# Patient Record
Sex: FEMALE | Race: WHITE | NOT HISPANIC OR LATINO | Employment: FULL TIME | URBAN - METROPOLITAN AREA
[De-identification: names, ages, dates, MRNs, and addresses within clinical notes are randomized per-mention and may not be internally consistent; named-entity substitution may affect disease eponyms.]

---

## 2017-02-21 ENCOUNTER — THERAPY VISIT (OUTPATIENT)
Dept: PHYSICAL THERAPY | Facility: CLINIC | Age: 52
End: 2017-02-21
Payer: COMMERCIAL

## 2017-02-21 ENCOUNTER — RADIANT APPOINTMENT (OUTPATIENT)
Dept: GENERAL RADIOLOGY | Facility: CLINIC | Age: 52
End: 2017-02-21
Attending: INTERNAL MEDICINE
Payer: COMMERCIAL

## 2017-02-21 ENCOUNTER — OFFICE VISIT (OUTPATIENT)
Dept: INTERNAL MEDICINE | Facility: CLINIC | Age: 52
End: 2017-02-21
Payer: COMMERCIAL

## 2017-02-21 VITALS
HEIGHT: 65 IN | TEMPERATURE: 98 F | WEIGHT: 212.6 LBS | SYSTOLIC BLOOD PRESSURE: 118 MMHG | DIASTOLIC BLOOD PRESSURE: 78 MMHG | OXYGEN SATURATION: 99 % | HEART RATE: 96 BPM | BODY MASS INDEX: 35.42 KG/M2

## 2017-02-21 DIAGNOSIS — M25.519 SHOULDER PAIN, UNSPECIFIED CHRONICITY, UNSPECIFIED LATERALITY: Primary | ICD-10-CM

## 2017-02-21 DIAGNOSIS — M75.42 IMPINGEMENT SYNDROME OF LEFT SHOULDER: Primary | ICD-10-CM

## 2017-02-21 DIAGNOSIS — Z13.6 CARDIOVASCULAR SCREENING; LDL GOAL LESS THAN 160: ICD-10-CM

## 2017-02-21 DIAGNOSIS — M79.673 PAIN OF FOOT, UNSPECIFIED LATERALITY: ICD-10-CM

## 2017-02-21 DIAGNOSIS — M25.519 SHOULDER PAIN, UNSPECIFIED CHRONICITY, UNSPECIFIED LATERALITY: ICD-10-CM

## 2017-02-21 DIAGNOSIS — Z12.11 COLON CANCER SCREENING: ICD-10-CM

## 2017-02-21 DIAGNOSIS — Z12.31 VISIT FOR SCREENING MAMMOGRAM: ICD-10-CM

## 2017-02-21 PROBLEM — R10.13 DYSPEPSIA: Status: ACTIVE | Noted: 2017-02-21

## 2017-02-21 PROCEDURE — 73030 X-RAY EXAM OF SHOULDER: CPT | Mod: LT

## 2017-02-21 PROCEDURE — 97161 PT EVAL LOW COMPLEX 20 MIN: CPT | Mod: GP | Performed by: PHYSICAL THERAPIST

## 2017-02-21 PROCEDURE — 97110 THERAPEUTIC EXERCISES: CPT | Mod: GP | Performed by: PHYSICAL THERAPIST

## 2017-02-21 PROCEDURE — 99204 OFFICE O/P NEW MOD 45 MIN: CPT | Performed by: INTERNAL MEDICINE

## 2017-02-21 RX ORDER — ACETAMINOPHEN 500 MG
500 TABLET ORAL EVERY 6 HOURS PRN
COMMUNITY
Start: 2012-12-26 | End: 2019-05-10

## 2017-02-21 NOTE — MR AVS SNAPSHOT
After Visit Summary   2/21/2017    Jayne Tyler    MRN: [id number]           Patient Information     Date Of Birth          1965        Visit Information        Provider Department      2/21/2017 6:20 AM Salo Simmons MD [facility]        Today's Diagnoses     Shoulder pain, unspecified chronicity, unspecified laterality    -  1    CARDIOVASCULAR SCREENING; LDL GOAL LESS THAN 160        Pain of foot, unspecified laterality        Visit for screening mammogram        Colon cancer screening           Follow-ups after your visit        Additional Services     GASTROENTEROLOGY ADULT REF PROCEDURE ONLY       Last Lab Result: No results found for: CR  Body mass index is 35.38 kg/(m^2).     Needed:  No  Language:  English    Patient will be contacted to schedule procedure.     Please be aware that coverage of these services is subject to the terms and limitations of your health insurance plan.  Call member services at your health plan with any benefit or coverage questions.  Any procedures must be performed at a [place] facility OR coordinated by your clinic's referral office.    Please bring the following with you to your appointment:    (1) Any X-Rays, CTs or MRIs which have been performed.  Contact the facility where they were done to arrange for  prior to your scheduled appointment.    (2) List of current medications   (3) This referral request   (4) Any documents/labs given to you for this referral            [facility] PT, HAND, AND CHIROPRACTIC REFERRAL       **This order will print in the [facility] Scheduling Office**    Physical Therapy, Hand Therapy and Chiropractic Care are available through:    *[facility]  *[facility]  *[facility]    Call one number to schedule at any of the above locations: [phone].    Your provider has referred you to: Physical Therapy at [facility] or [facility]    Indication/Reason  for Referral: Shoulder Pain  Onset of Illness: weeks++  Therapy Orders: Evaluate and Treat  Special Programs: None  Special Request: None    Solange Narvaez      Additional Comments for the Therapist or Chiropractor:     Please be aware that coverage of these services is subject to the terms and limitations of your health insurance plan.  Call member services at your health plan with any benefit or coverage questions.      Please bring the following to your appointment:    *Your personal calendar for scheduling future appointments  *Comfortable clothing            ORTHO  REFERRAL       [facility] is referring you to the Orthopedic  Services at [facility].       The  Representative will assist you in the coordination of your Orthopedic and Musculoskeletal Care as prescribed by your physician.    The  Representative will call you within 1 business day to help schedule your appointment, or you may contact the  Representative at:    All areas ~ [phone]     Type of Referral : Non Surgical       Timeframe requested: Within 2 weeks    Coverage of these services is subject to the terms and limitations of your health insurance plan.  Please call member services at your health plan with any benefit or coverage questions.      If X-rays, CT or MRI's have been performed, please contact the facility where they were done to arrange for , prior to your scheduled appointment.  Please bring this referral request to your appointment and present it to your specialist.                  Future tests that were ordered for you today     Open Future Orders        Priority Expected Expires Ordered    *MA Screening Digital Bilateral Routine 2/21/2017 3/31/2017 2/21/2017    XR Shoulder Left G/E 3 Views Routine 2/21/2017 3/31/2017 2/21/2017            Who to contact     If you have questions or need follow up information about today's clinic visit  "or your schedule please contact [facility] directly at [phone].  Normal or non-critical lab and imaging results will be communicated to you by MyChart, letter or phone within 4 business days after the clinic has received the results. If you do not hear from us within 7 days, please contact the clinic through [employer] or phone. If you have a critical or abnormal lab result, we will notify you by phone as soon as possible.  Submit refill requests through [employer] or call your pharmacy and they will forward the refill request to us. Please allow 3 business days for your refill to be completed.          Additional Information About Your Visit        [employer] Information     iCopyright lets you send messages to your doctor, view your test results, renew your prescriptions, schedule appointments and more. To sign up, go to [email or web address] . Click on \"Log in\" on the left side of the screen, which will take you to the Welcome page. Then click on \"Sign up Now\" on the right side of the page.     You will be asked to enter the access code listed below, as well as some personal information. Please follow the directions to create your username and password.     Your access code is: [email or web address]  Expires: 2017  6:42 AM     Your access code will  in 90 days. If you need help or a new code, please call your [place] clinic or [phone].        Care EveryWhere ID     This is your Care EveryWhere ID. This could be used by other organizations to access your [place] medical records  [id number]        Your Vitals Were     Pulse Temperature Height Last Period Pulse Oximetry Breastfeeding?    96 98  F (36.7  C) (Oral) 5' 5\" (1.651 m) (LMP Unknown) 99% No    BMI (Body Mass Index)                   35.38 kg/m2            Blood Pressure from Last 3 Encounters:   17 118/78    Weight from Last 3 Encounters:   17 212 lb 9.6 oz (96.4 kg)              We Performed the Following     " GASTROENTEROLOGY ADULT REF PROCEDURE ONLY     [facility], HAND, AND CHIR[facility]RACTIC REFERRAL     ORTHO  REFERRAL        Primary Care Provider    None Doctor, MD       No address on file        Thank you!     Thank you for choosing [facility]  for your care. Our goal is always to provide you with excellent care. Hearing back from our patients is one way we can continue to improve our services. Please take a few minutes to complete the written survey that you may receive in the mail after your visit with us. Thank you!             Your Updated Medication List - Protect others around you: Learn how to safely use, store and throw away your medicines at www.disposemymeds.org.          This list is accurate as of: 2/21/17  6:42 AM.  Always use your most recent med list.                   Brand Name Dispense Instructions for use    acetaminophen 500 MG tablet    TYLENOL     Take 500 mg by mouth every 6 hours as needed       ranitidine 150 MG tablet    ZANTAC     Take 300 mg by mouth 2 times daily

## 2017-02-21 NOTE — MR AVS SNAPSHOT
"              After Visit Summary   2/21/2017    Jayne Tyler    MRN: [id number]           Patient Information     Date Of Birth          1965        Visit Information        Provider Department      2/21/2017 8:10 AM Deandra Crook PT [facility] Physical Therapy        Today's Diagnoses     Impingement syndrome of left shoulder    -  1       Follow-ups after your visit        Your next 10 appointments already scheduled     Feb 27, 2017  8:30 AM CST   New Visit with Olvin Betancur DPM   [facility] ([facility]    [address] [id number] [phone]              Future tests that were ordered for you today     Open Future Orders        Priority Expected Expires Ordered    *MA Screening Digital Bilateral Routine 2/21/2017 3/31/2017 2/21/2017            Who to contact     If you have questions or need follow up information about today's clinic visit or your schedule please contact [facility] directly at [phone].  Normal or non-critical lab and imaging results will be communicated to you by [employer], letter or phone within 4 business days after the clinic has received the results. If you do not hear from us within 7 days, please contact the clinic through [employer] or phone. If you have a critical or abnormal lab result, we will notify you by phone as soon as possible.  Submit refill requests through [employer] or call your pharmacy and they will forward the refill request to us. Please allow 3 business days for your refill to be completed.          Additional Information About Your Visit        MyChart Information     [employer] lets you send messages to your doctor, view your test results, renew your prescriptions, schedule appointments and more. To sign up, go to [email or web address] . Click on \"Log in\" on the left side of the screen, " "which will take you to the Welcome page. Then click on \"Sign up Now\" on the right side of the page.     You will be asked to enter the access code listed below, as well as some personal information. Please follow the directions to create your username and password.     Your access code is: [email or web address]  Expires: 2017  6:42 AM     Your access code will  in 90 days. If you need help or a new code, please call your [place] clinic or [phone].        Care EveryWhere ID     This is your Care EveryWhere ID. This could be used by other organizations to access your [place] medical records  [id number]        Your Vitals Were     Last Period                   (LMP Unknown)            Blood Pressure from Last 3 Encounters:   17 118/78    Weight from Last 3 Encounters:   17 96.4 kg (212 lb 9.6 oz)              We Performed the Following     HC PT EVAL, LOW COMPLEXITY     [facility] INITIAL EVAL REPORT     THERAPEUTIC EXERCISES        Primary Care Provider    None Doctor, MD       No address on file        Thank you!     Thank you for choosing [place] [facility]  for your care. Our goal is always to provide you with excellent care. Hearing back from our patients is one way we can continue to improve our services. Please take a few minutes to complete the written survey that you may receive in the mail after your visit with us. Thank you!             Your Updated Medication List - Protect others around you: Learn how to safely use, store and throw away your medicines at www.disposemymeds.org.          This list is accurate as of: 17 10:54 AM.  Always use your most recent med list.                   Brand Name Dispense Instructions for use    acetaminophen 500 MG tablet    TYLENOL     Take 500 mg by mouth every 6 hours as needed       ranitidine 150 MG tablet    ZANTAC     Take 300 mg by mouth 2 times daily         "

## 2017-02-21 NOTE — NURSING NOTE
"Chief Complaint   Patient presents with     Shoulder Pain     Foot Pain       Initial /78 (BP Location: Left arm, Patient Position: Chair, Cuff Size: Adult Large)  Pulse 96  Temp 98  F (36.7  C) (Oral)  Ht 5' 5\" (1.651 m)  Wt 212 lb 9.6 oz (96.4 kg)  LMP  (LMP Unknown)  SpO2 99%  Breastfeeding? No  BMI 35.38 kg/m2 Estimated body mass index is 35.38 kg/(m^2) as calculated from the following:    Height as of this encounter: 5' 5\" (1.651 m).    Weight as of this encounter: 212 lb 9.6 oz (96.4 kg).  Medication Reconciliation: complete   Arianna Dolan CMA      "

## 2017-02-21 NOTE — PROGRESS NOTES
SUBJECTIVE:                                                    Jayne Tyler is a 51 year old female who presents to clinic today for the following health issues:    New Patient/Transfer of Care- Previously seen at [facility]       Musculoskeletal problem/pain      Duration: 2 yrs     Description  Location: left shoulder     Intensity:  moderate    Accompanying signs and symptoms: radiation of pain to arm, numbness and tingling    History  Previous similar problem: YES. Hx of R shoulder surgery as well   Previous evaluation:  Steroid injections in past- temporary relief     Precipitating or alleviating factors:  Trauma or overuse: no   Aggravating factors include: Lifting arm     Therapies tried and outcome: Steroid injections- temporary relief in past        Other concerns:  1. Right foot neuroma- seen by podiatry in [facility] for Neurolysis injection     Problem list and histories reviewed & adjusted, as indicated.  Additional history: as documented    Patient Active Problem List   Diagnosis     Shoulder pain, unspecified chronicity, unspecified laterality     CARDIOVASCULAR SCREENING; LDL GOAL LESS THAN 160     History reviewed. No pertinent past surgical history.    Social History   Substance Use Topics     Smoking status: Current Every Day Smoker     Types: Cigarettes     Smokeless tobacco: Not on file     Alcohol use Yes      Comment: Rare     History reviewed. No pertinent family history.      Current Outpatient Prescriptions   Medication Sig Dispense Refill     ranitidine (ZANTAC) 150 MG tablet Take 300 mg by mouth 2 times daily       acetaminophen (TYLENOL) 500 MG tablet Take 500 mg by mouth every 6 hours as needed       Allergies   Allergen Reactions     Penicillins Hives     BP Readings from Last 3 Encounters:   No data found for BP    Wt Readings from Last 3 Encounters:   No data found for Wt                    ROS:  C: NEGATIVE for fever, chills, change in weight  E/M: NEGATIVE for  "ear, mouth and throat problems  R: NEGATIVE for significant cough or SOB  CV: NEGATIVE for chest pain, palpitations or peripheral edema  GI: NEGATIVE for nausea, abdominal pain, heartburn, or change in bowel habits  : NEGATIVE for frequency, dysuria, or hematuria  H: NEGATIVE for bleeding problems  P: NEGATIVE for changes in mood or affect    OBJECTIVE:                                                    /78 (BP Location: Left arm, Patient Position: Chair, Cuff Size: Adult Large)  Pulse 96  Temp 98  F (36.7  C) (Oral)  Ht 5' 5\" (1.651 m)  Wt 212 lb 9.6 oz (96.4 kg)  LMP  (LMP Unknown)  SpO2 99%  Breastfeeding? No  BMI 35.38 kg/m2  Body mass index is 35.38 kg/(m^2).  GENERAL: healthy, alert and no distress  EYES: Eyes grossly normal to inspection, extraocular movements - intact, and PERRL  HENT: ear canals- normal; TMs- normal; Nose- normal; Mouth- no ulcers, no lesions  NECK: no tenderness, no adenopathy, no asymmetry, no masses, no stiffness; thyroid- normal to palpation  RESP: lungs clear to auscultation - no rales, no rhonchi, no wheezes  CV: regular rates and rhythm, normal S1 S2, no S3 or S4 and no murmur, no click or rub -  MS: extremities- no gross deformities noted, no edema  NEURO: strength and tone- normal, sensory exam- grossly normal, mentation- intact, speech- normal, reflexes- symmetric  Altered internal ROM left shoulder.  PSYCH: Alert and oriented times 3; speech- coherent , normal rate and volume; able to articulate logical thoughts, able to abstract reason, no tangential thoughts, no hallucinations or delusions, affect- normal     ASSESSMENT/PLAN:                                                      (M25.519) Shoulder pain, unspecified chronicity, unspecified laterality  (primary encounter diagnosis)  Comment: suggest starting PT then potential for [facility] referral  Plan: [facility], HAND, AND CHIROPRACTIC REFERRAL, ORTHO          REFERRAL, XR Shoulder Left G/E 3         Views        "     (Z13.6) CARDIOVASCULAR SCREENING; LDL GOAL LESS THAN 160  Comment: labs advised   Plan:     (M79.113) Pain of foot, unspecified laterality  Comment: appears has had chronic neuroma issues with multiple injections thus better served by seeing Podiatry  Plan:     (Z12.31) Visit for screening mammogram  Comment: ordered as screening  Plan: *MA Screening Digital Bilateral            (Z12.11) Colon cancer screening  Comment: ADVISED COLONOSCOPY FOR ROUTINE PREVENTATIVE CARE.  Plan: GASTROENTEROLOGY ADULT REF PROCEDURE ONLY        States had one at age 30-40 for ?reason        See Patient Instructions    Salo Simmons MD  [facility]    THE MEDICATION LIST HAS BEEN FULLY RECONCILED BY THE [facility] AND THE NURSING STAFF.

## 2017-02-21 NOTE — PROGRESS NOTES
"[place] Initial Evaluation    Subjective:    Jayne Tyler is a 51 year old female with a left shoulder condition.  Condition occurred with:  Unknown cause.  Condition occurred: for unknown reasons.  This is a new condition  Has had a 2-year history of left shoulder pain for unknown reasons. Has had 2 injections with good relief for a few months. July 2016 pain began to flare-up again and has steadily been worsening. Has not had an injection in more than a year. States it is hard to recall the details of her shoulder pain over past 2 years due to her divorce that was stressful. Has not sought care for shoulder because has been putting it off. She is right hand dominant. Pulling, pushing, lifting, reaching are all painful. Can also have pain just sitting at rest, feels like \"a really bad toothache.\". Lying on her right side feels like it allows the left shoulder to fall forward which is painful. Better to lie on left side but then is painful in the morning. History of right shoulder surgery \"to clean it up\" in 2015..    Patient reports pain:  Anterior and upper arm.    Pain is described as aching and is intermittent and reported as 2/10 and 9/10.   Worse during: varies.  Symptoms are exacerbated by using arm overhead, using arm behind back, lifting and carrying and relieved by activity/movement.  Since onset symptoms are gradually worsening.  Special tests:  X-ray.      General health as reported by patient is good.                  Barriers include:  None as reported by the patient.    Red flags:  None as reported by the patient.                      Objective:    Standing Alignment:    Cervical/Thoracic:  Forward head  Shoulder/UE:  Rounded shoulders                  Flexibility/Screens:   Negative screens: Cervical                              Shoulder Evaluation:  ROM:  AROM:    Flexion:  Left:  123    Right:  134    Abduction:  Left: 136   Right:  " 141                  Extension/Internal Rotation:  Left:  T9    Right:  T9    PROM:    Flexion:  Left:  125    Right: 162      Abduction:  Left:  WNL    Right:  WNL    Internal Rotation:  Left:  WNL    Right:  WNL  External Rotation:  Left:  WNL    Right:  WNL                Pain: with all motions and at end range  Endfeel: empty on passive shoulder flexion L, pain limited.  Strength:    Flexion: Left:4+/5    Pain: +    Right: 5/5     Pain:     Abduction:  Left: 4+/5   Pain:+    Right: 5/5     Pain:    Internal Rotation:  Left:5/5      Pain:+    Right: 5/5     Pain:  External Rotation:   Left:4+/5      Pain:+   Right:5/5     Pain:                Palpation:  Palpation assessed shoulder: Pt flinching and wincing with touch.  Left shoulder tenderness present at:  Supraspinatus; Infraspinatus; Teres Minor and Bicipital Groove    Mobility Tests:    Glenohumeral anterior left:  Normal  Glenohumeral anterior right:  Normal  Glenohumeral posterior left:  Normal  Glenohumeral posterior right:  Normal                                             General     ROS    Assessment/Plan:      Patient is a 51 year old female with left side shoulder complaints.  She is right hand dominant. She was teary at today's visit. Fairly good motion/strength but pain with all. Positive impingement tests. Tender with palpation to rotator cuff. Some pain behaviors: jumping and wincing with gentle palpation, at points when pt jumped/winced PT asked pt if that area was painful, she states, no, not really. Had pain with anti-gravity cuff exercises so started with pendulum and AAROM within pain tolerance. Will progress to cuff strengthening and scapular stabilization as able.    Patient has the following significant findings with corresponding treatment plan.                Diagnosis 1:  L shoulder impingement  Pain -  hot/cold therapy, directional preference exercise and home program  Decreased ROM/flexibility - manual therapy, therapeutic exercise  and home program  Decreased strength - therapeutic exercise, therapeutic activities and home program  Decreased function - therapeutic activities and home program  Impaired posture - neuro re-education and home program    Therapy Evaluation Codes:   1) History comprised of:   Personal factors that impact the plan of care:      None.    Comorbidity factors that impact the plan of care are:      None.     Medications impacting care: None.  2) Examination of Body Systems comprised of:   Body structures and functions that impact the plan of care:      Shoulder.   Activity limitations that impact the plan of care are:      Bathing, Dressing, Lifting and Laying down.  3) Clinical presentation characteristics are:   Stable/Uncomplicated.  4) Decision-Making    Low complexity using standardized patient assessment instrument and/or measureable assessment of functional outcome.  Cumulative Therapy Evaluation is: Low complexity.    Previous and current functional limitations:  (See Goal Flow Sheet for this information)    Short term and Long term goals: (See Goal Flow Sheet for this information)     Communication ability:  Patient appears to be able to clearly communicate and understand verbal and written communication and follow directions correctly.  Treatment Explanation - The following has been discussed with the patient:   RX ordered/plan of care  Anticipated outcomes  Possible risks and side effects  This patient would benefit from PT intervention to resume normal activities.   Rehab potential is good.    Frequency:  1 X week, once daily  Duration:  for 4 weeks tapering to 2 X a month over 8 weeks  Discharge Plan:  Achieve all LTG.  Independent in home treatment program.  Reach maximal therapeutic benefit.    Please refer to the daily flowsheet for treatment today, total treatment time and time spent performing 1:1 timed codes.

## 2017-02-22 NOTE — PROGRESS NOTES
Subjective:                                       Pertinent medical history includes:  Overweight, depression and smoking.  Medical allergies: yes (Penicillin).  Other surgeries include:  Orthopedic surgery and other (Bunion right ft, rt shoulder, 2 distectomy, 1 fusion).    Current occupation is .    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                              Objective:    System    Physical Exam    General     ROS    Assessment/Plan:

## 2017-02-27 ENCOUNTER — OFFICE VISIT (OUTPATIENT)
Dept: PODIATRY | Facility: CLINIC | Age: 52
End: 2017-02-27
Payer: COMMERCIAL

## 2017-02-27 ENCOUNTER — RADIANT APPOINTMENT (OUTPATIENT)
Dept: GENERAL RADIOLOGY | Facility: CLINIC | Age: 52
End: 2017-02-27
Attending: PODIATRIST
Payer: COMMERCIAL

## 2017-02-27 VITALS — BODY MASS INDEX: 35.32 KG/M2 | WEIGHT: 212 LBS | HEIGHT: 65 IN

## 2017-02-27 DIAGNOSIS — M77.8 CAPSULITIS OF FOOT, RIGHT: ICD-10-CM

## 2017-02-27 DIAGNOSIS — M20.12 HALLUX ABDUCTO VALGUS, LEFT: ICD-10-CM

## 2017-02-27 DIAGNOSIS — M79.671 RIGHT FOOT PAIN: Primary | ICD-10-CM

## 2017-02-27 PROCEDURE — 99203 OFFICE O/P NEW LOW 30 MIN: CPT | Performed by: PODIATRIST

## 2017-02-27 PROCEDURE — 73630 X-RAY EXAM OF FOOT: CPT | Mod: RT

## 2017-02-27 PROCEDURE — 73630 X-RAY EXAM OF FOOT: CPT | Mod: LT

## 2017-02-27 NOTE — PATIENT INSTRUCTIONS
[facility] Locations:        Monday & Friday AM - [facility] Wednesday - [facility]   [address] [place] [address] [place] [address]   ph: [phone] ph: [phone]   fax: [phone] fax:[phone]       Tuesday - Surgery Thursday - [facility]   [phone] [address] [place] [address]   Appointment Scheduling Line ph: [phone] fax: [phone]     FYI: Our new schedule at [facility] on  is from 7 AM - 2 PM.    Capsulitis/Metatarsalgia    All joints in the body are surrounded by a capsule, or a covering of soft tissue and ligaments. The capsule holds bones together and secretes joint fluid to help lubricate the joint.  If a joint capsule is exposed to excessive force, it can develop microscopic tears and become inflamed. This commonly occurs in the foot due to mild variation in anatomy. Hammertoes, bunions, irregular bone length, joint immobility, etc. can all lead to excessive force on the joint. Capsule injury can also occur due to repetitive stress from exercise, insufficient support from shoes, excessive bare foot walking and excessive weight.      Conservative treatments include ice, rest from the aggravating activity, weight loss, orthotic inserts, improving shoes and shoe modifications. Appropriate shoes will protect the inflamed tissue improving the chances of healing. Avoidance of standing or walking barefoot, including around the house, is necessary to allow healing. Casts are sometimes used for more aggressive protection.  NSAIDs such as Advil are also used to help with pain and decreasing inflammation. If pain continues over a period of weeks with continuous rest and icing, Corticosteroid injections can be a treatment option to try and help decrease inflammation.    Surgery is often necessary to correct the underlying structural problem. Surgery might include shortening an excessively long  "bone, repairing bunion or hammertoe, lengthening a tight Achilles  tendon, etc. These are same day surgeries that might be pursued if more conservative measures fail to provide relief.      The inflamed joint capsule has the potential to completely tear. This will allow the toe to drift off the ground, curving toward the other toes. The involved toe may under or overlap the adjacent toes as drift continues. The pain may improve after the joint tears or this new position will be permanent. Surgery can address the toe alignment. Your goal of treating capsulitis is to avoid this scenario.          Bunion (hallux abducto valgus)   A bunion is caused by muscle imbalance. The great toe is pulled toward the smaller toes. The metatarsal head is pushed outward creating a lump on the side of your foot. Imbalance is the result of foot structure and instability.   Bunions do not improve with time. They usually enlarge, however this is a fairly slow process. Shoes do not necessarily cause bunions, however, they can hasten development and definitely cause bunions to hurt.   Bunions often run in families. We inherit a certain foot structure, which may be predisposed to bunion development.   Bunion pain is likely a combination of shoes rubbing on the bump, nerve irritation, compression between the toes, joint misalignment, arthritis and altered gait.   Signs & Symptoms of \"Bunions\"   Bunions are usually termed mild, moderate or severe. Just because you have a bunion does not mean you have to have pain. There are some people with very severe bunions and no pain and people with mild bunions and a lot of pain.   1.  Pain on the inside of your foot at the big toe joint (1st MTPJ)   2.  Swelling on the inside of your foot at the big toe joint   3.  Redness on the inside of your foot at the big toe joint   4.  Numbness or burning in the big toe (hallux)   5.  Decreased motion at the big toe joint   6.  Painful bursa (fluid-filled sac) on " "the inside of your foot at the big toe joint   7.  Pain while wearing shoes -especially shoes too narrow or with high heels    8.  Pain during activities   9.  Corn in between the big toe and second toe   10.  Callous formation on the side or bottom of the big toe or big toe joint   11.  Callous under the second toe joint (2nd MTPJ)   12.  Pain in the second toe joint   Treatment for \"Bunions\"   Conservative (non-surgical) treatment will not make the bunion go away, but it will hopefully decrease the signs and symptoms you have and help you get rid of the pain and get you back to your activities.   1.  Wider shoes or extra depth shoes: Most bunion pain can be improved simply by wearing compatible shoes. People with bunions cannot choose footwear simply because they like the style. Your bunion should determine which shoes are to be worn. Wide shoes with nonirritating seams,soft leather and a square toe box are most compatible with a bunion. Shoes should fit appropriately right out of the box but may need to be professionally stretched and modified to accommodate the bump. Heels, dress shoes and shoes with pointed toes will not be comfortable.   2. NSAIDs   3.  Arch supports, custom inserts, padding, splints, toe spacers : Most bunion pain can be improved simply by wearing compatible shoes. People with bunions cannot choose footwear simply because they like the style. Your bunion should determine which shoes are to be worn. Wide shoes with nonirritating seams,soft leather and a square toe box are most compatible with a bunion. Shoes should fit appropriately right out of the box but may need to be professionally stretched and modified to accommodate the bump. Heels, dress shoes and shoes with pointed toes will not be comfortable.   4.  Change activities   5.  Physical therapy  Surgical Options  Surgical treatment for bunions is sometimes needed. If you are limited by pain, cannot fit in shoes comfortably and are not " able to do your daily activities then surgery may be a good option for you. There are many different surgical procedures to repair bunions. Your foot and ankle surgeon will review your foot exam findings, your x-rays, your age, your health, your lifestyle, your physical activity level and discuss with you which procedure he or she would recommend. Surgical procedures for bunions range from soft tissue repair to cutting and realigning the bones. It is not recommended that you have bunion surgery for cosmetic reasons (you do not like how your foot looks) or because you want to fit in a certain pair of shoes; There is the risk that even after surgery, the bunion will reoccur 9-10% of the time.   Bunion surgery involves cutting and repositioning the bones surrounding the bunion. Pins and screws are used to hold the bones in place during the healing process. The goal of bunion surgery is to reduce the size of the bunion bump. Realignment of the toe and joint is attempted.     Some first toes cannot be forced back into normal alignment even with surgery. Surgery is helpful in most cases but does not necessarily create a normal foot.   Healing after surgery requires about six weeks of protection. This allows the bone to heal. Maximum recovery takes about one year. The scar tissue and jOint structures require this amount of time to finish the healing process. Expect stiffness, swelling and numbness during that time frame. Bunion surgery does involve side effects. Some side effects are predictable and others are less common but do occur. A scar will be visible and could be irritated by shoes. The shoe may rub on the screw or internal pin requiring surgical removal of these fixation devices. The screw and pin would likely be left in place for a full year. The first toe may loose motion after bunion surgery. The amount of stiffness is variable. Some people never regain normal motion of the first toe. This is due to scar tissue  inherent to any surgery. The first toe may drift toward the second toe or away from the second toe. Spreading of the first and second toes is a rare occurrence after bunion surgery. This can be quite bothersome and would need to be surgically repaired. Toe drift toward the second toe could result in a recurrent bunion and revision surgery. Joint fusion is one option to correct an unstable, drifting toe. This procedure straightens the toe, however, no motion remains. Fusion may be necessary to correct complications of bunion surgery or as the original procedure in severe cases.   All surgical procedures involve risk of infection, numbness, pain, delayed healing, osteotomy dislocation, blood clots, continued foot pain, etc. Bunion surgery is quite complex and should not be taken lightly.   Any skin incision can lead to infection. Deep infection might involve the bone and thus repeat surgery and six weeks of IV antibiotics. Scar tissue can cause nerve pain or numbness. This is generally temporary but can be permanent. We do not have treatments that cure nerve problems. Second toe pain could be related to altered mechanics and pressure transferred to the second toe. Most feet with bunions have pre-existing second toe problems. Delayed bone healing would lengthen the healing time. Some bones simply do not heal. This requires repeat surgery, electronic bone stimulation and/or extended protection. Smokers have an approximate 20% chance of poor bone healing. This is double that of a non-smoker. The bone cut may displace. This may need to be repaired with a second operation. Displacement can cause jOint malalignment. Immobility after surgery can cause blood clots in the legs and lungs. This could result in death.   Foot pain is complex. Most feet hurt for more than one reason. Fixing the bunion would not necessarily create a pain free foot. Appropriate shoes, healthy body weight, avoidance of bare foot walking and moderation  "of activity will always be necessary to enjoy foot comfort. Your bunion may involve arthritis, which is incurable even with surgery. Long standing bunions often involve chronic irritation to the surrounding nerves. Nerve pain may not resolve even with reducing the bunion bump since permanent nerve damage may be present   Bunion surgery is nevertheless quite successful. Most surgical patients are pleased with their foot following bunion surgery. Many of the issues described above can be controlled by taking proper care of your foot during the healing process.   Your surgeon would be happy to fully describe any of the above issues. You should pursue a full understanding of the operation,recovery process and any potential problems that could develop.   Prevention of \"Bunions\"   1.  Do not wear high heels if there is a family history of bunions.  2.  Wear shoes that have enough width and depth in the toe box  Here are exercises that may benefit people with bunions:   Toe stretches. Stretching out your toes can help keep them limber and offset foot pain. To stretch your toes, point your toes straight ahead for 5 seconds and then curl them under for 5 seconds. Repeat these stretches 10 times. These exercises can be especially beneficial if you also have hammertoes, or chronically bent toes, in addition to a bunion.   Toe flexing and edel. Press your toes against a hard surface such as a wall, to flex and stretch them; hold the position for 10 seconds and repeat three to four times. Then flex your toes in the opposite direction; hold the position for 10 seconds and repeat three to four times.   Stretching your big toe. Using your fingers to gently pull your big toe over into proper alignment can be helpful as well. Hold your toe in position for 10 seconds and repeat three to four times.   Resistance exercises. Wrap either a towel or belt around your big toe and use it to pull your big toe toward you while " simultaneously pushing forward, against the towel, with your big toe.   Ball roll. To massage the bottom of your foot, sit down, place a golf ball on the floor under your foot, and roll it around under your foot for two minutes. This can help relieve foot strain and cramping.   Towel curls. You can strengthen your toes by spreading out a small towel on the floor, curling your toes around it, and pulling it toward you. Repeat five times. Gripping objects with your toes like this can help keep your foot flexible.   Picking up marbles. Another gripping exercise you can perform to keep your foot flexible is picking up marbles with your toes. Do this by placing 20 marbles on the floor in front of you and use your foot to pick the marbles up one by one and place them in a bowl.   Walking along the beach. Whenever possible, spend time walking on sand. This can give you a gentle foot massage and also help strengthen your toes. This is especially beneficial for people who have arthritis associated with their bunions.

## 2017-02-27 NOTE — PROGRESS NOTES
ASSESSMENT/PLAN:  Encounter Diagnoses   Name Primary?     Right foot pain Yes     Hallux abducto valgus, left      Capsulitis of foot, right      Conservative cares discussed that would address both feet:    1) adequate forefoot shoe width  2) stiffer soled shoe  3) metatarsal padding versus new custom orthoses  4) avoidance of barefoot walking    We reviewed the x-ray images together.  I explained that I am more concerned about her right foot pain.       If pain persists, she might benefit from a right 2nd metatarsal Weil osteotomy and plantar plate repair.         Body mass index is 35.28 kg/(m^2).    Weight management plan: Patient was referred to their PCP to discuss a diet and exercise plan.      Olvin Betancur DPM, FACFAS, MS    [place] [facility]/Foot & Ankle Surgery      ____________________________________________________________________    HPI:         Chief Complaint: right foot pain. She associates the pain to a neuroma.  Specifies the right 2nd metatarsophalangeal joint region.  Onset of problem: 6-7 years  Pain/ discomfort is described as:  Throbbing, deep ache, numbness at times  Ratin/10   Frequency:  constant    The pain is made worse with walking, standing  Previous treatment: custom orthoses, injections (both cortisone and alcohol).     Secondary concerns:  Pain related to her left foot pain.  30 years and progressing.  History of bunion surgery on the right in       *No past medical history on file.*  *No past surgical history on file.*  *  Current Outpatient Prescriptions   Medication Sig Dispense Refill     ranitidine (ZANTAC) 150 MG tablet Take 300 mg by mouth 2 times daily       acetaminophen (TYLENOL) 500 MG tablet Take 500 mg by mouth every 6 hours as needed         ROS:     A 10-point review of systems was performed and is positive for that noted in the HPI and as seen below.  All other areas are negative.     Numbness in feet?  yes   Calf pain with walking?  "yes  Recent foot/ankle injury? no  Weight change over past 12 months? 35-40# loss  Self perception as overweight? yes  Recent flu-like symptoms? no  Joint pain other than feet ? Fingers, shoulders, hips    Social History: Employment:  Customer service;  Exercise/Physical activity:  Daily walking;  Tobacco use:  yes  Social History     Social History     Marital status:      Spouse name: N/A     Number of children: N/A     Years of education: N/A     Occupational History     Not on file.     Social History Main Topics     Smoking status: Current Every Day Smoker     Types: Cigarettes     Smokeless tobacco: Not on file     Alcohol use Yes      Comment: Rare     Drug use: No     Sexual activity: Yes     Other Topics Concern     Parent/Sibling W/ Cabg, Mi Or Angioplasty Before 65f 55m? No     Social History Narrative       Family history:  No family history on file.    Rheumatoid arthritis:  Parent, sibling  Foot Problems: bunions  Diabetes: no      EXAM:    Vitals: Ht 5' 5\" (1.651 m)  Wt 212 lb (96.2 kg)  LMP  (LMP Unknown)  BMI 35.28 kg/m2  BMI: Body mass index is 35.28 kg/(m^2).  Height: 5' 5\"    Constitutional/ general:  Pt is in no apparent distress, appears well-nourished.  Cooperative with history and physical exam.     Vascular:  Pedal pulses are palpable bilaterally for both the DP and PT arteries.  CFT < 3 sec.  No edema.  Pedal hair growth noted.     Neuro:  Alert and oriented x 3. Coordinated gait.  Light touch sensation is intact to the L4, L5, S1 distributions. No obvious deficits.  No evidence of neurological-based weakness, spasticity, or contracture in the lower extremities.     Derm: Normal texture and turgor.  No erythema, ecchymosis, or cyanosis.  No open lesions.     Musculoskeletal:    Lower extremity muscle strength is normal.  Patient is ambulatory without an assistive device or brace .  No gross deformities.  Pain on palpation:  Sub right 2nd metatarsophalangeal joint - the second toe " has a contracture deformity.      Hallux abducto valgus deformity, left foot.  Reducible in the transverse plane with preserved sagittal plane ROM.  No crepitus.          Radiographic Exam:  X-Ray Findings:  I personally reviewed the films.      4 views right foot:  Two screws in the midshaft of the 1st metatarsal.  Significant, near 1cm, length difference between the 1st and longer 2nd metatarsal  No acute findings.    4 views left foot:  Findings are consistent with the clinical exam.  There is an increased 1st intermetatarsal angle (VALENTIN = 13 degrees) and increased hallux abductus angle.  The sesamoids are subluxing laterally in relation to the 1st metatarsal head.           Olvin Betancur DPM, FACFAS, MS    [place] [facility] Podiat[facility]/Foot & Ankle Surgery

## 2017-02-27 NOTE — MR AVS SNAPSHOT
After Visit Summary   2017    Jayne Tyler    MRN: [id number]           Patient Information     Date Of Birth          1965        Visit Information        Provider Department      2017 8:30 AM Olvin Betancur DPM [facility]        Today's Diagnoses     Right foot pain    -  1    Hallux abducto valgus, left          Care Instructions    Dr. Betancur's Clinic Locations:        Monday & Friday AM - [facility] [address] Wednesday - [facility]   [address] [place] [address] [place] [address]   ph: [phone] ph: [phone]   fax: [phone] fax:[phone]       Tuesday - Surgery Thursday [facility]   [phone] [address] [place] [address]   Appointment Scheduling Line ph: [phone] fax: [phone]     FYI: Our new schedule at [place] on  is from 7 AM - 2 PM.    Capsulitis/Metatarsalgia    All joints in the body are surrounded by a capsule, or a covering of soft tissue and ligaments. The capsule holds bones together and secretes joint fluid to help lubricate the joint.  If a joint capsule is exposed to excessive force, it can develop microscopic tears and become inflamed. This commonly occurs in the foot due to mild variation in anatomy. Hammertoes, bunions, irregular bone length, joint immobility, etc. can all lead to excessive force on the joint. Capsule injury can also occur due to repetitive stress from exercise, insufficient support from shoes, excessive bare foot walking and excessive weight.      Conservative treatments include ice, rest from the aggravating activity, weight loss, orthotic inserts, improving shoes and shoe modifications. Appropriate shoes will protect the inflamed tissue improving the chances of healing. Avoidance of standing or walking barefoot, including around the house, is necessary to allow healing. Casts are sometimes used for  "more aggressive protection.  NSAIDs such as Advil are also used to help with pain and decreasing inflammation. If pain continues over a period of weeks with continuous rest and icing, Corticosteroid injections can be a treatment option to try and help decrease inflammation.    Surgery is often necessary to correct the underlying structural problem. Surgery might include shortening an excessively long bone, repairing bunion or hammertoe, lengthening a tight Achilles  tendon, etc. These are same day surgeries that might be pursued if more conservative measures fail to provide relief.      The inflamed joint capsule has the potential to completely tear. This will allow the toe to drift off the ground, curving toward the other toes. The involved toe may under or overlap the adjacent toes as drift continues. The pain may improve after the joint tears or this new position will be permanent. Surgery can address the toe alignment. Your goal of treating capsulitis is to avoid this scenario.          Bunion (hallux abducto valgus)   A bunion is caused by muscle imbalance. The great toe is pulled toward the smaller toes. The metatarsal head is pushed outward creating a lump on the side of your foot. Imbalance is the result of foot structure and instability.   Bunions do not improve with time. They usually enlarge, however this is a fairly slow process. Shoes do not necessarily cause bunions, however, they can hasten development and definitely cause bunions to hurt.   Bunions often run in families. We inherit a certain foot structure, which may be predisposed to bunion development.   Bunion pain is likely a combination of shoes rubbing on the bump, nerve irritation, compression between the toes, joint misalignment, arthritis and altered gait.   Signs & Symptoms of \"Bunions\"   Bunions are usually termed mild, moderate or severe. Just because you have a bunion does not mean you have to have pain. There are some people with very " "severe bunions and no pain and people with mild bunions and a lot of pain.   1.  Pain on the inside of your foot at the big toe joint (1st MTPJ)   2.  Swelling on the inside of your foot at the big toe joint   3.  Redness on the inside of your foot at the big toe joint   4.  Numbness or burning in the big toe (hallux)   5.  Decreased motion at the big toe joint   6.  Painful bursa (fluid-filled sac) on the inside of your foot at the big toe joint   7.  Pain while wearing shoes -especially shoes too narrow or with high heels    8.  Pain during activities   9.  Corn in between the big toe and second toe   10.  Callous formation on the side or bottom of the big toe or big toe joint   11.  Callous under the second toe joint (2nd MTPJ)   12.  Pain in the second toe joint   Treatment for \"Bunions\"   Conservative (non-surgical) treatment will not make the bunion go away, but it will hopefully decrease the signs and symptoms you have and help you get rid of the pain and get you back to your activities.   1.  Wider shoes or extra depth shoes: Most bunion pain can be improved simply by wearing compatible shoes. People with bunions cannot choose footwear simply because they like the style. Your bunion should determine which shoes are to be worn. Wide shoes with nonirritating seams,soft leather and a square toe box are most compatible with a bunion. Shoes should fit appropriately right out of the box but may need to be professionally stretched and modified to accommodate the bump. Heels, dress shoes and shoes with pointed toes will not be comfortable.   2. NSAIDs   3.  Arch supports, custom inserts, padding, splints, toe spacers : Most bunion pain can be improved simply by wearing compatible shoes. People with bunions cannot choose footwear simply because they like the style. Your bunion should determine which shoes are to be worn. Wide shoes with nonirritating seams,soft leather and a square toe box are most compatible with a " bunion. Shoes should fit appropriately right out of the box but may need to be professionally stretched and modified to accommodate the bump. Heels, dress shoes and shoes with pointed toes will not be comfortable.   4.  Change activities   5.  Physical therapy  Surgical Options  Surgical treatment for bunions is sometimes needed. If you are limited by pain, cannot fit in shoes comfortably and are not able to do your daily activities then surgery may be a good option for you. There are many different surgical procedures to repair bunions. Your foot and ankle surgeon will review your foot exam findings, your x-rays, your age, your health, your lifestyle, your physical activity level and discuss with you which procedure he or she would recommend. Surgical procedures for bunions range from soft tissue repair to cutting and realigning the bones. It is not recommended that you have bunion surgery for cosmetic reasons (you do not like how your foot looks) or because you want to fit in a certain pair of shoes; There is the risk that even after surgery, the bunion will reoccur 9-10% of the time.   Bunion surgery involves cutting and repositioning the bones surrounding the bunion. Pins and screws are used to hold the bones in place during the healing process. The goal of bunion surgery is to reduce the size of the bunion bump. Realignment of the toe and joint is attempted.     Some first toes cannot be forced back into normal alignment even with surgery. Surgery is helpful in most cases but does not necessarily create a normal foot.   Healing after surgery requires about six weeks of protection. This allows the bone to heal. Maximum recovery takes about one year. The scar tissue and jOint structures require this amount of time to finish the healing process. Expect stiffness, swelling and numbness during that time frame. Bunion surgery does involve side effects. Some side effects are predictable and others are less common but  do occur. A scar will be visible and could be irritated by shoes. The shoe may rub on the screw or internal pin requiring surgical removal of these fixation devices. The screw and pin would likely be left in place for a full year. The first toe may loose motion after bunion surgery. The amount of stiffness is variable. Some people never regain normal motion of the first toe. This is due to scar tissue inherent to any surgery. The first toe may drift toward the second toe or away from the second toe. Spreading of the first and second toes is a rare occurrence after bunion surgery. This can be quite bothersome and would need to be surgically repaired. Toe drift toward the second toe could result in a recurrent bunion and revision surgery. Joint fusion is one option to correct an unstable, drifting toe. This procedure straightens the toe, however, no motion remains. Fusion may be necessary to correct complications of bunion surgery or as the original procedure in severe cases.   All surgical procedures involve risk of infection, numbness, pain, delayed healing, osteotomy dislocation, blood clots, continued foot pain, etc. Bunion surgery is quite complex and should not be taken lightly.   Any skin incision can lead to infection. Deep infection might involve the bone and thus repeat surgery and six weeks of IV antibiotics. Scar tissue can cause nerve pain or numbness. This is generally temporary but can be permanent. We do not have treatments that cure nerve problems. Second toe pain could be related to altered mechanics and pressure transferred to the second toe. Most feet with bunions have pre-existing second toe problems. Delayed bone healing would lengthen the healing time. Some bones simply do not heal. This requires repeat surgery, electronic bone stimulation and/or extended protection. Smokers have an approximate 20% chance of poor bone healing. This is double that of a non-smoker. The bone cut may displace. This  "may need to be repaired with a second operation. Displacement can cause jOint malalignment. Immobility after surgery can cause blood clots in the legs and lungs. This could result in death.   Foot pain is complex. Most feet hurt for more than one reason. Fixing the bunion would not necessarily create a pain free foot. Appropriate shoes, healthy body weight, avoidance of bare foot walking and moderation of activity will always be necessary to enjoy foot comfort. Your bunion may involve arthritis, which is incurable even with surgery. Long standing bunions often involve chronic irritation to the surrounding nerves. Nerve pain may not resolve even with reducing the bunion bump since permanent nerve damage may be present   Bunion surgery is nevertheless quite successful. Most surgical patients are pleased with their foot following bunion surgery. Many of the issues described above can be controlled by taking proper care of your foot during the healing process.   Your surgeon would be happy to fully describe any of the above issues. You should pursue a full understanding of the operation,recovery process and any potential problems that could develop.   Prevention of \"Bunions\"   1.  Do not wear high heels if there is a family history of bunions.  2.  Wear shoes that have enough width and depth in the toe box  Here are exercises that may benefit people with bunions:   Toe stretches. Stretching out your toes can help keep them limber and offset foot pain. To stretch your toes, point your toes straight ahead for 5 seconds and then curl them under for 5 seconds. Repeat these stretches 10 times. These exercises can be especially beneficial if you also have hammertoes, or chronically bent toes, in addition to a bunion.   Toe flexing and edel. Press your toes against a hard surface such as a wall, to flex and stretch them; hold the position for 10 seconds and repeat three to four times. Then flex your toes in the opposite " direction; hold the position for 10 seconds and repeat three to four times.   Stretching your big toe. Using your fingers to gently pull your big toe over into proper alignment can be helpful as well. Hold your toe in position for 10 seconds and repeat three to four times.   Resistance exercises. Wrap either a towel or belt around your big toe and use it to pull your big toe toward you while simultaneously pushing forward, against the towel, with your big toe.   Ball roll. To massage the bottom of your foot, sit down, place a golf ball on the floor under your foot, and roll it around under your foot for two minutes. This can help relieve foot strain and cramping.   Towel curls. You can strengthen your toes by spreading out a small towel on the floor, curling your toes around it, and pulling it toward you. Repeat five times. Gripping objects with your toes like this can help keep your foot flexible.   Picking up marbles. Another gripping exercise you can perform to keep your foot flexible is picking up marbles with your toes. Do this by placing 20 marbles on the floor in front of you and use your foot to pick the marbles up one by one and place them in a bowl.   Walking along the beach. Whenever possible, spend time walking on sand. This can give you a gentle foot massage and also help strengthen your toes. This is especially beneficial for people who have arthritis associated with their bunions.           Follow-ups after your visit        Your next 10 appointments already scheduled     Mar 02, 2017  8:50 AM CST   [facility] with Deandra Crook PT   [facility] ([facility]  )    [address] [id number] [phone]              Who to contact     If you have questions or need follow up information about today's clinic visit or your schedule please contact [facility] directly at [phone].  Normal or  "non-critical lab and imaging results will be communicated to you by MyChart, letter or phone within 4 business days after the clinic has received the results. If you do not hear from us within 7 days, please contact the clinic through [facility] or phone. If you have a critical or abnormal lab result, we will notify you by phone as soon as possible.  Submit refill requests through [facility] or call your pharmacy and they will forward the refill request to us. Please allow 3 business days for your refill to be completed.          Additional Information About Your Visit        [facility] Information     [facility] lets you send messages to your doctor, view your test results, renew your prescriptions, schedule appointments and more. To sign up, go to [email or web address] . Click on \"Log in\" on the left side of the screen, which will take you to the Welcome page. Then click on \"Sign up Now\" on the right side of the page.     You will be asked to enter the access code listed below, as well as some personal information. Please follow the directions to create your username and password.     Your access code is: [email or web address]  Expires: 2017  6:42 AM     Your access code will  in 90 days. If you need help or a new code, please call your [facility] clinic or [phone].        Care EveryWhere ID     This is your Care EveryWhere ID. This could be used by other organizations to access your [facility] medical records  [id number]        Your Vitals Were     Height Last Period BMI (Body Mass Index)             5' 5\" (1.651 m) (LMP Unknown) 35.28 kg/m2          Blood Pressure from Last 3 Encounters:   17 118/78    Weight from Last 3 Encounters:   17 212 lb (96.2 kg)   17 212 lb 9.6 oz (96.4 kg)              We Performed the Following     XR Foot Left G/E 3 Views     XR Foot Right G/E 3 Views        Primary Care Provider    None Doctor, MD       No address on file        Thank you!     Thank you for choosing " [facility]  for your care. Our goal is always to provide you with excellent care. Hearing back from our patients is one way we can continue to improve our services. Please take a few minutes to complete the written survey that you may receive in the mail after your visit with us. Thank you!             Your Updated Medication List - Protect others around you: Learn how to safely use, store and throw away your medicines at www.disposemymeds.org.          This list is accurate as of: 2/27/17  9:13 AM.  Always use your most recent med list.                   Brand Name Dispense Instructions for use    acetaminophen 500 MG tablet    TYLENOL     Take 500 mg by mouth every 6 hours as needed       ranitidine 150 MG tablet    ZANTAC     Take 300 mg by mouth 2 times daily

## 2017-02-27 NOTE — NURSING NOTE
"Chief Complaint   Patient presents with     Foot Problems     Bunion left foot x 30 years, neuroma right foot x 6-7 years       Initial Ht 5' 5\" (1.651 m)  Wt 212 lb (96.2 kg)  LMP  (LMP Unknown)  BMI 35.28 kg/m2 Estimated body mass index is 35.28 kg/(m^2) as calculated from the following:    Height as of this encounter: 5' 5\" (1.651 m).    Weight as of this encounter: 212 lb (96.2 kg).  Medication Reconciliation: complete  "

## 2017-03-02 ENCOUNTER — THERAPY VISIT (OUTPATIENT)
Dept: PHYSICAL THERAPY | Facility: CLINIC | Age: 52
End: 2017-03-02
Payer: COMMERCIAL

## 2017-03-02 DIAGNOSIS — M75.42 IMPINGEMENT SYNDROME OF LEFT SHOULDER: ICD-10-CM

## 2017-03-02 PROCEDURE — 97110 THERAPEUTIC EXERCISES: CPT | Mod: GP | Performed by: PHYSICAL THERAPIST

## 2017-03-02 PROCEDURE — 97112 NEUROMUSCULAR REEDUCATION: CPT | Mod: GP | Performed by: PHYSICAL THERAPIST

## 2017-03-03 ENCOUNTER — OFFICE VISIT (OUTPATIENT)
Dept: ORTHOPEDICS | Facility: CLINIC | Age: 52
End: 2017-03-03
Payer: COMMERCIAL

## 2017-03-03 VITALS
HEIGHT: 65 IN | HEART RATE: 94 BPM | SYSTOLIC BLOOD PRESSURE: 132 MMHG | DIASTOLIC BLOOD PRESSURE: 67 MMHG | BODY MASS INDEX: 35.32 KG/M2 | WEIGHT: 212 LBS

## 2017-03-03 DIAGNOSIS — G89.29 CHRONIC LEFT SHOULDER PAIN: Primary | ICD-10-CM

## 2017-03-03 DIAGNOSIS — M25.512 CHRONIC LEFT SHOULDER PAIN: Primary | ICD-10-CM

## 2017-03-03 PROCEDURE — 99244 OFF/OP CNSLTJ NEW/EST MOD 40: CPT | Performed by: PHYSICAL MEDICINE & REHABILITATION

## 2017-03-03 NOTE — PATIENT INSTRUCTIONS
We addressed the following today:    1. Chronic Left Shoulder Pain    Activity modification as discussed    Home exercise program as instructed    Topical Treatments: Ice or heat    Over the counter medication: Acetaminophen (Tylenol) 1000 mg every 6 hours with food (Maximum of 3000 mg/day)  Ibuprofen (Advil) maximum of 800 mg four times a day with food    MRI Lane call [phone] to schedule    Follow-up ~1-2 business days after completion of further diagnostic imaging for discussion of results/further medical care (sooner if needed; call direct clinic number [[phone]] at any time with questions or concerns)

## 2017-03-03 NOTE — PROGRESS NOTES
" [facility]   Clinic Visit s Mar 3, 2017    Subjective:  Jayne Tyler is a 51 year old right-hand dominant female, who is seen in consultation at the request of Salo Simmons MD for evaluation of chronic left shoulder pain.    Symptoms began 2 years ago.  Reports insidious onset without acute precipitating event.  Reports left shoulder pain that is located deep and laterally with intermittent radiation.  Pain is 8/10 in maximal severity and 5/10 currently.  Symptoms are generally worse with reaching, pushing, pulling, reaching up, IR, and ER activities and better with nothing in particular.  Other treatment has consisted of physical therapy, 2 previous left shoulder corticosteroid injections, and home exercises with minimal relief.  Notes intermittent weakness of the left upper extremity.  Numbness and tingling to the palmar aspect of the left hand.  Denies any left shoulder injuries/surgeries.    Patient's past medical, surgical, social, and family histories are reviewed today.  There are no significant contributory medical issues  No past medical history on file.    Review of Systems:  Constitutional: NEGATIVE for fever, chills, or change in weight  Skin: NEGATIVE for worrisome rashes, moles, or lesions  Neuro: POSITIVE for weakness of the left upper extremity  MSK: see HPI  Additional 10 point ROS is negative other than symptoms noted above and in HPI    Objective:  /67  Pulse 94  Ht 1.651 m (5' 5\")  Wt 96.2 kg (212 lb)  LMP  (LMP Unknown)  BMI 35.28 kg/m2  General: healthy, alert, obese, and in no acute distress  Skin: no suspicious lesions or rashes  Psych: mentation appears normal and affect normal/bright  HEENT: no scleral icterus  CV: no pedal edema  Resp: normal respiratory effort without conversational dyspnea   Neuro: motor strength as noted below  Lymph: no palpable lymphadenopathy    MSK:    LEFT SHOULDER  Inspection:    No swelling, bruising, discoloration, or " obvious deformity/asymmetry  Palpation:    Tender about the AC joint and anterior capsule    No tenderness over the acromion or greater tuberosity     Crepitus is absent  Strength:    ER 4+/5 with pain / IR 5/5    Special Tests:    Positive: Neer's, Zapata', crossed arm adduction, empty can test, Nallen's, Speed's, and Yergason's     Negative: Belly test    CERVICAL SPINE  Inspection:    No redness, swelling, ecchymosis, overlying skin change, or gross deformity/asymmetry with normal cervical lordosis present  Palpation:    Tender about the upper trapezius (left)    No tenderness over the cervical spinous processes, paracervical musculature (bilateral), upper trapezius (right), lower trapezius (bilateral), and rhomboids (bilateral)  Range of Motion:     Flexion within normal limits    Extension within normal limits    Right side bend within normal limits    Left side bend limited by pain    Right rotation limited by pain    Left rotation within normal limits  Strength:    Deltoid (C5) 5/5, biceps (C6) 5/5, wrist extension (C6) 5/5, triceps (C7) 5/5, wrist flexion (C7) 5/5, and finger flexion (C8) 5/5  Special Tests:    Positive: None    Negative: Spurling's (bilateral)    Imaging:  No x-rays indicated during today's visit  Previous films were reviewed today, independent visualization of images was performed, and results were discussed with the patient  Left shoulder x-rays - 2/21/2017  Impression:   1. Negative for fracture, subluxation, joint space narrowing, or other acute osseous abnormality.    ASSESSMENT:  1. Chronic left shoulder pain - differential diagnoses includes rotator cuff tear, rotator cuff impingement, rotator cuff tendinopathy, labral degeneration/tear, subacromial-subdeltoid bursitis, etc.    PLAN:  1. Activity modification as discussed, including limitation of activities that cause pain/discomfort.  2. Acetaminophen/Ibuprofen/ice/heat as needed for improved pain control.  3. Continue with  pain-free home exercise program as previously prescribed from formal physical therapy.  4. MR arthrogram of the left shoulder for further evaluation/medical care.  5. Follow-up ~1-2 business days after completion of further diagnostic imaging for discussion of results/further medical care.  Consider further formal physical therapy, left shoulder corticosteroid injection with ultrasound guidance, orthopedic surgery referral, etc. as deemed appropriate moving forward.    Patient's conditions were thoroughly discussed during today's visit with greater than 50% of the visit spent counseling the patient with total time spent face-to-face with the patient being 15 minutes.    Mario Muir DO, [facility]    Disclaimer: This note consists of symbols derived from keyboarding, dictation and/or voice recognition software. As a result, there may be errors in the script that have gone undetected. Please consider this when interpreting information found in this chart.

## 2017-03-03 NOTE — Clinical Note
Dear Jayne Barron saw me [place] [facility] [place].  Please refer to the visit note at your convenience and feel free to contact me should you have any questions.  Sincerely,  Mario Muir DO, [facility]

## 2017-03-03 NOTE — NURSING NOTE
"Chief Complaint   Patient presents with     Shoulder left       Initial /67  Pulse 94  Ht 5' 5\" (1.651 m)  Wt 212 lb (96.2 kg)  LMP  (LMP Unknown)  BMI 35.28 kg/m2 Estimated body mass index is 35.28 kg/(m^2) as calculated from the following:    Height as of this encounter: 5' 5\" (1.651 m).    Weight as of this encounter: 212 lb (96.2 kg).  Medication Reconciliation: complete    "

## 2017-03-03 NOTE — MR AVS SNAPSHOT
After Visit Summary   3/3/2017    Jayne Tyler    MRN: [id number]           Patient Information     Date Of Birth          1965        Visit Information        Provider Department      3/3/2017 8:20 AM Mario Muir DO [facility]        Today's Diagnoses     Chronic left shoulder pain    -  1      Care Instructions    We addressed the following today:    1. Chronic Left Shoulder Pain    Activity modification as discussed    Home exercise program as instructed    Topical Treatments: Ice or heat    Over the counter medication: Acetaminophen (Tylenol) 1000 mg every 6 hours with food (Maximum of 3000 mg/day)  Ibuprofen (Advil) maximum of 800 mg fourtimes a day with food    MRI [place] [phone]    Follow-up ~1-2 business days after completion of diagnostic imaging for further discussion of results/further medical care (sooner if needed; call direct clinic number [[phone]] at any time with questions or concerns)            Follow-ups after your visit        Future tests that were ordered for you today     Open Future Orders        Priority Expected Expires Ordered    MR Shoulder Left w Contrast Routine  3/3/2018 3/3/2017    XR Shoulder Gadolinium Injection Routine 3/3/2017 3/3/2018 3/3/2017            Who to contact     If you have questions or need follow up information about today's clinic visit or your schedule please contact [facility] directly at [phone].  Normal or non-critical lab and imaging results will be communicated to you by MyChart, letter or phone within 4 business days after the clinic has received the results. If you do not hear from us within 7 days, please contact the clinic through MyChart or phone. If you have a critical or abnormal lab result, we will notify you by phone as soon as possible.  Submit refill requests through MoneyMan or call your pharmacy and they will forward the refill request to us. Please allow 3  "business days for your refill to be completed.          Additional Information About Your Visit        MyChart Information     MotherKnows lets you send messages to your doctor, view your test results, renew your prescriptions, schedule appointments and more. To sign up, go to [email or web address] . Click on \"Log in\" on the left side of the screen, which will take you to the Welcome page. Then click on \"Sign up Now\" on the right side of the page.     You will be asked to enter the access code listed below, as well as some personal information. Please follow the directions to create your username and password.     Your access code is: [email or web address]  Expires: 2017  6:42 AM     Your access code will  in 90 days. If you need help or a new code, please call your [facility] clinic or [phone].        Care EveryWhere ID     This is your Care EveryWhere ID. This could be used by other organizations to access your [facility] medical records  [id number]        Your Vitals Were     Pulse Height Last Period BMI (Body Mass Index)          94 5' 5\" (1.651 m) (LMP Unknown) 35.28 kg/m2         Blood Pressure from Last 3 Encounters:   17 132/67   17 118/78    Weight from Last 3 Encounters:   17 212 lb (96.2 kg)   17 212 lb (96.2 kg)   17 212 lb 9.6 oz (96.4 kg)               Primary Care Provider    None Doctor, MD       No address on file        Thank you!     Thank you for choosing [facility]  for your care. Our goal is always to provide you with excellent care. Hearing back from our patients is one way we can continue to improve our services. Please take a few minutes to complete the written survey that you may receive in the mail after your visit with us. Thank you!             Your Updated Medication List - Protect others around you: Learn how to safely use, store and throw away your medicines at www.disposemymeds.org.          This list is accurate as of: 3/3/17  " 8:47 AM.  Always use your most recent med list.                   Brand Name Dispense Instructions for use    acetaminophen 500 MG tablet    TYLENOL     Take 500 mg by mouth every 6 hours as needed       ranitidine 150 MG tablet    ZANTAC     Take 300 mg by mouth 2 times daily

## 2017-03-06 DIAGNOSIS — M25.512 LEFT SHOULDER PAIN: Primary | ICD-10-CM

## 2017-03-08 ENCOUNTER — HOSPITAL ENCOUNTER (OUTPATIENT)
Dept: MRI IMAGING | Facility: CLINIC | Age: 52
End: 2017-03-08
Attending: PHYSICAL MEDICINE & REHABILITATION
Payer: COMMERCIAL

## 2017-03-08 ENCOUNTER — HOSPITAL ENCOUNTER (OUTPATIENT)
Dept: GENERAL RADIOLOGY | Facility: CLINIC | Age: 52
Discharge: HOME | End: 2017-03-08
Attending: PHYSICAL MEDICINE & REHABILITATION | Admitting: PHYSICAL MEDICINE & REHABILITATION
Payer: COMMERCIAL

## 2017-03-08 VITALS — SYSTOLIC BLOOD PRESSURE: 116 MMHG | DIASTOLIC BLOOD PRESSURE: 62 MMHG | HEART RATE: 93 BPM

## 2017-03-08 DIAGNOSIS — M25.512 CHRONIC LEFT SHOULDER PAIN: ICD-10-CM

## 2017-03-08 DIAGNOSIS — G89.29 CHRONIC LEFT SHOULDER PAIN: ICD-10-CM

## 2017-03-08 PROCEDURE — A9585 GADOBUTROL INJECTION: HCPCS | Performed by: PHYSICIAN ASSISTANT

## 2017-03-08 PROCEDURE — 25000125 ZZHC RX 250: Performed by: PHYSICIAN ASSISTANT

## 2017-03-08 PROCEDURE — 25000128 H RX IP 250 OP 636

## 2017-03-08 PROCEDURE — 25000125 ZZHC RX 250

## 2017-03-08 PROCEDURE — 23350 INJECTION FOR SHOULDER X-RAY: CPT

## 2017-03-08 PROCEDURE — 25500064 ZZH RX 255 OP 636: Performed by: PHYSICIAN ASSISTANT

## 2017-03-08 PROCEDURE — 25000128 H RX IP 250 OP 636: Performed by: PHYSICIAN ASSISTANT

## 2017-03-08 PROCEDURE — 73222 MRI JOINT UPR EXTREM W/DYE: CPT | Mod: LT

## 2017-03-08 RX ORDER — LIDOCAINE HYDROCHLORIDE 10 MG/ML
5 INJECTION, SOLUTION EPIDURAL; INFILTRATION; INTRACAUDAL; PERINEURAL ONCE
Status: COMPLETED | OUTPATIENT
Start: 2017-03-08 | End: 2017-03-08

## 2017-03-08 RX ORDER — IOPAMIDOL 408 MG/ML
10 INJECTION, SOLUTION INTRATHECAL ONCE
Status: COMPLETED | OUTPATIENT
Start: 2017-03-08 | End: 2017-03-08

## 2017-03-08 RX ORDER — LIDOCAINE HYDROCHLORIDE 10 MG/ML
INJECTION, SOLUTION EPIDURAL; INFILTRATION; INTRACAUDAL; PERINEURAL
Status: COMPLETED
Start: 2017-03-08 | End: 2017-03-08

## 2017-03-08 RX ORDER — GADOBUTROL 604.72 MG/ML
0.1 INJECTION INTRAVENOUS ONCE
Status: COMPLETED | OUTPATIENT
Start: 2017-03-08 | End: 2017-03-08

## 2017-03-08 RX ADMIN — GADOBUTROL 0.1 ML: 604.72 INJECTION INTRAVENOUS at 13:15

## 2017-03-08 RX ADMIN — LIDOCAINE HYDROCHLORIDE: 10 INJECTION, SOLUTION EPIDURAL; INFILTRATION; INTRACAUDAL; PERINEURAL at 13:16

## 2017-03-08 RX ADMIN — EPINEPHRINE 1 MG: 1 INJECTION, SOLUTION INTRAMUSCULAR; SUBCUTANEOUS at 15:22

## 2017-03-08 RX ADMIN — LIDOCAINE HYDROCHLORIDE 50 MG: 10 INJECTION, SOLUTION EPIDURAL; INFILTRATION; INTRACAUDAL; PERINEURAL at 13:16

## 2017-03-08 RX ADMIN — EPINEPHRINE 0.1 MG: 1 INJECTION, SOLUTION INTRAMUSCULAR; SUBCUTANEOUS at 15:23

## 2017-03-08 RX ADMIN — IOPAMIDOL 10 ML: 408 INJECTION, SOLUTION INTRATHECAL at 13:15

## 2017-03-08 NOTE — PROGRESS NOTES
Pt tolerated procedure well, post procedure pt was taken to MRI for arthrogram. Pt verbalized understanding of written and verbal instructions and left department in satisfactory condition after her MRI . There is no evidence of bleeding upon discharge.

## 2017-03-08 NOTE — PROGRESS NOTES
RADIOLOGY PROCEDURE NOTE  Patient name: Jayne Tyler  MRN: [id number]  : 1965    Pre-procedure diagnosis: Left shoulder pain  Post-procedure diagnosis: Same    Procedure Date/Time: 2017  1:21 PM  Procedure: Left shoulder kishore injection  Estimated blood loss: None  Specimen(s) collected with description: none  The patient tolerated the procedure well with no immediate complications.  Significant findings:none    See imaging dictation for procedural details.    Provider name: Av Patel  Assistant(s):None

## 2017-03-10 ENCOUNTER — TELEPHONE (OUTPATIENT)
Dept: ORTHOPEDICS | Facility: CLINIC | Age: 52
End: 2017-03-10

## 2017-03-10 ENCOUNTER — OFFICE VISIT (OUTPATIENT)
Dept: INTERNAL MEDICINE | Facility: CLINIC | Age: 52
End: 2017-03-10
Payer: COMMERCIAL

## 2017-03-10 VITALS
SYSTOLIC BLOOD PRESSURE: 112 MMHG | HEIGHT: 65 IN | TEMPERATURE: 98.4 F | WEIGHT: 214.4 LBS | DIASTOLIC BLOOD PRESSURE: 82 MMHG | HEART RATE: 99 BPM | OXYGEN SATURATION: 99 % | BODY MASS INDEX: 35.72 KG/M2

## 2017-03-10 DIAGNOSIS — M25.512 ACUTE PAIN OF LEFT SHOULDER: ICD-10-CM

## 2017-03-10 DIAGNOSIS — M75.100 TEAR OF TENDON OF ROTATOR CUFF: Primary | ICD-10-CM

## 2017-03-10 LAB
ANION GAP SERPL CALCULATED.3IONS-SCNC: 5 MMOL/L (ref 3–14)
BASOPHILS # BLD AUTO: 0 10E9/L (ref 0–0.2)
BASOPHILS NFR BLD AUTO: 0.2 %
BUN SERPL-MCNC: 13 MG/DL (ref 7–30)
CALCIUM SERPL-MCNC: 9 MG/DL (ref 8.5–10.1)
CHLORIDE SERPL-SCNC: 109 MMOL/L (ref 94–109)
CO2 SERPL-SCNC: 28 MMOL/L (ref 20–32)
CREAT SERPL-MCNC: 0.95 MG/DL (ref 0.52–1.04)
DIFFERENTIAL METHOD BLD: NORMAL
EOSINOPHIL # BLD AUTO: 0.2 10E9/L (ref 0–0.7)
EOSINOPHIL NFR BLD AUTO: 1.9 %
ERYTHROCYTE [DISTWIDTH] IN BLOOD BY AUTOMATED COUNT: 14.4 % (ref 10–15)
ERYTHROCYTE [SEDIMENTATION RATE] IN BLOOD BY WESTERGREN METHOD: 9 MM/H (ref 0–30)
GFR SERPL CREATININE-BSD FRML MDRD: 62 ML/MIN/1.7M2
GLUCOSE SERPL-MCNC: 94 MG/DL (ref 70–99)
HCT VFR BLD AUTO: 46.1 % (ref 35–47)
HGB BLD-MCNC: 15.1 G/DL (ref 11.7–15.7)
LYMPHOCYTES # BLD AUTO: 2.8 10E9/L (ref 0.8–5.3)
LYMPHOCYTES NFR BLD AUTO: 25.5 %
MCH RBC QN AUTO: 32.1 PG (ref 26.5–33)
MCHC RBC AUTO-ENTMCNC: 32.8 G/DL (ref 31.5–36.5)
MCV RBC AUTO: 98 FL (ref 78–100)
MONOCYTES # BLD AUTO: 1 10E9/L (ref 0–1.3)
MONOCYTES NFR BLD AUTO: 9.5 %
NEUTROPHILS # BLD AUTO: 6.8 10E9/L (ref 1.6–8.3)
NEUTROPHILS NFR BLD AUTO: 62.9 %
PLATELET # BLD AUTO: 296 10E9/L (ref 150–450)
POTASSIUM SERPL-SCNC: 4.3 MMOL/L (ref 3.4–5.3)
RBC # BLD AUTO: 4.7 10E12/L (ref 3.8–5.2)
SODIUM SERPL-SCNC: 142 MMOL/L (ref 133–144)
WBC # BLD AUTO: 10.8 10E9/L (ref 4–11)

## 2017-03-10 PROCEDURE — 80048 BASIC METABOLIC PNL TOTAL CA: CPT | Performed by: INTERNAL MEDICINE

## 2017-03-10 PROCEDURE — 36415 COLL VENOUS BLD VENIPUNCTURE: CPT | Performed by: INTERNAL MEDICINE

## 2017-03-10 PROCEDURE — 85025 COMPLETE CBC W/AUTO DIFF WBC: CPT | Performed by: INTERNAL MEDICINE

## 2017-03-10 PROCEDURE — 85652 RBC SED RATE AUTOMATED: CPT | Performed by: INTERNAL MEDICINE

## 2017-03-10 PROCEDURE — 99214 OFFICE O/P EST MOD 30 MIN: CPT | Performed by: INTERNAL MEDICINE

## 2017-03-10 RX ORDER — HYDROCODONE BITARTRATE AND ACETAMINOPHEN 5; 325 MG/1; MG/1
1-2 TABLET ORAL EVERY 6 HOURS PRN
Qty: 25 TABLET | Refills: 0 | Status: SHIPPED | OUTPATIENT
Start: 2017-03-10 | End: 2017-05-18

## 2017-03-10 NOTE — TELEPHONE ENCOUNTER
Phone call to patient and informed of Dr. Muir's recommendations below. She states she is unable to take ibuprofen due to stomach ulcer. She was given Norco by PCP this am and states she will use it if needed, otherwise will take tylenol. Will continue icing.   Appointment scheduled with Dr. George for 3/13/17 and she will follow up at that time.     MARIA EUGENIA Edwards RN.

## 2017-03-10 NOTE — TELEPHONE ENCOUNTER
Received voicemail from [facility], Dr. Balderas's office. Patient is being seen there now and Dr. Balderas would like to speak to Dr. Muir.   Dr. Muir saw patient on 3/3/17.     Per chart review patient had MR Arthrogram of left shoulder on 3/8/17 ordered by Dr. Muir. She is scheduled to see Dr Muir on 3/13/17 regarding results.   Results: IMPRESSION:  1. 0.9 cm ill-defined supraspinatus tendon tear with full-thickness  involvement. Fairly prominent tendinosis.  2. Moderate infraspinatus tendinosis.  3. Some anatomic findings which can predispose to impingement.    Phone call to [facility] and spoke with Dr. Balderas. He would like Dr. Muir to call him as patient is quite painful and crying.     Dr. Muir informed and will contact Dr. Balderas.     MARIA EUGENIA Edwards RN

## 2017-03-10 NOTE — TELEPHONE ENCOUNTER
Please call patient to inform that spoke to Dr. Balderas on the telephone this morning regarding shoulder pain.  Please inform patient I would recommend symptomatic care as at this time including Acetaminophen/Ibuprofen/ice as needed for improvement of pain/discomfort and hold on completion of formal physical therapy/pain-free home exercise program at this time. MRI of the left shoulder noted a rotator cuff tear along with rotator cuff tendinitis and findings that could predispose her to rotator cuff impingement. Recommend canceling the patient's appointment scheduled for 3/13/2017 and to schedule with Dr. George or Dr. St from orthopedic surgery for consideration of surgical intervention for further treatment purposes. Increased pain is likely related to injection of contrast and will likely improve some over the course of time.    Mario Muir DO, [facility]  [facility]

## 2017-03-10 NOTE — PROGRESS NOTES
SUBJECTIVE:                                                    Jayne Tyler is a 51 year old female who presents to clinic today for the following health issues:    Joint Pain     Onset: 2 days    Description:   Location: R shoulder  Character: Dull ache    Intensity: 8-9/10    Progression of Symptoms: same    Accompanying Signs & Symptoms:  Other symptoms: radiation of pain to arm   History:   Previous similar pain: no       Precipitating factors:   Trauma or overuse: YES- sx started after getting contrast dye inj into shoulder for MRI    Alleviating factors:  Improved by: nothing       Therapies Tried and outcome:  rest/inactivity and ice, tylenol      Would like note for work excusing her for the past 2 days        Problem list and histories reviewed & adjusted, as indicated.  Additional history: as documented        Reviewed and updated as needed this visit by clinical staff  Tobacco  Allergies       Reviewed and updated as needed this visit by Provider           Past Medical History:  ---------------------------  No past medical history on file.    Past Surgical History:  ---------------------------  No past surgical history on file.    Current Medications:  ---------------------------  Current Outpatient Prescriptions   Medication Sig Dispense Refill     ranitidine (ZANTAC) 150 MG tablet Take 300 mg by mouth 2 times daily       acetaminophen (TYLENOL) 500 MG tablet Take 500 mg by mouth every 6 hours as needed         Allergies:  -------------  Allergies   Allergen Reactions     Penicillins Hives       Social History:  -------------------  Social History     Social History     Marital status:      Spouse name: N/A     Number of children: N/A     Years of education: N/A     Occupational History     Not on file.     Social History Main Topics     Smoking status: Current Every Day Smoker     Types: Cigarettes     Smokeless tobacco: Not on file     Alcohol use Yes      Comment: Rare     Drug use:  "No     Sexual activity: Yes     Other Topics Concern     Parent/Sibling W/ Cabg, Mi Or Angioplasty Before 65f 55m? No     Social History Narrative       Family Medical History:  ------------------------------  No family history on file.      ROS:  REVIEW OF SYSTEMS:    RESP: negative for cough, dyspnea, wheezing, hemoptysis  CV: negative for chest pain, palpitations, PND, PRESSLEY, orthopnea; reports no changes in their ability to perform physical activity (from cardiovascular standpoint)  GI: negative for dysphagia, N/V, pain, melena, diarrhea and constipation  NEURO: negative for numbness/tingling, paralysis, incoordination, or focal weakness     OBJECTIVE:                                                    /82  Pulse 99  Temp 98.4  F (36.9  C) (Oral)  Ht 5' 4.75\" (1.645 m)  Wt 214 lb 6.4 oz (97.3 kg)  LMP  (LMP Unknown)  SpO2 99%  BMI 35.95 kg/m2     GENERAL alert and no distress  EYES:  Normal sclera,conjunctiva, EOMI  HENT: oral and posterior pharynx without lesions or erythema, facies symmetric  NECK: Neck supple. No LAD, without thyroidmegaly or JVD., Carotids without bruits.  RESP: Clear to ausculation bilaterally without wheezes or crackles. Normal BS in all fields.  CV: RRR normal S1S2 without murmurs, rubs or gallops. PMI normal  LYMPH: no cervical lymph adenopathy appreciated  MS: extremities- no gross deformities of the visible extremities noted, no edema  PSYCH: Alert and oriented times 3; speech- coherent  SKIN:  No obvious significant skin lesions on visible portions of face   SHOULDER:  Significant pain in left shoulder with simple rotation, strenhgt intact, just pain with any movement.       Results for orders placed or performed in visit on 03/10/17   CBC with platelets and differential   Result Value Ref Range    WBC 10.8 4.0 - 11.0 10e9/L    RBC Count 4.70 3.8 - 5.2 10e12/L    Hemoglobin 15.1 11.7 - 15.7 g/dL    Hematocrit 46.1 35.0 - 47.0 %    MCV 98 78 - 100 fl    MCH 32.1 26.5 - 33.0 pg    " MCHC 32.8 31.5 - 36.5 g/dL    RDW 14.4 10.0 - 15.0 %    Platelet Count 296 150 - 450 10e9/L    Diff Method Automated Method     % Neutrophils 62.9 %    % Lymphocytes 25.5 %    % Monocytes 9.5 %    % Eosinophils 1.9 %    % Basophils 0.2 %    Absolute Neutrophil 6.8 1.6 - 8.3 10e9/L    Absolute Lymphocytes 2.8 0.8 - 5.3 10e9/L    Absolute Monocytes 1.0 0.0 - 1.3 10e9/L    Absolute Eosinophils 0.2 0.0 - 0.7 10e9/L    Absolute Basophils 0.0 0.0 - 0.2 10e9/L   ESR: Erythrocyte sedimentation rate   Result Value Ref Range    Sed Rate 9 0 - 30 mm/h   Basic metabolic panel   Result Value Ref Range    Sodium 142 133 - 144 mmol/L    Potassium 4.3 3.4 - 5.3 mmol/L    Chloride 109 94 - 109 mmol/L    Carbon Dioxide 28 20 - 32 mmol/L    Anion Gap 5 3 - 14 mmol/L    Glucose 94 70 - 99 mg/dL    Urea Nitrogen 13 7 - 30 mg/dL    Creatinine 0.95 0.52 - 1.04 mg/dL    GFR Estimate 62 >60 mL/min/1.7m2    GFR Estimate If Black 75 >60 mL/min/1.7m2    Calcium 9.0 8.5 - 10.1 mg/dL           ASSESSMENT/PLAN:                                                      (M75.100) Tear of tendon of rotator cuff  (primary encounter diagnosis)  Comment: reviewed MRI report  Spoke with Dr. Muir who will contact her later and arrange appointment with surgeon.   Plan: HYDROcodone-acetaminophen (NORCO) 5-325 MG per         tablet            (M25.512) Acute pain of left shoulder  Comment: most likely irritation of the joint from the injection, should resolve with time, doubt infection as this would bee too soon and the labs are not indicative for this.   Pain meds for now.   Close f/u with orthopedist.   Reviewed this with Dr. Muir   Plan: CBC with platelets and differential, ESR:         Erythrocyte sedimentation rate, Basic metabolic        panel, HYDROcodone-acetaminophen (NORCO) 5-325         MG per tablet               *  Doubt infection at this time.     *  I will speak with Dr. Muir about the pain today and that I gave you the MRI results.  I will ask him  to give further directions.      *  Hydrocodone (narcotic pain medication) 1-2 tablet four times per day as needed for severe pain only.   Beware of drowsiness, nausea, vomiting, when taking this medication.  Do not drive, or operate dangerous equipment after taking this.         See Patient Instructions    ALYSSA NARANJO M.D., MD  [facility]

## 2017-03-10 NOTE — MR AVS SNAPSHOT
After Visit Summary   3/10/2017    Jayne Tyler    MRN: [id number]           Patient Information     Date Of Birth          1965        Visit Information        Provider Department      3/10/2017 8:20 AM Royce Balderas MD [facility]        Today's Diagnoses     Tear of tendon of rotator cuff    -  1    Acute pain of left shoulder          Care Instructions    *  Doubt infection at this time.     *  I will speak with Dr. Muir about the pain today and that I gave you the MRI results.  I will ask him to give further directions.      *  Hydrocodone (narcotic pain medication) 1-2 tablet four times per day as needed for severe pain only.   Beware of drowsiness, nausea, vomiting, when taking this medication.  Do not drive, or operate dangerous equipment after taking this.           Follow-ups after your visit        Your next 10 appointments already scheduled     Mar 13, 2017  5:40 PM CDT   Return Visit with Mario Muir DO   [facility] ([facility])    [address]  [address] [place] [id number]   [phone]              Who to contact     If you have questions or need follow up information about today's clinic visit or your schedule please contact [facility] directly at [phone].  Normal or non-critical lab and imaging results will be communicated to you by MyChart, letter or phone within 4 business days after the clinic has received the results. If you do not hear from us within 7 days, please contact the clinic through MyChart or phone. If you have a critical or abnormal lab result, we will notify you by phone as soon as possible.  Submit refill requests through eCollect or call your pharmacy and they will forward the refill request to us. Please allow 3 business days for your refill to be completed.          Additional Information About Your Visit        MyChart Information   "   Sabakat lets you send messages to your doctor, view your test results, renew your prescriptions, schedule appointments and more. To sign up, go to [email or web address] . Click on \"Log in\" on the left side of the screen, which will take you to the Welcome page. Then click on \"Sign up Now\" on the right side of the page.     You will be asked to enter the access code listed below, as well as some personal information. Please follow the directions to create your username and password.     Your access code is: [email or web address]  Expires: 2017  6:42 AM     Your access code will  in 90 days. If you need help or a new code, please call your [facility] clinic or [phone].        Care EveryWhere ID     This is your Care EveryWhere ID. This could be used by other organizations to access your [facility] medical records  [id number]        Your Vitals Were     Pulse Temperature Height Last Period Pulse Oximetry BMI (Body Mass Index)    99 98.4  F (36.9  C) (Oral) 5' 4.75\" (1.645 m) (LMP Unknown) 99% 35.95 kg/m2       Blood Pressure from Last 3 Encounters:   03/10/17 112/82   17 116/62   17 132/67    Weight from Last 3 Encounters:   03/10/17 214 lb 6.4 oz (97.3 kg)   17 212 lb (96.2 kg)   17 212 lb (96.2 kg)              We Performed the Following     Basic metabolic panel     CBC with platelets and differential     ESR: Erythrocyte sedimentation rate          Today's Medication Changes          These changes are accurate as of: 3/10/17  9:17 AM.  If you have any questions, ask your nurse or doctor.               Start taking these medicines.        Dose/Directions    HYDROcodone-acetaminophen 5-325 MG per tablet   Commonly known as:  NORCO   Used for:  Tear of tendon of rotator cuff, Acute pain of left shoulder   Started by:  Royce Balderas MD        Dose:  1-2 tablet   Take 1-2 tablets by mouth every 6 hours as needed for moderate to severe pain or pain maximum 4 tablet(s) per day "   Quantity:  25 tablet   Refills:  0            Where to get your medicines      Some of these will need a paper prescription and others can be bought over the counter.  Ask your nurse if you have questions.     Bring a paper prescription for each of these medications     HYDROcodone-acetaminophen 5-325 MG per tablet                Primary Care Provider    Jessica Chavarria MD       No address on file        Thank you!     Thank you for choosing [facility]  for your care. Our goal is always to provide you with excellent care. Hearing back from our patients is one way we can continue to improve our services. Please take a few minutes to complete the written survey that you may receive in the mail after your visit with us. Thank you!             Your Updated Medication List - Protect others around you: Learn how to safely use, store and throw away your medicines at www.disposemymeds.org.          This list is accurate as of: 3/10/17  9:17 AM.  Always use your most recent med list.                   Brand Name Dispense Instructions for use    acetaminophen 500 MG tablet    TYLENOL     Take 500 mg by mouth every 6 hours as needed       HYDROcodone-acetaminophen 5-325 MG per tablet    NORCO    25 tablet    Take 1-2 tablets by mouth every 6 hours as needed for moderate to severe pain or pain maximum 4 tablet(s) per day       ranitidine 150 MG tablet    ZANTAC     Take 300 mg by mouth 2 times daily

## 2017-03-10 NOTE — NURSING NOTE
"Chief Complaint   Patient presents with     Shoulder Pain     pain in R shoulder since contrast dye inj on 3/8/17. Pain not going away       Initial /82  Pulse 99  Temp 98.4  F (36.9  C) (Oral)  Ht 5' 4.75\" (1.645 m)  Wt 214 lb 6.4 oz (97.3 kg)  LMP  (LMP Unknown)  SpO2 99%  BMI 35.95 kg/m2 Estimated body mass index is 35.95 kg/(m^2) as calculated from the following:    Height as of this encounter: 5' 4.75\" (1.645 m).    Weight as of this encounter: 214 lb 6.4 oz (97.3 kg).  Medication Reconciliation: complete   Jerri Hedrick CMA      "

## 2017-03-13 ENCOUNTER — OFFICE VISIT (OUTPATIENT)
Dept: ORTHOPEDICS | Facility: CLINIC | Age: 52
End: 2017-03-13
Payer: COMMERCIAL

## 2017-03-13 ENCOUNTER — TELEPHONE (OUTPATIENT)
Dept: ORTHOPEDICS | Facility: CLINIC | Age: 52
End: 2017-03-13

## 2017-03-13 VITALS
HEIGHT: 65 IN | BODY MASS INDEX: 35.65 KG/M2 | SYSTOLIC BLOOD PRESSURE: 116 MMHG | DIASTOLIC BLOOD PRESSURE: 74 MMHG | WEIGHT: 214 LBS

## 2017-03-13 DIAGNOSIS — M25.512 LEFT SHOULDER PAIN: ICD-10-CM

## 2017-03-13 DIAGNOSIS — M75.122 COMPLETE TEAR OF LEFT ROTATOR CUFF: ICD-10-CM

## 2017-03-13 DIAGNOSIS — M75.42 IMPINGEMENT SYNDROME, SHOULDER, LEFT: ICD-10-CM

## 2017-03-13 DIAGNOSIS — M75.100 ROTATOR CUFF SYNDROME: Primary | ICD-10-CM

## 2017-03-13 DIAGNOSIS — M19.019 AC (ACROMIOCLAVICULAR) JOINT ARTHRITIS: ICD-10-CM

## 2017-03-13 DIAGNOSIS — M75.122 COMPLETE TEAR OF LEFT ROTATOR CUFF: Primary | ICD-10-CM

## 2017-03-13 PROCEDURE — 99204 OFFICE O/P NEW MOD 45 MIN: CPT | Performed by: ORTHOPAEDIC SURGERY

## 2017-03-13 NOTE — PROGRESS NOTES
HISTORY OF PRESENT ILLNESS:    Jayne Tyler is a 51 year old female who is seen in consultation at the request of Dr. Muir for left shoulder, rotator cuff tear    Present symptoms: Pt states shoulder pain has been present for about 2 years, with gradually worsening pain over that time.  Pt states since having MRI, she has had significant pain in the shoulder, states pain is so bad she has not been able to go back to work.  Pt states pain is through the anterior shoulder, describes pain as a deep toothache, moving wrong will cause shooting pain going down the arm, reports having throbbing pain going down the arm (to the elbow). The radiculopathy symptoms reported. Pain is mostly in the superior aspect and upper lateral aspect.  Pain in the left shoulder is a lot worse than the right shoulder pain for which she previously underwent surgery  Treatments tried to this point: MRI, Norco, ice, physical therapy  Orthopedic PMH: right shoulder surgery - 2/2015; diskectomies x 2; lumbar fusion - 1999; right bunionectomy - 2008    No past medical history on file.    Past Surgical History   Procedure Laterality Date     Orthopedic surgery  1996     diskectomy - lumbar     Orthopedic surgery  1997     diskectomy - lumbar     Orthopedic surgery  04/1999     L5-S1 lumbar fusion     Bunionectomy Right 01/2008     right foot - bunionectomy     Orthopedic surgery Right 02/2015     Right shoulder scope - dec/dcr, RCR       Family History   Problem Relation Age of Onset     Coronary Artery Disease Mother      Rheumatoid Arthritis Mother      Rheumatoid Arthritis Sister      Coronary Artery Disease Maternal Grandmother      Coronary Artery Disease Maternal Grandfather        Social History     Social History     Marital status:      Spouse name: N/A     Number of children: N/A     Years of education: N/A     Occupational History     Not on file.     Social History Main Topics     Smoking status: Current Every Day Smoker      "Types: Cigarettes     Smokeless tobacco: Not on file     Alcohol use Yes      Comment: Rare     Drug use: No     Sexual activity: Yes     Other Topics Concern     Parent/Sibling W/ Cabg, Mi Or Angioplasty Before 65f 55m? No     Social History Narrative       Current Outpatient Prescriptions   Medication Sig Dispense Refill     HYDROcodone-acetaminophen (NORCO) 5-325 MG per tablet Take 1-2 tablets by mouth every 6 hours as needed for moderate to severe pain or pain maximum 4 tablet(s) per day 25 tablet 0     ranitidine (ZANTAC) 150 MG tablet Take 300 mg by mouth 2 times daily       acetaminophen (TYLENOL) 500 MG tablet Take 500 mg by mouth every 6 hours as needed Reported on 3/13/2017         Allergies   Allergen Reactions     Penicillins Hives       REVIEW OF SYSTEMS:  CONSTITUTIONAL:  NEGATIVE for fever, chills, change in weight  INTEGUMENTARY/SKIN:  NEGATIVE for worrisome rashes, moles or lesions  EYES:  NEGATIVE for vision changes or irritation  ENT/MOUTH:  NEGATIVE for ear, mouth and throat problems  RESP:  NEGATIVE for significant cough or SOB  BREAST:  NEGATIVE for masses, tenderness or discharge  CV:  NEGATIVE for chest pain, palpitations or peripheral edema  GI:  ulcers  :  Negative   MUSCULOSKELETAL:  See HPI above  NEURO:  NEGATIVE for weakness, dizziness or paresthesias  ENDOCRINE:  NEGATIVE for temperature intolerance, skin/hair changes  HEME/ALLERGY/IMMUNE:  NEGATIVE for bleeding problems  PSYCHIATRIC:  Depression, panic attacks      PHYSICAL EXAM:  /74 (BP Location: Right arm, Patient Position: Chair, Cuff Size: Adult Regular)  Ht 5' 4.75\" (1.645 m)  Wt 214 lb (97.1 kg)  LMP  (LMP Unknown)  BMI 35.89 kg/m2  Body mass index is 35.89 kg/(m^2).   GENERAL APPEARANCE: healthy, alert and no distress   HEENT: No apparent thyroid megaly. Clear sclera with normal ocular movement  RESPIRATORY: No labored breathing  SKIN: no suspicious lesions or rashes  NEURO: Normal strength and tone, mentation intact " and speech normal  VASCULAR: Good pulses, and capillary refill   LYMPH: no lymphadenopathy   PSYCH:  mentation appears normal and affect normal/bright    MUSCULOSKELETAL:  Normal gait  Normal neck movement  Normal right shoulder movement  Significantly painful left shoulder motion  Active assistive, however, forward elevation is up to 160   Positive impingement sign, left  Positive arm drop test, left  Slightly tender acromioclavicular joint, left  Painful biceps groove, left     ASSESSMENT:  Chronic rotator cuff tear, impingement syndrome and acromioclavicular joint DJD, Left shoulder  Status post right shoulder arthroscopy decompression and distal clavicle resection, 2015      PLAN:  We visualized the images of MRI scan from March 8, 2017. Findings are thoroughly explained.  She was felt to have possible biceps pathology in addition to the radiologist reading about full-thickness rotator cuff tear and acromioclavicular joint DJD/arthrosis. There was felt to be some impingement from the lateral aspect of coracoacromial ligament.  Given her relatively young age, because of ongoing pain, surgical intervention was felt to be most reasonable option.  Having gone through a similar situation even though rotator cuff tear was not done she is well familiar with the nuances of the situation and options.  We discussed the possibility of decompression, distal clavicle resection, biceps tenolysis and rotator cuff tear repair. Like to surgery on the right, this will take a lot longer for recovery.  With understanding the situation regarding the nature of the surgery, potential benefits and risk of surgery,, she wants to go ahead with surgical intervention. This will be set up sometime in the future. We anticipate one month of arm sling after the operation.        Imaging Interpretation:  MR Shoulder Left w Contrast    Narrative    MR SHOULDER LEFT WITH CONTRAST 3/8/2017 2:41 PM     HISTORY: Chronic left shoulder pain - evaluate  for rotator cuff  tear/tendinopathy, proximal biceps tendinopathy, labral  degeneration/tear, signs of rotator cuff impingement, etc. Pain in  left shoulder. Other chronic pain.     TECHNIQUE:  Multiplanar, multisequence with intraarticular contrast.  Injection procedure dictated separately.    FINDINGS:  Osseous Acromion Outlet:  There is active AC arthrosis, including  mild-moderate inferior hypertrophic change.  This results in  impression on the supraspinatus musculotendinous junction. Moderate  subacromial spurring. Type 2-3 acromion.  No os acromiale.    Rotator Cuff: Fairly prominent supraspinatus tendinosis. There is  ill-defined tearing of the anterior middle supraspinatus tendon. This  has some full-thickness extension with contrast extending into the  subacromial bursa; the tear measures 0.9 cm AP. Moderate infraspinatus  tendinosis. No subscapularis tendon tear or significant tendinosis.   Intact teres minor tendon. No asymmetric muscle atrophy.     Labral Structures: No superior labral tear (SLAP lesion) identified.  No labral cyst identified. No anterior or posterior labral tear  identified.    Biceps Tendon: No tear, dislocation, or significant tendinosis.    Osseous and Cartilaginous Structures: Mild  resorptive change of the  humeral head. No bone contusion or fracture. No glenohumeral  osteoarthritis or apparent chondromalacia identified.    Joint Space: No definite synovitis is appreciated.    Additional Findings: No deltoid muscle edema.      Impression    IMPRESSION:  1. 0.9 cm ill-defined supraspinatus tendon tear with full-thickness  involvement. Fairly prominent tendinosis.  2. Moderate infraspinatus tendinosis.  3. Some anatomic findings which can predispose to impingement.    MD Kevin REYES MD  Department of Orthopedic Surgery        Disclaimer: This note consists of symbols derived from keyboarding, dictation and/or voice recognition software. As a result, there may be  errors in the script that have gone undetected. Please consider this when interpreting information found in this chart.

## 2017-03-13 NOTE — TELEPHONE ENCOUNTER
Scheduled surgery for Left shoulder scope, dec-distal clavicle resection-rotator cuff tear with Dr. George @ [facility] @ 1:45.  Surgery education packet provided to patient.

## 2017-03-13 NOTE — NURSING NOTE
"Chief Complaint   Patient presents with     Shoulder Pain     Left shoulder       Initial /74 (BP Location: Right arm, Patient Position: Chair, Cuff Size: Adult Regular)  Ht 5' 4.75\" (1.645 m)  Wt 214 lb (97.1 kg)  LMP  (LMP Unknown)  BMI 35.89 kg/m2 Estimated body mass index is 35.89 kg/(m^2) as calculated from the following:    Height as of this encounter: 5' 4.75\" (1.645 m).    Weight as of this encounter: 214 lb (97.1 kg).  Medication Reconciliation: complete    "

## 2017-03-13 NOTE — TELEPHONE ENCOUNTER
PT orders pending for Left shoulder arthroscopy, decompression, distal clavicle resection, rotator cuff tear repair.   Please sign.

## 2017-03-13 NOTE — LETTER
[facility]  [address]  [address]  [place] [address]  [phone]        March 13, 2017    RE:Jayne Tyler  [address]  [place] [address]    1965              To whom it may concern:      Jayne Tyler is under my professional care for left shoulder, rotator cuff tear.  Patient was off work 3/8/2017 - 3/13/2017 due to shoulder pain. She may return to work on or about 3/14/2017.  Patient is currently scheduled for surgery, please anticipate patient being off work for 2-3 weeks following surgery.        Sincerely,        Kevin George MD

## 2017-03-13 NOTE — MR AVS SNAPSHOT
After Visit Summary   3/13/2017    Jayne Tyler    MRN: [id number]           Patient Information     Date Of Birth          1965        Visit Information        Provider Department      3/13/2017 3:20 PM Kevin George MD [facility]        Today's Diagnoses     Complete tear of left rotator cuff    -  1    AC (acromioclavicular) joint arthritis        Impingement syndrome, shoulder, left           Follow-ups after your visit        Your next 10 appointments already scheduled     Mar 16, 2017  8:00 AM CDT   [facility] physical with [facility]olo Abrams MD   [facility]    [address]  [place] [facility] [address]   [phone]            Mar 22, 2017  4:40 PM CDT   ANDERSON Extremity with Maycol Simmons PT   [facility] ([facility]  )    [address]  [address]  [place] [address]   [phone]            Apr 10, 2017  1:20 PM CDT   Return Visit with Pieter Rivera PA-C   [facility]TH[facility]IC SURGERY ([facility])    [address]  [address]  [place] [facility] [address]   [phone]              Who to contact     If you have questions or need follow up information about today's clinic visit or your schedule please contact [facility]TH[facility] SURGERY directly at [phone].  Normal or non-critical lab and imaging results will be communicated to you by MyChart, letter or phone within 4 business days after the clinic has received the results. If you do not hear from us within 7 days, please contact the clinic through [employer] or phone. If you have a critical or abnormal lab result, we will notify you by phone as soon as possible.  Submit refill requests through [employer] or call your pharmacy and they will forward the refill request to us. Please allow 3 business days for your refill to be completed.          Additional Information About Your Visit        [facility]  "Information     [employer] lets you send messages to your doctor, view your test results, renew your prescriptions, schedule appointments and more. To sign up, go to [email or web address] . Click on \"Log in\" on the left side of the screen, which will take you to the Welcome page. Then click on \"Sign up Now\" on the right side of the page.     You will be asked to enter the access code listed below, as well as some personal information. Please follow the directions to create your username and password.     Your access code is: [email or web address]  Expires: 2017  7:42 AM     Your access code will  in 90 days. If you need help or a new code, please call your [place] clinic or [phone].        Care EveryWhere ID     This is your Care EveryWhere ID. This could be used by other organizations to access your [place] medical records  [id number]        Your Vitals Were     Height Last Period BMI (Body Mass Index)             5' 4.75\" (1.645 m) (LMP Unknown) 35.89 kg/m2          Blood Pressure from Last 3 Encounters:   17 116/74   03/10/17 112/82   17 116/62    Weight from Last 3 Encounters:   17 214 lb (97.1 kg)   03/10/17 214 lb 6.4 oz (97.3 kg)   17 212 lb (96.2 kg)              Today, you had the following     No orders found for display       Primary Care Provider    Jessica Chavarria MD       No address on file        Thank you!     Thank you for choosing [facility]THOPEDIC SURGERY  for your care. Our goal is always to provide you with excellent care. Hearing back from our patients is one way we can continue to improve our services. Please take a few minutes to complete the written survey that you may receive in the mail after your visit with us. Thank you!             Your Updated Medication List - Protect others around you: Learn how to safely use, store and throw away your medicines at www.disposemymeds.org.          This list is accurate as of: 3/13/17  4:33 PM.  Always use your " most recent med list.                   Brand Name Dispense Instructions for use    acetaminophen 500 MG tablet    TYLENOL     Take 500 mg by mouth every 6 hours as needed Reported on 3/13/2017       HYDROcodone-acetaminophen 5-325 MG per tablet    NORCO    25 tablet    Take 1-2 tablets by mouth every 6 hours as needed for moderate to severe pain or pain maximum 4 tablet(s) per day       ranitidine 150 MG tablet    ZANTAC     Take 300 mg by mouth 2 times daily

## 2017-03-16 ENCOUNTER — OFFICE VISIT (OUTPATIENT)
Dept: INTERNAL MEDICINE | Facility: CLINIC | Age: 52
End: 2017-03-16
Payer: COMMERCIAL

## 2017-03-16 VITALS
SYSTOLIC BLOOD PRESSURE: 98 MMHG | WEIGHT: 213 LBS | TEMPERATURE: 98.2 F | BODY MASS INDEX: 35.49 KG/M2 | HEART RATE: 106 BPM | OXYGEN SATURATION: 96 % | DIASTOLIC BLOOD PRESSURE: 74 MMHG | HEIGHT: 65 IN

## 2017-03-16 DIAGNOSIS — Z01.818 PREOP GENERAL PHYSICAL EXAM: Primary | ICD-10-CM

## 2017-03-16 DIAGNOSIS — M75.122 COMPLETE TEAR OF LEFT ROTATOR CUFF: ICD-10-CM

## 2017-03-16 PROCEDURE — 99214 OFFICE O/P EST MOD 30 MIN: CPT | Performed by: FAMILY MEDICINE

## 2017-03-16 PROCEDURE — 93000 ELECTROCARDIOGRAM COMPLETE: CPT | Performed by: FAMILY MEDICINE

## 2017-03-16 NOTE — MR AVS SNAPSHOT
After Visit Summary   3/16/2017    Jayne Tyler    MRN: [id number]           Patient Information     Date Of Birth          1965        Visit Information        Provider Department      3/16/2017 8:00 AM Bartolo Abrams MD [facility]        Today's Diagnoses     Preop general physical exam    -  1    Complete tear of left rotator cuff          Care Instructions      Before Your Surgery      Call your surgeon if there is any change in your health. This includes signs of a cold or flu (such as a sore throat, runny nose, cough, rash or fever).    Do not smoke, drink alcohol or take over the counter medicine (unless your surgeon or primary care doctor tells you to) for the 24 hours before and after surgery.    If you take prescribed drugs: Follow your doctor s orders about which medicines to take and which to stop until after surgery.    Eating and drinking prior to surgery: follow the instructions from your surgeon    Take a shower or bath the night before surgery. Use the soap your surgeon gave you to gently clean your skin. If you do not have soap from your surgeon, use your regular soap. Do not shave or scrub the surgery site.  Wear clean pajamas and have clean sheets on your bed.         Follow-ups after your visit        Your next 10 appointments already scheduled     Mar 22, 2017  4:40 PM CDT   [facility]    [address] [place] [address]   [phone]            Apr 10, 2017  1:20 PM CDT   Return Visit with Pieter Rivera PA-C   [facility] ([facility])    [address] [place] [address]   [phone]              Who to contact     If you have questions or need follow up information about today's clinic visit or your schedule please contact [facility] directly at [phone].  Normal or  "non-critical lab and imaging results will be communicated to you by MyChart, letter or phone within 4 business days after the clinic has received the results. If you do not hear from us within 7 days, please contact the clinic through [employer] or phone. If you have a critical or abnormal lab result, we will notify you by phone as soon as possible.  Submit refill requests through [employer]ila or call your pharmacy and they will forward the refill request to us. Please allow 3 business days for your refill to be completed.          Additional Information About Your Visit        [employer] Information     [employer] lets you send messages to your doctor, view your test results, renew your prescriptions, schedule appointments and more. To sign up, go to [email or web address] . Click on \"Log in\" on the left side of the screen, which will take you to the Welcome page. Then click on \"Sign up Now\" on the right side of the page.     You will be asked to enter the access code listed below, as well as some personal information. Please follow the directions to create your username and password.     Your access code is: [email or web address]  Expires: 2017  7:42 AM     Your access code will  in 90 days. If you need help or a new code, please call your [place] clinic or [phone].        Care EveryWhere ID     This is your [facility] EveryWhere ID. This could be used by other organizations to access your [place] medical records  [id number]        Your Vitals Were     Pulse Temperature Height Last Period Pulse Oximetry BMI (Body Mass Index)    106 98.2  F (36.8  C) (Oral) 5' 4.75\" (1.645 m) (LMP Unknown) 96% 35.72 kg/m2       Blood Pressure from Last 3 Encounters:   17 98/74   17 116/74   03/10/17 112/82    Weight from Last 3 Encounters:   17 213 lb (96.6 kg)   17 214 lb (97.1 kg)   03/10/17 214 lb 6.4 oz (97.3 kg)              We Performed the Following     EKG 12-lead complete w/read - Clinics        Primary " Care Provider    Jessica Chavarria MD       No address on file        Thank you!     Thank you for choosing [facility]  for your care. Our goal is always to provide you with excellent care. Hearing back from our patients is one way we can continue to improve our services. Please take a few minutes to complete the written survey that you may receive in the mail after your visit with us. Thank you!             Your Updated Medication List - Protect others around you: Learn how to safely use, store and throw away your medicines at www.disposemymeds.org.          This list is accurate as of: 3/16/17 11:59 PM.  Always use your most recent med list.                   Brand Name Dispense Instructions for use    acetaminophen 500 MG tablet    TYLENOL     Take 500 mg by mouth every 6 hours as needed Reported on 3/13/2017       HYDROcodone-acetaminophen 5-325 MG per tablet    NORCO    25 tablet    Take 1-2 tablets by mouth every 6 hours as needed for moderate to severe pain or pain maximum 4 tablet(s) per day       ranitidine 150 MG tablet    ZANTAC     Take 300 mg by mouth 2 times daily

## 2017-03-16 NOTE — NURSING NOTE
"Chief Complaint   Patient presents with     Pre-Op Exam       Initial BP 98/74  Pulse 106  Temp 98.2  F (36.8  C) (Oral)  Ht 5' 4.75\" (1.645 m)  Wt 213 lb (96.6 kg)  LMP  (LMP Unknown)  SpO2 96%  BMI 35.72 kg/m2 Estimated body mass index is 35.72 kg/(m^2) as calculated from the following:    Height as of this encounter: 5' 4.75\" (1.645 m).    Weight as of this encounter: 213 lb (96.6 kg).  Medication Reconciliation: complete    "

## 2017-03-16 NOTE — PROGRESS NOTES
[place] [address]  [facility] [address]  [phone]  Dept: [phone]    PRE-OP EVALUATION:  Today's date: 3/16/2017    Jayne Tyler (: 1965) presents for pre-operative evaluation assessment as requested by NANETTE Alex.  She requires evaluation and anesthesia risk assessment prior to undergoing surgery/procedure for treatment of left shoulder .  Proposed procedure: left rotator cuff    Date of Surgery/ Procedure: 17  Time of Surgery/ Procedure: pm  Hospital/Surgical Facility: [facility]    Primary Physician: Jessica Chavarria  Type of Anesthesia Anticipated: General    Patient has a Health Care Directive or Living Will:  NO    1. NO - Do you have a history of heart attack, stroke, stent, bypass or surgery on an artery in the head, neck, heart or legs?  2. NO - Do you ever have any pain or discomfort in your chest?  3. NO - Do you have a history of  Heart Failure?  4. NO - Are you troubled by shortness of breath when: walking on the level, up a slight hill or at night?  5. NO - Do you currently have a cold, bronchitis or other respiratory infection?  6. NO - Do you have a cough, shortness of breath or wheezing?  7. NO - Do you sometimes get pains in the calves of your legs when you walk?  8. YES - DO YOU OR ANYONE IN YOUR FAMILY HAVE PREVIOUS HISTORY OF BLOOD CLOTS? Superficial phlebitis.  9. NO - Do you or does anyone in your family have a serious bleeding problem such as prolonged bleeding following surgeries or cuts?  10. NO - Have you ever had problems with anemia or been told to take iron pills?  11. NO - Have you had any abnormal blood loss such as black, tarry or bloody stools, or abnormal vaginal bleeding?  12. YES - HAVE YOU EVER HAD A BLOOD TRANSFUSION? Possibly with back surgery - auto donation.  13. NO - Have you or any of your relatives ever had problems with anesthesia?  14. NO - Do you have sleep apnea, excessive snoring or daytime drowsiness?  15. NO  - Do you have any prosthetic heart valves?  16. NO - Do you have prosthetic joints?  17. NO - Is there any chance that you may be pregnant?      HPI:                                                      Brief HPI related to upcoming procedure:     She has had progressively worsening L shoulder pain. MRI shows rotator cuff tear.      No h/o surgical or anesthesia complications.        MEDICAL HISTORY:                                                      Patient Active Problem List    Diagnosis Date Noted     Shoulder pain, unspecified chronicity, unspecified laterality 02/21/2017     Priority: Medium     CARDIOVASCULAR SCREENING; LDL GOAL LESS THAN 160 02/21/2017     Priority: Medium     Pain of foot, unspecified laterality 02/21/2017     Priority: Medium     Dyspepsia 02/21/2017     Priority: Medium     Impingement syndrome of left shoulder 02/21/2017     Priority: Medium      History reviewed. No pertinent past medical history.  Past Surgical History   Procedure Laterality Date     Orthopedic surgery  1996     diskectomy - lumbar     Orthopedic surgery  1997     diskectomy - lumbar     Orthopedic surgery  04/1999     L5-S1 lumbar fusion     Bunionectomy Right 01/2008     right foot - bunionectomy     Orthopedic surgery Right 02/2015     Right shoulder scope - dec/dcr, RCR     Current Outpatient Prescriptions   Medication Sig Dispense Refill     ranitidine (ZANTAC) 150 MG tablet Take 300 mg by mouth 2 times daily       HYDROcodone-acetaminophen (NORCO) 5-325 MG per tablet Take 1-2 tablets by mouth every 6 hours as needed for moderate to severe pain or pain maximum 4 tablet(s) per day 25 tablet 0     acetaminophen (TYLENOL) 500 MG tablet Take 500 mg by mouth every 6 hours as needed Reported on 3/13/2017       OTC products: no recent use of OTC ASA, NSAIDS or Steroids    Allergies   Allergen Reactions     Penicillins Hives      Latex Allergy: NO    Social History   Substance Use Topics     Smoking status: Current  Every Day Smoker     Types: Cigarettes     Smokeless tobacco: Not on file     Alcohol use Yes      Comment: Rare     History   Drug Use No       REVIEW OF SYSTEMS:                                                    Constitutional, neuro, ENT, endocrine, pulmonary, cardiac, gastrointestinal, genitourinary, musculoskeletal, integument and psychiatric systems are negative, except as otherwise noted.    EXAM:                                                    LMP  (LMP Unknown)    GENERAL APPEARANCE: healthy, alert and no distress     EYES: EOMI, PERRL     HENT:  mouth without ulcers or lesions     NECK: no adenopathy, thyroid normal to palpation     RESP: lungs clear      CV: regular rates and rhythm, no murmur, click or rub     ABDOMEN:  soft, nontender     MS: L shoulder weakness and tenderness with abduction, ext rotation     SKIN: no suspicious lesions or rashes     NEURO: Normal strength and tone     PSYCH: mentation appears normal. and affect normal/bright    DIAGNOSTICS:                                                      EKG: RSR, rate 78, axis normal, ST segments normal, T waves normal, no ectopy, tracing WNL.      Recent Labs   Lab Test  03/10/17   0837   HGB  15.1   PLT  296   NA  142   POTASSIUM  4.3   CR  0.95        IMPRESSION:                                                    Diagnosis/reason for consult:     (Z01.818) Preop general physical exam  (primary encounter diagnosis)  Comment: satis  Plan: EKG 12-lead complete w/read - Clinics            (M75.122) Complete tear of left rotator cuff  Comment:   Plan:         The proposed surgical procedure is considered INTERMEDIATE risk.    REVISED CARDIAC RISK INDEX  The patient has the following serious cardiovascular risks for perioperative complications such as (MI, PE, VFib and 3  AV Block):  No serious cardiac risks  INTERPRETATION: 2 risks: Class III (moderate risk - 6.6% complication rate)    The patient has the following additional risks for  perioperative complications:  No identified additional risks          ICD-10-CM    1. Preop general physical exam Z01.818    2. Complete tear of left rotator cuff M75.122        RECOMMENDATIONS:                                                        --Patient is to take all scheduled medications on the day of surgery EXCEPT for modifications listed below.    APPROVAL GIVEN to proceed with proposed procedure, without further diagnostic evaluation       Signed Electronically by: Bartolo Abrams MD    Copy of this evaluation report is provided to requesting physician.    [place] Preop Guidelines

## 2017-03-17 ENCOUNTER — TELEPHONE (OUTPATIENT)
Dept: ORTHOPEDICS | Facility: CLINIC | Age: 52
End: 2017-03-17

## 2017-03-17 NOTE — TELEPHONE ENCOUNTER
Our goal is to complete and return forms within 5-7 business days of receiving the request.    Type of form Requested: Attending Physician Statement - Short term Disability  Form requested by (include company):   [employer]    Phone number for requestor: [phone]  Date form is requested by: not listed    How will form be returned?:  fax to [phone]  Has the patient signed a consent form for release of information (may be included with form)? No  Additional comments: Pt is scheduled for Left shoulder RC repair / dec/ dcr on 3/29/2017     Form was started and place in brown accordion file for provider review/ completion at [facility].

## 2017-03-22 ENCOUNTER — THERAPY VISIT (OUTPATIENT)
Dept: PHYSICAL THERAPY | Facility: CLINIC | Age: 52
End: 2017-03-22
Payer: COMMERCIAL

## 2017-03-22 DIAGNOSIS — M25.519 SHOULDER PAIN, UNSPECIFIED CHRONICITY, UNSPECIFIED LATERALITY: Primary | ICD-10-CM

## 2017-03-22 PROCEDURE — 97162 PT EVAL MOD COMPLEX 30 MIN: CPT | Mod: GP | Performed by: PHYSICAL THERAPIST

## 2017-03-22 PROCEDURE — 97110 THERAPEUTIC EXERCISES: CPT | Mod: GP | Performed by: PHYSICAL THERAPIST

## 2017-03-22 NOTE — MR AVS SNAPSHOT
"              After Visit Summary   3/22/2017    Jayne Tyler    MRN: [id number]           Patient Information     Date Of Birth          1965        Visit Information        Provider Department      3/22/2017 4:40 PM Maycol Simmons PT [facility]        Today's Diagnoses     Shoulder pain, unspecified chronicity, unspecified laterality    -  1       Follow-ups after your visit        Your next 10 appointments already scheduled     Apr 10, 2017  1:20 PM CDT   Return Visit with Pieter Rivera PA-C   [facility]THOPEDIC SURGERY ([facility])    [address]  [address]  [facility] [address]   [phone]              Who to contact     If you have questions or need follow up information about today's clinic visit or your schedule please contact [facility] directly at [phone].  Normal or non-critical lab and imaging results will be communicated to you by MyChart, letter or phone within 4 business days after the clinic has received the results. If you do not hear from us within 7 days, please contact the clinic through MyChart or phone. If you have a critical or abnormal lab result, we will notify you by phone as soon as possible.  Submit refill requests through [employer] or call your pharmacy and they will forward the refill request to us. Please allow 3 business days for your refill to be completed.          Additional Information About Your Visit        MyChart Information     [employer] lets you send messages to your doctor, view your test results, renew your prescriptions, schedule appointments and more. To sign up, go to [email or web address] . Click on \"Log in\" on the left side of the screen, which will take you to the Welcome page. Then click on \"Sign up Now\" on the right side of the page.     You will be asked to enter the access code listed below, as well as some personal information. Please follow the directions to create your username " and password.     Your access code is: [email or web address]  Expires: 2017  7:42 AM     Your access code will  in 90 days. If you need help or a new code, please call your [place] clinic or [phone].        Care EveryWhere ID     This is your Care EveryWhere ID. This could be used by other organizations to access your [place] medical records  [id number]        Your Vitals Were     Last Period                   (LMP Unknown)            Blood Pressure from Last 3 Encounters:   17 98/74   17 116/74   03/10/17 112/82    Weight from Last 3 Encounters:   17 96.6 kg (213 lb)   17 97.1 kg (214 lb)   03/10/17 97.3 kg (214 lb 6.4 oz)              We Performed the Following     [facility] Eval Report     PT Eval, Moderate Complexity (87519)     Therapeutic Exercises        Primary Care Provider    None Doctor, MD       No address on file        Thank you!     Thank you for choosing [facility]  for your care. Our goal is always to provide you with excellent care. Hearing back from our patients is one way we can continue to improve our services. Please take a few minutes to complete the written survey that you may receive in the mail after your visit with us. Thank you!             Your Updated Medication List - Protect others around you: Learn how to safely use, store and throw away your medicines at www.disposemymeds.org.          This list is accurate as of: 3/22/17  5:09 PM.  Always use your most recent med list.                   Brand Name Dispense Instructions for use    acetaminophen 500 MG tablet    TYLENOL     Take 500 mg by mouth every 6 hours as needed Reported on 3/13/2017       HYDROcodone-acetaminophen 5-325 MG per tablet    NORCO    25 tablet    Take 1-2 tablets by mouth every 6 hours as needed for moderate to severe pain or pain maximum 4 tablet(s) per day       ranitidine 150 MG tablet    ZANTAC     Take 300 mg by mouth 2 times daily

## 2017-03-22 NOTE — PROGRESS NOTES
Subjective:    Jayne Tyler is a 51 year old female with a left shoulder condition.  Condition occurred with:  Repetition/overuse.  Condition occurred: in the community.  This is a chronic condition  2 year history of left shoulder pain secondary to compensating for right shoulder after surgery. Pt has had constant pain since that did not respond to conservative treatment. Pt referred by MD for therapy on 3-13-17. Pt is scheduled to have surgery on 3-29-17..    Patient reports pain:  Anterior.  Radiates to:  Shoulder and upper arm.  Pain is described as sharp and aching and is constant and reported as 8/10.  Associated symptoms:  Loss of motion/stiffness and loss of strength. Pain is worse during the night.  Symptoms are exacerbated by using arm behind back, using arm overhead, using arm at shoulder level, lying on extremity, lifting and carrying and relieved by rest, ice and analgesics.  Since onset symptoms are gradually worsening.  Special tests:  X-ray and MRI (see report).  Previous treatment includes physical therapy (injections x 2).  There was mild improvement following previous treatment.  General health as reported by patient is good.  Pertinent medical history includes:  Overweight, smoking and depression.  Medical allergies: yes (pennicillian).  Other surgeries include:  Orthopedic surgery (right foot, multiple spinal surgeries).  Current medications:  Pain medication (ramitidine, hydrocodone).  Current occupation is .    Primary job tasks include:  Prolonged sitting.        Red flags:  Pain at rest/night.                      Objective:    Standing Alignment:      Shoulder/upper extremity deviations alignment: forward shoulder posture.                                       Shoulder Evaluation:  ROM:  AROM:    Flexion:  Left:  40      Extension: Left: 40  Abduction:  Left: 45         External Rotation:  Left:  20                    PROM:    Flexion:  Left:  60        Extension:   Left:  55      Abduction:  Left:  58        Internal Rotation:  Left:  40      External Rotation:  Left:  35                        Strength:    Flexion: Left:4-/5 Weak/painful    Pain:      Extension:  Left: 4/5    Pain:      Abduction:  Left: 4-/5  Weak/painful  Pain:      Adduction:  Left: 5-/5    Pain:      Internal Rotation:  Left:4/5     Pain:      External Rotation:   Left:4-/5   Weak/painful    Pain:               Special Tests:    Left shoulder positive for the following special tests:  Impingement    Palpation:    Left shoulder tenderness present at:  Supraspinatus                                       General     ROS    Assessment/Plan:      Patient is a 51 year old female with left side shoulder complaints.    Patient has the following significant findings with corresponding treatment plan.                Diagnosis 1:  Left rotator cuff tear  Pain -  hot/cold therapy, self management, education and home program  Decreased ROM/flexibility - therapeutic exercise, therapeutic activity and home program  Decreased strength - therapeutic exercise, therapeutic activities and home program    Therapy Evaluation Codes:   1) History comprised of:   Personal factors that impact the plan of care:      Time since onset of symptoms.    Comorbidity factors that impact the plan of care are:      Depression, Overweight and Smoking.     Medications impacting care: Pain.  2) Examination of Body Systems comprised of:   Body structures and functions that impact the plan of care:      Cervical spine and Shoulder.   Activity limitations that impact the plan of care are:      Bathing, Cooking, Driving, Dressing, Lifting, Reading/Computer work, Sleeping and Laying down.  3) Clinical presentation characteristics are:   Evolving/Changing.  4) Decision-Making    Moderate complexity using standardized patient assessment instrument and/or measureable assessment of functional outcome.  Cumulative Therapy Evaluation is: Moderate  complexity.    Previous and current functional limitations:  (See Goal Flow Sheet for this information)    Short term and Long term goals: (See Goal Flow Sheet for this information)     Communication ability:  Patient appears to be able to clearly communicate and understand verbal and written communication and follow directions correctly.  Treatment Explanation - The following has been discussed with the patient:   RX ordered/plan of care  Anticipated outcomes  Possible risks and side effects  This patient would benefit from PT intervention to resume normal activities.   Rehab potential is good.    Frequency:  1 X week, once daily  Duration:  for 1 weeks  Discharge Plan:  Achieve all LTG.  Independent in home treatment program.  Reach maximal therapeutic benefit.    Please refer to the daily flowsheet for treatment today, total treatment time and time spent performing 1:1 timed codes.

## 2017-03-27 NOTE — TELEPHONE ENCOUNTER
LVM for patient who had called to find out status of paperwork.  Paperwork has been received and will be completed on/after date of surgery per Dr. George.  He does not complete this prior to surgery incase surgery is delayed or pushed for any reason to a different date.      Asked patient to call back if she has questions, or if she would like a letter or anything indicating when surgery is scheduled.    GWEN Herrmann,  ATC

## 2017-03-29 ENCOUNTER — TRANSFERRED RECORDS (OUTPATIENT)
Dept: HEALTH INFORMATION MANAGEMENT | Facility: CLINIC | Age: 52
End: 2017-03-29

## 2017-04-03 NOTE — TELEPHONE ENCOUNTER
Received call from Darron Carney, asking to confirm date of surgery and what kind of surgery patient had done. Recommended he request in writing and fax number given.     After call ended, chart reviewed to find STD forms listed below.     MARIA EUGENIA Edwards RN

## 2017-04-04 NOTE — TELEPHONE ENCOUNTER
Pt called to inquire about status of forms.  Informed her I would check on them and make sure they get sent out.  Restrictions for ~ 6 weeks, to follow up on 4/10/17.    Completed and faxed as requested, copy sent for scanning.

## 2017-04-10 ENCOUNTER — OFFICE VISIT (OUTPATIENT)
Dept: ORTHOPEDICS | Facility: CLINIC | Age: 52
End: 2017-04-10
Payer: COMMERCIAL

## 2017-04-10 DIAGNOSIS — Z47.89 ORTHOPEDIC AFTERCARE: Primary | ICD-10-CM

## 2017-04-10 DIAGNOSIS — G89.18 POST-OP PAIN: ICD-10-CM

## 2017-04-10 DIAGNOSIS — M75.102 UNSPECIFIED ROTATOR CUFF TEAR OR RUPTURE OF LEFT SHOULDER, NOT SPECIFIED AS TRAUMATIC: ICD-10-CM

## 2017-04-10 PROCEDURE — 99024 POSTOP FOLLOW-UP VISIT: CPT | Performed by: PHYSICIAN ASSISTANT

## 2017-04-10 RX ORDER — OXYCODONE AND ACETAMINOPHEN 5; 325 MG/1; MG/1
1 TABLET ORAL EVERY 4 HOURS PRN
Qty: 30 TABLET | Refills: 0 | Status: SHIPPED | OUTPATIENT
Start: 2017-04-10 | End: 2017-05-01

## 2017-04-10 NOTE — LETTER
4/10/2017       RE: Jayne Tyler  [address]  [place] [address]           Dear Colleague,    Thank you for referring your patient, Jayne Tyler, to the [facility] ORTHOPEDIC SURGERY. Please see a copy of my visit note below.    HISTORY OF PRESENT ILLNESS:    Jayne Tyler is a 51 year old female who is seen in follow up for Left shoulder arthr DEC/DCR, mini open RCR, DOS 3/29/2017, Dr. George.  Present symptoms: Sling, Pain medication at night mainly.  Sleep disturbed.  Removes sling when seated, some exercises, (pendulum).  No new injuries.    Denies Chest pain, Calve pain, Fever, Chills.    Current Treatment: post op.      PHYSICAL EXAM:  There were no vitals taken for this visit.  There is no height or weight on file to calculate BMI.   GENERAL APPEARANCE: healthy, alert and no distress   PSYCH:  mentation appears normal and affect normal/bright    MSK:  Left: Shoulder  - presents in well fitted sling.  Ambulates: WNG  Incision clean and dry, staples present, healing.  Appropriate incisional erythema.   Yes Ecchymosis resolving..  No calve pain on palpation.  Edema min.  CMS: diane incisional numbness, otherwise grossly intac, to hand.  AROM elbow and hand wnl, shoulder deferred..      IMAGING INTERPRETATION:  NOne today.     ASSESSMENT:  Jayne Tyler is a 51 year old female S/P Left shoulder arthr DEC/DCR, mini open RCR, DOS 3/29/2017, Dr. George.  Healing, as expected.    PLAN:  - Surgery discussed, images reviewed if applicable, and all questions were answered at this time.  - staples removed with sterile technique, steri-strips applied in usual fashion, care instructions given and verbally acknowledged.  - Medications: refill Percocet with instructions to taper.  - Physical Therapy: Orders to start at 4 weeks post op.  - Sling and no active shoulder motions 4 weeks post op.    Return to clinic 4, weeks.    Pieter Rivera PA-C    Dept. Orthopedic Surgery  [facility]  [facility]   4/10/2017        Again, thank you for allowing me to participate in the care of your patient.        Sincerely,              Pieter Rivera PA-C

## 2017-04-10 NOTE — PROGRESS NOTES
HISTORY OF PRESENT ILLNESS:    Jayne [place] is a 51 year old female who is seen in follow up for Left shoulder arthr DEC/DCR, mini open RCR, DOS 3/29/2017, Dr. George.  Present symptoms: Sling, Pain medication at night mainly.  Sleep disturbed.  Removes sling when seated, some exercises, (pendulum).  No new injuries.    Denies Chest pain, Calve pain, Fever, Chills.    Current Treatment: post op.      PHYSICAL EXAM:  There were no vitals taken for this visit.  There is no height or weight on file to calculate BMI.   GENERAL APPEARANCE: healthy, alert and no distress   PSYCH:  mentation appears normal and affect normal/bright    MSK:  Left: Shoulder  - presents in well fitted sling.  Ambulates: WNG  Incision clean and dry, staples present, healing.  Appropriate incisional erythema.   Yes Ecchymosis resolving..  No calve pain on palpation.  Edema min.  CMS: diane incisional numbness, otherwise grossly intac, to hand.  AROM elbow and hand wnl, shoulder deferred..      IMAGING INTERPRETATION:  NOne today.     ASSESSMENT:  Jayne Tyler is a 51 year old female S/P Left shoulder arthr DEC/DCR, mini open RCR, DOS 3/29/2017, Dr. George.  Healing, as expected.    PLAN:  - Surgery discussed, images reviewed if applicable, and all questions were answered at this time.  - staples removed with sterile technique, steri-strips applied in usual fashion, care instructions given and verbally acknowledged.  - Medications: refill Percocet with instructions to taper.  - Physical Therapy: Orders to start at 4 weeks post op.  - Sling and no active shoulder motions 4 weeks post op.    Return to clinic 4, weeks.    Pieter Rivera PA-C    Dept. Orthopedic Surgery  [facility]   4/10/2017

## 2017-04-10 NOTE — MR AVS SNAPSHOT
"              After Visit Summary   4/10/2017    Jayne Tyler    MRN: [id number]           Patient Information     Date Of Birth          1965        Visit Information        Provider Department      4/10/2017 1:20 PM Pieter Rivera PA-C [facility]TH[facility]        Today's Diagnoses     Orthopedic aftercare    -  1      Care Instructions    Incision care instructions:  Keep dry 24 hours.  Showering is ok after that time, however no soaking or scrubbing of incision for 2 weeks.  Steri-strips will most likely fall off on their own, however they may be removed after 1 weeks with rubbing alcohol if they have not.    Sling on when up and moving for 4 weeks after surgery.      No active motion at the shoulder, use gravity or the other arm.    Follow up in 4 weeks.        Follow-ups after your visit        Follow-up notes from your care team     Return in about 4 weeks (around 5/8/2017).      Who to contact     If you have questions or need follow up information about today's clinic visit or your schedule please contact [facility]TH[facility] directly at [phone].  Normal or non-critical lab and imaging results will be communicated to you by [employer], letter or phone within 4 business days after the clinic has received the results. If you do not hear from us within 7 days, please contact the clinic through [employer] or phone. If you have a critical or abnormal lab result, we will notify you by phone as soon as possible.  Submit refill requests through [employer] or call your pharmacy and they will forward the refill request to us. Please allow 3 business days for your refill to be completed.          Additional Information About Your Visit        MyChart Information     [employer] lets you send messages to your doctor, view your test results, renew your prescriptions, schedule appointments and more. To sign up, go to [email or web address] . Click on \"Log in\" on the left side of " "the screen, which will take you to the Welcome page. Then click on \"Sign up Now\" on the right side of the page.     You will be asked to enter the access code listed below, as well as some personal information. Please follow the directions to create your username and password.     Your access code is: [email or web address]  Expires: 2017  7:42 AM     Your access code will  in 90 days. If you need help or a new code, please call your [facility] or [phone].        Care EveryWhere ID     This is your Care EveryWhere ID. This could be used by other organizations to access your [facility] medical records  [id number]         Blood Pressure from Last 3 Encounters:   17 98/74   17 116/74   03/10/17 112/82    Weight from Last 3 Encounters:   17 213 lb (96.6 kg)   17 214 lb (97.1 kg)   03/10/17 214 lb 6.4 oz (97.3 kg)              Today, you had the following     No orders found for display       Primary Care Provider    Jessica Chavarria MD       No address on file        Thank you!     Thank you for choosing [facility]  for your care. Our goal is always to provide you with excellent care. Hearing back from our patients is one way we can continue to improve our services. Please take a few minutes to complete the written survey that you may receive in the mail after your visit with us. Thank you!             Your Updated Medication List - Protect others around you: Learn how to safely use, store and throw away your medicines at www.disposemymeds.org.          This list is accurate as of: 4/10/17  2:00 PM.  Always use your most recent med list.                   Brand Name Dispense Instructions for use    acetaminophen 500 MG tablet    TYLENOL     Take 500 mg by mouth every 6 hours as needed Reported on 3/13/2017       HYDROcodone-acetaminophen 5-325 MG per tablet    NORCO    25 tablet    Take 1-2 tablets by mouth every 6 hours as needed for moderate to severe pain or pain " maximum 4 tablet(s) per day       ranitidine 150 MG tablet    ZANTAC     Take 300 mg by mouth 2 times daily

## 2017-04-10 NOTE — PATIENT INSTRUCTIONS
Incision care instructions:  Keep dry 24 hours.  Showering is ok after that time, however no soaking or scrubbing of incision for 2 weeks.  Steri-strips will most likely fall off on their own, however they may be removed after 1 weeks with rubbing alcohol if they have not.    Sling on when up and moving for 4 weeks after surgery.      No active motion at the shoulder, use gravity or the other arm.    Follow up in 4 weeks.

## 2017-04-12 NOTE — TELEPHONE ENCOUNTER
[facility] request for additional information including office notes.    Placed in brown [facility] file for completion.

## 2017-04-12 NOTE — TELEPHONE ENCOUNTER
Forms completed,  Estimate 8-12 weeks off.    Left with triage.  Pieter Rivera PA-C  [facility] - Surgery\

## 2017-04-24 ENCOUNTER — THERAPY VISIT (OUTPATIENT)
Dept: PHYSICAL THERAPY | Facility: CLINIC | Age: 52
End: 2017-04-24
Payer: COMMERCIAL

## 2017-04-24 DIAGNOSIS — M75.102 TEAR OF LEFT ROTATOR CUFF, UNSPECIFIED TEAR EXTENT: ICD-10-CM

## 2017-04-24 DIAGNOSIS — Z47.89 AFTERCARE FOLLOWING SURGERY OF THE MUSCULOSKELETAL SYSTEM: Primary | ICD-10-CM

## 2017-04-24 PROCEDURE — 97010 HOT OR COLD PACKS THERAPY: CPT | Mod: GP | Performed by: PHYSICAL THERAPIST

## 2017-04-24 PROCEDURE — 97161 PT EVAL LOW COMPLEX 20 MIN: CPT | Mod: GP | Performed by: PHYSICAL THERAPIST

## 2017-04-24 PROCEDURE — 97110 THERAPEUTIC EXERCISES: CPT | Mod: GP | Performed by: PHYSICAL THERAPIST

## 2017-04-24 NOTE — PROGRESS NOTES
"Subjective:  Physical Therapy Initial Evaluation    Therapist Impression:   Jayne Tyler is a 51 year old female patient presenting to Physical Therapy with: L shoulder pain. These impairments limit their ability to use her arm actively. Skilled PT services are necessary in order to reduce impairments and improve independent function.    Precautions/Restrictions/MD instructions: Start with PROM for 4 weeks, (may have pt perform PROM exercises at home if deemed reliable,) then AROM progression starting with assisted ROM. During Assisted ROM, Once PROM is greater than 140 degrees of Flexion, pt may progress to Strengthening as tolerated.  -Follow up with Dr. George's office on the 8th    Subjective:    DOI/onset: 3/29/17 surgery   History of current symptoms: R shoulder scope \"a couple years ago.\" Pain for 2+ years on the left side.  Primary pain location: anterior, superior  Quality/radiation: none  Exacerbated by: moving arm  Relieved by: arm at side  Pain: Pain is rated 4/10 Currently, 1/10 at best, and 8/10 at worst.  Previous Treatment: PT Effect of prior treatment: mild    Imaging: Prior to surgery: MRI showed supra tear     Occupation: Customer service represntative  Job duties: off work now, no date in mind; computer work and prolonged sitting  PMH: Needs to have foot surgery also; pain at night, smoking, overweight.   -Surgeries: Right shoulder scope 2 years ago, bunion right foot. BACK: diskectomy and L5-S1 fusion  Medications: refer to medical chart; oxycodone before bed  Sleeping: on back or right side.    Patient's goals: gain functional use of arm  General health as reported by patient: good.     Previous functional status:  fully functional prior to pain onset/injury.     HPI                    Objective:  SHOULDER:    Cervical Screen: negative     Shoulder: (* indicates patient's pain); ROM in parentheses is PROM   L  (PROM) R  (PROM) MMT L MMT R   Flex 170 90 Did not test    Abd 165 45 Did not test "    IR  To belly Did not test    ER  15 Did not test      Special tests: Deferred     :System    Physical Exam    General     ROS    Assessment/Plan:      Patient is a 51 year old female with left side shoulder complaints.    Patient has the following significant findings with corresponding treatment plan.                Diagnosis 1:  L shoulder pain s/p RCR  Pain -  hot/cold therapy, manual therapy, self management, education and home program  Decreased ROM/flexibility - manual therapy, therapeutic exercise and home program  Decreased joint mobility - manual therapy, therapeutic exercise and home program  Decreased strength - therapeutic exercise, therapeutic activities and home program  Decreased proprioception - neuro re-education, therapeutic activities and home program  Impaired muscle performance - neuro re-education and home program  Decreased function - therapeutic activities and home program    Therapy Evaluation Codes:   1) History comprised of:   Personal factors that impact the plan of care:      Time since onset of symptoms.    Comorbidity factors that impact the plan of care are:      Overweight and Smoking.     Medications impacting care: Pain.  2) Examination of Body Systems comprised of:   Body structures and functions that impact the plan of care:      Shoulder.   Activity limitations that impact the plan of care are:      Lifting, Sitting and Working.  3) Clinical presentation characteristics are:   Stable/Uncomplicated.  4) Decision-Making    Low complexity using standardized patient assessment instrument and/or measureable assessment of functional outcome.  Cumulative Therapy Evaluation is: Low complexity.    Previous and current functional limitations:  (See Goal Flow Sheet for this information)    Short term and Long term goals: (See Goal Flow Sheet for this information)     Communication ability:  Patient appears to be able to clearly communicate and understand verbal and written communication  and follow directions correctly.  Treatment Explanation - The following has been discussed with the patient:   RX ordered/plan of care  Anticipated outcomes  Possible risks and side effects  This patient would benefit from PT intervention to resume normal activities.   Rehab potential is good.    Frequency:  1-2 X week, once daily  Duration:  for 8 weeks  Discharge Plan:  Achieve all LTG.  Independent in home treatment program.  Reach maximal therapeutic benefit.    Please refer to the daily flowsheet for treatment today, total treatment time and time spent performing 1:1 timed codes.

## 2017-04-27 ENCOUNTER — THERAPY VISIT (OUTPATIENT)
Dept: PHYSICAL THERAPY | Facility: CLINIC | Age: 52
End: 2017-04-27
Payer: COMMERCIAL

## 2017-04-27 DIAGNOSIS — Z47.89 AFTERCARE FOLLOWING SURGERY OF THE MUSCULOSKELETAL SYSTEM: ICD-10-CM

## 2017-04-27 DIAGNOSIS — M75.102 TEAR OF LEFT ROTATOR CUFF, UNSPECIFIED TEAR EXTENT: ICD-10-CM

## 2017-04-27 PROCEDURE — 97110 THERAPEUTIC EXERCISES: CPT | Mod: GP | Performed by: PHYSICAL THERAPIST

## 2017-05-01 ENCOUNTER — THERAPY VISIT (OUTPATIENT)
Dept: PHYSICAL THERAPY | Facility: CLINIC | Age: 52
End: 2017-05-01
Payer: COMMERCIAL

## 2017-05-01 DIAGNOSIS — M75.102 TEAR OF LEFT ROTATOR CUFF, UNSPECIFIED TEAR EXTENT: ICD-10-CM

## 2017-05-01 DIAGNOSIS — G89.18 POST-OP PAIN: ICD-10-CM

## 2017-05-01 DIAGNOSIS — Z47.89 AFTERCARE FOLLOWING SURGERY OF THE MUSCULOSKELETAL SYSTEM: ICD-10-CM

## 2017-05-01 PROCEDURE — 97110 THERAPEUTIC EXERCISES: CPT | Mod: GP | Performed by: PHYSICAL THERAPIST

## 2017-05-01 PROCEDURE — 97010 HOT OR COLD PACKS THERAPY: CPT | Mod: GP | Performed by: PHYSICAL THERAPIST

## 2017-05-01 NOTE — TELEPHONE ENCOUNTER
Medication Request for: percocet            Patient had Left shoulder arthr DEC/DCR, mini open RCR, DOS 3/29/2017, Dr. George.    Patient currently taking: one tab at bedtime   Takes ibuprofen 200mg twice daily but states she has an ulcer and is not really supposed to be taking it. Is also icing.   Problems/ Concerns of Patient: no side effects reported  Prescription last written on 4/10/17 by Cruzito Rivera PA-C   Last Fill Quantity: 30 with # refills: 0  Sig: take one tab every 4 hrs prn     Last Office Visit by provider: 4/10/17  Future Office Visit:  5/8/17  Patient desires to  at , they have been notified that they will need to present a photo ID  Pharmacy selected in [place].    Medication requests may take up to 3 business days for a response  Has patient been notified of this Yes  Patient informed we will get back with her tomorrow after checking with Cruzito Rivera PA-C.     Please advise on refill.     MARIA EUGENIA Edwards RN

## 2017-05-02 RX ORDER — OXYCODONE AND ACETAMINOPHEN 5; 325 MG/1; MG/1
1 TABLET ORAL EVERY 4 HOURS PRN
Qty: 30 TABLET | Refills: 0 | Status: ON HOLD | OUTPATIENT
Start: 2017-05-02 | End: 2017-06-06

## 2017-05-03 NOTE — TELEPHONE ENCOUNTER
Patient left voice mail last evening inquiring about refill of Percocet.     Returned call, she states that she never did get the VM, she is requesting that Rx be walked to the pharmacy on the 1st floor. Rx hand carried to pharmacy and left for patient.    Caleb Rodriguez, ATC

## 2017-05-04 ENCOUNTER — THERAPY VISIT (OUTPATIENT)
Dept: PHYSICAL THERAPY | Facility: CLINIC | Age: 52
End: 2017-05-04
Payer: COMMERCIAL

## 2017-05-04 DIAGNOSIS — M75.102 TEAR OF LEFT ROTATOR CUFF, UNSPECIFIED TEAR EXTENT: ICD-10-CM

## 2017-05-04 DIAGNOSIS — Z47.89 AFTERCARE FOLLOWING SURGERY OF THE MUSCULOSKELETAL SYSTEM: ICD-10-CM

## 2017-05-04 PROCEDURE — 97010 HOT OR COLD PACKS THERAPY: CPT | Mod: GP | Performed by: PHYSICAL THERAPIST

## 2017-05-04 PROCEDURE — 97110 THERAPEUTIC EXERCISES: CPT | Mod: GP | Performed by: PHYSICAL THERAPIST

## 2017-05-05 ENCOUNTER — OFFICE VISIT (OUTPATIENT)
Dept: PODIATRY | Facility: CLINIC | Age: 52
End: 2017-05-05
Payer: COMMERCIAL

## 2017-05-05 VITALS
WEIGHT: 212 LBS | SYSTOLIC BLOOD PRESSURE: 121 MMHG | HEIGHT: 65 IN | DIASTOLIC BLOOD PRESSURE: 68 MMHG | BODY MASS INDEX: 35.32 KG/M2

## 2017-05-05 DIAGNOSIS — M77.8 CAPSULITIS OF RIGHT FOOT: ICD-10-CM

## 2017-05-05 DIAGNOSIS — M79.671 RIGHT FOOT PAIN: ICD-10-CM

## 2017-05-05 DIAGNOSIS — Q72.899 LONGITUDINAL DEFICIENCY OF METATARSAL BONE: Primary | ICD-10-CM

## 2017-05-05 PROCEDURE — 99213 OFFICE O/P EST LOW 20 MIN: CPT | Performed by: PODIATRIST

## 2017-05-05 NOTE — PROGRESS NOTES
ASSESSMENT/PLAN:  Encounter Diagnoses   Name Primary?     Longitudinal deficiency of metatarsal bone Yes     Capsulitis of right foot      Right foot pain      A fairly exhaustive conservative approach has not helped.  She is requesting surgery.     We discussed the surgical procedure and expected length of recovery.   The patient was asked to carefully consider surgery.  If it is scheduled, she will need to return for a more detailed discussion including the planned procedure(s), risks, benefits, post operative cares, course and prognosis.    Pre-Op Orders:   Laymans terms = (in parentheses)    Diagnosis:  1)  Capsulitis, joint pain, right 2nd metatarsophalangeal joint    2) elongated right 2nd metatarsal      Consent:  1) Weil osteotomy, right 2nd metatarsal  (shortening and elevating the bone)  2) possible plantar plate/ capsule repair, right 2nd metatarsophalangeal joint  3) hammer toe repair, right 2nd toe    Surgeon Procedure Time:  60 min  Anesthesia:  MAC with local  Location:  [facility]  Special Instruments:  Mini c arm; Arthrex Scorpion plantar plate system; Arthrex twist off screws; Arthrex Trim It pins  Start time:  7:30 or to follow previous cases  ALLERGIES:  Penicillins        Body mass index is 35.55 kg/(m^2).    Weight management plan: Patient was referred to their PCP to discuss a diet and exercise plan.      Olvin Betancur DPM, FACFAS, MS    [place] Department of Podiatry/Foot & Ankle Surgery      ____________________________________________________________________    HPI:         Chief Complaint: right foot capsulitis.  I evaluated her for this in .  She has tried rigid-soled hiking boots, metatarsal pads, custom orthoses, avoidance of barefoot walking, injection.  Pain persists and is significantly affecting her quaility of life and limiting her weight bearing   Onset of problem: 6-7 years  Pain/ discomfort is described as:  Throbbing, deep ache  Ratin/10   Frequency: daily  The  pain is made worse with walking, standing  Previous treatment: see above      *No past medical history on file.*  *  Past Surgical History:   Procedure Laterality Date     BUNIONECTOMY Right 01/2008    right foot - bunionectomy     ORTHOPEDIC SURGERY  1996    diskectomy - lumbar     ORTHOPEDIC SURGERY  1997    diskectomy - lumbar     ORTHOPEDIC SURGERY  04/1999    L5-S1 lumbar fusion     ORTHOPEDIC SURGERY Right 02/2015    Right shoulder scope - dec/dcr, RCR   *  *  Current Outpatient Prescriptions   Medication Sig Dispense Refill     oxyCODONE-acetaminophen (PERCOCET) 5-325 MG per tablet Take 1 tablet by mouth every 4 hours as needed for pain = 30 tablet 0     ranitidine (ZANTAC) 150 MG tablet Take 300 mg by mouth 2 times daily       acetaminophen (TYLENOL) 500 MG tablet Take 500 mg by mouth every 6 hours as needed Reported on 3/13/2017       HYDROcodone-acetaminophen (NORCO) 5-325 MG per tablet Take 1-2 tablets by mouth every 6 hours as needed for moderate to severe pain or pain maximum 4 tablet(s) per day (Patient not taking: Reported on 5/5/2017) 25 tablet 0       Social History: Employment:  Customer service;  Exercise/Physical activity:  Daily walking;  Tobacco use:  yes  Social History     Social History     Marital status:      Spouse name: N/A     Number of children: N/A     Years of education: N/A     Occupational History     Not on file.     Social History Main Topics     Smoking status: Current Every Day Smoker     Types: Cigarettes     Smokeless tobacco: Not on file     Alcohol use Yes      Comment: Rare     Drug use: No     Sexual activity: Yes     Other Topics Concern     Parent/Sibling W/ Cabg, Mi Or Angioplasty Before 65f 55m? No     Social History Narrative       Family history:  Family History   Problem Relation Age of Onset     Coronary Artery Disease Mother      Rheumatoid Arthritis Mother      Rheumatoid Arthritis Sister      Coronary Artery Disease Maternal Grandmother      Coronary  Artery Disease Maternal Grandfather      Family History Negative Father          EXAM:    Body mass index is 35.55 kg/(m^2).    Constitutional/ general:  Pt is in no apparent distress, appears well-nourished.  Cooperative with history and physical exam.     Vascular:  Pedal pulses are palpable bilaterally for both the DP and PT arteries.  CFT < 3 sec.  No edema.  Pedal hair growth noted.     Neuro:  Alert and oriented x 3. Coordinated gait.  Light touch sensation is intact to the L4, L5, S1 distributions. No obvious deficits.  No evidence of neurological-based weakness, spasticity, or contracture in the lower extremities.     Derm: Normal texture and turgor.  No erythema, ecchymosis, or cyanosis.  No open lesions.     Musculoskeletal:    Lower extremity muscle strength is normal.  Patient is ambulatory without an assistive device or brace .  No gross deformities.  Pain on palpation:  Sub right 2nd metatarsophalangeal joint - the second toe has a contracture deformity.       Previous radiographic Exam:  X-Ray Findings:  I personally reviewed the films.      4 views right foot:  Two screws in the midshaft of the 1st metatarsal.  Significant, near 1cm, length difference between the 1st and longer 2nd metatarsal  No acute findings.        Olvin Betancur DPM, FACFAS, MS    [place] Department of Podiatry/Foot & Ankle Surgery

## 2017-05-05 NOTE — NURSING NOTE
"Chief Complaint   Patient presents with     Foot Problems     x8 years capsulitis of right foot, previously seen in February       Initial /68 (BP Location: Right arm, Patient Position: Chair, Cuff Size: Adult Large)  Ht 5' 4.75\" (1.645 m)  Wt 212 lb (96.2 kg)  BMI 35.55 kg/m2 Estimated body mass index is 35.55 kg/(m^2) as calculated from the following:    Height as of this encounter: 5' 4.75\" (1.645 m).    Weight as of this encounter: 212 lb (96.2 kg).  Medication Reconciliation: complete   Jaylin Short MA      "

## 2017-05-05 NOTE — PATIENT INSTRUCTIONS
DR. SINGH'S SCHEDULE:        MONDAY / FRIDAY AM - [facility] WEDNESDAY - [place]   [address] [place] [address] [place] [address]   P: [phone] P: [phone]   F: [phone] F:[phone]       TUESDAY - SURGERY THURSDAY - [place]   [phone] [address] [place] [address]   TO SCHEDULE AN APPT CALL: P: [phone] F: [phone]     FYI: Our schedule at [place] on Wednesday is from 7 AM - 2 PM.        BODY MASS INDEX (BMI)  Many things can cause foot and ankle problems. Foot structure, activity level, foot mechanics and injuries are common causes of pain.    One very important issue that often goes unmentioned, is body weight.  Extra weight can cause increased stress on muscles, ligaments, bones and tendons. Sometimes just a few extra pounds is all it takes to put one over her/his threshold. Without reducing that stress, it can be difficult to alleviate pain.      Some people are uncomfortable addressing this issue, but we feel it is important for you to think about it. As Foot & Ankle specialists, our job is addressing the lower extremity problem and possible causes.     Regarding extra body weight, we encourage patients to discuss diet and weight management plans with their primary care doctors. It is this team approach that gives you the best opportunity for pain relief and getting you back on your feet.

## 2017-05-05 NOTE — LETTER
5/5/2017       RE: Jayne Tyler  [address] [facility] [address]           Dear Colleague,    Thank you for referring your patient, Jayne Tyler, to the [facility]. Please see a copy of my visit note below.      ASSESSMENT/PLAN:  Encounter Diagnoses   Name Primary?     Longitudinal deficiency of metatarsal bone Yes     Capsulitis of right foot      Right foot pain      A fairly exhaustive conservative approach has not helped.  She is requesting surgery.     We discussed the surgical procedure and expected length of recovery.   The patient was asked to carefully consider surgery.  If it is scheduled, she will need to return for a more detailed discussion including the planned procedure(s), risks, benefits, post operative cares, course and prognosis.    Pre-Op Orders:   Laymans terms = (in parentheses)    Diagnosis:  1)  Capsulitis, joint pain, right 2nd metatarsophalangeal joint    2) elongated right 2nd metatarsal      Consent:  1) Weil osteotomy, right 2nd metatarsal  (shortening and elevating the bone)  2) possible plantar plate/ capsule repair, right 2nd metatarsophalangeal joint  3) hammer toe repair, right 2nd toe    Surgeon Procedure Time:  60 min  Anesthesia:  MAC with local  Location:  [facility]  Special Instruments:  Mini c arm; Arthrex Scorpion plantar plate system; Arthrex twist off screws; Arthrex Trim It pins  Start time:  7:30 or to follow previous cases  ALLERGIES:  Penicillins        Body mass index is 35.55 kg/(m^2).    Weight management plan: Patient was referred to their PCP to discuss a diet and exercise plan.      Olvin Betancur, YAYA, FACFAS, MS    [place] Department of Podiatry/Foot & Ankle Surgery      ____________________________________________________________________    HPI:         Chief Complaint: right foot capsulitis.  I evaluated her for this in February, 2017.  She has tried rigid-soled hiking boots, metatarsal pads, custom orthoses, avoidance of  barefoot walking, injection.  Pain persists and is significantly affecting her quaility of life and limiting her weight bearing   Onset of problem: 6-7 years  Pain/ discomfort is described as:  Throbbing, deep ache  Ratin/10   Frequency: daily  The pain is made worse with walking, standing  Previous treatment: see above      *No past medical history on file.*  *  Past Surgical History:   Procedure Laterality Date     BUNIONECTOMY Right 2008    right foot - bunionectomy     ORTHOPEDIC SURGERY  1996    diskectomy - lumbar     ORTHOPEDIC SURGERY      diskectomy - lumbar     ORTHOPEDIC SURGERY  1999    L5-S1 lumbar fusion     ORTHOPEDIC SURGERY Right 2015    Right shoulder scope - dec/dcr, RCR   *  *  Current Outpatient Prescriptions   Medication Sig Dispense Refill     oxyCODONE-acetaminophen (PERCOCET) 5-325 MG per tablet Take 1 tablet by mouth every 4 hours as needed for pain = 30 tablet 0     ranitidine (ZANTAC) 150 MG tablet Take 300 mg by mouth 2 times daily       acetaminophen (TYLENOL) 500 MG tablet Take 500 mg by mouth every 6 hours as needed Reported on 3/13/2017       HYDROcodone-acetaminophen (NORCO) 5-325 MG per tablet Take 1-2 tablets by mouth every 6 hours as needed for moderate to severe pain or pain maximum 4 tablet(s) per day (Patient not taking: Reported on 2017) 25 tablet 0       Social History: Employment:  Customer service;  Exercise/Physical activity:  Daily walking;  Tobacco use:  yes  Social History     Social History     Marital status:      Spouse name: N/A     Number of children: N/A     Years of education: N/A     Occupational History     Not on file.     Social History Main Topics     Smoking status: Current Every Day Smoker     Types: Cigarettes     Smokeless tobacco: Not on file     Alcohol use Yes      Comment: Rare     Drug use: No     Sexual activity: Yes     Other Topics Concern     Parent/Sibling W/ Cabg, Mi Or Angioplasty Before 65f 55m? No     Social  History Narrative       Family history:  Family History   Problem Relation Age of Onset     Coronary Artery Disease Mother      Rheumatoid Arthritis Mother      Rheumatoid Arthritis Sister      Coronary Artery Disease Maternal Grandmother      Coronary Artery Disease Maternal Grandfather      Family History Negative Father          EXAM:    Body mass index is 35.55 kg/(m^2).    Constitutional/ general:  Pt is in no apparent distress, appears well-nourished.  Cooperative with history and physical exam.     Vascular:  Pedal pulses are palpable bilaterally for both the DP and PT arteries.  CFT < 3 sec.  No edema.  Pedal hair growth noted.     Neuro:  Alert and oriented x 3. Coordinated gait.  Light touch sensation is intact to the L4, L5, S1 distributions. No obvious deficits.  No evidence of neurological-based weakness, spasticity, or contracture in the lower extremities.     Derm: Normal texture and turgor.  No erythema, ecchymosis, or cyanosis.  No open lesions.     Musculoskeletal:    Lower extremity muscle strength is normal.  Patient is ambulatory without an assistive device or brace .  No gross deformities.  Pain on palpation:  Sub right 2nd metatarsophalangeal joint - the second toe has a contracture deformity.       Previous radiographic Exam:  X-Ray Findings:  I personally reviewed the films.      4 views right foot:  Two screws in the midshaft of the 1st metatarsal.  Significant, near 1cm, length difference between the 1st and longer 2nd metatarsal  No acute findings.        Olvin Betancur DPM, MELODIE, MS    [place] Department of Podiatry/Foot & Ankle Surgery              Again, thank you for allowing me to participate in the care of your patient.        Sincerely,              Olvin Betancur DPM

## 2017-05-05 NOTE — MR AVS SNAPSHOT
After Visit Summary   5/5/2017    Jayne Tyler    MRN: [id number]           Patient Information     Date Of Birth          1965        Visit Information        Provider Department      5/5/2017 9:00 AM Olvin Betancur DPM [facility]         Follow-ups after your visit        Your next 10 appointments already scheduled     May 08, 2017 10:40 AM CDT   ANDERSON Extremity with Paul Breyen, PT   [facility] ([facility] [place]  )    [address] [place] [address]   [phone]            May 08, 2017  1:00 PM CDT   Return Visit with Pieter Rivera PA-C   [facility] ([facility] [place])    [address] [place] [address]   [phone]            May 11, 2017 10:40 AM CDT   ANDERSON Extremity with Paul Breyen, PT   [facility] ([facility] [place]  )    [address] [place] [address]   [phone]            May 15, 2017 11:20 AM CDT   ANDERSON Extremity with Paul Breyen, PT   [facility] ([facility] [place]  )    [address] [place] [address]   [phone]              Who to contact     If you have questions or need follow up information about today's clinic visit or your schedule please contact [facility] directly at [phone].  Normal or non-critical lab and imaging results will be communicated to you by MyChart, letter or phone within 4 business days after the clinic has received the results. If you do not hear from us within 7 days, please contact the clinic through [employer] or phone. If you have a critical or abnormal lab result, we will notify you by phone as soon as possible.  Submit refill requests through [employer] or call your pharmacy and they will forward the refill request to us. Please allow 3 business days for your refill to be completed.          Additional Information About Your Visit       "  MyChart Information     [employer] lets you send messages to your doctor, view your test results, renew your prescriptions, schedule appointments and more. To sign up, go to [email or web address] . Click on \"Log in\" on the left side of the screen, which will take you to the Welcome page. Then click on \"Sign up Now\" on the right side of the page.     You will be asked to enter the access code listed below, as well as some personal information. Please follow the directions to create your username and password.     Your access code is: [email or web address]  Expires: 2017  7:42 AM     Your access code will  in 90 days. If you need help or a new code, please call your [place] clinic or [phone].        Care EveryWhere ID     This is your Care EveryWhere ID. This could be used by other organizations to access your [place] medical records  [id number]        Your Vitals Were     Height BMI (Body Mass Index)                5' 4.75\" (1.645 m) 35.55 kg/m2           Blood Pressure from Last 3 Encounters:   17 121/68   17 98/74   17 116/74    Weight from Last 3 Encounters:   17 212 lb (96.2 kg)   17 213 lb (96.6 kg)   17 214 lb (97.1 kg)              Today, you had the following     No orders found for display       Primary Care Provider    Jessica Chavarria MD       No address on file        Thank you!     Thank you for choosing [facility]  for your care. Our goal is always to provide you with excellent care. Hearing back from our patients is one way we can continue to improve our services. Please take a few minutes to complete the written survey that you may receive in the mail after your visit with us. Thank you!             Your Updated Medication List - Protect others around you: Learn how to safely use, store and throw away your medicines at www.disposemymeds.org.          This list is accurate as of: 17  9:16 AM.  Always use your most recent med list. "                   Brand Name Dispense Instructions for use    acetaminophen 500 MG tablet    TYLENOL     Take 500 mg by mouth every 6 hours as needed Reported on 3/13/2017       HYDROcodone-acetaminophen 5-325 MG per tablet    NORCO    25 tablet    Take 1-2 tablets by mouth every 6 hours as needed for moderate to severe pain or pain maximum 4 tablet(s) per day       oxyCODONE-acetaminophen 5-325 MG per tablet    PERCOCET    30 tablet    Take 1 tablet by mouth every 4 hours as needed for pain =       ranitidine 150 MG tablet    ZANTAC     Take 300 mg by mouth 2 times daily

## 2017-05-08 ENCOUNTER — TELEPHONE (OUTPATIENT)
Dept: PODIATRY | Facility: CLINIC | Age: 52
End: 2017-05-08

## 2017-05-08 ENCOUNTER — OFFICE VISIT (OUTPATIENT)
Dept: ORTHOPEDICS | Facility: CLINIC | Age: 52
End: 2017-05-08
Payer: COMMERCIAL

## 2017-05-08 ENCOUNTER — THERAPY VISIT (OUTPATIENT)
Dept: PHYSICAL THERAPY | Facility: CLINIC | Age: 52
End: 2017-05-08
Payer: COMMERCIAL

## 2017-05-08 DIAGNOSIS — M75.102 TEAR OF LEFT ROTATOR CUFF, UNSPECIFIED TEAR EXTENT: ICD-10-CM

## 2017-05-08 DIAGNOSIS — Z47.89 ORTHOPEDIC AFTERCARE: Primary | ICD-10-CM

## 2017-05-08 DIAGNOSIS — Z47.89 AFTERCARE FOLLOWING SURGERY OF THE MUSCULOSKELETAL SYSTEM: ICD-10-CM

## 2017-05-08 PROCEDURE — 99024 POSTOP FOLLOW-UP VISIT: CPT | Performed by: PHYSICIAN ASSISTANT

## 2017-05-08 PROCEDURE — 97112 NEUROMUSCULAR REEDUCATION: CPT | Mod: GP | Performed by: PHYSICAL THERAPIST

## 2017-05-08 PROCEDURE — 97110 THERAPEUTIC EXERCISES: CPT | Mod: GP | Performed by: PHYSICAL THERAPIST

## 2017-05-08 NOTE — LETTER
[facility]  [address]  [facility] [address]  [phone]          May 8, 2017    RE:  Jayne Tyler                                                                                                                                                       [address]  [place] [address]            To whom it may concern:    Jayne Tyler is under my professional care for Left shoulder Rotator cuff surgery.  She  may return to work with the following: The employee is UNABLE to return to work until 5/22/2017.    When the patient returns to work, the following restrictions apply to the left arm and hours until 6/5/2017:    D) Reach Above Shoulders: Not at all (0 hours)  E) Lift, carry, push, and pull no more than:  11-20 lbs.    Reduced hours:  May work up to 4 hours daily.    We will reassess at the end of this period.      Sincerely,        Pieter Rivera PA-C

## 2017-05-08 NOTE — PROGRESS NOTES
HISTORY OF PRESENT ILLNESS:    Jayne Tyler is a 51 year old female who is seen in follow up for Left shoulder arthr DEC/DCR, mini open RCR, DOS 3/29/2017, Dr. George..  Present symptoms: Pt reports slow progress.  Pain improving.  Taking one Percocet prior to PT and one at night.  Sleeping a little better.  Tapered sling to as needed, ulysses If walking a lot.  PT progressing.  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  Denies Chest pain, Calve pain, Fever, Chills.    Current Treatment: Postop.    PHYSICAL EXAM:  There were no vitals taken for this visit.  There is no height or weight on file to calculate BMI.   GENERAL APPEARANCE: healthy, alert and no distress   PSYCH:  mentation appears normal and affect normal/bright    MSK:  Left: Shoulder .  Ambulates: WNG.  Incision clean and dry, suture expression at proximal incsion present, healing.  Appropriate incisional erythema.   CMS: diane incisional numbness, otherwise grossly intact.  PROM supine, Flexion 100, with slow progression  Strength, 4/5 in flexion , ER/IR, ABD..      IMAGING INTERPRETATION:  None today.  Images read and interpreted by me.     ASSESSMENT:  Jayne Tyler is a 51 year old female S/P Left shoulder arthr DEC/DCR, mini open RCR, DOS 3/29/2017, Dr. Georeg..  Doing well with progress.  Questions return to work.     PLAN:  - Surgery discussed, images reviewed if applicable, and all questions were answered at this time.  - care instructions given and verbally acknowledged.  - Medications: as current.  - Physical therapy: continue with current physical therapy, progress as tolerated.  - use good form as a guide for what is appropriate use.  - Return to work letter.    Return to clinic 4 weeks.    Pieter Rivera PA-C    Dept. Orthopedic Surgery  [facility]   5/8/2017

## 2017-05-08 NOTE — TELEPHONE ENCOUNTER
Scheduled surgery for Weil Osteotomy, right 2nd metatarsal (shortening and elevating the bone)  possible plantar plate/capsule repair, right 2nd metatsophalangeal joint, hammertoe repair, right 2nd toe  on 6/06/2017 with Dr. Betancur @ [facility] @ 8:35.  Surgery education packet provided to patient.

## 2017-05-09 NOTE — PATIENT INSTRUCTIONS
- Medications: as current.  - Physical therapy: continue with current physical therapy, progress as tolerated.  - use good form as a guide for what is appropriate use.    Follow up in 4-6 weeks.

## 2017-05-11 ENCOUNTER — THERAPY VISIT (OUTPATIENT)
Dept: PHYSICAL THERAPY | Facility: CLINIC | Age: 52
End: 2017-05-11
Payer: COMMERCIAL

## 2017-05-11 DIAGNOSIS — M75.102 TEAR OF LEFT ROTATOR CUFF, UNSPECIFIED TEAR EXTENT: ICD-10-CM

## 2017-05-11 DIAGNOSIS — Z47.89 AFTERCARE FOLLOWING SURGERY OF THE MUSCULOSKELETAL SYSTEM: ICD-10-CM

## 2017-05-11 PROCEDURE — 97010 HOT OR COLD PACKS THERAPY: CPT | Mod: GP | Performed by: PHYSICAL THERAPIST

## 2017-05-11 PROCEDURE — 97110 THERAPEUTIC EXERCISES: CPT | Mod: GP | Performed by: PHYSICAL THERAPIST

## 2017-05-15 ENCOUNTER — THERAPY VISIT (OUTPATIENT)
Dept: PHYSICAL THERAPY | Facility: CLINIC | Age: 52
End: 2017-05-15
Payer: COMMERCIAL

## 2017-05-15 DIAGNOSIS — M75.102 TEAR OF LEFT ROTATOR CUFF, UNSPECIFIED TEAR EXTENT: ICD-10-CM

## 2017-05-15 DIAGNOSIS — Z47.89 AFTERCARE FOLLOWING SURGERY OF THE MUSCULOSKELETAL SYSTEM: ICD-10-CM

## 2017-05-15 PROCEDURE — 97110 THERAPEUTIC EXERCISES: CPT | Mod: GP | Performed by: PHYSICAL THERAPIST

## 2017-05-15 PROCEDURE — 97010 HOT OR COLD PACKS THERAPY: CPT | Mod: GP | Performed by: PHYSICAL THERAPIST

## 2017-05-18 ENCOUNTER — OFFICE VISIT (OUTPATIENT)
Dept: INTERNAL MEDICINE | Facility: CLINIC | Age: 52
End: 2017-05-18
Payer: COMMERCIAL

## 2017-05-18 VITALS
OXYGEN SATURATION: 98 % | BODY MASS INDEX: 37.18 KG/M2 | HEIGHT: 64 IN | DIASTOLIC BLOOD PRESSURE: 88 MMHG | SYSTOLIC BLOOD PRESSURE: 124 MMHG | WEIGHT: 217.8 LBS | TEMPERATURE: 98.8 F | HEART RATE: 92 BPM

## 2017-05-18 DIAGNOSIS — K21.9 GASTROESOPHAGEAL REFLUX DISEASE, ESOPHAGITIS PRESENCE NOT SPECIFIED: ICD-10-CM

## 2017-05-18 DIAGNOSIS — M77.51 CAPSULITIS OF METATARSOPHALANGEAL (MTP) JOINT OF RIGHT FOOT: ICD-10-CM

## 2017-05-18 DIAGNOSIS — Z01.818 PREOPERATIVE EXAMINATION: Primary | ICD-10-CM

## 2017-05-18 PROBLEM — Z47.89 AFTERCARE FOLLOWING SURGERY OF THE MUSCULOSKELETAL SYSTEM: Status: RESOLVED | Noted: 2017-04-24 | Resolved: 2017-05-18

## 2017-05-18 PROBLEM — M75.42 IMPINGEMENT SYNDROME OF LEFT SHOULDER: Status: RESOLVED | Noted: 2017-02-21 | Resolved: 2017-05-18

## 2017-05-18 PROBLEM — M25.519 SHOULDER PAIN, UNSPECIFIED CHRONICITY, UNSPECIFIED LATERALITY: Status: RESOLVED | Noted: 2017-02-21 | Resolved: 2017-05-18

## 2017-05-18 PROBLEM — R10.13 DYSPEPSIA: Status: RESOLVED | Noted: 2017-02-21 | Resolved: 2017-05-18

## 2017-05-18 PROBLEM — M75.102 TEAR OF LEFT ROTATOR CUFF, UNSPECIFIED TEAR EXTENT: Status: RESOLVED | Noted: 2017-04-24 | Resolved: 2017-05-18

## 2017-05-18 PROBLEM — M79.673 PAIN OF FOOT, UNSPECIFIED LATERALITY: Status: RESOLVED | Noted: 2017-02-21 | Resolved: 2017-05-18

## 2017-05-18 PROBLEM — Z13.6 CARDIOVASCULAR SCREENING; LDL GOAL LESS THAN 160: Status: RESOLVED | Noted: 2017-02-21 | Resolved: 2017-05-18

## 2017-05-18 LAB
MRSA DNA SPEC QL NAA+PROBE: NORMAL
SPECIMEN SOURCE: NORMAL

## 2017-05-18 PROCEDURE — 99214 OFFICE O/P EST MOD 30 MIN: CPT | Performed by: INTERNAL MEDICINE

## 2017-05-18 PROCEDURE — 87641 MR-STAPH DNA AMP PROBE: CPT | Performed by: INTERNAL MEDICINE

## 2017-05-18 PROCEDURE — 87640 STAPH A DNA AMP PROBE: CPT | Mod: 59 | Performed by: INTERNAL MEDICINE

## 2017-05-18 NOTE — PROGRESS NOTES
SUBJECTIVE:                                                      HPI: Jayne Tyler is a pleasant 51 year old female who presents for preoperative evaluation.    Surgeon: Pipe  Date: 6/6/17  Location: [facility]  Surgery: osteotomy right seconds metatarsal, hammertoe repair right second toe (intermediate risk)  Indication: capsulitis of right second MTP joint, right second hammertoe    Past Medical History:   Diagnosis Date     Acid reflux disease      Obesity (BMI 30-39.9)      Personal or family history of excessive or prolonged bleeding? No  Personal or family history of blood clots? No  History of snoring/Sleep Apnea? Snores, but never been tested for ZHANE  History of blood transfusions? No    Clinical Predictors of Increased Risk: none    Past Surgical History:   Procedure Laterality Date     ARTHROSCOPY SHOULDER Right 2015     BUNIONECTOMY Right 2008     DISKECTOMY, LUMBAR, SINGLE SP  1996    L5-S1     DISKECTOMY, LUMBAR, SINGLE SP  1997    L5-S1     FUSION LUMBAR ANTERIOR ONE LEVEL  1999    L5-S1     ROTATOR CUFF REPAIR RT/LT Left 2017     Artificial assistive devices, such as pacemakers, artificial cardiac valves, or artificial joints? Right foot, left shoulder, and lumbar hardware in place    Allergies   Allergen Reactions     Penicillins Hives     Personal or family history of allergy to anesthesia? No  Allergy to local or topical analgesics? No  Allergy to latex? No  Allergy to adhesives/skin preparations (chlorhexadine)? No    Current Outpatient Prescriptions   Medication Sig     oxyCODONE-acetaminophen (PERCOCET) 5-325 MG per tablet Take 1 tablet by mouth every 4 hours as needed for pain =     ranitidine (ZANTAC) 150 MG tablet Take 300 mg by mouth 2 times daily     acetaminophen (TYLENOL) 500 MG tablet Take 500 mg by mouth every 6 hours as needed Reported on 3/13/2017     Over the counter medications? Ibuprofen as needed - stopping 1 week prior to surgery  Vitamin Supplements? No  Herbal Supplements?  No    Family History   Problem Relation Age of Onset     Rheumatoid Arthritis Mother      Coronary Artery Disease Mother      s/p MI and PCIs     Rheumatoid Arthritis Sister      Myocardial Infarction Maternal Grandmother      in her 60s     Myocardial Infarction Maternal Grandfather      later in life     DIABETES No family hx of      CEREBROVASCULAR DISEASE No family hx of      Colon Cancer No family hx of      Breast Cancer No family hx of      Ovarian Cancer No family hx of      Occupational History     Customer Service      Social History Main Topics     Smoking status: Current Every Day Smoker     Packs/day: 1.00     Years: 25.00     Types: Cigarettes     Smokeless tobacco: Never Used      Comment: 5 - 6 cigs/day.     Alcohol use Yes      Comment: couple drinks/month     Drug use: No     Social History Narrative    . In a long-term relationship.    3 adult kids.    No grandchildren.    Exercises 4-5x/week: treadmill, rowing, and lift weights.      Functional capacity: Can do heavy work around the house such as scrubbing floors or lifting or moving heavy furniture (4-10 METs)    ROS:  Constitutional: denies unintentional weight loss or gain; denies fevers, chills, or sweats     Cardiovascular: denies chest pain, palpitations, or edema  Respiratory: denies cough, wheezing, shortness of breath, or dyspnea on exertion  Gastrointestinal: denies nausea, vomiting, constipation, diarrhea, or abdominal pain  Genitourinary: denies urinary frequency, urgency, dysuria, or hematuria  Integumentary: denies rash or pruritus  Musculoskeletal: denies back pain, muscle pain, joint pain, or joint swelling  Neurologic: denies focal weakness, numbness, or tingling  Hematologic/Immunologic: denies history of anemia or blood transfusions  Endocrine: denies heat or cold intolerance; denies polyuria, polydipsia  Psychiatric: denies depression or anxiety    OBJECTIVE:                                                      BP  "124/88 (BP Location: Right arm, Patient Position: Chair, Cuff Size: Adult Large)  Pulse 92  Temp 98.8  F (37.1  C) (Oral)  Ht 5' 4\" (1.626 m)  Wt 217 lb 12.8 oz (98.8 kg)  LMP  (LMP Unknown)  SpO2 98%  BMI 37.39 kg/m2  Constitutional: well-appearing  Eyes: normal conjunctivae and lids; pupils equal, round, and reactive to light  Ears, Nose, Mouth, and Throat: normal ears and nose; tympanic membranes visualized and normal; normal lips, teeth, and gums; no oropharyngeal lesions or ulcers  Neck: supple and symmetric; no lymphadenopathy; no thyromegaly or masses  Respiratory: normal respiratory effort; clear to auscultation bilaterally  Cardiovascular: regular rate and rhythm; pedal pulses palpable; no edema  Gastrointestinal: soft, non-tender, non-distended, and bowel sounds present; no organomegaly or masses  Musculoskeletal: normal gait and station; no clubbing or cyanosis  Psych: normal judgment and insight; normal mood and affect; recent and remote memory intact; oriented to time, place, and person    LABS: CBC and BMP 3/10/17 within normal limits.    ECG (3/16/17): sinus rhythm    ASSESSMENT/PLAN:                                                      Jayne Tyler is a pleasant 51 year old female who presents for a preoperative evaluation. She is at low clinical risk for a intermediate risk procedure.    (Z01.818) Preoperative examination  (primary encounter diagnosis)  (M77.51) Capsulitis of metatarsophalangeal (MTP) joint of right foot  (K21.9) Gastroesophageal reflux disease, esophagitis presence not specified  Plan:    - HOLD ibuprofen 1 week prior to surgery.   - May continue ranitidine on morning of surgery (may take with a sip of water).    RECOMMEND PROCEEDING WITH SURGERY: YES.     Thank you for referring Jayne Tyler to me for consultation and allowing me to participate in her care.    The instructions on the AVS were discussed and explained to the patient. Patient expressed " understanding of instructions.    Loli Aquino MD   [address] [address]  T: [phone], F: [phone]

## 2017-05-18 NOTE — LETTER
[address] [address]  [phone]      05/18/17      Jayne Tyler  [address] [facility] [address]        Dear ,    It was a pleasure meeting you the other day! I hope this finds you well.    I wanted to let you know that your nasal swab came back as negative.    Please feel free to contact me with any questions or concerns.      Take care,    Loli Aquino MD   [address] [address]  T: [phone], F: [phone]

## 2017-05-18 NOTE — MR AVS SNAPSHOT
After Visit Summary   5/18/2017    Jayne Tyler    MRN: [id number]           Patient Information     Date Of Birth          1965        Visit Information        Provider Department      5/18/2017 10:00 AM Loli Aquino MD [facility]        Today's Diagnoses     Preoperative examination    -  1      Care Instructions    Nasal swab today.    ---    May continue Zantac on morning of surgery (with a sip of water).     ---    May proceed with surgery.         Follow-ups after your visit        Your next 10 appointments already scheduled     May 19, 2017 10:00 AM CDT   [facility]    [address] [id number] [phone]            May 22, 2017 10:00 AM CDT   [facility] Extremity with Paul Breyen, PT   [facility]    [address]  [facility] [address]   [phone]            Jun 02, 2017 10:40 AM CDT   Return Visit with Pieter Rivera PA-C   [facility] ([facility] [place])    [address]  [address]  [facility] [address]   [phone]            Jun 05, 2017 10:40 AM CDT   ANDERSON Extremity with Paul Breyen, PT   [facility]    [address]  [address]  [facility] [address]   [phone]            Jun 06, 2017   Procedure with Olvin Betancur DPM   [facility] (--)    [address]  [facility] [address]   [phone]            Jun 12, 2017 10:00 AM CDT   [facility] with [facility]    [address] [id number] [phone]              Who to contact     If you have questions or need follow up information about today's clinic visit or your schedule please contact  "[facility] directly at [phone].  Normal or non-critical lab and imaging results will be communicated to you by MyChart, letter or phone within 4 business days after the clinic has received the results. If you do not hear from us within 7 days, please contact the clinic through MyChart or phone. If you have a critical or abnormal lab result, we will notify you by phone as soon as possible.  Submit refill requests through [employer] or call your pharmacy and they will forward the refill request to us. Please allow 3 business days for your refill to be completed.          Additional Information About Your Visit        [email or web address] Information     [employer] lets you send messages to your doctor, view your test results, renew your prescriptions, schedule appointments and more. To sign up, go to [email or web address] . Click on \"Log in\" on the left side of the screen, which will take you to the Welcome page. Then click on \"Sign up Now\" on the right side of the page.     You will be asked to enter the access code listed below, as well as some personal information. Please follow the directions to create your username and password.     Your access code is: [email or web address]  Expires: 2017  7:42 AM     Your access code will  in 90 days. If you need help or a new code, please call your [place] clinic or [phone].        Care EveryWhere ID     This is your Care EveryWhere ID. This could be used by other organizations to access your [place] medical records  [id number]        Your Vitals Were     Pulse Temperature Height Last Period Pulse Oximetry BMI (Body Mass Index)    92 98.8  F (37.1  C) (Oral) 5' 4\" (1.626 m) (LMP Unknown) 98% 37.39 kg/m2       Blood Pressure from Last 3 Encounters:   17 124/88   17 121/68   17 98/74    Weight from Last 3 Encounters:   17 217 lb 12.8 oz (98.8 kg)   17 212 lb (96.2 kg)   17 213 lb (96.6 kg)              We Performed the " Following     Methicillin Resistant Staph Aureus PCR        Primary Care Provider    None Doctor, MD       No address on file        Thank you!     Thank you for choosing [facility]  for your care. Our goal is always to provide you with excellent care. Hearing back from our patients is one way we can continue to improve our services. Please take a few minutes to complete the written survey that you may receive in the mail after your visit with us. Thank you!             Your Updated Medication List - Protect others around you: Learn how to safely use, store and throw away your medicines at www.disposemymeds.org.          This list is accurate as of: 5/18/17 10:32 AM.  Always use your most recent med list.                   Brand Name Dispense Instructions for use    acetaminophen 500 MG tablet    TYLENOL     Take 500 mg by mouth every 6 hours as needed Reported on 3/13/2017       oxyCODONE-acetaminophen 5-325 MG per tablet    PERCOCET    30 tablet    Take 1 tablet by mouth every 4 hours as needed for pain =       ranitidine 150 MG tablet    ZANTAC     Take 300 mg by mouth 2 times daily

## 2017-05-18 NOTE — NURSING NOTE
"Chief Complaint   Patient presents with     Pre-Op Exam     [facility] 6/6/17 - Podiatric surgery       Initial /88 (BP Location: Right arm, Patient Position: Chair, Cuff Size: Adult Large)  Pulse 92  Temp 98.8  F (37.1  C) (Oral)  Ht 5' 4\" (1.626 m)  Wt 217 lb 12.8 oz (98.8 kg)  LMP  (LMP Unknown)  SpO2 98%  BMI 37.39 kg/m2 Estimated body mass index is 37.39 kg/(m^2) as calculated from the following:    Height as of this encounter: 5' 4\" (1.626 m).    Weight as of this encounter: 217 lb 12.8 oz (98.8 kg).  Medication Reconciliation: complete       Kaminibose MA      "

## 2017-05-18 NOTE — PATIENT INSTRUCTIONS
Nasal swab today.    ---    May continue Zantac on morning of surgery (with a sip of water).     ---    May proceed with surgery.

## 2017-05-19 ENCOUNTER — OFFICE VISIT (OUTPATIENT)
Dept: PODIATRY | Facility: CLINIC | Age: 52
End: 2017-05-19
Payer: COMMERCIAL

## 2017-05-19 VITALS
SYSTOLIC BLOOD PRESSURE: 116 MMHG | HEIGHT: 64 IN | WEIGHT: 215 LBS | DIASTOLIC BLOOD PRESSURE: 70 MMHG | BODY MASS INDEX: 36.7 KG/M2

## 2017-05-19 DIAGNOSIS — M20.41 HAMMER TOE OF RIGHT FOOT: ICD-10-CM

## 2017-05-19 DIAGNOSIS — Q72.899 LONGITUDINAL DEFICIENCY OF METATARSAL BONE: Primary | ICD-10-CM

## 2017-05-19 DIAGNOSIS — M77.8 CAPSULITIS OF FOOT, RIGHT: ICD-10-CM

## 2017-05-19 PROCEDURE — 99214 OFFICE O/P EST MOD 30 MIN: CPT | Performed by: PODIATRIST

## 2017-05-19 NOTE — PROGRESS NOTES
Subjective:    Jayne Tyler is a 51 year old female who presents to clinic today for surgical planning and discussion.   She is scheduled to undergo right foot surgery on 6/617: 1) Weil osteotomy, right 2nd metatarsal  (shortening and elevating the bone) 2) possible plantar plate/ capsule repair, right 2nd metatarsophalangeal joint 3) hammer toe repair, right 2nd toe.     Conservative measures (rigid-soled hiking boots, metatarsal pads, custom orthoses, avoidance of barefoot walking, injection ) do not provide adequate relief.      Please see the surgical planning questionnaire below.     Patient Active Problem List   Diagnosis     Obesity (BMI 30-39.9)     Acid reflux disease       Current Outpatient Prescriptions   Medication     oxyCODONE-acetaminophen (PERCOCET) 5-325 MG per tablet     ranitidine (ZANTAC) 150 MG tablet     acetaminophen (TYLENOL) 500 MG tablet     No current facility-administered medications for this visit.         Objective:   Constitutional/ general: Pt is in no apparent distress, appears well-nourished. Cooperative with history and physical exam.      Vascular:  Pedal pulses are palpable bilaterally for both the DP and PT arteries. CFT < 3 sec. No edema. Pedal hair growth noted.      Neuro: Alert and oriented x 3. Coordinated gait. Light touch sensation is intact to the L4, L5, S1 distributions. No obvious deficits. No evidence of neurological-based weakness, spasticity, or contracture in the lower extremities.      Derm: Normal texture and turgor. No erythema, ecchymosis, or cyanosis. No open lesions.      Musculoskeletal:  Lower extremity muscle strength is normal. Patient is ambulatory without an assistive device or brace . No gross deformities. Pain on palpation: Sub right 2nd metatarsophalangeal joint - the second toe has a contracture deformity.       Previous radiographic Exam: X-Ray Findings: I personally reviewed the films.      4 views right foot: Two screws in the midshaft  of the 1st metatarsal. Significant, near 1cm, length difference between the 1st and longer 2nd metatarsal No acute findings.       Assessment:  Encounter Diagnoses   Name Primary?     Longitudinal deficiency of metatarsal bone Yes     Capsulitis of foot, right      Hammer toe of right foot        Plan:  The surgical procedures was discussed in detail, including risks, benefits, post operative course and cares, and prognosis.  Risks discussed include, but are not limited to,  postoperative pain, swelling,  infection, healing complications, temporary or permanent numbness, transfer lesions,  DVT, hypertropohic scar formation, complex regional pain syndrome and need for additional surgery.       I explained that although infrequent, there are intra-operative and post-operative challenges and complications that can occur.  Some of the latter are listed above.  Intra-operative challenges can involve finding poor bone quality, difficulty with the internal fixation (when used), and even inadvertent injury to neighboring structures that would then need to be repaired.     No guarantees were given.     Although the surgical hardware typically is left in, it was explained that if there are hardware-related complications, future removal might be needed.     The patient was also informed that a  might be present on the day of surgery.       PROCEDURE:  Pre-Op Orders:   Laymans terms = (in parentheses)     Diagnosis: 1) Capsulitis, joint pain, right 2nd metatarsophalangeal joint  2) elongated right 2nd metatarsal       Consent: 1) Weil osteotomy, right 2nd metatarsal  (shortening and elevating the bone) 2) possible plantar plate/ capsule repair, right 2nd metatarsophalangeal joint 3) hammer toe repair, right 2nd toe     Surgeon Procedure Time: 60 min  Anesthesia: MAC with local  Location: [facility]  Special Instruments: Mini c arm; Arthrex Scorpion plantar plate system; Arthrex twist off screws; Arthrex Trim It  pins  Start time: 7:30 or to follow previous cases  ALLERGIES:  Penicillins    Greater than 50% (25min) was spent in face-to-face counseling and coordination of care. This excluded any procedure time during the visit.    Olvin Betancur DPM, FACFAS, MS    [place] Department of Podiatry/Foot & Ankle Surgery            PRE-OP QUESTIONNAIRE:    1)  Do you smoke?  Yes 5-6 per day     2)  Have you ever had a blood clot in your legs or lungs?  2014    3)  Any family history of blood clots or bleeding disorders?  no    4)  Do you have an allergy or intolerance to any pain medication? no    5)  Do you have a history of any dependency on pain medications? no    6)  Does another doctor treat you for chronic pain or prescribe pain medication?  Percocet for rotatir cuff repair 3/29/2017    7)  Do you have help at home (family/friends) to assist after surgery?  yes    8)  Have you ever had complications after surgery? no      9)  Do you have problems with balance?  no    10) Any allergy to metal or jewelry?  bianca      11) Any allergy to suture material? no    12) How much time off from work are you allowed or planning for?  4-6 weeks

## 2017-05-19 NOTE — PATIENT INSTRUCTIONS
Review of Surgical Risks    The following is a list of possible risks associated with foot and ankle surgery.  This is not all-inclusive.  Please realize that risks associated with elective foot surgery are low and there are ways to deal with them when they occur.     1) Infection:  Probably the most concerning and discussed risk of surgery, the chance of infection is about 1% with elective surgery.  Often it involves the skin around the incision and resolves with oral antibiotics.  It is rare that infection will be deep and require surgical intervention.  You will receive an antibiotic prior to surgery.    2)  Pain: Surgery is trauma to the foot. Therefore a certain amount of pain is to be expected. This will be most bothersome during the first 1-2 days.  Taking your pain medication, elevating the extremity above heart level, and using ice are all very important for adequate pain management.     3)  Swelling:  This is due to the truama of surgery.  A certain degree of swelling is normal. However, if there is too much, it can impair healing, increase the risk of infection, add to your pain, and linger for several months after surgery making return to normal shoes difficult.   Elevating the limb is extremely important.    4)  Healing Complications:  There are may factors that can impair healing. There is no way to speed up the biological rate of healing.  People tend to find ways to slow it down. Proper nutrition, not smoking, following the instructions provided by your surgeon are ways to help with normal healing.    Sometimes the skin is slow to heal, and an open area along the incision develops.  If this happens, it cannot be stitched closed.  It then needs to heal from the inside out.  Rarely there will be an issue with bone healing, which might require a special device called a bone stimulator and/or a revision surgery.    5)  Temporary and permanent Numbness;  Numbness beyond the area of surgery is to be  expected. Initially it is from the local anesthetic that was injected into your foot.  This wears off within 24 hours.  Continued numbness or tingling is usually from swelling that compresses the nerves. Numbness that lasts for weeks is from nerve injury/irritation. This might eventually resolve or be permanent.      6)  Blood Clot:  Although rare, this is potentially the most dangerous risk associated with foot surgery.  A blood clot in the leg can lead to varicose veins and chronic swelling.  A blood clot that travels to the lungs is a very serious condition requiring hospitalization.  Increased risk is related to trauma of surgery and degree of immobilization.  There are many other risk factors that are not related directly to surgery (smoking, family history, personal history of varicose veins and/or blood clots, cancer, high fat cholesterol and lipids).  Your surgeon will discuss this with you and devise an appropriate plan to help prevent this complication.    7)  Hypertrophic Scar:  Some people have skin that is prone to forming exaggerated scar tissue.  This can be unsightly and uncomfortable.  There are ways to treat it.              8)  Complex Regional Pain Syndrome:  Rarely some people have pain that is out of proportion to the surgical procedure and harder to get control of.  This pain can linger and cause changes in skin temperature and appearance. If this occurs, physical therapy and/or a pain specialist might be needed.    9)  Unique risks Associated with Your Surgery:  _____________________________________  ______________________________________________________________________    There are also intra-operative challenges and complications that can occur.   Intra-operative challenges can involve finding poor bone quality, difficulty with the internal fixation (when used), and even inadvertent injury to neighboring structures that would then need to be repaired.     Please remember that surgical  "complications are rare.  Your surgeon has a plan to deal with them, should one occur.  The primary goal of surgery is pain reduction. There are no guarantees.    An \"abnormal\" foot prior to surgery, is not necessarily a \"normal\" foot afterwards.  Hopefully it is a less painful one.      If you have any questions, please discuss with Dr. Betancur prior to surgery.        Olvin Betancur, YAYA, FACFAS, MS    [place] Department of Podiatry/Foot & Ankle Surgery    Dr. Betancur's Post Operative Patient Instructions:  Please read carefully.       Activity:    1)  Weight bearing status: non weight bearing 3-6 weeks    2)  Keep your right lower extremity elevated 23/24 hours above heart level.  You will want to keep your foot elevated as much as possible for the first week after surgery.     3)  It is recommended that you get up and move aorund (walk, crutch, roll) for short periods each hour while you are awake.  It is okay to wiggle your toes.  If you are not in a splint or cast, move your ankle joint.  Motion helps lower risk of a blood clot in your leg.      4)  If you bathe or shower, the dressing must be kept dry.  Safety is a concern and sponge bathing might be best.  You can purchase a water proof cast protector.     5)  You are not to drive while you are taking pain medications.  No driving, if surgery was done on the right foot/ ankle or if you drive a stick shift.     Specific Cares:    1)  Keep the dressing clean, dry, and intact.  If your dressing gets wet, comes apart, or falls off, please call your clinic and notify Dr. Betancur.  Some bleeding through the dressing is okay.  But if it causes a spot bigger than 2 inches in diameter, please notify Dr. Betancur.     2)  Place an ice pack behind the knee of the surgical side for up to 20min/hour.   It can also be placed on the front of the ankle.     4)  Call your clinic if you experience calf pain, chest pain, or problems breathing.    5)  Call your clinic if fever, increasing " pain that you can't control or a large amount of bleeding.      6) What to do if your pain seems out of control:    1)  Make sure you are truly elevating the foot/ankle above hear level.  2)  Make sure you are using a cold pack/ ice  3)  Check the pain medication instructions:  Usually you can take two pain pills every four hours, if needed.  4)  If the above are not adequate, then you need to remove the brace/ boot and remove the compression wrap. The wrap might be too tight.  After you do this, elevate the foot for an hour and then re-wrap the foot lightly and replace the splint / boot.      Follow-up Appointment:  In 1 week(s) with Dr. Betancur at the clinic you previously saw him at.                           Medications:     IMPORTANT:  Take your pain medication when you get home, even if you are not having pain.  You want it in your system before the local anesthetic (foot numbness from the shot) wears off.      1)  Pain medication(s) (take with food): oxycodone  If hydroxyzine was prescribed, it is for itching,  leg spasms, and makes the other pain medication work better.    2)  Anti blood clot plan: To help prevent a blood clot in your legs, it is important that you wiggle your toes and ankles frequently. Obviously, this might be limited on the surgical side.  Get up and crutch, crawl, roll around a few minutes every hour while you are awake.  Keep the white sock on the non-surgical side and the compression wraps to the knee on the surgical side.  If Dr. Betancur thinks that you are at high risk for a blood clot, he might put you on a blood thinner called enoxaparin.    Please call the clinic if you have any pain or swelling in either calf.      If you have questions or concerns after surgery, please call the Podiatry/Foot & Ankle Surgery Triage Nurse at the [facility]:  [phone] .   If it is after 4:30PM you can reach a [place] Nurse Advisor at [phone] or [phone] toll-free. They can then page the  podiatrist on call, if needed.   There is a [place] podiatrist on call all of the time.          Olvin Betancur DPM, FACSOHAN, MS    [place] Department of Podiatry/Foot & Ankle Surgery

## 2017-05-19 NOTE — LETTER
5/19/2017       RE: Jayne Tyler  [address] [address] [facility] [address]           Dear Colleague,    Thank you for referring your patient, Jayne Tyler, to the [facility]. Please see a copy of my visit note below.      Subjective:    Jayne Tyler is a 51 year old female who presents to clinic today for surgical planning and discussion.   She is scheduled to undergo right foot surgery on 6/617: 1) Weil osteotomy, right 2nd metatarsal  (shortening and elevating the bone) 2) possible plantar plate/ capsule repair, right 2nd metatarsophalangeal joint 3) hammer toe repair, right 2nd toe.     Conservative measures (rigid-soled hiking boots, metatarsal pads, custom orthoses, avoidance of barefoot walking, injection ) do not provide adequate relief.      Please see the surgical planning questionnaire below.     Patient Active Problem List   Diagnosis     Obesity (BMI 30-39.9)     Acid reflux disease       Current Outpatient Prescriptions   Medication     oxyCODONE-acetaminophen (PERCOCET) 5-325 MG per tablet     ranitidine (ZANTAC) 150 MG tablet     acetaminophen (TYLENOL) 500 MG tablet     No current facility-administered medications for this visit.         Objective:   Constitutional/ general: Pt is in no apparent distress, appears well-nourished. Cooperative with history and physical exam.      Vascular:  Pedal pulses are palpable bilaterally for both the DP and PT arteries. CFT < 3 sec. No edema. Pedal hair growth noted.      Neuro: Alert and oriented x 3. Coordinated gait. Light touch sensation is intact to the L4, L5, S1 distributions. No obvious deficits. No evidence of neurological-based weakness, spasticity, or contracture in the lower extremities.      Derm: Normal texture and turgor. No erythema, ecchymosis, or cyanosis. No open lesions.      Musculoskeletal:  Lower extremity muscle strength is normal. Patient is ambulatory without an assistive device or brace . No  gross deformities. Pain on palpation: Sub right 2nd metatarsophalangeal joint - the second toe has a contracture deformity.       Previous radiographic Exam: X-Ray Findings: I personally reviewed the films.      4 views right foot: Two screws in the midshaft of the 1st metatarsal. Significant, near 1cm, length difference between the 1st and longer 2nd metatarsal No acute findings.       Assessment:  Encounter Diagnoses   Name Primary?     Longitudinal deficiency of metatarsal bone Yes     Capsulitis of foot, right      Hammer toe of right foot        Plan:  The surgical procedures was discussed in detail, including risks, benefits, post operative course and cares, and prognosis.  Risks discussed include, but are not limited to,  postoperative pain, swelling,  infection, healing complications, temporary or permanent numbness, transfer lesions,  DVT, hypertropohic scar formation, complex regional pain syndrome and need for additional surgery.       I explained that although infrequent, there are intra-operative and post-operative challenges and complications that can occur.  Some of the latter are listed above.  Intra-operative challenges can involve finding poor bone quality, difficulty with the internal fixation (when used), and even inadvertent injury to neighboring structures that would then need to be repaired.     No guarantees were given.     Although the surgical hardware typically is left in, it was explained that if there are hardware-related complications, future removal might be needed.     The patient was also informed that a  might be present on the day of surgery.       PROCEDURE:  Pre-Op Orders:   Laymans terms = (in parentheses)     Diagnosis: 1) Capsulitis, joint pain, right 2nd metatarsophalangeal joint  2) elongated right 2nd metatarsal       Consent: 1) Weil osteotomy, right 2nd metatarsal  (shortening and elevating the bone) 2) possible plantar plate/ capsule repair, right 2nd  metatarsophalangeal joint 3) hammer toe repair, right 2nd toe     Surgeon Procedure Time: 60 min  Anesthesia: MAC with local  Location: [facility]  Special Instruments: Mini c arm; Arthrex Scorpion plantar plate system; Arthrex twist off screws; Arthrex Trim It pins  Start time: 7:30 or to follow previous cases  ALLERGIES:  Penicillins    Greater than 50% (25min) was spent in face-to-face counseling and coordination of care. This excluded any procedure time during the visit.    Olvin Betancur DPM, FACFAS, MS    [place] Department of Podiatry/Foot & Ankle Surgery            PRE-OP QUESTIONNAIRE:    1)  Do you smoke?  Yes 5-6 per day     2)  Have you ever had a blood clot in your legs or lungs?  2014    3)  Any family history of blood clots or bleeding disorders?  no    4)  Do you have an allergy or intolerance to any pain medication? no    5)  Do you have a history of any dependency on pain medications? no    6)  Does another doctor treat you for chronic pain or prescribe pain medication?  Percocet for rotatir cuff repair 3/29/2017    7)  Do you have help at home (family/friends) to assist after surgery?  yes    8)  Have you ever had complications after surgery? no      9)  Do you have problems with balance?  no    10) Any allergy to metal or jewelry?  bianca      11) Any allergy to suture material? no    12) How much time off from work are you allowed or planning for?  4-6 weeks      Again, thank you for allowing me to participate in the care of your patient.        Sincerely,              Olvin Betancur DPM

## 2017-05-19 NOTE — MR AVS SNAPSHOT
After Visit Summary   5/19/2017    Jayne Tyler    MRN: [id number]           Patient Information     Date Of Birth          1965        Visit Information        Provider Department      5/19/2017 10:00 AM Olvin Betancur DPM [facility] Instructions    Review of Surgical Risks    The following is a list of possible risks associated with foot and ankle surgery.  This is not all-inclusive.  Please realize that risks associated with elective foot surgery are low and there are ways to deal with them when they occur.     1) Infection:  Probably the most concerning and discussed risk of surgery, the chance of infection is about 1% with elective surgery.  Often it involves the skin around the incision and resolves with oral antibiotics.  It is rare that infection will be deep and require surgical intervention.  You will receive an antibiotic prior to surgery.    2)  Pain: Surgery is trauma to the foot. Therefore a certain amount of pain is to be expected. This will be most bothersome during the first 1-2 days.  Taking your pain medication, elevating the extremity above heart level, and using ice are all very important for adequate pain management.     3)  Swelling:  This is due to the truama of surgery.  A certain degree of swelling is normal. However, if there is too much, it can impair healing, increase the risk of infection, add to your pain, and linger for several months after surgery making return to normal shoes difficult.   Elevating the limb is extremely important.    4)  Healing Complications:  There are may factors that can impair healing. There is no way to speed up the biological rate of healing.  People tend to find ways to slow it down. Proper nutrition, not smoking, following the instructions provided by your surgeon are ways to help with normal healing.    Sometimes the skin is slow to heal, and an open area along the incision develops.  If  this happens, it cannot be stitched closed.  It then needs to heal from the inside out.  Rarely there will be an issue with bone healing, which might require a special device called a bone stimulator and/or a revision surgery.    5)  Temporary and permanent Numbness;  Numbness beyond the area of surgery is to be expected. Initially it is from the local anesthetic that was injected into your foot.  This wears off within 24 hours.  Continued numbness or tingling is usually from swelling that compresses the nerves. Numbness that lasts for weeks is from nerve injury/irritation. This might eventually resolve or be permanent.      6)  Blood Clot:  Although rare, this is potentially the most dangerous risk associated with foot surgery.  A blood clot in the leg can lead to varicose veins and chronic swelling.  A blood clot that travels to the lungs is a very serious condition requiring hospitalization.  Increased risk is related to trauma of surgery and degree of immobilization.  There are many other risk factors that are not related directly to surgery (smoking, family history, personal history of varicose veins and/or blood clots, cancer, high fat cholesterol and lipids).  Your surgeon will discuss this with you and devise an appropriate plan to help prevent this complication.    7)  Hypertrophic Scar:  Some people have skin that is prone to forming exaggerated scar tissue.  This can be unsightly and uncomfortable.  There are ways to treat it.              8)  Complex Regional Pain Syndrome:  Rarely some people have pain that is out of proportion to the surgical procedure and harder to get control of.  This pain can linger and cause changes in skin temperature and appearance. If this occurs, physical therapy and/or a pain specialist might be needed.    9)  Unique risks Associated with Your Surgery:  _____________________________________  ______________________________________________________________________    There are  "also intra-operative challenges and complications that can occur.   Intra-operative challenges can involve finding poor bone quality, difficulty with the internal fixation (when used), and even inadvertent injury to neighboring structures that would then need to be repaired.     Please remember that surgical complications are rare.  Your surgeon has a plan to deal with them, should one occur.  The primary goal of surgery is pain reduction. There are no guarantees.    An \"abnormal\" foot prior to surgery, is not necessarily a \"normal\" foot afterwards.  Hopefully it is a less painful one.      If you have any questions, please discuss with Dr. Betancur prior to surgery.        Olvin Betancur, YAYA, FACFAS, MS    [place] Department of Podiatry/Foot & Ankle Surgery    Dr. Betancur's Post Operative Patient Instructions:  Please read carefully.       Activity:    1)  Weight bearing status: non weight bearing 3-6 weeks    2)  Keep your right lower extremity elevated 23/24 hours above heart level.  You will want to keep your foot elevated as much as possible for the first week after surgery.     3)  It is recommended that you get up and move aorund (walk, crutch, roll) for short periods each hour while you are awake.  It is okay to wiggle your toes.  If you are not in a splint or cast, move your ankle joint.  Motion helps lower risk of a blood clot in your leg.      4)  If you bathe or shower, the dressing must be kept dry.  Safety is a concern and sponge bathing might be best.  You can purchase a water proof cast protector.     5)  You are not to drive while you are taking pain medications.  No driving, if surgery was done on the right foot/ ankle or if you drive a stick shift.     Specific Cares:    1)  Keep the dressing clean, dry, and intact.  If your dressing gets wet, comes apart, or falls off, please call your clinic and notify Dr. Betancur.  Some bleeding through the dressing is okay.  But if it causes a spot bigger than 2 inches " in diameter, please notify Dr. Betancur.     2)  Place an ice pack behind the knee of the surgical side for up to 20min/hour.   It can also be placed on the front of the ankle.     4)  Call your clinic if you experience calf pain, chest pain, or problems breathing.    5)  Call your clinic if fever, increasing pain that you can't control or a large amount of bleeding.      6) What to do if your pain seems out of control:    1)  Make sure you are truly elevating the foot/ankle above hear level.  2)  Make sure you are using a cold pack/ ice  3)  Check the pain medication instructions:  Usually you can take two pain pills every four hours, if needed.  4)  If the above are not adequate, then you need to remove the brace/ boot and remove the compression wrap. The wrap might be too tight.  After you do this, elevate the foot for an hour and then re-wrap the foot lightly and replace the splint / boot.      Follow-up Appointment:  In 1 week(s) with Dr. Betancur at the clinic you previously saw him at.                           Medications:     IMPORTANT:  Take your pain medication when you get home, even if you are not having pain.  You want it in your system before the local anesthetic (foot numbness from the shot) wears off.      1)  Pain medication(s) (take with food): oxycodone  If hydroxyzine was prescribed, it is for itching,  leg spasms, and makes the other pain medication work better.    2)  Anti blood clot plan: To help prevent a blood clot in your legs, it is important that you wiggle your toes and ankles frequently. Obviously, this might be limited on the surgical side.  Get up and crutch, crawl, roll around a few minutes every hour while you are awake.  Keep the white sock on the non-surgical side and the compression wraps to the knee on the surgical side.  If Dr. Betancur thinks that you are at high risk for a blood clot, he might put you on a blood thinner called enoxaparin.    Please call the clinic if you have any pain  or swelling in either calf.      If you have questions or concerns after surgery, please call the Podiatry/Foot & Ankle Surgery Triage Nurse at the [facility]:  [phone] .   If it is after 4:30PM you can reach a [facility] Nurse Advisor at [phone] or [phone] toll-free. They can then page the podiatrist on call, if needed.   There is a [facility] podiatrist on call all of the time.          Olvin Betancur DPM, FACFAS, MS    [facility] of Podiatry/Foot & Ankle Surgery            Follow-ups after your visit        Your next 10 appointments already scheduled     May 22, 2017 10:00 AM CDT   [facility] Extremity with Paul Breyen, PT [facility] ([facility]  )    [id number] [place] [address]  [address] [place] [address]   [phone]            Jun 02, 2017 10:40 AM CDT   Return Visit with Pieter Rivera PA-C   [facility] ([facility])    [id number] [place] [address]  [address] [place] [address]   [phone]            Jun 05, 2017 10:40 AM CDT   [facility] Extremity with Paul Breyen, PT [facility] ([facility]  )    [id number] [place] [address]  [address] [place] [address]   [phone]            Jun 06, 2017   Procedure with Olvin Betancur DPM   [facility] (--)    [address]  [facility] [address]   [phone]            Jun 12, 2017 10:00 AM CDT   Return Visit with Olvin Betancur DPM   [facility] ([facility])    [address] [address] [phone]              Who to contact     If you have questions or need follow up information about today's clinic visit or your schedule please contact [facility] directly at [phone].  Normal or non-critical lab and imaging results will be communicated to you by MyChart, letter or phone within 4 business days after the clinic has received the results. If  "you do not hear from us within 7 days, please contact the clinic through [facility] or phone. If you have a critical or abnormal lab result, we will notify you by phone as soon as possible.  Submit refill requests through [facility] or call your pharmacy and they will forward the refill request to us. Please allow 3 business days for your refill to be completed.          Additional Information About Your Visit        [employer] Information     [facility] [employer] lets you send messages to your doctor, view your test results, renew your prescriptions, schedule appointments and more. To sign up, go to [email or web address] . Click on \"Log in\" on the left side of the screen, which will take you to the Welcome page. Then click on \"Sign up Now\" on the right side of the page.     You will be asked to enter the access code listed below, as well as some personal information. Please follow the directions to create your username and password.     Your access code is: [email or web address]  Expires: 2017  7:42 AM     Your access code will  in 90 days. If you need help or a new code, please call your [facility] clinic or [phone].        Care EveryWhere ID     This is your Care EveryWhere ID. This could be used by other organizations to access your [facility] medical records  [id number]        Your Vitals Were     Height Last Period BMI (Body Mass Index)             5' 4\" (1.626 m) (LMP Unknown) 36.9 kg/m2          Blood Pressure from Last 3 Encounters:   17 116/70   17 124/88   17 121/68    Weight from Last 3 Encounters:   17 215 lb (97.5 kg)   17 217 lb 12.8 oz (98.8 kg)   17 212 lb (96.2 kg)              Today, you had the following     No orders found for display       Primary Care Provider    Jessica Chavarria MD       No address on file        Thank you!     Thank you for choosing [facility]  for your care. Our goal is always to provide you with excellent care. Hearing back from " our patients is one way we can continue to improve our services. Please take a few minutes to complete the written survey that you may receive in the mail after your visit with us. Thank you!             Your Updated Medication List - Protect others around you: Learn how to safely use, store and throw away your medicines at www.disposemymeds.org.          This list is accurate as of: 5/19/17 10:29 AM.  Always use your most recent med list.                   Brand Name Dispense Instructions for use    acetaminophen 500 MG tablet    TYLENOL     Take 500 mg by mouth every 6 hours as needed Reported on 5/19/2017       oxyCODONE-acetaminophen 5-325 MG per tablet    PERCOCET    30 tablet    Take 1 tablet by mouth every 4 hours as needed for pain =       ranitidine 150 MG tablet    ZANTAC     Take 300 mg by mouth 2 times daily

## 2017-05-19 NOTE — NURSING NOTE
"Chief Complaint   Patient presents with     Pre-Op Exam     DOS 6/6/17 WEIL OSTEOTOMY RIGHT SECOND METATARSAL (SHORTENING AND ELEVATING THE BONE), POSSIBLE PLANTAR PLATE/CAPSULE REPAIR RIGHT SECOND METATARSAL PHALANGEAL JOINT, HAMMERTOE REPAIR RIGHT SECOND TOE (MINI C-ARM, ARTHREX SCORPION PLANTAR PLATE SYSTEM, ARTHREX TWIST-OFF SCREWS, ARTHREX TRIM IT PINS)       Initial /70 (BP Location: Right arm, Patient Position: Chair, Cuff Size: Adult Large)  Ht 5' 4\" (1.626 m)  Wt 215 lb (97.5 kg)  LMP  (LMP Unknown)  BMI 36.9 kg/m2 Estimated body mass index is 36.9 kg/(m^2) as calculated from the following:    Height as of this encounter: 5' 4\" (1.626 m).    Weight as of this encounter: 215 lb (97.5 kg).  Medication Reconciliation: complete   Jaylin Short MA      "

## 2017-05-22 ENCOUNTER — THERAPY VISIT (OUTPATIENT)
Dept: PHYSICAL THERAPY | Facility: CLINIC | Age: 52
End: 2017-05-22
Payer: COMMERCIAL

## 2017-05-22 ENCOUNTER — TELEPHONE (OUTPATIENT)
Dept: ORTHOPEDICS | Facility: CLINIC | Age: 52
End: 2017-05-22

## 2017-05-22 DIAGNOSIS — Z47.89 AFTERCARE FOLLOWING SURGERY OF THE MUSCULOSKELETAL SYSTEM: ICD-10-CM

## 2017-05-22 DIAGNOSIS — M25.519 SHOULDER PAIN: ICD-10-CM

## 2017-05-22 PROCEDURE — 97110 THERAPEUTIC EXERCISES: CPT | Mod: GP | Performed by: PHYSICAL THERAPIST

## 2017-05-22 PROCEDURE — 97112 NEUROMUSCULAR REEDUCATION: CPT | Mod: GP | Performed by: PHYSICAL THERAPIST

## 2017-05-22 NOTE — TELEPHONE ENCOUNTER
Patient calls stating her [employer] company, [employer], states they faxed a request to our office on 5/17/17 regarding her recent visit with Cruzito Rivera PA-C on 5/8/17.   Informed we did not receive. Confirmed they have the correct fax number. Asked that she contact medical records to see if they have received. Otherwise, asked that she have [employer] re-fax request. She verbalized understanding.     MARIA EUGENIA Edwards RN

## 2017-06-01 NOTE — H&P (VIEW-ONLY)
SUBJECTIVE:                                                      HPI: Jayne Tyler is a pleasant 51 year old female who presents for preoperative evaluation.    Surgeon: Pipe  Date: 6/6/17  Location: [facility]  Surgery: osteotomy right seconds metatarsal, hammertoe repair right second toe (intermediate risk)  Indication: capsulitis of right second MTP joint, right second hammertoe    Past Medical History:   Diagnosis Date     Acid reflux disease      Obesity (BMI 30-39.9)      Personal or family history of excessive or prolonged bleeding? No  Personal or family history of blood clots? No  History of snoring/Sleep Apnea? Snores, but never been tested for ZHANE  History of blood transfusions? No    Clinical Predictors of Increased Risk: none    Past Surgical History:   Procedure Laterality Date     ARTHROSCOPY SHOULDER Right 2015     BUNIONECTOMY Right 2008     DISKECTOMY, LUMBAR, SINGLE SP  1996    L5-S1     DISKECTOMY, LUMBAR, SINGLE SP  1997    L5-S1     FUSION LUMBAR ANTERIOR ONE LEVEL  1999    L5-S1     ROTATOR CUFF REPAIR RT/LT Left 2017     Artificial assistive devices, such as pacemakers, artificial cardiac valves, or artificial joints? Right foot, left shoulder, and lumbar hardware in place    Allergies   Allergen Reactions     Penicillins Hives     Personal or family history of allergy to anesthesia? No  Allergy to local or topical analgesics? No  Allergy to latex? No  Allergy to adhesives/skin preparations (chlorhexadine)? No    Current Outpatient Prescriptions   Medication Sig     oxyCODONE-acetaminophen (PERCOCET) 5-325 MG per tablet Take 1 tablet by mouth every 4 hours as needed for pain =     ranitidine (ZANTAC) 150 MG tablet Take 300 mg by mouth 2 times daily     acetaminophen (TYLENOL) 500 MG tablet Take 500 mg by mouth every 6 hours as needed Reported on 3/13/2017     Over the counter medications? Ibuprofen as needed - stopping 1 week prior to surgery  Vitamin Supplements? No  Herbal Supplements?  No    Family History   Problem Relation Age of Onset     Rheumatoid Arthritis Mother      Coronary Artery Disease Mother      s/p MI and PCIs     Rheumatoid Arthritis Sister      Myocardial Infarction Maternal Grandmother      in her 60s     Myocardial Infarction Maternal Grandfather      later in life     DIABETES No family hx of      CEREBROVASCULAR DISEASE No family hx of      Colon Cancer No family hx of      Breast Cancer No family hx of      Ovarian Cancer No family hx of      Occupational History     Customer Service      Social History Main Topics     Smoking status: Current Every Day Smoker     Packs/day: 1.00     Years: 25.00     Types: Cigarettes     Smokeless tobacco: Never Used      Comment: 5 - 6 cigs/day.     Alcohol use Yes      Comment: couple drinks/month     Drug use: No     Social History Narrative    . In a long-term relationship.    3 adult kids.    No grandchildren.    Exercises 4-5x/week: treadmill, rowing, and lift weights.      Functional capacity: Can do heavy work around the house such as scrubbing floors or lifting or moving heavy furniture (4-10 METs)    ROS:  Constitutional: denies unintentional weight loss or gain; denies fevers, chills, or sweats     Cardiovascular: denies chest pain, palpitations, or edema  Respiratory: denies cough, wheezing, shortness of breath, or dyspnea on exertion  Gastrointestinal: denies nausea, vomiting, constipation, diarrhea, or abdominal pain  Genitourinary: denies urinary frequency, urgency, dysuria, or hematuria  Integumentary: denies rash or pruritus  Musculoskeletal: denies back pain, muscle pain, joint pain, or joint swelling  Neurologic: denies focal weakness, numbness, or tingling  Hematologic/Immunologic: denies history of anemia or blood transfusions  Endocrine: denies heat or cold intolerance; denies polyuria, polydipsia  Psychiatric: denies depression or anxiety    OBJECTIVE:                                                      BP  "124/88 (BP Location: Right arm, Patient Position: Chair, Cuff Size: Adult Large)  Pulse 92  Temp 98.8  F (37.1  C) (Oral)  Ht 5' 4\" (1.626 m)  Wt 217 lb 12.8 oz (98.8 kg)  LMP  (LMP Unknown)  SpO2 98%  BMI 37.39 kg/m2  Constitutional: well-appearing  Eyes: normal conjunctivae and lids; pupils equal, round, and reactive to light  Ears, Nose, Mouth, and Throat: normal ears and nose; tympanic membranes visualized and normal; normal lips, teeth, and gums; no oropharyngeal lesions or ulcers  Neck: supple and symmetric; no lymphadenopathy; no thyromegaly or masses  Respiratory: normal respiratory effort; clear to auscultation bilaterally  Cardiovascular: regular rate and rhythm; pedal pulses palpable; no edema  Gastrointestinal: soft, non-tender, non-distended, and bowel sounds present; no organomegaly or masses  Musculoskeletal: normal gait and station; no clubbing or cyanosis  Psych: normal judgment and insight; normal mood and affect; recent and remote memory intact; oriented to time, place, and person    LABS: CBC and BMP 3/10/17 within normal limits.    ECG (3/16/17): sinus rhythm    ASSESSMENT/PLAN:                                                      Jayne Tyler is a pleasant 51 year old female who presents for a preoperative evaluation. She is at low clinical risk for a intermediate risk procedure.    (Z01.818) Preoperative examination  (primary encounter diagnosis)  (M77.51) Capsulitis of metatarsophalangeal (MTP) joint of right foot  (K21.9) Gastroesophageal reflux disease, esophagitis presence not specified  Plan:    - HOLD ibuprofen 1 week prior to surgery.   - May continue ranitidine on morning of surgery (may take with a sip of water).    RECOMMEND PROCEEDING WITH SURGERY: YES.     Thank you for referring Jayne Tyler to me for consultation and allowing me to participate in her care.    The instructions on the AVS were discussed and explained to the patient. Patient expressed " understanding of instructions.    Loli Aquino MD   [address] [address]  T: [phone], F: [phone]

## 2017-06-02 ENCOUNTER — OFFICE VISIT (OUTPATIENT)
Dept: ORTHOPEDICS | Facility: CLINIC | Age: 52
End: 2017-06-02
Payer: COMMERCIAL

## 2017-06-02 DIAGNOSIS — Z47.89 ORTHOPEDIC AFTERCARE: Primary | ICD-10-CM

## 2017-06-02 PROCEDURE — 99024 POSTOP FOLLOW-UP VISIT: CPT | Performed by: PHYSICIAN ASSISTANT

## 2017-06-02 NOTE — PROGRESS NOTES
HISTORY OF PRESENT ILLNESS:    Jayne Tyler is a 51 year old female who is seen in follow up for Left shoulder arthr DEC/DCR, mini open RCR, DOS 3/29/2017, Dr. George.  Present symptoms: Back to work, half time, doing ok.  Last couple nights no pain medication.  Did fall pulling weeks last week, but doing ok today.  Overall improving.  Denies Chest pain, Calve pain, Fever, Chills.    Current Treatment: postop, PT.    PHYSICAL EXAM:  LMP  (LMP Unknown)  There is no height or weight on file to calculate BMI.   GENERAL APPEARANCE: healthy, alert and no distress   PSYCH:  mentation appears normal and affect normal/bright    MSK:  Left: Shoulder ..  Incision clean and dry, well healed.   CMS: diane incisional numbness, otherwise grossly intact.  PROM supine 140.  ER45, IR 70      IMAGING INTERPRETATION:  None today..  Images read and interpreted by me.     ASSESSMENT:  Jayne Tyler is a 51 year old female S/P Left shoulder arthr DEC/DCR, mini open RCR, DOS 3/29/2017, Dr. George..  Aggravation due to recent events.  Otherwise progressing.     PLAN:  - Care instructions given and verbally acknowledged.  - Medications: as current.  - Physical therapy: continue with current physical therapy  - AAT using good form as a guide.   - RTW letter.    Return to clinic 6, weeks    Pieter Rivera PA-C    Dept. Orthopedic Surgery  [facility]   6/2/2017

## 2017-06-02 NOTE — PATIENT INSTRUCTIONS
Once aggravation resolves, may return to PT as tolerated.    NSAIDs, Ice and sling as needed for aggravation    Follow up in 6 weeks.

## 2017-06-02 NOTE — MR AVS SNAPSHOT
After Visit Summary   6/2/2017    Jayne Tyler    MRN: [id number]           Patient Information     Date Of Birth          1965        Visit Information        Provider Department      6/2/2017 10:40 AM Pieter Rivera PA-C [facility]        Today's Diagnoses     Orthopedic aftercare    -  1      Care Instructions    Once aggravation resolves, may return to PT as tolerated.    NSAIDs, Ice and sling as needed for aggravation    Follow up in 6 weeks.          Follow-ups after your visit        Follow-up notes from your care team     Return in about 6 weeks (around 7/14/2017).      Your next 10 appointments already scheduled     Jun 05, 2017 10:40 AM CDT   [facility] Extremity with Paul Breyen, PT   [facility] ([facility]  )    [address]  [address]  [place] [address]   [phone]            Jun 06, 2017   Procedure with Olvin Betancur DPM   [facility] (--)    [address]  [facility] [id number] [phone]            Jun 12, 2017 10:00 AM CDT   Return Visit with Olvin Betancur DPM   [facility] ([facility])    [address] [address] [phone]              Who to contact     If you have questions or need follow up information about today's clinic visit or your schedule please contact [facility] directly at [phone].  Normal or non-critical lab and imaging results will be communicated to you by MyChart, letter or phone within 4 business days after the clinic has received the results. If you do not hear from us within 7 days, please contact the clinic through [employer] or phone. If you have a critical or abnormal lab result, we will notify you by phone as soon as possible.  Submit refill requests through [employer] or call your pharmacy and they will forward the refill request to us. Please allow 3  "business days for your refill to be completed.          Additional Information About Your Visit        MyChart Information     [employer] lets you send messages to your doctor, view your test results, renew your prescriptions, schedule appointments and more. To sign up, go to [email or web address] . Click on \"Log in\" on the left side of the screen, which will take you to the Welcome page. Then click on \"Sign up Now\" on the right side of the page.     You will be asked to enter the access code listed below, as well as some personal information. Please follow the directions to create your username and password.     Your access code is: [email or web address]  Expires: 2017  2:05 PM     Your access code will  in 90 days. If you need help or a new code, please call your [place] clinic or [phone].        Care EveryWhere ID     This is your Care EveryWhere ID. This could be used by other organizations to access your [place] medical records  [id number]        Your Vitals Were     Last Period                   (LMP Unknown)            Blood Pressure from Last 3 Encounters:   17 116/70   17 124/88   17 121/68    Weight from Last 3 Encounters:   17 215 lb (97.5 kg)   17 217 lb 12.8 oz (98.8 kg)   17 212 lb (96.2 kg)              Today, you had the following     No orders found for display       Primary Care Provider    Jessica Chavarria MD       No address on file        Thank you!     Thank you for choosing [facility]  for your care. Our goal is always to provide you with excellent care. Hearing back from our patients is one way we can continue to improve our services. Please take a few minutes to complete the written survey that you may receive in the mail after your visit with us. Thank you!             Your Updated Medication List - Protect others around you: Learn how to safely use, store and throw away your medicines at www.disposemymeds.org.          This " list is accurate as of: 6/2/17  2:05 PM.  Always use your most recent med list.                   Brand Name Dispense Instructions for use    acetaminophen 500 MG tablet    TYLENOL     Take 500 mg by mouth every 6 hours as needed Reported on 5/19/2017       oxyCODONE-acetaminophen 5-325 MG per tablet    PERCOCET    30 tablet    Take 1 tablet by mouth every 4 hours as needed for pain =       ranitidine 150 MG tablet    ZANTAC     Take 300 mg by mouth 2 times daily

## 2017-06-02 NOTE — LETTER
[facility]  [address]  [facility] [facility] [address]  [phone]          June 2, 2017    RE:  Jayne Tyler                                                                                                                                                       [address]  [place] [id number]            To whom it may concern:    Jayne Tyler is under my professional care for Left shoulder Rotator cuff surgery.  She  may return to work with the following: The employee was ABLE to return to work 5/22/2017.    When the patient returns to work, the following restrictions apply to the left arm and hours starting  6/6/2017 until 7/6/2017:    D) Reach Above Shoulders: Not at all (0 hours)  E) Lift, carry, push, and pull no more than:  11-20 lbs.    Reduced hours:  May work up to 6 hours daily.    We will reassess at the end of this period.      Sincerely,        Pieter Rivera PA-C

## 2017-06-05 ENCOUNTER — THERAPY VISIT (OUTPATIENT)
Dept: PHYSICAL THERAPY | Facility: CLINIC | Age: 52
End: 2017-06-05
Payer: COMMERCIAL

## 2017-06-05 DIAGNOSIS — Z47.89 AFTERCARE FOLLOWING SURGERY OF THE MUSCULOSKELETAL SYSTEM: ICD-10-CM

## 2017-06-05 DIAGNOSIS — M25.519 SHOULDER PAIN: ICD-10-CM

## 2017-06-05 PROCEDURE — 97110 THERAPEUTIC EXERCISES: CPT | Mod: GP | Performed by: PHYSICAL THERAPIST

## 2017-06-05 PROCEDURE — 97112 NEUROMUSCULAR REEDUCATION: CPT | Mod: GP | Performed by: PHYSICAL THERAPIST

## 2017-06-05 PROCEDURE — 97010 HOT OR COLD PACKS THERAPY: CPT | Mod: GP | Performed by: PHYSICAL THERAPIST

## 2017-06-06 ENCOUNTER — APPOINTMENT (OUTPATIENT)
Dept: GENERAL RADIOLOGY | Facility: CLINIC | Age: 52
End: 2017-06-06
Attending: PODIATRIST
Payer: COMMERCIAL

## 2017-06-06 ENCOUNTER — HOSPITAL ENCOUNTER (OUTPATIENT)
Facility: CLINIC | Age: 52
Discharge: HOME | End: 2017-06-06
Attending: PODIATRIST | Admitting: PODIATRIST
Payer: COMMERCIAL

## 2017-06-06 ENCOUNTER — ANESTHESIA EVENT (OUTPATIENT)
Dept: SURGERY | Facility: CLINIC | Age: 52
End: 2017-06-06
Payer: COMMERCIAL

## 2017-06-06 ENCOUNTER — ANESTHESIA (OUTPATIENT)
Dept: SURGERY | Facility: CLINIC | Age: 52
End: 2017-06-06
Payer: COMMERCIAL

## 2017-06-06 VITALS
WEIGHT: 215.6 LBS | OXYGEN SATURATION: 96 % | BODY MASS INDEX: 35.92 KG/M2 | RESPIRATION RATE: 16 BRPM | TEMPERATURE: 97.3 F | HEART RATE: 75 BPM | SYSTOLIC BLOOD PRESSURE: 125 MMHG | DIASTOLIC BLOOD PRESSURE: 69 MMHG | HEIGHT: 65 IN

## 2017-06-06 DIAGNOSIS — G89.18 ACUTE POST-OPERATIVE PAIN: Primary | ICD-10-CM

## 2017-06-06 DIAGNOSIS — T50.905A ITCHING DUE TO DRUG: ICD-10-CM

## 2017-06-06 DIAGNOSIS — L29.89 ITCHING DUE TO DRUG: ICD-10-CM

## 2017-06-06 DIAGNOSIS — Z78.9 DEEP VEIN THROMBOSIS (DVT) PROPHYLAXIS PRESCRIBED AT DISCHARGE: ICD-10-CM

## 2017-06-06 PROCEDURE — 27110028 ZZH OR GENERAL SUPPLY NON-STERILE: Performed by: PODIATRIST

## 2017-06-06 PROCEDURE — 28285 REPAIR OF HAMMERTOE: CPT | Mod: T6 | Performed by: PODIATRIST

## 2017-06-06 PROCEDURE — 25000125 ZZHC RX 250: Performed by: PODIATRIST

## 2017-06-06 PROCEDURE — S0077 INJECTION, CLINDAMYCIN PHOSP: HCPCS | Performed by: PODIATRIST

## 2017-06-06 PROCEDURE — 25000132 ZZH RX MED GY IP 250 OP 250 PS 637: Performed by: ANESTHESIOLOGY

## 2017-06-06 PROCEDURE — 25000132 ZZH RX MED GY IP 250 OP 250 PS 637: Performed by: PODIATRIST

## 2017-06-06 PROCEDURE — 40000170 ZZH STATISTIC PRE-PROCEDURE ASSESSMENT II: Performed by: PODIATRIST

## 2017-06-06 PROCEDURE — 40000277 XR SURGERY CARM FLUORO LESS THAN 5 MIN W STILLS

## 2017-06-06 PROCEDURE — 37000008 ZZH ANESTHESIA TECHNICAL FEE, 1ST 30 MIN: Performed by: PODIATRIST

## 2017-06-06 PROCEDURE — 36000065 ZZH SURGERY LEVEL 4 W FLUORO 1ST 30 MIN: Performed by: PODIATRIST

## 2017-06-06 PROCEDURE — 27210995 ZZH RX 272: Performed by: PODIATRIST

## 2017-06-06 PROCEDURE — 25000128 H RX IP 250 OP 636: Performed by: NURSE ANESTHETIST, CERTIFIED REGISTERED

## 2017-06-06 PROCEDURE — 71000027 ZZH RECOVERY PHASE 2 EACH 15 MINS: Performed by: PODIATRIST

## 2017-06-06 PROCEDURE — 28308 INCISION OF METATARSAL: CPT | Mod: RT | Performed by: PODIATRIST

## 2017-06-06 PROCEDURE — 40000986 XR FOOT PORT RT 3 VW: Mod: RT

## 2017-06-06 PROCEDURE — 25000125 ZZHC RX 250: Performed by: NURSE ANESTHETIST, CERTIFIED REGISTERED

## 2017-06-06 PROCEDURE — 27210794 ZZH OR GENERAL SUPPLY STERILE: Performed by: PODIATRIST

## 2017-06-06 PROCEDURE — 36000063 ZZH SURGERY LEVEL 4 EA 15 ADDTL MIN: Performed by: PODIATRIST

## 2017-06-06 PROCEDURE — 27810325 ZZHC OR IMPLANT OTHER OPNP: Performed by: PODIATRIST

## 2017-06-06 PROCEDURE — 71000012 ZZH RECOVERY PHASE 1 LEVEL 1 FIRST HR: Performed by: PODIATRIST

## 2017-06-06 PROCEDURE — C1713 ANCHOR/SCREW BN/BN,TIS/BN: HCPCS | Performed by: PODIATRIST

## 2017-06-06 PROCEDURE — 28232 INCISION OF TOE TENDON: CPT | Mod: T6 | Performed by: PODIATRIST

## 2017-06-06 PROCEDURE — 37000009 ZZH ANESTHESIA TECHNICAL FEE, EACH ADDTL 15 MIN: Performed by: PODIATRIST

## 2017-06-06 PROCEDURE — S0020 INJECTION, BUPIVICAINE HYDRO: HCPCS | Performed by: PODIATRIST

## 2017-06-06 DEVICE — IMP SCR ARTHREX QUICKFIX TI 2X11MM AR-8930-11: Type: IMPLANTABLE DEVICE | Site: FOOT | Status: FUNCTIONAL

## 2017-06-06 DEVICE — IMP PIN ARTHREX BIO TRIM IT 2.0X100MM AR-4152DS: Type: IMPLANTABLE DEVICE | Site: FOOT | Status: FUNCTIONAL

## 2017-06-06 DEVICE — IMP WIRE KIRSCHNER 0.045X4" 78.2020: Type: IMPLANTABLE DEVICE | Site: FOOT | Status: FUNCTIONAL

## 2017-06-06 DEVICE — IMP SCR ARTHREX QUICKFIX TI 2X13MM AR-8930-13: Type: IMPLANTABLE DEVICE | Site: FOOT | Status: FUNCTIONAL

## 2017-06-06 RX ORDER — SODIUM CHLORIDE, SODIUM LACTATE, POTASSIUM CHLORIDE, CALCIUM CHLORIDE 600; 310; 30; 20 MG/100ML; MG/100ML; MG/100ML; MG/100ML
INJECTION, SOLUTION INTRAVENOUS CONTINUOUS PRN
Status: DISCONTINUED | OUTPATIENT
Start: 2017-06-06 | End: 2017-06-06

## 2017-06-06 RX ORDER — CLINDAMYCIN PHOSPHATE 900 MG/50ML
900 INJECTION, SOLUTION INTRAVENOUS
Status: DISCONTINUED | OUTPATIENT
Start: 2017-06-06 | End: 2017-06-06 | Stop reason: HOSPADM

## 2017-06-06 RX ORDER — FENTANYL CITRATE 50 UG/ML
25-50 INJECTION, SOLUTION INTRAMUSCULAR; INTRAVENOUS
Status: DISCONTINUED | OUTPATIENT
Start: 2017-06-06 | End: 2017-06-06 | Stop reason: HOSPADM

## 2017-06-06 RX ORDER — MEPERIDINE HYDROCHLORIDE 25 MG/ML
12.5 INJECTION INTRAMUSCULAR; INTRAVENOUS; SUBCUTANEOUS
Status: DISCONTINUED | OUTPATIENT
Start: 2017-06-06 | End: 2017-06-06 | Stop reason: HOSPADM

## 2017-06-06 RX ORDER — OXYCODONE HYDROCHLORIDE 5 MG/1
TABLET ORAL
Qty: 40 TABLET | Refills: 0 | Status: SHIPPED | OUTPATIENT
Start: 2017-06-06 | End: 2019-02-25

## 2017-06-06 RX ORDER — PROPOFOL 10 MG/ML
INJECTION, EMULSION INTRAVENOUS CONTINUOUS PRN
Status: DISCONTINUED | OUTPATIENT
Start: 2017-06-06 | End: 2017-06-06

## 2017-06-06 RX ORDER — OXYCODONE HYDROCHLORIDE 5 MG/1
5 TABLET ORAL ONCE
Status: COMPLETED | OUTPATIENT
Start: 2017-06-06 | End: 2017-06-06

## 2017-06-06 RX ORDER — DEXAMETHASONE SODIUM PHOSPHATE 4 MG/ML
INJECTION, SOLUTION INTRA-ARTICULAR; INTRALESIONAL; INTRAMUSCULAR; INTRAVENOUS; SOFT TISSUE PRN
Status: DISCONTINUED | OUTPATIENT
Start: 2017-06-06 | End: 2017-06-06

## 2017-06-06 RX ORDER — LIDOCAINE HYDROCHLORIDE 20 MG/ML
INJECTION, SOLUTION INFILTRATION; PERINEURAL PRN
Status: DISCONTINUED | OUTPATIENT
Start: 2017-06-06 | End: 2017-06-06 | Stop reason: HOSPADM

## 2017-06-06 RX ORDER — SODIUM CHLORIDE, SODIUM LACTATE, POTASSIUM CHLORIDE, CALCIUM CHLORIDE 600; 310; 30; 20 MG/100ML; MG/100ML; MG/100ML; MG/100ML
INJECTION, SOLUTION INTRAVENOUS CONTINUOUS
Status: DISCONTINUED | OUTPATIENT
Start: 2017-06-06 | End: 2017-06-06 | Stop reason: HOSPADM

## 2017-06-06 RX ORDER — NALOXONE HYDROCHLORIDE 0.4 MG/ML
.1-.4 INJECTION, SOLUTION INTRAMUSCULAR; INTRAVENOUS; SUBCUTANEOUS
Status: DISCONTINUED | OUTPATIENT
Start: 2017-06-06 | End: 2017-06-06 | Stop reason: HOSPADM

## 2017-06-06 RX ORDER — FENTANYL CITRATE 50 UG/ML
25-50 INJECTION, SOLUTION INTRAMUSCULAR; INTRAVENOUS EVERY 5 MIN PRN
Status: DISCONTINUED | OUTPATIENT
Start: 2017-06-06 | End: 2017-06-06 | Stop reason: HOSPADM

## 2017-06-06 RX ORDER — HYDROXYZINE HYDROCHLORIDE 25 MG/1
25-50 TABLET, FILM COATED ORAL EVERY 6 HOURS PRN
Qty: 60 TABLET | Refills: 1 | Status: SHIPPED | OUTPATIENT
Start: 2017-06-06 | End: 2019-02-25

## 2017-06-06 RX ORDER — FENTANYL CITRATE 50 UG/ML
INJECTION, SOLUTION INTRAMUSCULAR; INTRAVENOUS PRN
Status: DISCONTINUED | OUTPATIENT
Start: 2017-06-06 | End: 2017-06-06

## 2017-06-06 RX ORDER — MAGNESIUM HYDROXIDE 1200 MG/15ML
LIQUID ORAL PRN
Status: DISCONTINUED | OUTPATIENT
Start: 2017-06-06 | End: 2017-06-06 | Stop reason: HOSPADM

## 2017-06-06 RX ORDER — CLINDAMYCIN PHOSPHATE 900 MG/50ML
900 INJECTION, SOLUTION INTRAVENOUS SEE ADMIN INSTRUCTIONS
Status: DISCONTINUED | OUTPATIENT
Start: 2017-06-06 | End: 2017-06-06 | Stop reason: HOSPADM

## 2017-06-06 RX ORDER — LIDOCAINE HYDROCHLORIDE 20 MG/ML
INJECTION, SOLUTION INFILTRATION; PERINEURAL PRN
Status: DISCONTINUED | OUTPATIENT
Start: 2017-06-06 | End: 2017-06-06

## 2017-06-06 RX ORDER — BUPIVACAINE HYDROCHLORIDE 5 MG/ML
INJECTION, SOLUTION PERINEURAL PRN
Status: DISCONTINUED | OUTPATIENT
Start: 2017-06-06 | End: 2017-06-06 | Stop reason: HOSPADM

## 2017-06-06 RX ORDER — ONDANSETRON 2 MG/ML
4 INJECTION INTRAMUSCULAR; INTRAVENOUS EVERY 30 MIN PRN
Status: DISCONTINUED | OUTPATIENT
Start: 2017-06-06 | End: 2017-06-06 | Stop reason: HOSPADM

## 2017-06-06 RX ORDER — LABETALOL HYDROCHLORIDE 5 MG/ML
10 INJECTION, SOLUTION INTRAVENOUS
Status: DISCONTINUED | OUTPATIENT
Start: 2017-06-06 | End: 2017-06-06 | Stop reason: HOSPADM

## 2017-06-06 RX ORDER — HYDROMORPHONE HYDROCHLORIDE 1 MG/ML
.3-.5 INJECTION, SOLUTION INTRAMUSCULAR; INTRAVENOUS; SUBCUTANEOUS EVERY 10 MIN PRN
Status: DISCONTINUED | OUTPATIENT
Start: 2017-06-06 | End: 2017-06-06 | Stop reason: HOSPADM

## 2017-06-06 RX ORDER — ONDANSETRON 4 MG/1
4 TABLET, ORALLY DISINTEGRATING ORAL EVERY 30 MIN PRN
Status: DISCONTINUED | OUTPATIENT
Start: 2017-06-06 | End: 2017-06-06 | Stop reason: HOSPADM

## 2017-06-06 RX ORDER — ONDANSETRON 2 MG/ML
INJECTION INTRAMUSCULAR; INTRAVENOUS PRN
Status: DISCONTINUED | OUTPATIENT
Start: 2017-06-06 | End: 2017-06-06

## 2017-06-06 RX ADMIN — LIDOCAINE HYDROCHLORIDE 20 MG: 20 INJECTION, SOLUTION INFILTRATION; PERINEURAL at 10:00

## 2017-06-06 RX ADMIN — FENTANYL CITRATE 50 MCG: 50 INJECTION, SOLUTION INTRAMUSCULAR; INTRAVENOUS at 08:58

## 2017-06-06 RX ADMIN — SODIUM CHLORIDE, POTASSIUM CHLORIDE, SODIUM LACTATE AND CALCIUM CHLORIDE: 600; 310; 30; 20 INJECTION, SOLUTION INTRAVENOUS at 08:53

## 2017-06-06 RX ADMIN — PHENYLEPHRINE HYDROCHLORIDE 100 MCG: 10 INJECTION, SOLUTION INTRAMUSCULAR; INTRAVENOUS; SUBCUTANEOUS at 09:41

## 2017-06-06 RX ADMIN — CLINDAMYCIN PHOSPHATE 900 MG: 18 INJECTION, SOLUTION INTRAVENOUS at 08:54

## 2017-06-06 RX ADMIN — PROPOFOL 50 MCG/KG/MIN: 10 INJECTION, EMULSION INTRAVENOUS at 08:57

## 2017-06-06 RX ADMIN — FENTANYL CITRATE 50 MCG: 50 INJECTION, SOLUTION INTRAMUSCULAR; INTRAVENOUS at 10:02

## 2017-06-06 RX ADMIN — MIDAZOLAM HYDROCHLORIDE 2 MG: 1 INJECTION, SOLUTION INTRAMUSCULAR; INTRAVENOUS at 08:52

## 2017-06-06 RX ADMIN — SODIUM CHLORIDE, POTASSIUM CHLORIDE, SODIUM LACTATE AND CALCIUM CHLORIDE: 600; 310; 30; 20 INJECTION, SOLUTION INTRAVENOUS at 10:15

## 2017-06-06 RX ADMIN — OXYCODONE HYDROCHLORIDE 5 MG: 5 TABLET ORAL at 11:23

## 2017-06-06 RX ADMIN — OXYCODONE HYDROCHLORIDE 5 MG: 5 TABLET ORAL at 12:50

## 2017-06-06 RX ADMIN — LIDOCAINE HYDROCHLORIDE 40 MG: 20 INJECTION, SOLUTION INFILTRATION; PERINEURAL at 09:01

## 2017-06-06 RX ADMIN — DEXAMETHASONE SODIUM PHOSPHATE 4 MG: 4 INJECTION, SOLUTION INTRA-ARTICULAR; INTRALESIONAL; INTRAMUSCULAR; INTRAVENOUS; SOFT TISSUE at 09:15

## 2017-06-06 RX ADMIN — FENTANYL CITRATE 50 MCG: 50 INJECTION, SOLUTION INTRAMUSCULAR; INTRAVENOUS at 10:04

## 2017-06-06 RX ADMIN — FENTANYL CITRATE 50 MCG: 50 INJECTION, SOLUTION INTRAMUSCULAR; INTRAVENOUS at 08:54

## 2017-06-06 RX ADMIN — ONDANSETRON 4 MG: 2 INJECTION INTRAMUSCULAR; INTRAVENOUS at 09:32

## 2017-06-06 ASSESSMENT — LIFESTYLE VARIABLES: TOBACCO_USE: 1

## 2017-06-06 NOTE — ANESTHESIA CARE TRANSFER NOTE
Patient: Jayne Tyler    Procedure(s):  1)  WEIL OSTEOTOMY RIGHT SECOND METATARSAL, 2) PLANTAR CAPSULE DEBRIDEMENT/SYNOVECTOMY, RIGHT SECOND METATARSAL PHALANGEAL JOINT, 3) HAMMERTOE REPAIR RIGHT SECOND TOE - Wound Class: I-Clean   - Wound Class: I-Clean   - Wound Class: I-Clean    Diagnosis: CAPSULITIS, JOINT PAIN, RIGHT SECOND MTP JOINT, ELONGATED RIGHT 2ND METATARSAL  Diagnosis Additional Information: No value filed.    Anesthesia Type:   MAC     Note:  Airway :Face Mask  Patient transferred to:PACU        Vitals: (Last set prior to Anesthesia Care Transfer)    CRNA VITALS  6/6/2017 1017 - 6/6/2017 1053      6/6/2017             Pulse: 85    SpO2: 97 %    Resp Rate (set): 10                Electronically Signed By: ELVIS Fernandez CRNA  June 6, 2017  10:53 AM

## 2017-06-06 NOTE — IP AVS SNAPSHOT
MRN:[id number]                      After Visit Summary   6/6/2017    Jayne Tyler    MRN: [id number]           Thank you!     Thank you for choosing [facility] for your care. Our goal is always to provide you with excellent care. Hearing back from our patients is one way we can continue to improve our services. Please take a few minutes to complete the written survey that you may receive in the mail after you visit with us. Thank you!        Patient Information     Date Of Birth          1965        About your hospital stay     You were admitted on:  June 6, 2017 You last received care in the:  [facility] Day [facility]    You were discharged on:  June 6, 2017       Who to Call     For medical emergencies, please call 911.  For non-urgent questions about your medical care, please call your primary care provider or clinic, None  For questions related to your surgery, please call your surgery clinic        Attending Provider     Provider Olvin Ruiz DPM Podiatry       Primary Care Provider    None Doctor, MD      After Care Instructions     Discharge Instructions       Review discharge instructions as directed by Provider.    Dr. Betancur's Post Operative Patient Instructions:  Please read carefully.       Activity:    1)  Weight bearing status: As tolerated in the surgical boot, but keep to the needed minimum    2)  Keep your right lower extremity elevated 23/24 hours above heart level.  You will want to keep your foot elevated as much as possible for the first week after surgery.     3)  It is recommended that you get up and move aorund (walk, crutch, roll) for short periods each hour while you are awake.  It is okay to wiggle your toes.  If you are not in a splint or cast, move your ankle joint.  Motion helps lower risk of a blood clot in your leg.      4)  If you bathe or shower, the dressing must be kept dry.  Safety is a concern and sponge bathing might be  best.  You can purchase a water proof cast protector.     5)  You are not to drive while you are taking pain medications.  No driving, if surgery was done on the right foot/ ankle or if you drive a stick shift.     Specific Cares:    1)  Keep the dressing clean, dry, and intact.  If your dressing gets wet, comes apart, or falls off, please call your clinic and notify Dr. Betancur.  Some bleeding through the dressing is okay.  But if it causes a spot bigger than 2 inches in diameter, please notify Dr. Betancur.     2)  Place an ice pack behind the knee of the surgical side for up to 20min/hour.   It can also be placed on the front of the ankle.     4)  Call your clinic if you experience calf pain, chest pain, or problems breathing.    5)  Call your clinic if fever, increasing pain that you can't control or a large amount of bleeding.      6) What to do if your pain seems out of control:    1)  Make sure you are truly elevating the foot/ankle above hear level.  2)  Make sure you are using a cold pack/ ice  3)  Check the pain medication instructions:  Usually you can take two pain pills every four hours, if needed.  4)  If the above are not adequate, then you need to remove the brace/ boot and remove the compression wrap. The wrap might be too tight.  After you do this, elevate the foot for an hour and then re-wrap the foot lightly and replace the splint / boot.      Follow-up Appointment:  In 1 week(s) with Dr. Betancur at the clinic you previously saw him at.                           Medications:     IMPORTANT:  Take your pain medication when you get home, even if you are not having pain.  You want it in your system before the local anesthetic (foot numbness from the shot) wears off.      1)  Pain medication(s) (take with food): Roxicodone  If hydroxyzine was prescribed, it is for itching,  leg spasms, and makes the other pain medication work better.    2)  Anti blood clot plan: To help prevent a blood clot in your legs, it is  important that you wiggle your toes and ankles frequently. Obviously, this might be limited on the surgical side.  Get up and walk around a few minutes every hour while you are awake.  Keep the white sock on the non-surgical side and the compression wraps to the knee on the surgical side.  If Dr. Betancur thinks that you are at high risk for a blood clot, he might put you on a blood thinner called enoxaparin.    Please call the clinic if you have any pain or swelling in either calf.      If you have questions or concerns after surgery, please call the Podiatry/Foot & Ankle Surgery Triage Nurse at the [facility]:  [phone] .   If it is after 4:30PM you can reach a [facility] Nurse Advisor at [phone] or [phone] toll-free. They can then page the podiatrist on call, if needed.   There is a [facility] podiatrist on call all of the time.          Olvin Betancur DPM, FACFAS, MS    [facility] Pod[facility] & Ankle Surgery                  Your next 10 appointments already scheduled     Jun 06, 2017 12:10 PM CDT   XR FOOT PORT RIGHT 3 VIEWS with SHXRP1   [facility] ([facility])    [address] [place] [id number] [phone]           Please bring a list of your current medicines to your exam. (Include vitamins, minerals and over-thecounter medicines.) Leave your valuables at home.  Tell your doctor if there is a chance you may be pregnant.  You do not need to do anything special for this exam.            Jun 12, 2017 10:00 AM CDT   [facility] Visit with Olvin Betancur DPM   [facility]    [address] [address] [phone]            Jun 29, 2017 11:20 AM CDT   [facility] Ext[facility]ity with Paul Breyen, PT   [facility] ([facility]  )    [address] [facility]  [address]  [facility] [address]   [phone]              Further instructions from your care team        Same Day Surgery Discharge Instructions for  Sedation and General Anesthesia       It's not unusual to feel dizzy, light-headed or faint for up to 24 hours after surgery or while taking pain medication.  If you have these symptoms: sit for a few minutes before standing and have someone assist you when you get up to walk or use the bathroom.      You should rest and relax for the next 24 hours. We recommend you make arrangements to have an adult stay with you for at least 24 hours after your discharge.  Avoid hazardous and strenuous activity.      DO NOT DRIVE any vehicle or operate mechanical equipment for 24 hours following the end of your surgery.  Even though you may feel normal, your reactions may be affected by the medication you have received.      Do not drink alcoholic beverages for 24 hours following surgery.       Slowly progress to your regular diet as you feel able. It's not unusual to feel nauseated and/or vomit after receiving anesthesia.  If you develop these symptoms, drink clear liquids (apple juice, ginger ale, broth, 7-up, etc. ) until you feel better.  If your nausea and vomiting persists for 24 hours, please notify your surgeon.        All narcotic pain medications, along with inactivity and anesthesia, can cause constipation. Drinking plenty of liquids and increasing fiber intake will help.      For any questions of a medical nature, call your surgeon.      Do not make important decisions for 24 hours.      If you had general anesthesia, you may have a sore throat for a couple of days related to the breathing tube used during surgery.  You may use Cepacol lozenges to help with this discomfort.  If it worsens or if you develop a fever, contact your surgeon.       If you feel your pain is not well managed with the pain medications prescribed by your surgeon, please contact your surgeon's office to let them know so they can address your concerns.       Enoxaparin Sodium (Lovenox)  Solution for  "injection  What is this medicine?  Lovenox is an injectable anti-coagulant (sometimes called a \"blood thinner\") used to prevent blood clots, treat existing blood clots or as a bridge if you've stopped taking your Coumadin for surgery.   What should I tell my health care provider before I take this medicine?  They need to know if you have any of these conditions:  bleeding disorders, hemorrhage, or hemophilia  infection of the heart or heart valves  kidney or liver disease  previous stroke  prosthetic heart valve  recent surgery or delivery of a baby  ulcer in the stomach or intestine, diverticulitis, or other bowel disease  an unusual or allergic reaction to enoxaparin, heparin, pork or pork products, other medicines, foods, dyes, or preservatives  pregnant or trying to get pregnant  breast-feeding  How should I use this medicine?     Enoxaparin should be taken at the same time everyday.  It is injected under the skin into fatty tissue, usually the belly. If you think you have taken too much of this medicine contact a poison control center or emergency room at once.  What if I miss a dose?  If you miss a dose, take it as soon as you can. If it is almost time for your next dose, take only that dose. Do not take double or extra doses.  What may interact with this medicine?  Do not take this medicine with any of the following medications:  aspirin and aspirin-like medicines  heparin  mifepristone  warfarin  This medicine may also interact with the following medications:  cilostazol  clopidogrel  dipyridamole  NSAIDs, medicines for pain and inflammation, like ibuprofen or naproxen  sulfinpyrazone  ticlopidine  This list may not describe all possible interactions. Give your health care provider a list of all the medicines, herbs, non-prescription drugs, or dietary supplements you use. Also tell them if you smoke, drink alcohol, or use illegal drugs. Some items may interact with your medicine.  Where should I keep my " medicine?  Keep out of the reach of children.  Store at room temperature between 15 and 30 F C (59 and 86  F). Do not freeze. If your injections have been specially prepared, you may need to store them in the refrigerator. Ask your pharmacist. Throw away any unused medicine after the expiration date.  How to inject enoxaparin (Lovenox):   1.  Wash your hands.   2.  Gather supplies:  syringe and alcohol wipe.    3.  Remove syringe from package.   4.  Select an area on you belly for injection:    Do not inject within 2 inches of belly button     Each time you inject, switch to the other side of your belly.                          Do not inject into scars, moles tattoos, burns, or bruises.     5.  Clean the area with an alcohol pad and allow to dry.   6.  Remove cap from syringe   7.  Hold syringe like a pencil with your dominant hand, with your other                hand gently pinch the area you cleansed with you thumb and                forefinger.  8.  Insert the needle quickly into the fatty roll at a 90 degree angle, making sure  the needle completely into the skin. Do not let go of the fatty roll until you have                                removed the needle.   9.   Press down slowly on the plunger with your finger until syringe is empty.  10. Remove needle and let go of fatty tissue.  Do not rub the injection site. Press gently on site with the alcohol wipe until it stops bleeding.   11. Press the plunger on the syringe to pop the cover over the needle. Discard into  sharps container.     What side effects may I notice from receiving this medicine?  Side effects that you should report to your doctor or health care professional as soon as possible:  allergic reactions like skin rash, itching or hives, swelling of the face, lips, or tongue  black, tarry stools  breathing problems  dark urine  feeling faint or lightheaded, falls  fever  heavy menstrual bleeding  unusual bruising or bleeding    Sharps  "Disposal    A \"sharp\" is a needle, lancets and syringes.  To prevent injury and the spread of disease, you must store and dispose of used sharps properly.  Never leave them out where someone could get hurt.       Storing sharps    Sharps should be stored in either a sharps container--available from your pharmacy or an empty detergent bottle (with a screw top) Do not store sharps in glass bottles, coffee cans or aluminum cans.  Keep sharps out of reach of children   If you use a detergent bottle, label the bottle.  The label should read: \"Do not recycle: Household sharps\"  Place the screw top back on after placing sharps in the bottle.      Place sharps point first into the container.  When it is about half-full, it is ready for disposal.      Disposing of sharps  Never throw loose sharps in the trash.   Never place a container with sharps in a recycling bin.  There are a couple of options for sharps disposal:     Mail-back programs:  There is a fee for this service.       1. [employer] [phone]    www.[employer]       2. Sharps [employer]. [phone]                         www.[employer]c.[employer]      3. [employer] [phone]    www.[employer]              In [place] it is legal to put a laundry bottle full of used     sharps into the garbage as long as the bottle has a     tight lid.  Duct tape the lid to the bottle, then place in a    brown paper bag and then throw in the garbage.                In [place] you may bring your sharps container to    an approved collection station.  To find a station call     [phone] or [phone].  Some stations     charge a fee for this service.                  For more information contact your [facility]'s solid waste program or either of these web sites.       In [place]: www.[employer].[facility].[place]/index.php/living-green/citizens.html         In [place]: dnr.[place].gov/org/aw/wm/household.html                                                         Pending " "Results     Date and Time Order Name Status Description    2017 1045 XR Surgery GREG L/T 5 Min Fluoro w Stills In process             Admission Information     Date & Time Provider Department Dept. Phone    2017 Olvin Betancur, [facility] Same Day Surgery [phone]      Your Vitals Were     Blood Pressure Pulse Temperature Respirations Height Weight    104/60 75 97.3  F (36.3  C) (Tympanic) 12 1.651 m (5' 5\") 97.8 kg (215 lb 9.6 oz)    Last Period Pulse Oximetry BMI (Body Mass Index)             (LMP Unknown) 97% 35.88 kg/m2         [employer] Information     [employer] lets you send messages to your doctor, view your test results, renew your prescriptions, schedule appointments and more. To sign up, go to [email or web address] . Click on \"Log in\" on the left side of the screen, which will take you to the Welcome page. Then click on \"Sign up Now\" on the right side of the page.     You will be asked to enter the access code listed below, as well as some personal information. Please follow the directions to create your username and password.     Your access code is: [email or web address]  Expires: 2017  2:05 PM     Your access code will  in 90 days. If you need help or a new code, please call your [facility] clinic or [phone].        Care EveryWhere ID     This is your Care EveryWhere ID. This could be used by other organizations to access your [facility] medical records  [id number]           Review of your medicines      START taking        Dose / Directions    enoxaparin 40 MG/0.4ML injection   Commonly known as:  LOVENOX   Used for:  Deep vein thrombosis (DVT) prophylaxis prescribed at discharge        Dose:  40 mg   Inject 0.4 mLs (40 mg) Subcutaneous daily   Quantity:  14 Syringe   Refills:  0       hydrOXYzine 25 MG tablet   Commonly known as:  ATARAX   Used for:  Itching due to drug, Acute post-operative pain        Dose:  25-50 mg   Take 1-2 tablets (25-50 mg) by mouth every 6 hours " as needed for itching   Quantity:  60 tablet   Refills:  1       oxyCODONE 5 MG IR tablet   Commonly known as:  ROXICODONE   Used for:  Acute post-operative pain   Notes to Patient:  Given 1123am, mayy take another at 323pm        maximum 12 tablet(s) per day  Take 1-2 tablets by mouth every 4-6 hours as needed for post operative pain.   Quantity:  40 tablet   Refills:  0         CONTINUE these medicines which have NOT CHANGED        Dose / Directions    acetaminophen 500 MG tablet   Commonly known as:  TYLENOL        Dose:  500 mg   Take 500 mg by mouth every 6 hours as needed Reported on 5/19/2017   Refills:  0       ranitidine 150 MG tablet   Commonly known as:  ZANTAC        Dose:  300 mg   Take 300 mg by mouth 2 times daily   Refills:  0         STOP taking     oxyCODONE-acetaminophen 5-325 MG per tablet   Commonly known as:  PERCOCET                Where to get your medicines      These medications were sent to [facility] - [address]  [address], [place] [address]     Phone:  [phone]     enoxaparin 40 MG/0.4ML injection    hydrOXYzine 25 MG tablet         Some of these will need a paper prescription and others can be bought over the counter. Ask your nurse if you have questions.     Bring a paper prescription for each of these medications     oxyCODONE 5 MG IR tablet                Protect others around you: Learn how to safely use, store and throw away your medicines at www.disposemymeds.org.             Medication List: This is a list of all your medications and when to take them. Check marks below indicate your daily home schedule. Keep this list as a reference.      Medications           Morning Afternoon Evening Bedtime As Needed    acetaminophen 500 MG tablet   Commonly known as:  TYLENOL   Take 500 mg by mouth every 6 hours as needed Reported on 5/19/2017                                enoxaparin 40 MG/0.4ML injection   Commonly known as:  LOVENOX    Inject 0.4 mLs (40 mg) Subcutaneous daily                                hydrOXYzine 25 MG tablet   Commonly known as:  ATARAX   Take 1-2 tablets (25-50 mg) by mouth every 6 hours as needed for itching                                oxyCODONE 5 MG IR tablet   Commonly known as:  ROXICODONE   maximum 12 tablet(s) per day  Take 1-2 tablets by mouth every 4-6 hours as needed for post operative pain.   Last time this was given:  5 mg on 6/6/2017 11:23 AM   Notes to Patient:  Given 1123amgolden take another at 323pm                                ranitidine 150 MG tablet   Commonly known as:  ZANTAC   Take 300 mg by mouth 2 times daily

## 2017-06-06 NOTE — OR NURSING
Portable xray done in phase 2. Short cam walker on. SDS staff member escorted pt to discharge door and assisted into vehicle.

## 2017-06-06 NOTE — ANESTHESIA POSTPROCEDURE EVALUATION
Patient: Jayne Tyler    Procedure(s):  1)  WEIL OSTEOTOMY RIGHT SECOND METATARSAL, 2) PLANTAR CAPSULE DEBRIDEMENT/SYNOVECTOMY, RIGHT SECOND METATARSAL PHALANGEAL JOINT, 3) HAMMERTOE REPAIR RIGHT SECOND TOE - Wound Class: I-Clean   - Wound Class: I-Clean   - Wound Class: I-Clean    Diagnosis:CAPSULITIS, JOINT PAIN, RIGHT SECOND MTP JOINT, ELONGATED RIGHT 2ND METATARSAL  Diagnosis Additional Information: No value filed.    Anesthesia Type:  MAC    Note:  Anesthesia Post Evaluation    Patient location during evaluation: PACU  Patient participation: Able to fully participate in evaluation  Level of consciousness: awake and alert  Pain management: adequate  Airway patency: patent  Cardiovascular status: acceptable  Respiratory status: acceptable  Hydration status: acceptable  PONV: none     Anesthetic complications: None          Last vitals:  Vitals:    06/06/17 1115 06/06/17 1131 06/06/17 1250   BP:   125/69   Pulse: 75     Resp: 12  16   Temp: 36.3  C (97.3  F) 36.3  C (97.3  F)    SpO2: 97%  96%         Electronically Signed By: Gautam Uribe MD  June 6, 2017  5:15 PM

## 2017-06-06 NOTE — IP AVS SNAPSHOT
[facility]    [address]    [place] [address]    Phone:  [phone]    Fax:  [phone]                                       After Visit Summary   6/6/2017    Jayne Tyler    MRN: [id number]           After Visit Summary Signature Page     I have received my discharge instructions, and my questions have been answered. I have discussed any challenges I see with this plan with the nurse or doctor.    ..........................................................................................................................................  Patient/Patient Representative Signature      ..........................................................................................................................................  Patient Representative Print Name and Relationship to Patient    ..................................................               ................................................  Date                                            Time    ..........................................................................................................................................  Reviewed by Signature/Title    ...................................................              ..............................................  Date                                                            Time

## 2017-06-06 NOTE — PROGRESS NOTES
S.  Patient was seen today at Same Day Surgery for a cam walker as ordered.  O/G. Help stabilize foot and ankle.  A.  Patient was fit with a medium short pneumatic Cam Walker for the right leg.  Cam walker was assembled by removing the soft liner from the foot plate and uprights, the patients foot and leg was positioned into the soft liner with the heel firmly sealed.  The liner was closed tightly and secured with the Velcro closure. The heel and foot were positioned in the rocker bottom foot plate and the uprights were contoured to fall at mid-line of the lower leg and secured to the Velcro.  The foot plate Velcro straps were secured, proximal straps first, then the distal strap.  The lower leg straps were attached starting distal and working proximal. The ankle joints were set at 90 degrees of dorsi/plantar flexion. Donning and doffing of the Cam Walker was explained.  P.   Patient was advised to contact this office with any problems or questions.

## 2017-06-06 NOTE — DISCHARGE INSTRUCTIONS
Same Day Surgery Discharge Instructions for  Sedation and General Anesthesia       It's not unusual to feel dizzy, light-headed or faint for up to 24 hours after surgery or while taking pain medication.  If you have these symptoms: sit for a few minutes before standing and have someone assist you when you get up to walk or use the bathroom.      You should rest and relax for the next 24 hours. We recommend you make arrangements to have an adult stay with you for at least 24 hours after your discharge.  Avoid hazardous and strenuous activity.      DO NOT DRIVE any vehicle or operate mechanical equipment for 24 hours following the end of your surgery.  Even though you may feel normal, your reactions may be affected by the medication you have received.      Do not drink alcoholic beverages for 24 hours following surgery.       Slowly progress to your regular diet as you feel able. It's not unusual to feel nauseated and/or vomit after receiving anesthesia.  If you develop these symptoms, drink clear liquids (apple juice, ginger ale, broth, 7-up, etc. ) until you feel better.  If your nausea and vomiting persists for 24 hours, please notify your surgeon.        All narcotic pain medications, along with inactivity and anesthesia, can cause constipation. Drinking plenty of liquids and increasing fiber intake will help.      For any questions of a medical nature, call your surgeon.      Do not make important decisions for 24 hours.      If you had general anesthesia, you may have a sore throat for a couple of days related to the breathing tube used during surgery.  You may use Cepacol lozenges to help with this discomfort.  If it worsens or if you develop a fever, contact your surgeon.       If you feel your pain is not well managed with the pain medications prescribed by your surgeon, please contact your surgeon's office to let them know so they can address your concerns.       Enoxaparin Sodium (Lovenox)  Solution for  "injection  What is this medicine?  Lovenox is an injectable anti-coagulant (sometimes called a \"blood thinner\") used to prevent blood clots, treat existing blood clots or as a bridge if you've stopped taking your Coumadin for surgery.   What should I tell my health care provider before I take this medicine?  They need to know if you have any of these conditions:  bleeding disorders, hemorrhage, or hemophilia  infection of the heart or heart valves  kidney or liver disease  previous stroke  prosthetic heart valve  recent surgery or delivery of a baby  ulcer in the stomach or intestine, diverticulitis, or other bowel disease  an unusual or allergic reaction to enoxaparin, heparin, pork or pork products, other medicines, foods, dyes, or preservatives  pregnant or trying to get pregnant  breast-feeding  How should I use this medicine?     Enoxaparin should be taken at the same time everyday.  It is injected under the skin into fatty tissue, usually the belly. If you think you have taken too much of this medicine contact a poison control center or emergency room at once.  What if I miss a dose?  If you miss a dose, take it as soon as you can. If it is almost time for your next dose, take only that dose. Do not take double or extra doses.  What may interact with this medicine?  Do not take this medicine with any of the following medications:  aspirin and aspirin-like medicines  heparin  mifepristone  warfarin  This medicine may also interact with the following medications:  cilostazol  clopidogrel  dipyridamole  NSAIDs, medicines for pain and inflammation, like ibuprofen or naproxen  sulfinpyrazone  ticlopidine  This list may not describe all possible interactions. Give your health care provider a list of all the medicines, herbs, non-prescription drugs, or dietary supplements you use. Also tell them if you smoke, drink alcohol, or use illegal drugs. Some items may interact with your medicine.  Where should I keep my " medicine?  Keep out of the reach of children.  Store at room temperature between 15 and 30 F C (59 and 86  F). Do not freeze. If your injections have been specially prepared, you may need to store them in the refrigerator. Ask your pharmacist. Throw away any unused medicine after the expiration date.  How to inject enoxaparin (Lovenox):   1.  Wash your hands.   2.  Gather supplies:  syringe and alcohol wipe.    3.  Remove syringe from package.   4.  Select an area on you belly for injection:    Do not inject within 2 inches of belly button     Each time you inject, switch to the other side of your belly.                          Do not inject into scars, moles tattoos, burns, or bruises.     5.  Clean the area with an alcohol pad and allow to dry.   6.  Remove cap from syringe   7.  Hold syringe like a pencil with your dominant hand, with your other                hand gently pinch the area you cleansed with you thumb and                forefinger.  8.  Insert the needle quickly into the fatty roll at a 90 degree angle, making sure  the needle completely into the skin. Do not let go of the fatty roll until you have                                removed the needle.   9.   Press down slowly on the plunger with your finger until syringe is empty.  10. Remove needle and let go of fatty tissue.  Do not rub the injection site. Press gently on site with the alcohol wipe until it stops bleeding.   11. Press the plunger on the syringe to pop the cover over the needle. Discard into  sharps container.     What side effects may I notice from receiving this medicine?  Side effects that you should report to your doctor or health care professional as soon as possible:  allergic reactions like skin rash, itching or hives, swelling of the face, lips, or tongue  black, tarry stools  breathing problems  dark urine  feeling faint or lightheaded, falls  fever  heavy menstrual bleeding  unusual bruising or bleeding    Sharps  "Disposal    A \"sharp\" is a needle, lancets and syringes.  To prevent injury and the spread of disease, you must store and dispose of used sharps properly.  Never leave them out where someone could get hurt.       Storing sharps    Sharps should be stored in either a sharps container--available from your pharmacy or an empty detergent bottle (with a screw top) Do not store sharps in glass bottles, coffee cans or aluminum cans.  Keep sharps out of reach of children   If you use a detergent bottle, label the bottle.  The label should read: \"Do not recycle: Household sharps\"  Place the screw top back on after placing sharps in the bottle.      Place sharps point first into the container.  When it is about half-full, it is ready for disposal.      Disposing of sharps  Never throw loose sharps in the trash.   Never place a container with sharps in a recycling bin.  There are a couple of options for sharps disposal:     Mail-back programs:  There is a fee for this service.       1. .953.2478    www.[employer]       2. Sharps [employer]. 223.550.4281                         www.sharpWireless Safetyc.[employer]      3. [employer] 895-556-9648    www.[employer]              In [place] it is legal to put a laundry bottle full of used     sharps into the garbage as long as the bottle has a     tight lid.  Duct tape the lid to the bottle, then place in a    brown paper bag and then throw in the garbage.                In [place] you may bring your sharps container to    an approved collection station.  To find a station call     [phone] or [phone].  Some stations     charge a fee for this service.                  For more information contact your [facility]'s solid waste program or either of these web sites.       In [place]: www.[facility].[place].us/index.php/living-green/citizens.html         In [place]: dnr.[place].gov[place]org/aw/wm/household.html                                                       "

## 2017-06-06 NOTE — BRIEF OP NOTE
[facility] Brief Operative Note    Pre-operative diagnosis: CAPSULITIS, JOINT PAIN, RIGHT SECOND MTP JOINT, ELONGATED RIGHT 2ND METATARSAL   Post-operative diagnosis * No post-op diagnosis entered *   Procedure: Procedure(s):  1)  WEIL OSTEOTOMY RIGHT SECOND METATARSAL, 2) PLANTAR CAPSULE DEBRIDEMENT/SYNOVECTOMY, RIGHT SECOND METATARSAL PHALANGEAL JOINT, 3) HAMMERTOE REPAIR RIGHT SECOND TOE - Wound Class: I-Clean   - Wound Class: I-Clean   - Wound Class: I-Clean   Surgeon: Olvin Betancur DPM   Assistants(s): Salo Foster DPM; Resident   Estimated blood loss: 3 ml    Specimens: None

## 2017-06-06 NOTE — ANESTHESIA PREPROCEDURE EVALUATION
Anesthesia Evaluation     .             ROS/MED HX    ENT/Pulmonary:     (+)tobacco use, Current use , . .   (-) sleep apnea   Neurologic:       Cardiovascular:         METS/Exercise Tolerance:     Hematologic:         Musculoskeletal:         GI/Hepatic:     (+) GERD Asymptomatic on medication,       Renal/Genitourinary:         Endo:     (+) Obesity, .      Psychiatric:         Infectious Disease:         Malignancy:         Other:                     Physical Exam  Normal systems: cardiovascular, pulmonary and dental    Airway   Mallampati: II  TM distance: >3 FB  Neck ROM: full    Dental     Cardiovascular       Pulmonary                     Anesthesia Plan      History & Physical Review  History and physical reviewed and following examination; no interval change.    ASA Status:  2 .    NPO Status:  > 8 hours    Plan for MAC   PONV prophylaxis:  Ondansetron (or other 5HT-3) and Dexamethasone or Solumedrol       Postoperative Care  Postoperative pain management:  IV analgesics.      Consents  Anesthetic plan, risks, benefits and alternatives discussed with:  Patient..                          .

## 2017-06-12 ENCOUNTER — OFFICE VISIT (OUTPATIENT)
Dept: PODIATRY | Facility: CLINIC | Age: 52
End: 2017-06-12
Payer: COMMERCIAL

## 2017-06-12 VITALS
SYSTOLIC BLOOD PRESSURE: 114 MMHG | BODY MASS INDEX: 35.82 KG/M2 | WEIGHT: 215 LBS | HEIGHT: 65 IN | DIASTOLIC BLOOD PRESSURE: 68 MMHG

## 2017-06-12 DIAGNOSIS — Z09 SURGERY FOLLOW-UP EXAMINATION: Primary | ICD-10-CM

## 2017-06-12 PROCEDURE — 99024 POSTOP FOLLOW-UP VISIT: CPT | Performed by: PODIATRIST

## 2017-06-12 NOTE — MR AVS SNAPSHOT
"              After Visit Summary   6/12/2017    Jayne Tyler    MRN: [id number]           Patient Information     Date Of Birth          1965        Visit Information        Provider Department      6/12/2017 10:00 AM Olvin Betancur DPM [facility]        Today's Diagnoses     Surgery follow-up examination    -  1       Follow-ups after your visit        Your next 10 appointments already scheduled     Jun 23, 2017 10:30 AM CDT   Return Visit with Olvin Betancur DPM   [facility] ([facility])    [address] [id number] [phone]            Jun 29, 2017 11:20 AM CDT   [facility] with Paul Breyen, PT   [facility] ([facility]  )    [address]  [address]  [place] [address] [phone]              Who to contact     If you have questions or need follow up information about today's clinic visit or your schedule please contact [facility] directly at [phone].  Normal or non-critical lab and imaging results will be communicated to you by [employer], letter or phone within 4 business days after the clinic has received the results. If you do not hear from us within 7 days, please contact the clinic through [employer] or phone. If you have a critical or abnormal lab result, we will notify you by phone as soon as possible.  Submit refill requests through [employer] or call your pharmacy and they will forward the refill request to us. Please allow 3 business days for your refill to be completed.          Additional Information About Your Visit        [employer] Information     [employer] lets you send messages to your doctor, view your test results, renew your prescriptions, schedule appointments and more. To sign up, go to [email or web address] . Click on \"Log in\" on the left side of the screen, which will take you to the Welcome page. Then click on \"Sign up " "Now\" on the right side of the page.     You will be asked to enter the access code listed below, as well as some personal information. Please follow the directions to create your username and password.     Your access code is: [email or web address]  Expires: 2017  2:05 PM     Your access code will  in 90 days. If you need help or a new code, please call your [facility] clinic or [phone].        Care EveryWhere ID     This is your Care EveryWhere ID. This could be used by other organizations to access your [facility] medical records  [id number]        Your Vitals Were     Height Last Period BMI (Body Mass Index)             5' 5\" (1.651 m) (LMP Unknown) 35.78 kg/m2          Blood Pressure from Last 3 Encounters:   17 114/68   17 125/69   17 116/70    Weight from Last 3 Encounters:   17 215 lb (97.5 kg)   17 215 lb 9.6 oz (97.8 kg)   17 215 lb (97.5 kg)              Today, you had the following     No orders found for display       Primary Care Provider    None Doctor, MD       No address on file        Thank you!     Thank you for choosing [facility]  for your care. Our goal is always to provide you with excellent care. Hearing back from our patients is one way we can continue to improve our services. Please take a few minutes to complete the written survey that you may receive in the mail after your visit with us. Thank you!             Your Updated Medication List - Protect others around you: Learn how to safely use, store and throw away your medicines at www.disposemymeds.org.          This list is accurate as of: 17 11:59 PM.  Always use your most recent med list.                   Brand Name Dispense Instructions for use    acetaminophen 500 MG tablet    TYLENOL     Take 500 mg by mouth every 6 hours as needed Reported on 2017       enoxaparin 40 MG/0.4ML injection    LOVENOX    14 Syringe    Inject 0.4 mLs (40 mg) Subcutaneous daily    "    hydrOXYzine 25 MG tablet    ATARAX    60 tablet    Take 1-2 tablets (25-50 mg) by mouth every 6 hours as needed for itching       oxyCODONE 5 MG IR tablet    ROXICODONE    40 tablet    maximum 12 tablet(s) per day  Take 1-2 tablets by mouth every 4-6 hours as needed for post operative pain.       ranitidine 150 MG tablet    ZANTAC     Take 300 mg by mouth 2 times daily

## 2017-06-12 NOTE — LETTER
6/12/2017       RE: Jayne Tyler  [address] [address] [address]           Dear Colleague,    Thank you for referring your patient, Jayne Tyler, to the [facility]. Please see a copy of my visit note below.    S: Pt presents 6 days  post op.      PROCEDURES:   1.  Weil osteotomy, right second metatarsal.   2.  Synovectomy, right second metatarsophalangeal joint]  3.  Right second toe proximal interphalangeal joint arthroplasty.   4.  Right second toe distal interphalangeal joint flexor tenotomy.       Pain is controlled with oxycodone   No N/V/F/C.     She is elevating as instructed.    WB status: CAM walker  No calf pain, chest pain, or shortness of breath.        O: Vascular:  Pedal pulses palpable.  Moderate right forefoot edema.    Neuro: light touch sensation intact distal to incision.    Derm: Incision iswell coapted.  Sutures intact.  No significant diane-incisional erythema.     Musculoskeletal: Satisfactory post op surgical correction of the right 2nd toe contrature. .     X-Ray Findings: films taken in the post operative setting show stable post operative findings.  2nd metatarsal is more in line with the 3rd (was shortened).  2nd toe is more rectus.    ASSESSMENT:  1) s/p above surgery.  No clinical signs of infection.  Pain is controlled.  Encounter Diagnosis   Name Primary?     Surgery follow-up examination Yes       PLAN:  1) oxycodone refilled  2) sterile dressing change  3) continue elevation, ice, CAM walker, rest  4) ankle ROM exercises encouraged.    Follow up in 1 week for suture removal.     Olvin Betancur DPM, FACFAS, MS    [place] [facility] of Podiatry/Foot & Ankle Surgery            Again, thank you for allowing me to participate in the care of your patient.        Sincerely,              Olvin Betancur DPM

## 2017-06-13 NOTE — OP NOTE
DATE OF PROCEDURE:  06/06/2017      SURGEON:  Olvin Betancur DPM       :  Salo Foster DPM      PREOPERATIVE DIAGNOSES:   1.  Capsulitis joint pain, right second metatarsophalangeal joint.   2.  Elongated right second metatarsal.   3.  Hammertoe deformity, right second toe.      POSTOPERATIVE DIAGNOSES:     1.  Capsulitis joint pain, right second metatarsophalangeal joint.   2.  Elongated right second metatarsal.     3.  Hammertoe deformity, right second toe.      PROCEDURES:   1.  Weil osteotomy, right second metatarsal.   2.  Synovectomy, right second metatarsophalangeal joint]  3.  Right second toe proximal interphalangeal joint arthroplasty.   4.  Right second toe distal interphalangeal joint flexor tenotomy.      ANESTHESIA:  MAC with local.      HEMOSTASIS:  Ankle pneumatic tourniquet at 250 mmHg.      ESTIMATED BLOOD LOSS:  3 mL.      MATERIALS:  Two 2.0 mm Arthrex snap off screws, sizes 11 and 13, one 2.0 mm Arthrex Trim-It pin absorbable and nonabsorbable suture material.      INJECTABLES:  0.5% Marcaine plain injected preoperatively and some additional 2% lidocaine plain injected intraoperatively.      COMPLICATIONS:  None apparent.      INDICATIONS FOR PROCEDURE:  Jayne Tyler is a 51-year-old female who saw me previously in clinic for ongoing and progressive pain associated with her right second metatarsophalangeal joint and right second toe region.  Her pain persisted and progressed, despite an exhaustive conservative trial including  rigid soled hiking boots, metatarsal pads, custom orthoses, avoidance of barefoot walking and injection.  She inquired about surgical intervention.  The procedures were discussed in detail including the risks, the benefits, the postoperative cares, course and prognosis.  She elected to undergo the operation.      DESCRIPTION OF PROCEDURE:  Patient was transported to the operating room and placed supine on the operating table.  IV sedation was initiated.   Local anesthetic was injected into the right foot.  The right foot was then prepped and draped in the normal aseptic fashion, exsanguinated and the ankle tourniquet inflated.      A longitudinal incision was made overlying the right second metatarsophalangeal joint.  Layered dissection was performed.  Bleeding vessels were electrocauterized.  The extensor tendons were identified and retracted laterally.  At this point, a transverse capsulotomy was performed and the medial and lateral collateral ligaments were released.  McGlamry elevator was used to release the plantar soft tissue adhesions.      Using a sagittal saw, a double cut Weil osteotomy was performed.  This involved 2 parallel cuts from dorsal distal running proximally out through the metatarsal neck on the plantar surface.  This allowed for removal of a wafer of bone, so we were able to achieve shortening and elevation of the metatarsal head.  Intraoperative fluoroscopy was used to determine the appropriate corrected length of the metatarsal.  Once satisfied, we then fixated using two 2.0 snap off screws from Arthrex.  The distal overhanging shelf of bone was removed with a bone rongeur.      It should be noted that we did inspect the plantar plate.  It did not appear appropriate for a full repair,  reefing of the plantar plate.  However, there was synovitis and loose synovium.  A synovectomy was performed.  Using a #15 scalpel, I removed the synovitic tissue as well as some loose capsular tissue within the joint.  The area was then irrigated with a copious amount of normal sterile saline.  Layered closure was performed.      Incision was then made overlying the proximal interphalangeal joint of the right second toe.  Layered dissection was performed.  Bleeding vessels were electrocauterized, a transverse tenotomy and capsulotomy was performed.  Capsular tissues were reflected away from both bones of the proximal interphalangeal joint.  Using a sagittal  saw, bone cuts were made just behind the subchondral plate at both the head of the proximal phalanx and the base of the middle phalanx.  Subchondral drilling was also performed to prepare the joint for fusion.  The joint was then fixated using a 2.0 mm Trim-It pin from Arthrex.  The toe sat in a more rectus position in the sagittal plane.     At this point there was an ongoing flexion deformity at the distal interphalangeal joint.  It was therefore thought medically necessary to address this deformity as well.  Using a #6200 [place] blade, an incision was made on the plantar aspect of the right second toe distal interphalangeal joint.  The flexor tendon was released as well as the plantar joint capsule.  The flexion deformity was then easily corrected and the distal toe was dorsiflexed.  The wound was irrigated with a copious amount of normal sterile saline and layered closure was performed.      A well-padded compressive dressing was placed on the right foot.  The tourniquet was deflated.  Patient tolerated the anesthesia and procedure well and was transported to the post-anesthesia care unit in stable condition.         ELYSE SINGH DPM             D: 2017 17:30   T: 2017 19:37   MT: [id number]      Name:     HILLARY FREGOSO   MRN:      [id number]        Account:        [id number]   :      1965           Procedure Date: 2017      Document: [id number]

## 2017-06-15 NOTE — PROGRESS NOTES
S: Pt presents 6 days  post op.      PROCEDURES:   1.  Weil osteotomy, right second metatarsal.   2.  Synovectomy, right second metatarsophalangeal joint]  3.  Right second toe proximal interphalangeal joint arthroplasty.   4.  Right second toe distal interphalangeal joint flexor tenotomy.       Pain is controlled with oxycodone   No N/V/F/C.     She is elevating as instructed.    WB status: CAM walker  No calf pain, chest pain, or shortness of breath.        O: Vascular:  Pedal pulses palpable.  Moderate right forefoot edema.    Neuro: light touch sensation intact distal to incision.    Derm: Incision iswell coapted.  Sutures intact.  No significant diane-incisional erythema.     Musculoskeletal: Satisfactory post op surgical correction of the right 2nd toe contrature. .     X-Ray Findings: films taken in the post operative setting show stable post operative findings.  2nd metatarsal is more in line with the 3rd (was shortened).  2nd toe is more rectus.    ASSESSMENT:  1) s/p above surgery.  No clinical signs of infection.  Pain is controlled.  Encounter Diagnosis   Name Primary?     Surgery follow-up examination Yes       PLAN:  1) oxycodone refilled  2) sterile dressing change  3) continue elevation, ice, CAM walker, rest  4) ankle ROM exercises encouraged.    Follow up in 1 week for suture removal.     Olvin Betancur DPM, FACSOHAN, MS [facility] of Podiatry/Foot & Ankle Surgery

## 2017-06-23 ENCOUNTER — OFFICE VISIT (OUTPATIENT)
Dept: PODIATRY | Facility: CLINIC | Age: 52
End: 2017-06-23
Payer: COMMERCIAL

## 2017-06-23 DIAGNOSIS — Z09 SURGERY FOLLOW-UP EXAMINATION: Primary | ICD-10-CM

## 2017-06-23 PROCEDURE — 99024 POSTOP FOLLOW-UP VISIT: CPT | Performed by: PODIATRIST

## 2017-06-23 NOTE — MR AVS SNAPSHOT
"              After Visit Summary   6/23/2017    Jayne Tyler    MRN: [id number]           Patient Information     Date Of Birth          1965        Visit Information        Provider Department      6/23/2017 10:30 AM Olvin Betancur DPM [facility]        Today's Diagnoses     Surgery follow-up examination    -  1       Follow-ups after your visit        Follow-up notes from your care team     Return in about 1 day (around 6/24/2017).      Your next 10 appointments already scheduled     Jun 29, 2017 11:20 AM CDT   ANDERSON Extremity with Paul Breyen, PT   [facility] [place]  [facility]    [address]  [address]  [facility] [address]   [phone]              Who to contact     If you have questions or need follow up information about today's clinic visit or your schedule please contact [facility] directly at [phone].  Normal or non-critical lab and imaging results will be communicated to you by MyChart, letter or phone within 4 business days after the clinic has received the results. If you do not hear from us within 7 days, please contact the clinic through Webshozhart or phone. If you have a critical or abnormal lab result, we will notify you by phone as soon as possible.  Submit refill requests through BioData or call your pharmacy and they will forward the refill request to us. Please allow 3 business days for your refill to be completed.          Additional Information About Your Visit        Webshozhart Information     BioData lets you send messages to your doctor, view your test results, renew your prescriptions, schedule appointments and more. To sign up, go to [email or web address] . Click on \"Log in\" on the left side of the screen, which will take you to the Welcome page. Then click on \"Sign up Now\" on the right side of the page.     You will be asked to enter the access code listed below, as well as some personal information. " Please follow the directions to create your username and password.     Your access code is: [email or web address]  Expires: 2017  2:05 PM     Your access code will  in 90 days. If you need help or a new code, please call your [place] clinic or [phone].        Care EveryWhere ID     This is your Care EveryWhere ID. This could be used by other organizations to access your [facility] medical records  [id number]        Your Vitals Were     Last Period                   (LMP Unknown)            Blood Pressure from Last 3 Encounters:   17 114/68   17 125/69   17 116/70    Weight from Last 3 Encounters:   17 215 lb (97.5 kg)   17 215 lb 9.6 oz (97.8 kg)   17 215 lb (97.5 kg)              Today, you had the following     No orders found for display       Primary Care Provider    None Doctor, MD       No address on file        Equal Access to Services     [facility]: Hadii aad ku hadasho Soomaali, waaxda luqadaha, qaybta kaalmada adeegyada, shannon mirandain brian hamilton . So [facility] [place] [phone].    ATENCIÓN: Si habla español, tiene a cabrera disposición servicios gratuitos de asistencia lingüística. Llame al [phone].    We comply with applicable federal civil rights laws and [place] laws. We do not discriminate on the basis of race, color, national origin, age, disability sex, sexual orientation or gender identity.            Thank you!     Thank you for choosing [facility]  for your care. Our goal is always to provide you with excellent care. Hearing back from our patients is one way we can continue to improve our services. Please take a few minutes to complete the written survey that you may receive in the mail after your visit with us. Thank you!             Your Updated Medication List - Protect others around you: Learn how to safely use, store and throw away your medicines at www.disposemymeds.org.          This list is accurate as of:  6/23/17 11:07 AM.  Always use your most recent med list.                   Brand Name Dispense Instructions for use Diagnosis    acetaminophen 500 MG tablet    TYLENOL     Take 500 mg by mouth every 6 hours as needed Reported on 5/19/2017        enoxaparin 40 MG/0.4ML injection    LOVENOX    14 Syringe    Inject 0.4 mLs (40 mg) Subcutaneous daily    Deep vein thrombosis (DVT) prophylaxis prescribed at discharge       hydrOXYzine 25 MG tablet    ATARAX    60 tablet    Take 1-2 tablets (25-50 mg) by mouth every 6 hours as needed for itching    Itching due to drug, Acute post-operative pain       oxyCODONE 5 MG IR tablet    ROXICODONE    40 tablet    maximum 12 tablet(s) per day  Take 1-2 tablets by mouth every 4-6 hours as needed for post operative pain.    Acute post-operative pain       ranitidine 150 MG tablet    ZANTAC     Take 300 mg by mouth 2 times daily

## 2017-06-23 NOTE — LETTER
6/23/2017       RE: Jayne Tyler  [address] [address] [address]           Dear Colleague,    Thank you for referring your patient, Jayne Tyler, to the [facility]. Please see a copy of my visit note below.    S: Pt presents 2 weeks  post op.      PROCEDURES:   1.  Weil osteotomy, right second metatarsal.   2.  Synovectomy, right second metatarsophalangeal joint]  3.  Right second toe proximal interphalangeal joint arthroplasty.   4.  Right second toe distal interphalangeal joint flexor tenotomy.       O: Vascular:  Pedal pulses palpable. Interval reduction in right forefoot edema.    Neuro: light touch sensation intact distal to incision.    Derm: Incision is well coapted.  Sutures intact.  No diane-incisional erythema.     Musculoskeletal: Satisfactory post op surgical correction of the right 2nd toe contrature.     X-Ray Findings: films taken in the post operative setting show stable post operative findings.  2nd metatarsal is more in line with the 3rd (was shortened).  2nd toe is more rectus.    ASSESSMENT:  Encounter Diagnosis   Name Primary?     Surgery follow-up examination Yes       PLAN:  Encounter Diagnosis   Name Primary?     Surgery follow-up examination Yes       Suture Removal    The incision was prepped with povodine iodine solution.  Using a sterile suture scissors and forceps, the sutures were removed w/o difficulty.  Incision cleansed with an alcohol wipe and a light dressing was applied.  Pt advised to keep foot dry for an additional 24 hours, and then washing the foot is okay.  Pt stated understanding.    Continue short CAM walker x 4 weeks  Continue elevation and ice, now more on an as-needed basis  Over the counter pain relievers at this point - she is fine with this    Follow up in 1 month    Olvin Betancur DPM, FACFAS, MS    [place] Department of Podiatry/Foot & Ankle Surgery            Again, thank you for allowing me to participate in the care  of your patient.        Sincerely,              Olvin Betancur, KEILAM

## 2017-06-23 NOTE — LETTER
[address] [address]  Phone: [phone]          6/23/2017        Jayne Tyler  [address] [address] [address]          To Whom It May Concern:      Jayne Tyler was seen and treated in clinic today. She is to be off from work until 7/17/17. If there are any questions or concerns, please have them contact my office.        Thank you,        Olvin Betancur

## 2017-06-23 NOTE — PROGRESS NOTES
S: Pt presents 2 weeks  post op.      PROCEDURES:   1.  Weil osteotomy, right second metatarsal.   2.  Synovectomy, right second metatarsophalangeal joint]  3.  Right second toe proximal interphalangeal joint arthroplasty.   4.  Right second toe distal interphalangeal joint flexor tenotomy.       O: Vascular:  Pedal pulses palpable. Interval reduction in right forefoot edema.    Neuro: light touch sensation intact distal to incision.    Derm: Incision is well coapted.  Sutures intact.  No diane-incisional erythema.     Musculoskeletal: Satisfactory post op surgical correction of the right 2nd toe contrature.     X-Ray Findings: films taken in the post operative setting show stable post operative findings.  2nd metatarsal is more in line with the 3rd (was shortened).  2nd toe is more rectus.    ASSESSMENT:  Encounter Diagnosis   Name Primary?     Surgery follow-up examination Yes       PLAN:  Encounter Diagnosis   Name Primary?     Surgery follow-up examination Yes       Suture Removal    The incision was prepped with povodine iodine solution.  Using a sterile suture scissors and forceps, the sutures were removed w/o difficulty.  Incision cleansed with an alcohol wipe and a light dressing was applied.  Pt advised to keep foot dry for an additional 24 hours, and then washing the foot is okay.  Pt stated understanding.    Continue short CAM walker x 4 weeks  Continue elevation and ice, now more on an as-needed basis  Over the counter pain relievers at this point - she is fine with this    Follow up in 1 month    Olvin Betancur DPM, FACFAS, MS [place] Department of Podiatry/Foot & Ankle Surgery

## 2017-06-29 ENCOUNTER — THERAPY VISIT (OUTPATIENT)
Dept: PHYSICAL THERAPY | Facility: CLINIC | Age: 52
End: 2017-06-29
Payer: COMMERCIAL

## 2017-06-29 DIAGNOSIS — M25.519 SHOULDER PAIN: ICD-10-CM

## 2017-06-29 DIAGNOSIS — Z47.89 AFTERCARE FOLLOWING SURGERY OF THE MUSCULOSKELETAL SYSTEM: ICD-10-CM

## 2017-06-29 PROCEDURE — 97110 THERAPEUTIC EXERCISES: CPT | Mod: GP | Performed by: PHYSICAL THERAPIST

## 2017-06-29 PROCEDURE — 97112 NEUROMUSCULAR REEDUCATION: CPT | Mod: GP | Performed by: PHYSICAL THERAPIST

## 2017-06-30 ENCOUNTER — TELEPHONE (OUTPATIENT)
Dept: PODIATRY | Facility: CLINIC | Age: 52
End: 2017-06-30

## 2017-06-30 NOTE — TELEPHONE ENCOUNTER
Patient called by my medical assistant.   New letter completed.    Olvin Betancur DPM, FACFAS, MS    [place] Department of Podiatry/Foot & Ankle Surgery

## 2017-06-30 NOTE — TELEPHONE ENCOUNTER
Pt called clinic  Pt would like to  a note stating her restrictions since surgery and a copy of the 2 f/u office visits  Pt would like to  today at [facility] if possible  Please call pt at  [phone] to advise where and when to

## 2017-06-30 NOTE — LETTER
REPORT OF WORK ABILITY    [address] [id number] [phone]    PATIENT DATA    Employee Name: Jayne Tyler      : 1965     SS#: [id number]      To Whom It May Concern:      Jayne Tyler was seen and treated in clinic 17.  She is status post reconstructive surgery of the right foot.   Due to the surgery, post operative pain, and limited activity, she is not able to work until  17.     If there are any questions or concerns, please have them contact my office.        Thank you,        Olvin Betancur DPM, FACFAS, MS    [place] Department of Podiatry/Foot & Ankle Surgery

## 2017-07-11 ENCOUNTER — OFFICE VISIT (OUTPATIENT)
Dept: ORTHOPEDICS | Facility: CLINIC | Age: 52
End: 2017-07-11
Payer: COMMERCIAL

## 2017-07-11 DIAGNOSIS — Z47.89 ORTHOPEDIC AFTERCARE: Primary | ICD-10-CM

## 2017-07-11 PROCEDURE — 99024 POSTOP FOLLOW-UP VISIT: CPT | Performed by: PHYSICIAN ASSISTANT

## 2017-07-11 NOTE — PROGRESS NOTES
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 George..  Present symptoms: Still sore/ painful with use and feels like popping out. Describes popping out feeling at jolt at arm, non painful, but uncomfortable, needs to lift arm to feel better, just happens with standing.  Back to work Monday.  No new falls.  Sleeping improving, no pain pills.  Using arm for most.  Overall feels like she should be further along.  Denies Chest pain, Calve pain, Fever, Chills.  Current Treatment:     PHYSICAL EXAM:  LMP  (LMP Unknown)  There is no height or weight on file to calculate BMI.   GENERAL APPEARANCE: healthy, alert and no distress   PSYCH:  mentation appears normal and affect normal/bright    MSK:  Left: shoulder .  Incision clean well healed..  CMS: diane incisional numbness, otherwise grossly intact.  AROM Flexion 120, Abd 90, shaking at end.  Cannot reproduce pulling out sensation.  Palpation negative for crepitus or clunk/shift with motion.     ASSESSMENT:  Jayne Tyler is a 52 year old female S/P Left shoulder arthr DEC/DCR, mini open RCR, DOS 3/29/2017, Dr. George...  Slowly progressing.  Unclear what popping out sensation is.    PLAN:  - Care instructions given and verbally acknowledged.  - Medications: none.  - Physical therapy: continue with current physical therapy  - AAT    Return to clinic 6, months, pos top with Dr. Doris Rivera PA-C    Dept. Orthopedic Surgery  [facility]   7/11/2017

## 2017-07-11 NOTE — MR AVS SNAPSHOT
After Visit Summary   7/11/2017    Jayne Tyler    MRN: [id number]           Patient Information     Date Of Birth          1965        Visit Information        Provider Department      7/11/2017 9:20 AM Pieter Rivera PA-C [facility] ORTHOPEDIC SURGERY        Today's Diagnoses     Orthopedic aftercare    -  1      Care Instructions    Continue with therapy, and working on exercises at home.      Use good form as a guide for what is appropriate use.    Follow up with Dr. George at 6 months post op.          Follow-ups after your visit        Follow-up notes from your care team     Return in about 3 months (around 10/11/2017).      Your next 10 appointments already scheduled     Jul 14, 2017  3:20 PM CDT   ANDERSON Extremity with Paul Breyen, PT   [facility] ([facility]  )    [address] [place] [address]   [phone]            Jul 21, 2017  9:00 AM CDT   Return Visit with Olvin Betancur DPM   [facility] ([facility])    [address] [place] [id number] [phone]            Aug 01, 2017  2:30 PM CDT   ANDERSON Extremity with Paul Breyen, PT   [facility] ([facility]  )    [address] [place] [address]   [phone]              Who to contact     If you have questions or need follow up information about today's clinic visit or your schedule please contact [facility] ORTHOPEDIC SURGERY directly at [phone].  Normal or non-critical lab and imaging results will be communicated to you by MyChart, letter or phone within 4 business days after the clinic has received the results. If you do not hear from us within 7 days, please contact the clinic through [email or web address] or phone. If you have a critical or abnormal lab result, we will notify you by phone as soon as possible.  Submit refill requests through Intuit or call your pharmacy and they will  "forward the refill request to us. Please allow 3 business days for your refill to be completed.          Additional Information About Your Visit        MyChart Information     [employer] lets you send messages to your doctor, view your test results, renew your prescriptions, schedule appointments and more. To sign up, go to [email or web address] . Click on \"Log in\" on the left side of the screen, which will take you to the Welcome page. Then click on \"Sign up Now\" on the right side of the page.     You will be asked to enter the access code listed below, as well as some personal information. Please follow the directions to create your username and password.     Your access code is: [email or web address]  Expires: 2017  2:05 PM     Your access code will  in 90 days. If you need help or a new code, please call your [place] clinic or [phone].        Care EveryWhere ID     This is your Care EveryWhere ID. This could be used by other organizations to access your [place] medical records  [id number]        Your Vitals Were     Last Period                   (LMP Unknown)            Blood Pressure from Last 3 Encounters:   17 114/68   17 125/69   17 116/70    Weight from Last 3 Encounters:   17 215 lb (97.5 kg)   17 215 lb 9.6 oz (97.8 kg)   17 215 lb (97.5 kg)              Today, you had the following     No orders found for display       Primary Care Provider    None Doctor, MD       No address on file        Equal Access to Services     [facility]: Hadii aad ku hadasho Soomaali, waaxda luqadaha, qaybta kaalmada adeegyada, waxay elva hamilton . So [facility] [phone].    ATENCIÓN: Si habla español, tiene a cabrera disposición servicios gratuitos de asistencia lingüística. Llame al [phone].    We comply with applicable federal civil rights laws and [place] laws. We do not discriminate on the basis of race, color, national origin, age, disability sex, " sexual orientation or gender identity.            Thank you!     Thank you for choosing [facility] ORTHOPEDIC SURGERY  for your care. Our goal is always to provide you with excellent care. Hearing back from our patients is one way we can continue to improve our services. Please take a few minutes to complete the written survey that you may receive in the mail after your visit with us. Thank you!             Your Updated Medication List - Protect others around you: Learn how to safely use, store and throw away your medicines at www.disposemymeds.org.          This list is accurate as of: 7/11/17 11:41 AM.  Always use your most recent med list.                   Brand Name Dispense Instructions for use Diagnosis    acetaminophen 500 MG tablet    TYLENOL     Take 500 mg by mouth every 6 hours as needed Reported on 5/19/2017        enoxaparin 40 MG/0.4ML injection    LOVENOX    14 Syringe    Inject 0.4 mLs (40 mg) Subcutaneous daily    Deep vein thrombosis (DVT) prophylaxis prescribed at discharge       hydrOXYzine 25 MG tablet    ATARAX    60 tablet    Take 1-2 tablets (25-50 mg) by mouth every 6 hours as needed for itching    Itching due to drug, Acute post-operative pain       oxyCODONE 5 MG IR tablet    ROXICODONE    40 tablet    maximum 12 tablet(s) per day  Take 1-2 tablets by mouth every 4-6 hours as needed for post operative pain.    Acute post-operative pain       ranitidine 150 MG tablet    ZANTAC     Take 300 mg by mouth 2 times daily

## 2017-07-11 NOTE — PATIENT INSTRUCTIONS
Continue with therapy, and working on exercises at home.      Use good form as a guide for what is appropriate use.    Follow up with Dr. George at 6 months post op.

## 2017-07-14 ENCOUNTER — THERAPY VISIT (OUTPATIENT)
Dept: PHYSICAL THERAPY | Facility: CLINIC | Age: 52
End: 2017-07-14
Payer: COMMERCIAL

## 2017-07-14 DIAGNOSIS — M25.519 SHOULDER PAIN: ICD-10-CM

## 2017-07-14 DIAGNOSIS — Z47.89 AFTERCARE FOLLOWING SURGERY OF THE MUSCULOSKELETAL SYSTEM: ICD-10-CM

## 2017-07-14 PROCEDURE — 97112 NEUROMUSCULAR REEDUCATION: CPT | Mod: GP | Performed by: PHYSICAL THERAPIST

## 2017-07-14 PROCEDURE — 97110 THERAPEUTIC EXERCISES: CPT | Mod: GP | Performed by: PHYSICAL THERAPIST

## 2017-07-14 PROCEDURE — 97140 MANUAL THERAPY 1/> REGIONS: CPT | Mod: GP | Performed by: PHYSICAL THERAPIST

## 2017-07-21 ENCOUNTER — OFFICE VISIT (OUTPATIENT)
Dept: PODIATRY | Facility: CLINIC | Age: 52
End: 2017-07-21
Payer: COMMERCIAL

## 2017-07-21 ENCOUNTER — RADIANT APPOINTMENT (OUTPATIENT)
Dept: GENERAL RADIOLOGY | Facility: CLINIC | Age: 52
End: 2017-07-21
Attending: PODIATRIST
Payer: COMMERCIAL

## 2017-07-21 VITALS
SYSTOLIC BLOOD PRESSURE: 114 MMHG | BODY MASS INDEX: 35.65 KG/M2 | DIASTOLIC BLOOD PRESSURE: 70 MMHG | WEIGHT: 214 LBS | HEIGHT: 65 IN

## 2017-07-21 DIAGNOSIS — Z09 SURGERY FOLLOW-UP EXAMINATION: Primary | ICD-10-CM

## 2017-07-21 PROCEDURE — 99024 POSTOP FOLLOW-UP VISIT: CPT | Performed by: PODIATRIST

## 2017-07-21 PROCEDURE — 73630 X-RAY EXAM OF FOOT: CPT | Mod: RT

## 2017-07-21 NOTE — MR AVS SNAPSHOT
"              After Visit Summary   7/21/2017    Jayne Tyler    MRN: [id number]           Patient Information     Date Of Birth          1965        Visit Information        Provider Department      7/21/2017 9:00 AM Olvin Betancur DPM [facility]        Today's Diagnoses     Surgery follow-up examination    -  1       Follow-ups after your visit        Your next 10 appointments already scheduled     Jul 27, 2017  1:50 PM CDT   ANDERSON Extremity with Jon Schoenecker, PT   [facility] ([facility]  )    [address]  [address]  [facility] [address]   [phone]            Aug 01, 2017  2:30 PM CDT   ANDERSON Extremity with Paul Breyen, PT   [facility] ([facility]  )    [address] [address] [address]   [phone]              Who to contact     If you have questions or need follow up information about today's clinic visit or your schedule please contact [facility] directly at [phone].  Normal or non-critical lab and imaging results will be communicated to you by [employer] Pack[employer], letter or phone within 4 business days after the clinic has received the results. If you do not hear from us within 7 days, please contact the clinic through Genius Packhart or phone. If you have a critical or abnormal lab result, we will notify you by phone as soon as possible.  Submit refill requests through Tunesat or call your pharmacy and they will forward the refill request to us. Please allow 3 business days for your refill to be completed.          Additional Information About Your Visit        Genius Packhart Information     Tunesat lets you send messages to your doctor, view your test results, renew your prescriptions, schedule appointments and more. To sign up, go to [email or web address] . Click on \"Log in\" on the left side of the screen, which will take you to the Welcome page. Then click on \"Sign up Now\" on the right side of the " "page.     You will be asked to enter the access code listed below, as well as some personal information. Please follow the directions to create your username and password.     Your access code is: [email or web address]  Expires: 2017  2:05 PM     Your access code will  in 90 days. If you need help or a new code, please call your [place] clinic or [phone].        Care EveryWhere ID     This is your Care EveryWhere ID. This could be used by other organizations to access your [place] medical records  [id number]        Your Vitals Were     Height Last Period BMI (Body Mass Index)             5' 5\" (1.651 m) (LMP Unknown) 35.61 kg/m2          Blood Pressure from Last 3 Encounters:   17 114/70   17 114/68   17 125/69    Weight from Last 3 Encounters:   17 214 lb (97.1 kg)   17 215 lb (97.5 kg)   17 215 lb 9.6 oz (97.8 kg)              We Performed the Following     XR Foot Right G/E 3 Views        Primary Care Provider    None Doctor, MD       No address on file        Equal Access to Services     [facility]: Hadii aad ku hadasho Solucianoali, waaxda luqadaha, qaybta kaalmada adeegyada, shannon mirandain hayilan arash hamilton . So [facility] [phone].    ATENCIÓN: Si habla español, tiene a cabrera disposición servicios gratuitos de asistencia lingüística. Llame al [phone].    We comply with applicable federal civil rights laws and [place] laws. We do not discriminate on the basis of race, color, national origin, age, disability sex, sexual orientation or gender identity.            Thank you!     Thank you for choosing [facility]  for your care. Our goal is always to provide you with excellent care. Hearing back from our patients is one way we can continue to improve our services. Please take a few minutes to complete the written survey that you may receive in the mail after your visit with us. Thank you!             Your Updated Medication List - " Protect others around you: Learn how to safely use, store and throw away your medicines at www.disposemymeds.org.          This list is accurate as of: 7/21/17  9:43 AM.  Always use your most recent med list.                   Brand Name Dispense Instructions for use Diagnosis    acetaminophen 500 MG tablet    TYLENOL     Take 500 mg by mouth every 6 hours as needed Reported on 5/19/2017        enoxaparin 40 MG/0.4ML injection    LOVENOX    14 Syringe    Inject 0.4 mLs (40 mg) Subcutaneous daily    Deep vein thrombosis (DVT) prophylaxis prescribed at discharge       hydrOXYzine 25 MG tablet    ATARAX    60 tablet    Take 1-2 tablets (25-50 mg) by mouth every 6 hours as needed for itching    Itching due to drug, Acute post-operative pain       oxyCODONE 5 MG IR tablet    ROXICODONE    40 tablet    maximum 12 tablet(s) per day  Take 1-2 tablets by mouth every 4-6 hours as needed for post operative pain.    Acute post-operative pain       ranitidine 150 MG tablet    ZANTAC     Take 300 mg by mouth 2 times daily

## 2017-07-21 NOTE — NURSING NOTE
"Chief Complaint   Patient presents with     Surgical Followup     DOS 6/6/17 right foot       Initial /70 (BP Location: Right arm, Cuff Size: Adult Regular)  Ht 5' 5\" (1.651 m)  Wt 214 lb (97.1 kg)  LMP  (LMP Unknown)  BMI 35.61 kg/m2 Estimated body mass index is 35.61 kg/(m^2) as calculated from the following:    Height as of this encounter: 5' 5\" (1.651 m).    Weight as of this encounter: 214 lb (97.1 kg).  Medication Reconciliation: complete   Jaylin Short MA      "

## 2017-07-21 NOTE — Clinical Note
7/21/2017         RE: Jayne Tyler  [address] [facility] [address]        Dear Colleague,    Thank you for referring your patient, Jayne Tyler, to the [facility]. Please see a copy of my visit note below.    S: Pt presents 6 weeks  post op.      PROCEDURES:   1.  Weil osteotomy, right second metatarsal.   2.  Synovectomy, right second metatarsophalangeal joint  3.  Right second toe proximal interphalangeal joint arthroplasty.   4.  Right second toe distal interphalangeal joint flexor tenotomy.     Doing well.  Occasional pain.  Wearing regular shoes.     O:   Vascular:  Pedal pulses palpable. Interval reduction in right forefoot edema.    Neuro: light touch sensation intact distal to incision.    Derm: Incision is well coapted.  Sutures intact.  No diane-incisional erythema.     Musculoskeletal: Satisfactory post op surgical correction of the right 2nd toe contrature.     X-Ray Findings: films taken in the post operative setting show stable post operative findings.  2nd metatarsal is more in line with the 3rd (was shortened).  2nd toe is more rectus.      ASSESSMENT:  Encounter Diagnosis   Name Primary?     Surgery follow-up examination Yes       PLAN:  Supportive, athletic-type shoes  Gradual return to weight bearing activities  No high impact activity for another 6 weeks  Continue 2nd metatarsophalangeal joint ROM exercises    Follow up in 1 month: MyChart message okay if going well.     Olvin eBtancur DPM, FACFAS, MS    [facility]            Again, thank you for allowing me to participate in the care of your patient.        Sincerely,        Olvin Betancur DPM

## 2017-07-21 NOTE — PROGRESS NOTES
S: Pt presents 6 weeks  post op.      PROCEDURES:   1.  Weil osteotomy, right second metatarsal.   2.  Synovectomy, right second metatarsophalangeal joint  3.  Right second toe proximal interphalangeal joint arthroplasty.   4.  Right second toe distal interphalangeal joint flexor tenotomy.     Doing well.  Occasional pain.  Wearing regular shoes.     O:   Vascular:  Pedal pulses palpable. Interval reduction in right forefoot edema.    Neuro: light touch sensation intact distal to incision.    Derm: Incision is well coapted.  Sutures intact.  No diane-incisional erythema.     Musculoskeletal: Satisfactory post op surgical correction of the right 2nd toe contrature.     X-Ray Findings: films taken in the post operative setting show stable post operative findings.  2nd metatarsal is more in line with the 3rd (was shortened).  2nd toe is more rectus.      ASSESSMENT:  Encounter Diagnosis   Name Primary?     Surgery follow-up examination Yes       PLAN:  Supportive, athletic-type shoes  Gradual return to weight bearing activities  No high impact activity for another 6 weeks  Continue 2nd metatarsophalangeal joint ROM exercises    Follow up in 1 month: MyChart message okay if going well.     Olvin Betancur DPM, FACFAS, MS    [facility]

## 2017-09-27 NOTE — PATIENT INSTRUCTIONS
*  Doubt infection at this time.     *  I will speak with Dr. Muir about the pain today and that I gave you the MRI results.  I will ask him to give further directions.      *  Hydrocodone (narcotic pain medication) 1-2 tablet four times per day as needed for severe pain only.   Beware of drowsiness, nausea, vomiting, when taking this medication.  Do not drive, or operate dangerous equipment after taking this.      Pt arrived to [place] [facility] for Complete Myelogram.

## 2018-02-02 PROBLEM — Z47.89 AFTERCARE FOLLOWING SURGERY OF THE MUSCULOSKELETAL SYSTEM: Status: RESOLVED | Noted: 2017-05-22 | Resolved: 2018-02-02

## 2018-02-02 PROBLEM — M25.519 SHOULDER PAIN: Status: RESOLVED | Noted: 2017-05-22 | Resolved: 2018-02-02

## 2018-02-02 NOTE — PROGRESS NOTES
DISCHARGE REPORT    Updated as of February 2, 2018  Progress reporting period is from apr 24, 2017 to Jul 14, 2017.       SUBJECTIVE  Subjective changes noted by patient:  Unknown. Patient has failed to return to clinic.    Current pain level is unknown. Patient has failed to return to clinic.  .     Initial Pain level: 8/10.   Changes in function:  Unknown. Patient has failed to return to clinic.  Adverse reaction to treatment or activity: Unknown. Patient has failed to return to clinic.    OBJECTIVE  Changes noted in objective findings:  Patient has failed to return to therapy so current objective findings are unknown.    ASSESSMENT/PLAN  Updated problem list and treatment plan: Diagnosis 1:  Left Shoulder Pain S/P RCR  STG/LTGs have been met or progress has been made towards goals:  Unknown. Patient has failed to return to clinic.  Assessment of Progress: The patient has not returned to therapy. Current status is unknown.  Self Management Plans:  Patient has been instructed in a home treatment program.  Leslie continues to require the following intervention to meet STG and LTG's:  Unknown. Patient has failed to return to clinic.    Recommendations:  Unable to make an accurate recommendation at this time. Patient has failed to return to clinic.    Please refer to the daily flowsheet for treatment today, total treatment time and time spent performing 1:1 timed codes.

## 2019-02-25 ENCOUNTER — OFFICE VISIT (OUTPATIENT)
Dept: INTERNAL MEDICINE | Facility: CLINIC | Age: 54
End: 2019-02-25
Payer: COMMERCIAL

## 2019-02-25 VITALS
RESPIRATION RATE: 16 BRPM | WEIGHT: 224.5 LBS | TEMPERATURE: 99.1 F | HEART RATE: 89 BPM | HEIGHT: 64 IN | SYSTOLIC BLOOD PRESSURE: 108 MMHG | BODY MASS INDEX: 38.33 KG/M2 | OXYGEN SATURATION: 97 % | DIASTOLIC BLOOD PRESSURE: 68 MMHG

## 2019-02-25 DIAGNOSIS — E66.01 MORBID OBESITY (H): ICD-10-CM

## 2019-02-25 DIAGNOSIS — J01.90 ACUTE SINUSITIS WITH SYMPTOMS > 10 DAYS: Primary | ICD-10-CM

## 2019-02-25 PROCEDURE — 99213 OFFICE O/P EST LOW 20 MIN: CPT | Performed by: PHYSICIAN ASSISTANT

## 2019-02-25 RX ORDER — DOXYCYCLINE 100 MG/1
100 CAPSULE ORAL 2 TIMES DAILY
Qty: 20 CAPSULE | Refills: 0 | Status: SHIPPED | OUTPATIENT
Start: 2019-02-25 | End: 2019-05-10

## 2019-02-25 SDOH — HEALTH STABILITY: MENTAL HEALTH: HOW MANY STANDARD DRINKS CONTAINING ALCOHOL DO YOU HAVE ON A TYPICAL DAY?: 1 OR 2

## 2019-02-25 SDOH — HEALTH STABILITY: MENTAL HEALTH: HOW OFTEN DO YOU HAVE A DRINK CONTAINING ALCOHOL?: MONTHLY OR LESS

## 2019-02-25 ASSESSMENT — MIFFLIN-ST. JEOR: SCORE: 1617.3

## 2019-02-25 NOTE — PROGRESS NOTES
"  SUBJECTIVE:   Jayne Tyler is a 53 year old female who presents to clinic today for the following health issues:      RESPIRATORY SYMPTOMS      Duration: 7-10 days    Description  nasal congestion, rhinorrhea, sore throat, facial pain/pressure, ear pain left - itchy x 1 week then got blood yesterday when cleaning left ear, headache and fatigue/malaise    Severity: moderate    Accompanying signs and symptoms: None    History (predisposing factors):  tobacco abuse    Precipitating or alleviating factors: None    Therapies tried and outcome:  Tylenol, Excedrin, netti pot      -------------------------------------    Problem list and histories reviewed & adjusted, as indicated.  Additional history: as documented    Labs reviewed in EPIC    Reviewed and updated as needed this visit by clinical staff  Tobacco  Allergies  Meds  Soc Hx      Reviewed and updated as needed this visit by Provider  Allergies  Meds         ROS:  Constitutional, HEENT, cardiovascular, pulmonary, gi and gu systems are negative, except as otherwise noted.    OBJECTIVE:     /68   Pulse 89   Temp 99.1  F (37.3  C) (Oral)   Resp 16   Ht 1.632 m (5' 4.25\")   Wt 101.8 kg (224 lb 8 oz)   LMP  (LMP Unknown)   SpO2 97%   Breastfeeding? No   BMI 38.24 kg/m    Body mass index is 38.24 kg/m .  GENERAL: healthy, alert and no distress  HENT: normal cephalic/atraumatic, ear canals and TM's normal, nose and mouth without ulcers or lesions, nasal mucosa edematous , rhinorrhea yellow, oropharynx clear, oral mucous membranes moist and sinuses: maxillary tenderness on bilateral  NECK: no adenopathy, no asymmetry, masses, or scars and thyroid normal to palpation  RESP: lungs clear to auscultation - no rales, rhonchi or wheezes  CV: regular rates and rhythm and normal S1 S2, no S3 or S4  MS: no gross musculoskeletal defects noted, no edema  SKIN: no suspicious lesions or rashes    Diagnostic Test Results:  none     ASSESSMENT/PLAN: "             1. Acute sinusitis with symptoms > 10 days    - doxycycline hyclate (VIBRAMYCIN) 100 MG capsule; Take 1 capsule (100 mg) by mouth 2 times daily for 10 days  Dispense: 20 capsule; Refill: 0    2. Morbid obesity (H)  Updated problem list   Reviewed to discuss with PCP       Recommend to schedule PE with Dr Aquino and catch up on health maintenance. PAP Mammo and colon cancer screening.        Patient Instructions   Continue with sinus rinses.          Nguyen Pearl PA-C  [facility]

## 2019-05-10 ENCOUNTER — ANCILLARY PROCEDURE (OUTPATIENT)
Dept: MAMMOGRAPHY | Facility: CLINIC | Age: 54
End: 2019-05-10
Attending: INTERNAL MEDICINE
Payer: COMMERCIAL

## 2019-05-10 ENCOUNTER — OFFICE VISIT (OUTPATIENT)
Dept: INTERNAL MEDICINE | Facility: CLINIC | Age: 54
End: 2019-05-10
Payer: COMMERCIAL

## 2019-05-10 VITALS
WEIGHT: 227.6 LBS | SYSTOLIC BLOOD PRESSURE: 122 MMHG | OXYGEN SATURATION: 96 % | RESPIRATION RATE: 16 BRPM | HEART RATE: 100 BPM | BODY MASS INDEX: 38.76 KG/M2 | DIASTOLIC BLOOD PRESSURE: 86 MMHG

## 2019-05-10 DIAGNOSIS — G89.29 CHRONIC LEFT SHOULDER PAIN: ICD-10-CM

## 2019-05-10 DIAGNOSIS — Z13.220 SCREENING FOR CHOLESTEROL LEVEL: ICD-10-CM

## 2019-05-10 DIAGNOSIS — Z98.890 HX OF REPAIR OF LEFT ROTATOR CUFF: ICD-10-CM

## 2019-05-10 DIAGNOSIS — Z12.31 ENCOUNTER FOR SCREENING MAMMOGRAM FOR BREAST CANCER: ICD-10-CM

## 2019-05-10 DIAGNOSIS — Z13.21 ENCOUNTER FOR VITAMIN DEFICIENCY SCREENING: ICD-10-CM

## 2019-05-10 DIAGNOSIS — Z00.01 ENCOUNTER FOR ROUTINE ADULT MEDICAL EXAM WITH ABNORMAL FINDINGS: Primary | ICD-10-CM

## 2019-05-10 DIAGNOSIS — M25.512 CHRONIC LEFT SHOULDER PAIN: ICD-10-CM

## 2019-05-10 DIAGNOSIS — Z12.11 SPECIAL SCREENING FOR MALIGNANT NEOPLASMS, COLON: ICD-10-CM

## 2019-05-10 DIAGNOSIS — Z11.4 SCREENING FOR HUMAN IMMUNODEFICIENCY VIRUS WITHOUT PRESENCE OF RISK FACTORS: ICD-10-CM

## 2019-05-10 DIAGNOSIS — Z23 NEED FOR VACCINATION: ICD-10-CM

## 2019-05-10 DIAGNOSIS — Z13.1 SCREENING FOR DIABETES MELLITUS: ICD-10-CM

## 2019-05-10 DIAGNOSIS — Z13.0 SCREENING FOR BLOOD DISEASE: ICD-10-CM

## 2019-05-10 DIAGNOSIS — Z12.4 SCREENING FOR CERVICAL CANCER: ICD-10-CM

## 2019-05-10 DIAGNOSIS — Z12.31 VISIT FOR SCREENING MAMMOGRAM: ICD-10-CM

## 2019-05-10 LAB
ALBUMIN SERPL-MCNC: 3.6 G/DL (ref 3.4–5)
ALP SERPL-CCNC: 75 U/L (ref 40–150)
ALT SERPL W P-5'-P-CCNC: 25 U/L (ref 0–50)
ANION GAP SERPL CALCULATED.3IONS-SCNC: 4 MMOL/L (ref 3–14)
AST SERPL W P-5'-P-CCNC: 19 U/L (ref 0–45)
BILIRUB SERPL-MCNC: 0.6 MG/DL (ref 0.2–1.3)
BUN SERPL-MCNC: 17 MG/DL (ref 7–30)
CALCIUM SERPL-MCNC: 8.9 MG/DL (ref 8.5–10.1)
CHLORIDE SERPL-SCNC: 111 MMOL/L (ref 94–109)
CHOLEST SERPL-MCNC: 231 MG/DL
CO2 SERPL-SCNC: 26 MMOL/L (ref 20–32)
CREAT SERPL-MCNC: 0.98 MG/DL (ref 0.52–1.04)
ERYTHROCYTE [DISTWIDTH] IN BLOOD BY AUTOMATED COUNT: 14.2 % (ref 10–15)
GFR SERPL CREATININE-BSD FRML MDRD: 66 ML/MIN/{1.73_M2}
GLUCOSE SERPL-MCNC: 96 MG/DL (ref 70–99)
HCT VFR BLD AUTO: 50.2 % (ref 35–47)
HDLC SERPL-MCNC: 61 MG/DL
HGB BLD-MCNC: 16.7 G/DL (ref 11.7–15.7)
LDLC SERPL CALC-MCNC: 157 MG/DL
MCH RBC QN AUTO: 31.6 PG (ref 26.5–33)
MCHC RBC AUTO-ENTMCNC: 33.3 G/DL (ref 31.5–36.5)
MCV RBC AUTO: 95 FL (ref 78–100)
NONHDLC SERPL-MCNC: 170 MG/DL
PLATELET # BLD AUTO: 220 10E9/L (ref 150–450)
POTASSIUM SERPL-SCNC: 4.3 MMOL/L (ref 3.4–5.3)
PROT SERPL-MCNC: 7.1 G/DL (ref 6.8–8.8)
RBC # BLD AUTO: 5.28 10E12/L (ref 3.8–5.2)
SODIUM SERPL-SCNC: 141 MMOL/L (ref 133–144)
TRIGL SERPL-MCNC: 67 MG/DL
WBC # BLD AUTO: 7.6 10E9/L (ref 4–11)

## 2019-05-10 PROCEDURE — 82306 VITAMIN D 25 HYDROXY: CPT | Performed by: INTERNAL MEDICINE

## 2019-05-10 PROCEDURE — 99213 OFFICE O/P EST LOW 20 MIN: CPT | Mod: 25 | Performed by: INTERNAL MEDICINE

## 2019-05-10 PROCEDURE — 77067 SCR MAMMO BI INCL CAD: CPT | Mod: TC

## 2019-05-10 PROCEDURE — 85027 COMPLETE CBC AUTOMATED: CPT | Performed by: INTERNAL MEDICINE

## 2019-05-10 PROCEDURE — 90750 HZV VACC RECOMBINANT IM: CPT | Performed by: INTERNAL MEDICINE

## 2019-05-10 PROCEDURE — 87389 HIV-1 AG W/HIV-1&-2 AB AG IA: CPT | Performed by: INTERNAL MEDICINE

## 2019-05-10 PROCEDURE — 99396 PREV VISIT EST AGE 40-64: CPT | Mod: 25 | Performed by: INTERNAL MEDICINE

## 2019-05-10 PROCEDURE — 87624 HPV HI-RISK TYP POOLED RSLT: CPT | Performed by: INTERNAL MEDICINE

## 2019-05-10 PROCEDURE — 80053 COMPREHEN METABOLIC PANEL: CPT | Performed by: INTERNAL MEDICINE

## 2019-05-10 PROCEDURE — G0145 SCR C/V CYTO,THINLAYER,RESCR: HCPCS | Performed by: INTERNAL MEDICINE

## 2019-05-10 PROCEDURE — 90471 IMMUNIZATION ADMIN: CPT | Performed by: INTERNAL MEDICINE

## 2019-05-10 PROCEDURE — 80061 LIPID PANEL: CPT | Performed by: INTERNAL MEDICINE

## 2019-05-10 PROCEDURE — 36415 COLL VENOUS BLD VENIPUNCTURE: CPT | Performed by: INTERNAL MEDICINE

## 2019-05-10 ASSESSMENT — PAIN SCALES - GENERAL: PAINLEVEL: MILD PAIN (2)

## 2019-05-10 NOTE — PROGRESS NOTES
SUBJECTIVE                                                      HPI: Jayne Tyelr is a pleasant 53 year old female who presents for a physical.    Complains of left shoulder pain:  - history of left rotator cuff tear s/p repair in 2017  - reports that pain present before surgery never really improved   - due to insurance issues and work she has lived with the pain without seeking additional intervention  - pain has worsened over the last month and again over the last several days  - no recent injuries, trauma, or unusual exertion  - pain occurs when getting dressed and washing or brushing her hair    ROS:  Constitutional: denies unintentional weight loss or gain; denies fevers, chills, or sweats     Cardiovascular: denies chest pain, palpitations, or edema  Respiratory: denies cough, wheezing, shortness of breath, or dyspnea on exertion  Gastrointestinal: denies nausea, vomiting, constipation, diarrhea, or abdominal pain  Genitourinary: denies urinary frequency, urgency, dysuria, or hematuria  Integumentary: denies rash or pruritus  Musculoskeletal: see above  Neurologic: denies focal weakness, numbness, or tingling  Hematologic/Immunologic: denies history of anemia or blood transfusions  Endocrine: denies heat or cold intolerance; denies polyuria, polydipsia  Psychiatric: denies anxiety; see preventative health below    Past Medical History:   Diagnosis Date     Obesity (BMI 30-39.9)      Past Surgical History:   Procedure Laterality Date     ARTHROSCOPY SHOULDER Right 2015     ARTHROSCOPY SHOULDER, OPEN ROTATOR CUFF REPAIR, COMBINED Left 03/29/2017    Procedure:  Left shoulder arthroscopy and arthroscopic subacromial decompression (acromioplasty, bursectomy and coracoacromial ligament resection). Arthroscopic distal clavicle resection. Mini-open rotator cuff tear repair. Surgeon:  Kevin George MD  Location: [facility]     BUNCorningCTOMY Right 2008     DISKECTOMY, LUMBAR, SINGLE SP  1996    L5-S1      DISKECTOMY, LUMBAR, SINGLE SP  1997    L5-S1     FUSION LUMBAR ANTERIOR ONE LEVEL  1999    L5-S1     OSTEOTOMY FOOT Right 6/6/2017    Procedure: OSTEOTOMY FOOT;  1)  WEIL OSTEOTOMY RIGHT SECOND METATARSAL, 2) PLANTAR CAPSULE DEBRIDEMENT/SYNOVECTOMY, RIGHT SECOND METATARSAL PHALANGEAL JOINT, 3) HAMMERTOE REPAIR RIGHT SECOND TOE;  Surgeon: Olvin Betancur DPM;  Location: [facility]     RECONSTRUCT FOREFOOT WITH METATARSOPHALANGEAL (MTP) FUSION Right 6/6/2017    Procedure: RECONSTRUCT FOREFOOT WITH METATARSOPHALANGEAL (MTP) FUSION;;  Surgeon: Olvin Betancur DPM;  [facility]     REPAIR HAMMER TOE Right 6/6/2017    Procedure: REPAIR HAMMER TOE;;  Surgeon: Olvin Betancur DPM;  Location: [facility]     Family History   Problem Relation Age of Onset     Rheumatoid Arthritis Mother      Coronary Artery Disease Mother         s/p MI and PCIs in 50s     Rheumatoid Arthritis Sister      Myocardial Infarction Maternal Grandmother         in her 60s     Myocardial Infarction Maternal Grandfather         later in life     Diabetes No family hx of      Cerebrovascular Disease No family hx of      Colon Cancer No family hx of      Breast Cancer No family hx of      Ovarian Cancer No family hx of      Social History     Occupational History     Occupation: [employer]   Tobacco Use     Smoking status: Current Every Day Smoker     Packs/day: 1.00     Years: 25.00     Pack years: 25.00     Types: Cigarettes     Smokeless tobacco: Never Used     Tobacco comment: 3-4 cigs/day   Substance and Sexual Activity     Alcohol use: Yes     Frequency: Monthly or less     Drinks per session: 1 or 2     Drug use: No     Sexual activity: Yes     Partners: Male   Lifestyle     Physical activity:     Days per week: Not on file     Minutes per session: Not on file     Stress: Not on file   Social History Narrative    . In a long-term relationship.    3 adult kids.    No grandchildren.    Walks, but no formal exercise.      Allergies   Allergen Reactions     Penicillins Hives     Immunization History   Administered Date(s) Administered     Influenza Vaccine IM 3yrs+ 4 Valent IIV4 10/10/2018     TDAP Vaccine (Adacel) 12/01/2014     OBJECTIVE                                                      /86   Pulse 100   Resp 16   Wt 103.2 kg (227 lb 9.6 oz)   LMP  (LMP Unknown)   SpO2 96%   BMI 38.76 kg/m    Constitutional: well-appearing  Eyes: normal conjunctivae and lids; pupils equal, round, and reactive to light  Ears, Nose, Mouth, and Throat: normal ears and nose; tympanic membranes visualized and normal; normal lips, teeth, and gums; no oropharyngeal lesions or ulcers  Neck: supple and symmetric; no lymphadenopathy; no thyromegaly or masses  Respiratory: normal respiratory effort; clear to auscultation bilaterally  Cardiovascular: regular rate and rhythm; pedal pulses palpable; no edema  Breasts: normal appearance; no masses or skin retraction; no nipple discharge or bleeding; no axillary lymphadenopathy  Gastrointestinal: soft, non-tender, non-distended, and bowel sounds present; no organomegaly or masses  Genitourinary: external genitalia, urethral meatus, and vagina normal; cervix visualized and normal in appearance  Musculoskeletal: normal gait and station; left shoulder with decreased ROM due to pain  Psych: normal judgment and insight; normal mood and affect; recent and remote memory intact; oriented to time, place, and person    PREVENTATIVE HEALTH                                                      Blood pressure: borderline  Breast CA screening: DUE  Cervical CA screening: DUE  Colon CA screening: DUE  Lung CA screening: not medically indicated at this time   Dexa: not medically indicated at this time   Screening HCV: n/a   Screening HIV: DUE  Screening cholesterol: DUE  Screening diabetes: DUE  STD testing: no risk factors present  Depression screening: PHQ-2 assessment completed and reviewed - no intervention  indicated at this time  Alcohol misuse screening: alcohol use reviewed - no intervention indicated at this time  Immunizations: reviewed; Shingrix series DUE    ASSESSMENT/PLAN                                                       (Z00.01) Encounter for routine adult medical exam with abnormal findings  (primary encounter diagnosis)  Comment: PMH, PSH, FH, SH, medications, allergies, immunizations, and preventative health measures reviewed.   Plan: see below for plans.     (Z23) Need for vaccination  Plan: Shingrix #1 today; #2 in 2-6 months.     (Z12.31) Encounter for screening mammogram for breast cancer  Plan: mammogram ordered - patient to schedule     (Z12.11) Special screening for malignant neoplasms, colon  Plan: screening colonoscopy ordered - patient will be contacted to schedule.     (Z12.4) Screening for cervical cancer  Plan: pap smear obtained today.     (Z13.220) Screening for cholesterol level  (Z13.1) Screening for diabetes mellitus  (Z13.0) Screening for blood disease  (Z13.21) Encounter for vitamin deficiency screening  (Z11.4) Screening for human immunodeficiency virus without presence of risk factors  Plan: fasting labs today.     (M25.512,  G89.29) Chronic left shoulder pain  (Z98.890) Hx of repair of left rotator cuff  Plan: patient referred back to Dr. George for further evaluation; may need repeat imaging and/or may benefit from an injection; patient to schedule.     The instructions on the AVS were discussed and explained to the patient. Patient expressed understanding of instructions.    (Chart documentation was completed, in part, with [employer] voice-recognition software. Even though reviewed, some grammatical, spelling, and word errors may remain.)    Loli Aquino MD   [address] [address]  T: [phone], F: [phone]

## 2019-05-10 NOTE — PATIENT INSTRUCTIONS
Shingrix #1 today; #2 in 2-6 months.    ---    Fasting labs and mammogram today.    ---    You will be contacted to schedule your screening colonoscopy.    ---    Pap smear results take ~1 week to come back.    ---    Please schedule follow-up with Dr. George - number below/next page.

## 2019-05-13 ENCOUNTER — TELEPHONE (OUTPATIENT)
Dept: ORTHOPEDICS | Facility: CLINIC | Age: 54
End: 2019-05-13

## 2019-05-14 DIAGNOSIS — E55.9 VITAMIN D DEFICIENCY: Primary | ICD-10-CM

## 2019-05-14 LAB
COPATH REPORT: NORMAL
DEPRECATED CALCIDIOL+CALCIFEROL SERPL-MC: 15 UG/L (ref 20–75)
HIV 1+2 AB+HIV1 P24 AG SERPL QL IA: NONREACTIVE
PAP: NORMAL

## 2019-05-14 RX ORDER — MULTIVIT-MIN/IRON/FOLIC ACID/K 18-600-40
2000 CAPSULE ORAL DAILY
Qty: 90 CAPSULE | Refills: 3 | Status: SHIPPED | OUTPATIENT
Start: 2019-05-14 | End: 2024-01-29

## 2019-05-14 RX ORDER — ERGOCALCIFEROL 1.25 MG/1
50000 CAPSULE, LIQUID FILLED ORAL WEEKLY
Qty: 12 CAPSULE | Refills: 1 | Status: SHIPPED | OUTPATIENT
Start: 2019-05-14 | End: 2022-04-14

## 2019-05-16 LAB
FINAL DIAGNOSIS: NORMAL
HPV HR 12 DNA CVX QL NAA+PROBE: NEGATIVE
HPV16 DNA SPEC QL NAA+PROBE: NEGATIVE
HPV18 DNA SPEC QL NAA+PROBE: NEGATIVE
SPECIMEN DESCRIPTION: NORMAL
SPECIMEN SOURCE CVX/VAG CYTO: NORMAL

## 2019-06-03 ENCOUNTER — OFFICE VISIT (OUTPATIENT)
Dept: ORTHOPEDICS | Facility: CLINIC | Age: 54
End: 2019-06-03
Payer: COMMERCIAL

## 2019-06-03 VITALS — SYSTOLIC BLOOD PRESSURE: 120 MMHG | DIASTOLIC BLOOD PRESSURE: 80 MMHG

## 2019-06-03 DIAGNOSIS — Z98.890 S/P ROTATOR CUFF SURGERY: ICD-10-CM

## 2019-06-03 DIAGNOSIS — M25.512 CHRONIC PAIN IN LEFT SHOULDER: Primary | ICD-10-CM

## 2019-06-03 DIAGNOSIS — G89.29 CHRONIC PAIN IN LEFT SHOULDER: Primary | ICD-10-CM

## 2019-06-03 PROCEDURE — 99214 OFFICE O/P EST MOD 30 MIN: CPT | Performed by: PHYSICIAN ASSISTANT
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
## 2019-06-03 NOTE — PATIENT INSTRUCTIONS
An MRI has been ordered of the shoulder.    Please schedule an appointment 2-3 business days after to follow with results.

## 2019-06-06 ENCOUNTER — MYC MEDICAL ADVICE (OUTPATIENT)
Dept: ORTHOPEDICS | Facility: CLINIC | Age: 54
End: 2019-06-06

## 2019-06-10 ENCOUNTER — HOSPITAL ENCOUNTER (OUTPATIENT)
Dept: MRI IMAGING | Facility: CLINIC | Age: 54
Discharge: HOME | End: 2019-06-10
Attending: PHYSICIAN ASSISTANT | Admitting: PHYSICIAN ASSISTANT
Payer: COMMERCIAL

## 2019-06-10 DIAGNOSIS — Z98.890 S/P ROTATOR CUFF SURGERY: ICD-10-CM

## 2019-06-10 DIAGNOSIS — M25.512 CHRONIC PAIN IN LEFT SHOULDER: ICD-10-CM

## 2019-06-10 DIAGNOSIS — G89.29 CHRONIC PAIN IN LEFT SHOULDER: ICD-10-CM

## 2019-06-10 PROCEDURE — 73221 MRI JOINT UPR EXTREM W/O DYE: CPT | Mod: LT

## 2019-06-12 ENCOUNTER — OFFICE VISIT (OUTPATIENT)
Dept: ORTHOPEDICS | Facility: CLINIC | Age: 54
End: 2019-06-12
Payer: COMMERCIAL

## 2019-06-12 DIAGNOSIS — M75.52 SUBACROMIAL BURSITIS OF LEFT SHOULDER JOINT: Primary | ICD-10-CM

## 2019-06-12 PROCEDURE — 20610 DRAIN/INJ JOINT/BURSA W/O US: CPT | Mod: LT | Performed by: PHYSICIAN ASSISTANT

## 2019-06-12 PROCEDURE — 99213 OFFICE O/P EST LOW 20 MIN: CPT | Mod: 25 | Performed by: PHYSICIAN ASSISTANT

## 2019-06-12 RX ORDER — LIDOCAINE HYDROCHLORIDE 10 MG/ML
4 INJECTION, SOLUTION INFILTRATION; PERINEURAL
Status: DISCONTINUED | OUTPATIENT
Start: 2019-06-12 | End: 2021-05-11

## 2019-06-12 RX ORDER — TESTOSTERONE CYPIONATE 200 MG/ML
2 INJECTION INTRAMUSCULAR
Status: DISCONTINUED | OUTPATIENT
Start: 2019-06-12 | End: 2021-05-11

## 2019-06-12 RX ADMIN — LIDOCAINE HYDROCHLORIDE 4 ML: 10 INJECTION, SOLUTION INFILTRATION; PERINEURAL at 15:39

## 2019-06-12 RX ADMIN — TESTOSTERONE CYPIONATE 2 ML: 200 INJECTION INTRAMUSCULAR at 15:39

## 2019-06-12 NOTE — PROGRESS NOTES
HISTORY OF PRESENT ILLNESS:    Jayne Tyler is a 53 year old female who is seen in follow up for left shoulder MRI results.  Present symptoms: patient reports no new symptoms in the left shoulder region since last office visit. She does note increased pain in the left biceps region, and difficulties sleeping at nighttime.   Current pain level: 2-3/10.    Treatments tried to this point: icing  Past Medical History: Unchanged from the visit of 6/3/19. Please refer to that note.    REVIEW OF SYSTEMS:  CONSTITUTIONAL:  NEGATIVE for fever, chills, change in weight  INTEGUMENTARY/SKIN:  NEGATIVE for worrisome rashes, moles or lesions  EYES:  NEGATIVE for vision changes or irritation  ENT/MOUTH:  NEGATIVE for ear, mouth and throat problems  RESP:  NEGATIVE for significant cough or SOB  BREAST:  NEGATIVE for masses, tenderness or discharge  CV:  NEGATIVE for chest pain, palpitations or peripheral edema  GI:  ulcers  :  Negative   MUSCULOSKELETAL:  See HPI above  NEURO:  NEGATIVE for weakness, dizziness or paresthesias  ENDOCRINE:  NEGATIVE for temperature intolerance, skin/hair changes  HEME/ALLERGY/IMMUNE:  NEGATIVE for bleeding problems  PSYCHIATRIC:  Depression, panic attacks    PHYSICAL EXAM:  LMP  (LMP Unknown)   There is no height or weight on file to calculate BMI.   GENERAL APPEARANCE: healthy, alert and no distress   SKIN: no suspicious lesions or rashes  NEURO: Normal strength and tone, mentation intact and speech normal  VASCULAR: UE with good pulses, and cappillary refill   LYMPH: no lymphadenopathy   PSYCH:  mentation appears normal and affect normal/bright    MSK:  Left shoulder:  Observation: Mild sulcus sign, no lesion, well-healed surgical scar.  Active range of motion decreased with pain in flexion and abduction, points to anterior shoulder.  Palpation: Pain over supra/infraspinatus.  Circulation, sensation, and strength grossly intact.    IMAGING INTERPRETATION:    MR SHOULDER LEFT WITHOUT CONTRAST  6/10/2019 8:41 AM     HISTORY: Shoulder pain, prior rotator cuff repair, re-tear suspected,  negative x-ray. Status post left shoulder DEC/DCR. Rotator cuff repair  2017 with chronic pain and weakness, no trauma history. Chronic pain  in left shoulder.  Status post rotator cuff surgery. Surgery in March 2017.    COMPARISON: 3/8/2017    TECHNIQUE:  Multiplanar, multisequence without contrast.    FINDINGS:  Osseous Acromion Outlet: Postsurgical changes including resection at  the AC joint and acromioplasty. No os acromiale.    Rotator Cuff: Supraspinatus tendon -  Postsurgical changes. No  recurrent tear or significant tendinosis identified.  Infraspinatus  tendon -  mild tendinosis.  Subscapularis tendon -  no tear or  significant tendinosis.  Intact teres minor tendon.  No asymmetric  muscle atrophy.     Labral Structures: No superior labral tear (SLAP lesion) identified.  No labral cyst identified. No anterior or posterior labral tear  identified.    Biceps Tendon: No tear, dislocation, or significant tendinosis.    Osseous and Cartilaginous Structures:  No bone contusion or fracture.  No glenohumeral osteoarthritis or apparent chondromalacia identified.    Joint Space: No significant overall joint effusion.    Additional Findings: No deltoid muscle edema. Medium amount of fluid  within the subacromial-subdeltoid bursa.   Impression:     IMPRESSION:  1. Postsurgical changes. No recurrent supraspinatus tendon tear  identified.  2. Mild infraspinatus tendinosis.  3. Subacromial bursal fluid.    RAMILA VERGARA MD        ASSESSMENT:  - Infraspinatus tendinitis, subacromial bursitis.  -On injection to subacromial bursa there was some tenting of the skin, indicating a possible deltoid defect.    PLAN:  Subacromial cortisone injection.  If not improving will refer for shoulder specialist surgical consult.     Large Joint Injection/Arthocentesis: L subacromial bursa  Date/Time: 6/12/2019 3:39 PM  Performed by: Miguel  Pieter DEL CID PA-C  Authorized by: Pieter Rivera PA-C     Indications:  Pain and osteoarthritis  Needle Size:  22 G  Guidance: landmark guided    Approach:  Posterior  Location:  Shoulder      Site:  L subacromial bursa  Medications:  2 mL dexamethasone 120 MG/30ML; 4 mL lidocaine 1 %  Medications comment:  8mL of 1% Lidocaine was injected.  Outcome:  Tolerated well, no immediate complications  Procedure discussed: discussed risks, benefits, and alternatives    Consent Given by:  Patient  Timeout: timeout called immediately prior to procedure    Prep: patient was prepped and draped in usual sterile fashion          Pieter Rivera PA-C  [facility] - Surgery

## 2019-06-13 NOTE — PATIENT INSTRUCTIONS
As we discussed, following cortisone injection there may be a short time of symptom relief due to the anesthetic medication and the injection, followed by a return to usual symptoms.  Please do not allow cortisone 2 weeks to take effect.  Return to clinic if onset of increased pain, redness or fever .    Follow-up in 6 weeks, or if worsening symptoms.

## 2019-06-17 ENCOUNTER — TRANSFERRED RECORDS (OUTPATIENT)
Dept: HEALTH INFORMATION MANAGEMENT | Facility: CLINIC | Age: 54
End: 2019-06-17

## 2019-09-29 ENCOUNTER — HEALTH MAINTENANCE LETTER (OUTPATIENT)
Age: 54
End: 2019-09-29

## 2020-01-07 ENCOUNTER — OFFICE VISIT (OUTPATIENT)
Dept: INTERNAL MEDICINE | Facility: CLINIC | Age: 55
End: 2020-01-07
Payer: COMMERCIAL

## 2020-01-07 VITALS
BODY MASS INDEX: 40.8 KG/M2 | DIASTOLIC BLOOD PRESSURE: 86 MMHG | WEIGHT: 239 LBS | RESPIRATION RATE: 14 BRPM | SYSTOLIC BLOOD PRESSURE: 124 MMHG | TEMPERATURE: 98 F | HEART RATE: 85 BPM | OXYGEN SATURATION: 98 % | HEIGHT: 64 IN

## 2020-01-07 DIAGNOSIS — L30.4 INTERTRIGO: ICD-10-CM

## 2020-01-07 DIAGNOSIS — L03.316 CELLULITIS, UMBILICAL: Primary | ICD-10-CM

## 2020-01-07 PROCEDURE — 99213 OFFICE O/P EST LOW 20 MIN: CPT | Performed by: PHYSICIAN ASSISTANT

## 2020-01-07 RX ORDER — SULFAMETHOXAZOLE/TRIMETHOPRIM 800-160 MG
1 TABLET ORAL 2 TIMES DAILY
Qty: 14 TABLET | Refills: 0 | Status: SHIPPED | OUTPATIENT
Start: 2020-01-07 | End: 2020-01-14

## 2020-01-07 RX ORDER — KETOCONAZOLE 20 MG/G
CREAM TOPICAL 2 TIMES DAILY
Qty: 15 G | Refills: 0 | Status: SHIPPED | OUTPATIENT
Start: 2020-01-07 | End: 2020-01-21

## 2020-01-07 ASSESSMENT — MIFFLIN-ST. JEOR: SCORE: 1678.07

## 2020-01-07 NOTE — PROGRESS NOTES
Subjective     Jayne Tyler is a 54 year old female who presents to clinic today for the following health issues:    HPI   Concern - Infected Naval  Onset: 5 days    Description:   Pain and redness in naval    Intensity: moderate    Progression of Symptoms:  worsening    Accompanying Signs & Symptoms:  Swelling, notices some weeping from the naval    Previous history of similar problem:   Yes    Precipitating factors:   Worsened by: not properly drying after bathing    Alleviating factors:  Improved by: regular cleaning    Therapies Tried and outcome: salt water solution- no change  -------------------------------------    BP Readings from Last 3 Encounters:   01/07/20 124/86   06/12/19 (P) 124/80   06/03/19 120/80    Wt Readings from Last 3 Encounters:   01/07/20 108.4 kg (239 lb)   05/10/19 103.2 kg (227 lb 9.6 oz)   02/25/19 101.8 kg (224 lb 8 oz)                    -------------------------------------  Reviewed and updated as needed this visit by Provider         Review of Systems   ROS COMP: Constitutional, HEENT, cardiovascular, pulmonary, gi and gu systems are negative, except as otherwise noted.      Objective    LMP  (LMP Unknown)   There is no height or weight on file to calculate BMI.  Physical Exam   GENERAL: healthy, alert and no distress  RESP: lungs clear to auscultation - no rales, rhonchi or wheezes  CV: regular rate and rhythm, normal S1 S2, no S3 or S4, no murmur, click or rub, no peripheral edema and peripheral pulses strong  ABDOMEN: soft, nontender, no hepatosplenomegaly, no masses and bowel sounds normal  SKIN: redness and swelling of the umbilicus noted.  Serous drainage noted.   Gently cleaned with a cotton tip swab      Diagnostic Test Results:  none         Assessment & Plan     1. Cellulitis, umbilical    - sulfamethoxazole-trimethoprim (BACTRIM DS/SEPTRA DS) 800-160 MG tablet; Take 1 tablet by mouth 2 times daily for 7 days  Dispense: 14 tablet; Refill: 0    2. Intertrigo    -  "ketoconazole (NIZORAL) 2 % external cream; Apply topically 2 times daily for 14 days  Dispense: 15 g; Refill: 0     Tobacco Cessation:   reports that she has been smoking cigarettes. She has a 25.00 pack-year smoking history. She has never used smokeless tobacco.  Tobacco Cessation Action Plan: per PCP      BMI:   Estimated body mass index is 40.71 kg/m  as calculated from the following:    Height as of this encounter: 1.632 m (5' 4.25\").    Weight as of this encounter: 108.4 kg (239 lb).   Weight management plan: Patient was referred to their PCP to discuss a diet and exercise plan.        Patient Instructions   Keep area clean and dry          Return in about 2 weeks (around 1/21/2020) for recheck if not improving, regular primary provider.    Nguyen Pearl PA-C  [facility]      "

## 2020-08-25 ENCOUNTER — MYC MEDICAL ADVICE (OUTPATIENT)
Dept: INTERNAL MEDICINE | Facility: CLINIC | Age: 55
End: 2020-08-25

## 2020-08-26 ENCOUNTER — OFFICE VISIT (OUTPATIENT)
Dept: INTERNAL MEDICINE | Facility: CLINIC | Age: 55
End: 2020-08-26
Payer: COMMERCIAL

## 2020-08-26 VITALS
SYSTOLIC BLOOD PRESSURE: 128 MMHG | OXYGEN SATURATION: 97 % | HEART RATE: 89 BPM | DIASTOLIC BLOOD PRESSURE: 82 MMHG | BODY MASS INDEX: 40.86 KG/M2 | TEMPERATURE: 98.4 F | WEIGHT: 239.9 LBS | RESPIRATION RATE: 15 BRPM

## 2020-08-26 DIAGNOSIS — J01.90 ACUTE BACTERIAL SINUSITIS: Primary | ICD-10-CM

## 2020-08-26 DIAGNOSIS — B96.89 ACUTE BACTERIAL SINUSITIS: Primary | ICD-10-CM

## 2020-08-26 PROCEDURE — 99213 OFFICE O/P EST LOW 20 MIN: CPT | Performed by: INTERNAL MEDICINE

## 2020-08-26 RX ORDER — LEVOFLOXACIN 750 MG/1
750 TABLET, FILM COATED ORAL DAILY
Qty: 7 TABLET | Refills: 0 | Status: SHIPPED | OUTPATIENT
Start: 2020-08-26 | End: 2020-10-03

## 2020-08-26 RX ORDER — FLUTICASONE PROPIONATE 50 MCG
2 SPRAY, SUSPENSION (ML) NASAL DAILY
Qty: 16 G | Refills: 0 | Status: SHIPPED | OUTPATIENT
Start: 2020-08-26 | End: 2020-11-09

## 2020-08-26 NOTE — PROGRESS NOTES
"  SUBJECTIVE:                                                      HPI: Jayne Tyler is a pleasant 55 year old female who presents with sinus symptoms:    Ongoing for the last 2 weeks. Symptoms include sinus congestion, pain, and pressure, runny nose, headache. No sore throat or coughing. No fevers or chills. No sneezing or itchy, watery, red eyes.    Has been using Mucinex, Neti pot rinses, and \"a lot of Excedrin\" to no avail.    Allergies significant for penicillin (hives).    The medication, allergy, and problem lists have been reviewed and updated as appropriate.     OBJECTIVE:                                                      /82   Pulse 89   Temp 98.4  F (36.9  C) (Oral)   Resp 15   Wt 108.8 kg (239 lb 14.4 oz)   LMP  (LMP Unknown)   SpO2 97%   BMI 40.86 kg/m    Constitutional: well-appearing  Head, Ears, Nose, and Throat: normocephalic, atraumatic; normal external auditory canal and pinna; tympanic membranes visualized and normal; nasal septum straight; nasal mucosa pink; no oropharyngeal lesions or ulcers; dentition normal; no tonsillar exudates  Neck: supple, symmetric, no thyromegaly or lymphadenopathy  Respiratory: normal respiratory effort    ASSESSMENT/PLAN:                                                      (J01.90,  B96.89) Acute bacterial sinusitis  (primary encounter diagnosis)  Plan: Course of Levaquin and Flonase prescribed; may continue Mucinex and Neti pot rinses; minimize Excedrin use; if symptoms worsen, change, or do not improve, patient to contact MD.    The instructions on the AVS were discussed and explained to the patient. Patient expressed understanding of instructions.    (Chart documentation was completed, in part, with [employer] voice-recognition software. Even though reviewed, some grammatical, spelling, and word errors may remain.)    Loli Aquino MD   [address] [address]  T: [phone], F: " [phone]

## 2020-08-26 NOTE — PATIENT INSTRUCTIONS
START Levofloxacin 750mg daily x 1 week.    ---    START Flonase 2 sprays/nostril daily and consistently.    ---    May continue Mucinex and Neti Pot rinses.    ---    Minimize Excedrin use as it may be causing elevated blood pressure readings.

## 2020-09-22 ENCOUNTER — VIRTUAL VISIT (OUTPATIENT)
Dept: FAMILY MEDICINE | Facility: OTHER | Age: 55
End: 2020-09-22
Payer: COMMERCIAL

## 2020-09-22 DIAGNOSIS — Z20.822 SUSPECTED 2019 NOVEL CORONAVIRUS INFECTION: Primary | ICD-10-CM

## 2020-09-22 PROCEDURE — 99421 OL DIG E/M SVC 5-10 MIN: CPT | Performed by: NURSE PRACTITIONER

## 2020-09-22 NOTE — PROGRESS NOTES
"Date: 2020 06:08:07  Clinician: Bhavya Galvan  Clinician NPI: [id number]  Patient: Jayne Tyler  Patient : 1965  Patient Address: [address] [place] [id number]  Patient Phone: [phone]  Visit Protocol: URI  Patient Summary:  Jayne is a 55 year old ( : 1965 ) female who initiated a [facility] Visit for COVID-19 (Coronavirus) evaluation and screening. When asked the question \"Please sign me up to receive news, health information and promotions from [facility].\", Jayne responded \"No\".    Jayne states her symptoms started gradually 3-4 days ago.   Her symptoms consist of chills, malaise, a sore throat, a cough, nasal congestion, a headache, enlarged lymph nodes, and myalgia. Jayne also feels feverish.   Symptom details     Nasal secretions: The color of her mucus is clear.    Cough: Jayne coughs a few times an hour and her cough is more bothersome at night. Phlegm comes into her throat when she coughs. She does not believe her cough is caused by post-nasal drip. The color of the phlegm is clear.     Sore throat: Jayne reports having moderate throat pain (4-6 on a 10 point pain scale), does not have exudate on her tonsils, and can swallow liquids. The lymph nodes in her neck are enlarged. A rash has not appeared on the skin since the sore throat started.     Temperature: Her current temperature is 99.1 degrees Fahrenheit.     Headache: She states the headache is moderate (4-6 on a 10 point pain scale).      Jayne denies having facial pain or pressure, teeth pain, ageusia, diarrhea, ear pain, anosmia, vomiting, rhinitis, nausea, and wheezing. She also denies double sickening (worsening symptoms after initial improvement) and having recent facial or sinus surgery in the past 60 days. She is not experiencing dyspnea.   Precipitating events  Jayne is not sure if she has been exposed to someone with strep throat. She has not recently been exposed to someone with influenza. Jayne has " been in close contact with the following high risk individuals: people with asthma, heart disease or diabetes.   Pertinent COVID-19 (Coronavirus) information  In the past 14 days, Jayne has not worked in a congregate living setting.   She does not work or volunteer as healthcare worker or a  and does not work or volunteer in a healthcare facility.   Jayne also has not lived in a congregate living setting in the past 14 days. She does not live with a healthcare worker.   Jayne has not had a close contact with a laboratory-confirmed COVID-19 patient within 14 days of symptom onset.   Since December 2019, Jayne and has not had upper respiratory infection or influenza-like illness. Has not been diagnosed with lab-confirmed COVID-19 test   Pertinent medical history  Jayne had 1 sinus infection within the past year.   Jayne has taken an antibiotic medication in the past month. Antibiotic details as reported by the patient (free text): Levofloxacin for sinus infectiom   Jayne does not get yeast infections when she takes antibiotics.   Jayne needs a return to work/school note.   Weight: 238 lbs   Jayne smokes or uses smokeless tobacco.   Weight: 238 lbs    MEDICATIONS: omeprazole-sodium bicarbonate oral, fluticasone propionate nasal, ALLERGIES: Penicillins  Clinician Response:  Dear Jayne,   Your symptoms show that you may have coronavirus (COVID-19). This illness can cause fever, cough and trouble breathing. Many people get a mild case and get better on their own. Some people can get very sick.  What should I do?  We would like to test you for this virus.   1. Please call [phone] to schedule your visit. Explain that you were [facility] have [facility]-19 test. Be ready to share your [facility] visit ID number.  The following will serve as your written order for this COVID Test, ordered by me, for the indication of suspected COVID [Z20.828]: The test will be ordered in Epic, our electronic  "health record, after you are scheduled. It will show as ordered and authorized by Kyler Muir MD.  Order: COVID-19 (Coronavirus) PCR for SYMPTOMATIC testing from [facility].      2. When it's time for your COVID test:  Stay at least 6 feet away from others. (If someone will drive you to your test, stay in the backseat, as far away from the  as you can.)   Cover your mouth and nose with a mask, tissue or washcloth.  Go straight to the testing site. Don't make any stops on the way there or back.      3.Starting now: Stay home and away from others (self-isolate) until:   You've had no fever---and no medicine that reduces fever---for one full day (24 hours). And...   Your other symptoms have gotten better. For example, your cough or breathing has improved. And...   At least 10 days have passed since your symptoms started.       During this time, don't leave the house except for testing or medical care.   Stay in your own room, even for meals. Use your own bathroom if you can.   Stay away from others in your home. No hugging, kissing or shaking hands. No visitors.  Don't go to work, school or anywhere else.    Clean \"high touch\" surfaces often (doorknobs, counters, handles, etc.). Use a household cleaning spray or wipes. You'll find a full list of  on the EPA website: www.epa.gov/pesticide-registration/list-n-disinfectants-use-against-sars-cov-2.   Cover your mouth and nose with a mask, tissue or washcloth to avoid spreading germs.  Wash your hands and face often. Use soap and water.  Caregivers in these groups are at risk for severe illness due to COVID-19:  o People 65 years and older  o People who live in a nursing home or long-term care facility  o People with chronic disease (lung, heart, cancer, diabetes, kidney, liver, immunologic)  o People who have a weakened immune system, including those who:   Are in cancer treatment  Take medicine that weakens the immune system, such as corticosteroids  Had a bone " marrow or organ transplant  Have an immune deficiency  Have poorly controlled HIV or AIDS  Are obese (body mass index of 40 or higher)  Smoke regularly   o Caregivers should wear gloves while washing dishes, handling laundry and cleaning bedrooms and bathrooms.  o Use caution when washing and drying laundry: Don't shake dirty laundry, and use the warmest water setting that you can.  o For more tips, go to www.cdc.gov/coronavirus/2019-ncov/downloads/10Things.pdf.    4.Sign up for Cardio control. We know it's scary to hear that you might have COVID-19. We want to track your symptoms to make sure you're okay over the next 2 weeks. Please look for an email from Cardio control---this is a free, online program that we'll use to keep in touch. To sign up, follow the link in the email. Learn more at [email or web address]  How can I take care of myself?   Get lots of rest. Drink extra fluids (unless a doctor has told you not to).   Take Tylenol (acetaminophen) for fever or pain. If you have liver or kidney problems, ask your family doctor if it's okay to take Tylenol.   Adults can take either:    650 mg (two 325 mg pills) every 4 to 6 hours, or...   1,000 mg (two 500 mg pills) every 8 hours as needed.    Note: Don't take more than 3,000 mg in one day. Acetaminophen is found in many medicines (both prescribed and over-the-counter medicines). Read all labels to be sure you don't take too much.   For children, check the Tylenol bottle for the right dose. The dose is based on the child's age or weight.    If you have other health problems (like cancer, heart failure, an organ transplant or severe kidney disease): Call your specialty clinic if you don't feel better in the next 2 days.       Know when to call 911. Emergency warning signs include:    Trouble breathing or shortness of breath Pain or pressure in the chest that doesn't go away Feeling confused like you haven't felt before, or not being able to wake up  Bluish-colored lips or face.  Where can I get more information?   [facility] -- About COVID-19: [email or web address]   CDC -- What to Do If You're Sick: www.cdc.gov/coronavirus/2019-ncov/about/steps-when-sick.html   [facility] -- Ending Home Isolation: www.cdc.gov/coronavirus/2019-ncov/hcp/disposition-in-home-patients.html   [facility] -- Caring for Someone: www.cdc.gov/coronavirus/2019-ncov/if-you-are-sick/care-for-someone.html   [facility] -- Interim Guidance for Hospital Discharge to Home: www.[facility].[place]/diseases/coronavirus/hcp/hospdischarge.pdf   [facility] clinical trials (COVID-19 research studies): clinicalaffairs.[facility]-clinical-trials    Below are the COVID-19 hotlines at the [place] Department of [facility] ([facility]). Interpreters are available.    For health questions: Call 018-820-0897 or 1-328.939.5725 (7 a.m. to 7 p.m.) For questions about schools and childcare: Call 963-972-1426 or 1-332.258.1686 (7 a.m. to 7 p.m.)    Diagnosis: Cough  Diagnosis ICD: R05

## 2020-09-24 DIAGNOSIS — Z20.822 SUSPECTED 2019 NOVEL CORONAVIRUS INFECTION: ICD-10-CM

## 2020-09-24 PROCEDURE — U0003 INFECTIOUS AGENT DETECTION BY NUCLEIC ACID (DNA OR RNA); SEVERE ACUTE RESPIRATORY SYNDROME CORONAVIRUS 2 (SARS-COV-2) (CORONAVIRUS DISEASE [COVID-19]), AMPLIFIED PROBE TECHNIQUE, MAKING USE OF HIGH THROUGHPUT TECHNOLOGIES AS DESCRIBED BY CMS-2020-01-R: HCPCS | Performed by: FAMILY MEDICINE

## 2020-09-25 LAB
SARS-COV-2 RNA SPEC QL NAA+PROBE: NOT DETECTED
SPECIMEN SOURCE: NORMAL

## 2020-10-03 ENCOUNTER — E-VISIT (OUTPATIENT)
Dept: INTERNAL MEDICINE | Facility: CLINIC | Age: 55
End: 2020-10-03
Payer: COMMERCIAL

## 2020-10-03 DIAGNOSIS — J01.90 ACUTE BACTERIAL SINUSITIS: ICD-10-CM

## 2020-10-03 DIAGNOSIS — B96.89 ACUTE BACTERIAL SINUSITIS: ICD-10-CM

## 2020-10-03 PROCEDURE — 99421 OL DIG E/M SVC 5-10 MIN: CPT | Performed by: INTERNAL MEDICINE

## 2020-10-03 RX ORDER — LEVOFLOXACIN 750 MG/1
750 TABLET, FILM COATED ORAL DAILY
Qty: 10 TABLET | Refills: 0 | Status: SHIPPED | OUTPATIENT
Start: 2020-10-03 | End: 2020-11-09

## 2020-11-09 ENCOUNTER — OFFICE VISIT (OUTPATIENT)
Dept: INTERNAL MEDICINE | Facility: CLINIC | Age: 55
End: 2020-11-09
Payer: COMMERCIAL

## 2020-11-09 VITALS
HEART RATE: 92 BPM | BODY MASS INDEX: 41.73 KG/M2 | OXYGEN SATURATION: 98 % | WEIGHT: 245 LBS | RESPIRATION RATE: 16 BRPM | SYSTOLIC BLOOD PRESSURE: 118 MMHG | TEMPERATURE: 97.9 F | DIASTOLIC BLOOD PRESSURE: 80 MMHG

## 2020-11-09 DIAGNOSIS — E55.9 VITAMIN D DEFICIENCY: ICD-10-CM

## 2020-11-09 DIAGNOSIS — Z11.59 ENCOUNTER FOR HEPATITIS C SCREENING TEST FOR LOW RISK PATIENT: ICD-10-CM

## 2020-11-09 DIAGNOSIS — Z12.31 ENCOUNTER FOR SCREENING MAMMOGRAM FOR BREAST CANCER: ICD-10-CM

## 2020-11-09 DIAGNOSIS — R09.81 SINUS CONGESTION: ICD-10-CM

## 2020-11-09 DIAGNOSIS — J32.9 RECURRENT SINUS INFECTIONS: ICD-10-CM

## 2020-11-09 DIAGNOSIS — Z00.00 ROUTINE HISTORY AND PHYSICAL EXAMINATION OF ADULT: Primary | ICD-10-CM

## 2020-11-09 DIAGNOSIS — Z13.1 SCREENING FOR DIABETES MELLITUS: ICD-10-CM

## 2020-11-09 DIAGNOSIS — Z71.6 ENCOUNTER FOR SMOKING CESSATION COUNSELING: ICD-10-CM

## 2020-11-09 DIAGNOSIS — Z13.220 SCREENING FOR CHOLESTEROL LEVEL: ICD-10-CM

## 2020-11-09 PROCEDURE — 80061 LIPID PANEL: CPT | Performed by: INTERNAL MEDICINE

## 2020-11-09 PROCEDURE — 80053 COMPREHEN METABOLIC PANEL: CPT | Performed by: INTERNAL MEDICINE

## 2020-11-09 PROCEDURE — 86803 HEPATITIS C AB TEST: CPT | Performed by: INTERNAL MEDICINE

## 2020-11-09 PROCEDURE — 82306 VITAMIN D 25 HYDROXY: CPT | Performed by: INTERNAL MEDICINE

## 2020-11-09 PROCEDURE — 36415 COLL VENOUS BLD VENIPUNCTURE: CPT | Performed by: INTERNAL MEDICINE

## 2020-11-09 PROCEDURE — 99396 PREV VISIT EST AGE 40-64: CPT | Performed by: INTERNAL MEDICINE

## 2020-11-09 RX ORDER — BUPROPION HYDROCHLORIDE 150 MG/1
150 TABLET ORAL EVERY MORNING
Qty: 90 TABLET | Refills: 3 | Status: SHIPPED | OUTPATIENT
Start: 2020-11-09 | End: 2021-11-05

## 2020-11-09 RX ORDER — FLUTICASONE PROPIONATE 50 MCG
2 SPRAY, SUSPENSION (ML) NASAL DAILY
Qty: 16 G | Refills: 3 | Status: SHIPPED | OUTPATIENT
Start: 2020-11-09 | End: 2021-03-09

## 2020-11-09 SDOH — ECONOMIC STABILITY: TRANSPORTATION INSECURITY
IN THE PAST 12 MONTHS, HAS LACK OF TRANSPORTATION KEPT YOU FROM MEETINGS, WORK, OR FROM GETTING THINGS NEEDED FOR DAILY LIVING?: NOT ASKED

## 2020-11-09 SDOH — ECONOMIC STABILITY: TRANSPORTATION INSECURITY
IN THE PAST 12 MONTHS, HAS THE LACK OF TRANSPORTATION KEPT YOU FROM MEDICAL APPOINTMENTS OR FROM GETTING MEDICATIONS?: NOT ASKED

## 2020-11-09 SDOH — ECONOMIC STABILITY: FOOD INSECURITY: WITHIN THE PAST 12 MONTHS, THE FOOD YOU BOUGHT JUST DIDN'T LAST AND YOU DIDN'T HAVE MONEY TO GET MORE.: NOT ASKED

## 2020-11-09 SDOH — ECONOMIC STABILITY: INCOME INSECURITY: HOW HARD IS IT FOR YOU TO PAY FOR THE VERY BASICS LIKE FOOD, HOUSING, MEDICAL CARE, AND HEATING?: NOT ASKED

## 2020-11-09 SDOH — ECONOMIC STABILITY: FOOD INSECURITY: WITHIN THE PAST 12 MONTHS, YOU WORRIED THAT YOUR FOOD WOULD RUN OUT BEFORE YOU GOT MONEY TO BUY MORE.: NOT ASKED

## 2020-11-09 NOTE — PATIENT INSTRUCTIONS
Refills of Flonase sent in - 2 sprays/nostril daily and consistently.    ENT referral placed - please call to schedule.    ---    Fasting labs today.    ---    Please schedule your mammogram when able.     ---    START Wellbutrin XL 150mg once daily 1-2 weeks before you officially stop smoking.

## 2020-11-09 NOTE — PROGRESS NOTES
SUBJECTIVE                                                      HPI: Jayne Tyler is a very pleasant 55 year old female who presents for a physical.    Patient continues to struggles with left sided nasal congestion, especially at night. No sinus pain. No fevers or chills. Ran out of Flonase. Two recent sinus infections.     Patient is also interested in medication to help her stop smoking.     ROS:  Constitutional: no unintentional weight loss or gain reported; no fevers, chills, or sweats reported  Cardiovascular: no chest pain, palpitations, or edema reported  Respiratory: no cough, wheezing, shortness of breath, or dyspnea on exertion reported  Gastrointestinal: no nausea, vomiting, constipation, diarrhea, or abdominal pain reported  Genitourinary: no urinary frequency, urgency, dysuria, or hematuria reported  Integumentary: no rash or pruritus reported  Musculoskeletal: no back pain, muscle pain, joint pain, or joint swelling reported  Neurologic: no focal weakness, numbness, or tingling reported  Hematologic: no easy bruising or bleeding reported  Endocrine: no heat or cold intolerance reported; no polyuria or polydipsia reported  Psychiatric: no anxiety or depression reported    Past Medical History:   Diagnosis Date     Morbid obesity (H)      Past Surgical History:   Procedure Laterality Date     ARTHROSCOPY SHOULDER Right 2015     ARTHROSCOPY SHOULDER, OPEN ROTATOR CUFF REPAIR, COMBINED Left 03/29/2017    Procedure:  Left shoulder arthroscopy and arthroscopic subacromial decompression (acromioplasty, bursectomy and coracoacromial ligament resection). Arthroscopic distal clavicle resection. Mini-open rotator cuff tear repair. Surgeon:  Kevin George MD  [facility]     [facility]     DISKECTOMY, LUMBAR, SINGLE SP  1996    L5-S1     DISKECTOMY, LUMBAR, SINGLE SP  1997    L5-S1     FUSION LUMBAR ANTERIOR ONE LEVEL  1999    L5-S1     HC SACROPLASTY       OSTEOTOMY FOOT  Right 06/06/2017    Procedure: OSTEOTOMY FOOT;  1)  WEIL OSTEOTOMY RIGHT SECOND METATARSAL, 2) PLANTAR CAPSULE DEBRIDEMENT/SYNOVECTOMY, RIGHT SECOND METATARSAL PHALANGEAL JOINT, 3) HAMMERTOE REPAIR RIGHT SECOND TOE;  Surgeon: Olvin Betancur DPM;  Location: [facility]     RECONSTRUCT FOREFOOT WITH METATARSOPHALANGEAL (MTP) FUSION Right 06/06/2017    Procedure: RECONSTRUCT FOREFOOT WITH METATARSOPHALANGEAL (MTP) FUSION;;  Surgeon: Olvin Betancur DPM;  [facility]     REPAIR HAMMER TOE Right 06/06/2017    Procedure: REPAIR HAMMER TOE;;  Surgeon: Olvin Betancur DPM;  [facility]     ROTATOR CUFF REPAIR RT/LT Left 2017     Family History   Problem Relation Age of Onset     Rheumatoid Arthritis Mother      Coronary Artery Disease Mother         s/p MI and PCIs in 50s     Rheumatoid Arthritis Sister      Myocardial Infarction Maternal Grandmother         in her 60s     Myocardial Infarction Maternal Grandfather         later in life     Diabetes No family hx of      Cerebrovascular Disease No family hx of      Colon Cancer No family hx of      Breast Cancer No family hx of      Ovarian Cancer No family hx of      Social History     Occupational History     Occupation:    Tobacco Use     Smoking status: Current Some Day Smoker     Packs/day: 1.00     Years: 26.00     Pack years: 26.00     Types: Cigarettes     Smokeless tobacco: Never Used     Tobacco comment: 3-4 cigs per week    Substance and Sexual Activity     Alcohol use: Yes     Frequency: Monthly or less     Drinks per session: 1 or 2     Drug use: No     Sexual activity: Yes     Partners: Male   Social History Narrative    . In a long-term relationship.    3 adult kids.    No grandchildren.    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     Allergies   Allergen Reactions     Penicillins Hives     Current Outpatient Medications   Medication Sig     buPROPion (WELLBUTRIN XL) 150 MG 24 hr tablet Take 1 tablet (150 mg) by mouth every morning      Cholecalciferol (VITAMIN D) 2000 units CAPS Take 2,000 Units by mouth daily     fluticasone (FLONASE) 50 MCG/ACT nasal spray Spray 2 sprays into both nostrils daily     vitamin D2 (ERGOCALCIFEROL) 65993 units (1250 mcg) capsule Take 1 capsule (50,000 Units) by mouth once a week     Immunization History   Administered Date(s) Administered     Influenza Vaccine IM > 6 months Valent IIV4 10/10/2018, 10/22/2019, 10/16/2020     TDAP Vaccine (Adacel) 12/01/2014     Td (Adult), Adsorbed 07/12/2004     Zoster vaccine recombinant adjuvanted (SHINGRIX) 05/10/2019, 10/22/2019     OBJECTIVE                                                      /80 (BP Location: Left arm, Patient Position: Chair, Cuff Size: Adult Large)   Pulse 92   Temp 97.9  F (36.6  C) (Temporal)   Resp 16   Wt 111.1 kg (245 lb)   LMP  (LMP Unknown)   SpO2 98%   BMI 41.73 kg/m    Constitutional: well-appearing  Eyes: normal conjunctivae and lids  Head, Ears, and Eyes: normocephalic; normal external auditory canal and pinna; tympanic membranes visualized and normal; normal lids and conjunctivae  Neck: supple, symmetric, no thyromegaly or lymphadenopathy  Respiratory: normal respiratory effort; clear to auscultation bilaterally  Cardiovascular: regular rate and rhythm; no edema  Gastrointestinal: soft, non-tender, and non-distended; no organomegaly or masses  Musculoskeletal: normal gait and station  Psych: normal judgment and insight; normal mood and affect; recent and remote memory intact    PREVENTATIVE HEALTH                                                      Blood pressure: within normal limits   Breast CA screening: DUE  Cervical CA screening: up to date   Colon CA screening: up to date   Lung CA screening: not medically indicated at this time   Dexa: not medically indicated at this time   Screening HCV: DUE  Screening HIV: completed  Screening cholesterol: DUE  Screening diabetes: DUE  STD testing: no risk factors present  Alcohol misuse  screening: alcohol use reviewed - no intervention indicated at this time  Immunizations: reviewed; up to date     ASSESSMENT/PLAN                                                       (Z00.00) Routine history and physical examination of adult  (primary encounter diagnosis)  Comment: PMH, PSH, FH, SH, medications, allergies, immunizations, and preventative health measures reviewed and updated as appropriate.  Plan: see below for plans.      (J32.9) Recurrent sinus infections  (R09.81) Sinus congestion  Plan: RESTART Flonase daily and consistently; referred to ENT for further evaluation - patient to schedule.     (Z13.220) Screening for cholesterol level  (Z13.1) Screening for diabetes mellitus  (Z11.59) Encounter for hepatitis C screening test for low risk patient  (E55.9) Vitamin D deficiency  Plan: fasting labs today.    (Z12.31) Encounter for screening mammogram for breast cancer  Plan: screening mammogram ordered - patient to schedule.     (Z71.6) Encounter for smoking cessation counseling  Plan: Wellbutrin XL 150mg daily prescribed.     Loli Aquino MD   [address] [address]  T: [phone], F: [phone]  (Note was completed, in part, with [employer] voice-recognition software. Documentation was reviewed, but some grammatical, spelling, and word errors may remain.)

## 2020-11-10 LAB
ALBUMIN SERPL-MCNC: 3.6 G/DL (ref 3.4–5)
ALP SERPL-CCNC: 80 U/L (ref 40–150)
ALT SERPL W P-5'-P-CCNC: 26 U/L (ref 0–50)
ANION GAP SERPL CALCULATED.3IONS-SCNC: 7 MMOL/L (ref 3–14)
AST SERPL W P-5'-P-CCNC: 22 U/L (ref 0–45)
BILIRUB SERPL-MCNC: 0.5 MG/DL (ref 0.2–1.3)
BUN SERPL-MCNC: 11 MG/DL (ref 7–30)
CALCIUM SERPL-MCNC: 9.3 MG/DL (ref 8.5–10.1)
CHLORIDE SERPL-SCNC: 109 MMOL/L (ref 94–109)
CHOLEST SERPL-MCNC: 246 MG/DL
CO2 SERPL-SCNC: 25 MMOL/L (ref 20–32)
CREAT SERPL-MCNC: 0.86 MG/DL (ref 0.52–1.04)
DEPRECATED CALCIDIOL+CALCIFEROL SERPL-MC: 50 UG/L (ref 20–75)
GFR SERPL CREATININE-BSD FRML MDRD: 76 ML/MIN/{1.73_M2}
GLUCOSE SERPL-MCNC: 89 MG/DL (ref 70–99)
HCV AB SERPL QL IA: NONREACTIVE
HDLC SERPL-MCNC: 63 MG/DL
LDLC SERPL CALC-MCNC: 166 MG/DL
NONHDLC SERPL-MCNC: 183 MG/DL
POTASSIUM SERPL-SCNC: 3.8 MMOL/L (ref 3.4–5.3)
PROT SERPL-MCNC: 7.1 G/DL (ref 6.8–8.8)
SODIUM SERPL-SCNC: 141 MMOL/L (ref 133–144)
TRIGL SERPL-MCNC: 87 MG/DL

## 2020-12-04 ENCOUNTER — ANCILLARY PROCEDURE (OUTPATIENT)
Dept: MAMMOGRAPHY | Facility: CLINIC | Age: 55
End: 2020-12-04
Attending: INTERNAL MEDICINE
Payer: COMMERCIAL

## 2020-12-04 DIAGNOSIS — Z12.31 VISIT FOR SCREENING MAMMOGRAM: ICD-10-CM

## 2020-12-04 DIAGNOSIS — Z12.31 ENCOUNTER FOR SCREENING MAMMOGRAM FOR BREAST CANCER: ICD-10-CM

## 2020-12-04 PROCEDURE — 77067 SCR MAMMO BI INCL CAD: CPT | Performed by: RADIOLOGY

## 2020-12-04 PROCEDURE — 77063 BREAST TOMOSYNTHESIS BI: CPT | Performed by: RADIOLOGY

## 2021-01-04 ENCOUNTER — OFFICE VISIT (OUTPATIENT)
Dept: INTERNAL MEDICINE | Facility: CLINIC | Age: 56
End: 2021-01-04
Payer: COMMERCIAL

## 2021-01-04 VITALS
RESPIRATION RATE: 18 BRPM | BODY MASS INDEX: 42.34 KG/M2 | WEIGHT: 248 LBS | OXYGEN SATURATION: 99 % | HEART RATE: 88 BPM | TEMPERATURE: 98.6 F | DIASTOLIC BLOOD PRESSURE: 82 MMHG | HEIGHT: 64 IN | SYSTOLIC BLOOD PRESSURE: 128 MMHG

## 2021-01-04 DIAGNOSIS — N95.0 POST-MENOPAUSAL BLEEDING: Primary | ICD-10-CM

## 2021-01-04 LAB
ERYTHROCYTE [DISTWIDTH] IN BLOOD BY AUTOMATED COUNT: 13.5 % (ref 10–15)
HCT VFR BLD AUTO: 42.2 % (ref 35–47)
HGB BLD-MCNC: 13.9 G/DL (ref 11.7–15.7)
MCH RBC QN AUTO: 31.7 PG (ref 26.5–33)
MCHC RBC AUTO-ENTMCNC: 32.9 G/DL (ref 31.5–36.5)
MCV RBC AUTO: 96 FL (ref 78–100)
PLATELET # BLD AUTO: 268 10E9/L (ref 150–450)
RBC # BLD AUTO: 4.38 10E12/L (ref 3.8–5.2)
WBC # BLD AUTO: 11.5 10E9/L (ref 4–11)

## 2021-01-04 PROCEDURE — 99213 OFFICE O/P EST LOW 20 MIN: CPT | Performed by: INTERNAL MEDICINE

## 2021-01-04 PROCEDURE — 36415 COLL VENOUS BLD VENIPUNCTURE: CPT | Performed by: INTERNAL MEDICINE

## 2021-01-04 PROCEDURE — 83002 ASSAY OF GONADOTROPIN (LH): CPT | Performed by: INTERNAL MEDICINE

## 2021-01-04 PROCEDURE — 84443 ASSAY THYROID STIM HORMONE: CPT | Performed by: INTERNAL MEDICINE

## 2021-01-04 PROCEDURE — 85027 COMPLETE CBC AUTOMATED: CPT | Performed by: INTERNAL MEDICINE

## 2021-01-04 PROCEDURE — 83001 ASSAY OF GONADOTROPIN (FSH): CPT | Performed by: INTERNAL MEDICINE

## 2021-01-04 ASSESSMENT — MIFFLIN-ST. JEOR: SCORE: 1713.89

## 2021-01-04 NOTE — PATIENT INSTRUCTIONS
Schedule ultrasound on the way out today.    Blood tests today.    Please schedule a follow-up visit with ob/gyn - after your ultrasound.

## 2021-01-04 NOTE — PROGRESS NOTES
"  ASSESSMENT/PLAN                                                      (N95.0) Post-menopausal bleeding  (primary encounter diagnosis)  Comment: etiology unclear.  Plan: Pelvic ultrasound ordered-patient to schedule; OB/GYN referral placed - patient to schedule; CBC, TSH, LH, and FSH today to evaluate for potential organic causes of/contributors to bleeding.      Loli Aquino MD   [facility]  [address] [place] [id number]  T: [phone], F: [phone]    SUBJECTIVE                                                      Jayne Tyler is a very pleasant 55 year old female who presents with postmenopausal bleeding:    Occurred last month. Lasted several days, with varying flow, like a period. Mild intermittent vaginal bleeding prior to that for least a couple of months. LMP ~5 years ago. Prolonged bleeding last month started after restarting a daily baby aspirin.    OBJECTIVE                                                      /82 (BP Location: Left arm, Patient Position: Chair, Cuff Size: Adult Large)   Pulse 88   Temp 98.6  F (37  C) (Temporal)   Resp 18   Ht 1.632 m (5' 4.25\")   Wt 112.5 kg (248 lb)   LMP  (LMP Unknown)   SpO2 99%   BMI 42.24 kg/m     Constitutional: well-appearing    ---    (Note documentation was completed, in part, with [employer] voice-recognition software. Documentation was reviewed, but some grammatical, spelling, and word errors may remain.)    "

## 2021-01-05 ENCOUNTER — ANCILLARY PROCEDURE (OUTPATIENT)
Dept: ULTRASOUND IMAGING | Facility: CLINIC | Age: 56
End: 2021-01-05
Attending: INTERNAL MEDICINE
Payer: COMMERCIAL

## 2021-01-05 DIAGNOSIS — N95.0 POST-MENOPAUSAL BLEEDING: ICD-10-CM

## 2021-01-05 LAB
FSH SERPL-ACNC: 26.3 IU/L
LH SERPL-ACNC: 19.6 IU/L
TSH SERPL DL<=0.005 MIU/L-ACNC: 1.91 MU/L (ref 0.4–4)

## 2021-01-05 PROCEDURE — 76830 TRANSVAGINAL US NON-OB: CPT | Performed by: OBSTETRICS & GYNECOLOGY

## 2021-01-05 PROCEDURE — 76856 US EXAM PELVIC COMPLETE: CPT | Performed by: OBSTETRICS & GYNECOLOGY

## 2021-01-07 ENCOUNTER — OFFICE VISIT (OUTPATIENT)
Dept: OBGYN | Facility: CLINIC | Age: 56
End: 2021-01-07
Payer: COMMERCIAL

## 2021-01-07 VITALS
WEIGHT: 243.2 LBS | BODY MASS INDEX: 41.42 KG/M2 | SYSTOLIC BLOOD PRESSURE: 142 MMHG | DIASTOLIC BLOOD PRESSURE: 90 MMHG | HEART RATE: 91 BPM

## 2021-01-07 DIAGNOSIS — N95.0 POST-MENOPAUSAL BLEEDING: Primary | ICD-10-CM

## 2021-01-07 PROCEDURE — 88305 TISSUE EXAM BY PATHOLOGIST: CPT | Performed by: PATHOLOGY

## 2021-01-07 PROCEDURE — 99243 OFF/OP CNSLTJ NEW/EST LOW 30: CPT | Mod: 25 | Performed by: OBSTETRICS & GYNECOLOGY

## 2021-01-07 PROCEDURE — 58100 BIOPSY OF UTERUS LINING: CPT | Performed by: OBSTETRICS & GYNECOLOGY

## 2021-01-07 NOTE — PROGRESS NOTES
"Patient seen in consultation at the request of Loli Aquino MD for postmenopausal bleeding    Ms. Jayne Tyler 55 year old P3 (vaginal deliveries) presents for postmenopausal bleeding. Reports that this occurred about 2-3 weeks ago and she likens it to a \"mini-period.\" Denies concurrent cramping. Resolved after a couple of days. Has been menopausal for 5 years. Denies hx of hormone replacement therapy to address vasomotor symptoms.   Reports normal baseline bladder and bowel habits.   She is sexually active and denies postcoital bleeding and dyspareunia.       Past Medical History:   Diagnosis Date     Morbid obesity (H)      Pure hypercholesterolemia        Past Surgical History:   Procedure Laterality Date     ARTHROSCOPY SHOULDER Right 2015     ARTHROSCOPY SHOULDER, OPEN ROTATOR CUFF REPAIR, COMBINED Left 03/29/2017    Procedure:  Left shoulder arthroscopy and arthroscopic subacromial decompression (acromioplasty, bursectomy and coracoacromial ligament resection). Arthroscopic distal clavicle resection. Mini-open rotator cuff tear repair. Surgeon:  Kevin George MD  Location: [facility]     BUNIONECTOMY Right 2008     DISKECTOMY, LUMBAR, SINGLE SP  1996    L5-S1     DISKECTOMY, LUMBAR, SINGLE SP  1997    L5-S1     FUSION LUMBAR ANTERIOR ONE LEVEL  1999    L5-S1     HC SACROPLASTY       OSTEOTOMY FOOT Right 06/06/2017    Procedure: OSTEOTOMY FOOT;  1)  WEIL OSTEOTOMY RIGHT SECOND METATARSAL, 2) PLANTAR CAPSULE DEBRIDEMENT/SYNOVECTOMY, RIGHT SECOND METATARSAL PHALANGEAL JOINT, 3) HAMMERTOE REPAIR RIGHT SECOND TOE;  Surgeon: Olvin Betancur DPM;  Location: [facility]     RECONSTRUCT FOREFOOT WITH METATARSOPHALANGEAL (MTP) FUSION Right 06/06/2017    Procedure: RECONSTRUCT FOREFOOT WITH METATARSOPHALANGEAL (MTP) FUSION;;  Surgeon: Olvin Betancur DPM;  Location: [facility]     REPAIR HAMMER TOE Right 06/06/2017    Procedure: REPAIR HAMMER TOE;;  Surgeon: Olvin Betancur DPM;  Location: [facility] "     ROTATOR CUFF REPAIR RT/LT Left 2017       Current Outpatient Medications   Medication     buPROPion (WELLBUTRIN XL) 150 MG 24 hr tablet     Cholecalciferol (VITAMIN D) 2000 units CAPS     fluticasone (FLONASE) 50 MCG/ACT nasal spray     vitamin D2 (ERGOCALCIFEROL) 37338 units (1250 mcg) capsule     Current Facility-Administered Medications   Medication     dexamethasone (DECADRON) injection 2 mL     lidocaine 1 % injection 4 mL       Allergies   Allergen Reactions     Penicillins Hives       Social History     Tobacco Use     Smoking status: Current Some Day Smoker     Packs/day: 1.00     Years: 26.00     Pack years: 26.00     Types: Cigarettes     Smokeless tobacco: Never Used     Tobacco comment: 3-4 cigs per week    Substance Use Topics     Alcohol use: Yes     Frequency: Monthly or less     Drinks per session: 1 or 2     Drug use: No       Family History   Problem Relation Age of Onset     Rheumatoid Arthritis Mother      Coronary Artery Disease Mother         s/p MI and PCIs in 50s     Rheumatoid Arthritis Sister      Myocardial Infarction Maternal Grandmother         in her 60s     Myocardial Infarction Maternal Grandfather         later in life     Diabetes No family hx of      Cerebrovascular Disease No family hx of      Colon Cancer No family hx of      Breast Cancer No family hx of      Ovarian Cancer No family hx of        C: NEGATIVE for fever, chills, change in weight  HEENT: NEGATIVE for visual changes, runny nose, epistaxis, ear pain, tinnitus, tooth ache, sore throat, difficulty with swallowing, sinus pain  R: NEGATIVE for significant cough or SOB  CV: NEGATIVE for chest pain, palpitations or peripheral edema  BREAST: NEGATIVE For soreness, pain, lumps or nipple discharge  GI: NEGATIVE for nausea, abdominal pain, heartburn, or change in bowel habits  : NEGATIVE for frequency, dysuria, hematuria, vaginal discharge, or irregular bleeding  Neuro: NEGATIVE for numbness, tingling, focal weakness, or  headache  INT: NEGATIVE for rashes, lesions or pruritis   PSYCH: NEGATIVE for anxiety, depression, or halluciinations    Exam:   BP (!) 142/90 (BP Location: Left arm, Cuff Size: Adult Large)   Pulse 91   Wt 110.3 kg (243 lb 3.2 oz)   LMP  (LMP Unknown)   Breastfeeding No   BMI 41.42 kg/m    General Appearance: Well nourished, well developed female, NAD, AOx3  Neurological: Mental Status Normal and Station and Gait Normal   Skin: Normal skin turgor  HEET: Atraumatic, normocephalic, EOMI  Neck: Supple,no adenopathy,thyroid normal  Lungs: Good respiratory effort  Abdomen: Soft, NT, ND, no masses  Pelvic: Normal external female genitalia.  No external lesions, normal hair distribution, no adenopathy. Speculum exam reveals vaginal epithelium well rugated with no abnormal discharge. Cervix appears smooth, pink, with no visible lesions. Bimanual exam reveals normal size uterus, anteverted, non-tender, and mobile. No adnexal masses or tenderness. No cervical motion tenderness.   Extremities: No clubbing, no cyanosis and no edema    Pelvic ultrasound 1/5/2021  CLINICAL INFORMATION   Indications for ultrasound:   Bleeding/Menses - Post munira bleeding   LMP: post munira 5 yrs    Hormones: none   Measurements:  Uterus:  8.6 x 4.2 x 3.8 cm     Position is anteverted.  Contour is smooth/regular.   Endo cav: 11.9 mm       Prominent   Right ovary: nv    Left ovary:   nv    Cul de sac: no free fluid   ===================================   Complete pelvic ultrasound using realtime transabdominal and transvaginal scanning.   Findings:  Normal appearing bladder.  Bilateral ovaries not visualized  Prominent irregular endometrial lining  No free fluid in the cul de sac  Impression:  Thickened heterogeneous endometrial lining in a postmenopausal woman: recommend endometrial sampling.   Marjorie Nixon MD FACOG    Procedure: Endometrial biopsy  After informed consent was obtained from the patient, a speculum was placed in the vagina  to visualize the cervix.  The cervix was then swabbed with a betadine prep.  Tenaculum was applied to the anterior cervical lip. Endometrial biopsy pipelle was passed through the cervical os and tissue obtained.  Uterus sounded to 9 cm.  Tenaculum was removed and sites were hemostatic. There were no complications. The patient tolerated the procedure well with a minimal amount of cramping noted.  Specimen was sent to pathology.    A/P: Postmenopausal bleeding  -- endometrial biopsy done today to rule out endometrial hyperplasia/malignancy; post-biopsy bleeding precautions reviewed  -- pelvic ultrasound reviewed; no structural lesions present ie polyps, fibroids that could be contributing to her bleeding  -- TSH testing done recently   -- will call patient with results of biopsy and discuss follow-up management thereafter    Total time spent was 30 minutes; greater than 50% of time was spent in counseling and/or coordination of care for the above listed diagnoses, not including time spent on the procedure.    Jonathan Peters MD  [facility]

## 2021-01-07 NOTE — NURSING NOTE
"Chief Complaint   Patient presents with     Abnormal Uterine Bleeding     Post menopausal Bleeding        Initial BP (!) 142/90 (BP Location: Left arm, Cuff Size: Adult Large)   Pulse 91   Wt 110.3 kg (243 lb 3.2 oz)   LMP  (LMP Unknown)   Breastfeeding No   BMI 41.42 kg/m   Estimated body mass index is 41.42 kg/m  as calculated from the following:    Height as of 21: 1.632 m (5' 4.25\").    Weight as of this encounter: 110.3 kg (243 lb 3.2 oz).  BP completed using cuff size: large    Questioned patient about current smoking habits.  Pt. currently smokes.  Advised about smoking cessation.          The following HM Due: NONE      Nguyen Quick, LUCIAN on 2021 at 1:34 PM     "

## 2021-01-11 LAB — COPATH REPORT: NORMAL

## 2021-01-12 NOTE — RESULT ENCOUNTER NOTE
Inform patient that her endometrial biopsy returned benign. Therefore, no evidence of malignancy/cancer.   However, there was mention of a small focus of endometrial polyp present in the pathology. Her pelvic ultrasound does not show an obvious sign of a polyp in her uterine cavity, but if she would like to pursue a definitive diagnosis of this, then she will need to undergo a hysteroscopy to look inside the uterine cavity to know for sure.   If she would like to talk more about this, then she can return for a follow-up visit.     Jonathan Peters MD

## 2021-01-21 ENCOUNTER — TELEPHONE (OUTPATIENT)
Dept: OBGYN | Facility: CLINIC | Age: 56
End: 2021-01-21

## 2021-01-21 ENCOUNTER — OFFICE VISIT (OUTPATIENT)
Dept: OBGYN | Facility: CLINIC | Age: 56
End: 2021-01-21
Payer: COMMERCIAL

## 2021-01-21 ENCOUNTER — PREP FOR PROCEDURE (OUTPATIENT)
Dept: OBGYN | Facility: CLINIC | Age: 56
End: 2021-01-21

## 2021-01-21 VITALS — SYSTOLIC BLOOD PRESSURE: 140 MMHG | BODY MASS INDEX: 41.71 KG/M2 | DIASTOLIC BLOOD PRESSURE: 92 MMHG | WEIGHT: 244.9 LBS

## 2021-01-21 DIAGNOSIS — N95.0 POST-MENOPAUSAL BLEEDING: Primary | ICD-10-CM

## 2021-01-21 DIAGNOSIS — N95.0 POSTMENOPAUSAL BLEEDING: Primary | ICD-10-CM

## 2021-01-21 PROCEDURE — 99214 OFFICE O/P EST MOD 30 MIN: CPT | Performed by: OBSTETRICS & GYNECOLOGY

## 2021-01-21 RX ORDER — PHENAZOPYRIDINE HYDROCHLORIDE 100 MG/1
200 TABLET, FILM COATED ORAL ONCE
Status: CANCELLED | OUTPATIENT
Start: 2021-01-21 | End: 2021-01-21

## 2021-01-21 NOTE — PROGRESS NOTES
Follow-up visit  Postmenopausal bleeding    Ms. Jayne Tyler 55 year old returns to review endometrial biopsy results and pelvic ultrasound findings.   She has no complaints or no further concerns to address.       Past Medical History:   Diagnosis Date     Morbid obesity (H)      Pure hypercholesterolemia        Past Surgical History:   Procedure Laterality Date     ARTHROSCOPY SHOULDER Right 2015     ARTHROSCOPY SHOULDER, OPEN ROTATOR CUFF REPAIR, COMBINED Left 03/29/2017    Procedure:  Left shoulder arthroscopy and arthroscopic subacromial decompression (acromioplasty, bursectomy and coracoacromial ligament resection). Arthroscopic distal clavicle resection. Mini-open rotator cuff tear repair. Surgeon:  Kevin George MD  Location: [facility]     BUNIONECTOMY Right 2008     DISKECTOMY, LUMBAR, SINGLE SP  1996    L5-S1     DISKECTOMY, LUMBAR, SINGLE SP  1997    L5-S1     FUSION LUMBAR ANTERIOR ONE LEVEL  1999    L5-S1     HC SACROPLASTY       OSTEOTOMY FOOT Right 06/06/2017    Procedure: OSTEOTOMY FOOT;  1)  WEIL OSTEOTOMY RIGHT SECOND METATARSAL, 2) PLANTAR CAPSULE DEBRIDEMENT/SYNOVECTOMY, RIGHT SECOND METATARSAL PHALANGEAL JOINT, 3) HAMMERTOE REPAIR RIGHT SECOND TOE;  Surgeon: Olvin Betancur DPM;  [facility]     RECONSTRUCT FOREFOOT WITH METATARSOPHALANGEAL (MTP) FUSION Right 06/06/2017    Procedure: RECONSTRUCT FOREFOOT WITH METATARSOPHALANGEAL (MTP) FUSION;;  Surgeon: Olvin Betancur DPM;  [facility]     REPAIR HAMMER TOE Right 06/06/2017    Procedure: REPAIR HAMMER TOE;;  Surgeon: Olvin Betancur DPM;  [facility]     ROTATOR CUFF REPAIR RT/LT Left 2017       Current Outpatient Medications   Medication     buPROPion (WELLBUTRIN XL) 150 MG 24 hr tablet     Cholecalciferol (VITAMIN D) 2000 units CAPS     fluticasone (FLONASE) 50 MCG/ACT nasal spray     vitamin D2 (ERGOCALCIFEROL) 03632 units (1250 mcg) capsule     Current Facility-Administered Medications   Medication      dexamethasone (DECADRON) injection 2 mL     lidocaine 1 % injection 4 mL       Allergies   Allergen Reactions     Penicillins Hives       Social History     Tobacco Use     Smoking status: Current Some Day Smoker     Packs/day: 1.00     Years: 26.00     Pack years: 26.00     Types: Cigarettes     Smokeless tobacco: Never Used     Tobacco comment: 3-4 cigs per week    Substance Use Topics     Alcohol use: Yes     Frequency: Monthly or less     Drinks per session: 1 or 2     Drug use: No       Family History   Problem Relation Age of Onset     Rheumatoid Arthritis Mother      Coronary Artery Disease Mother         s/p MI and PCIs in 50s     Rheumatoid Arthritis Sister      Myocardial Infarction Maternal Grandmother         in her 60s     Myocardial Infarction Maternal Grandfather         later in life     Diabetes No family hx of      Cerebrovascular Disease No family hx of      Colon Cancer No family hx of      Breast Cancer No family hx of      Ovarian Cancer No family hx of        C: NEGATIVE for fever, chills, change in weight  HEENT: NEGATIVE for visual changes, runny nose, epistaxis, ear pain, tinnitus, tooth ache, sore throat, difficulty with swallowing, sinus pain  R: NEGATIVE for significant cough or SOB  CV: NEGATIVE for chest pain, palpitations or peripheral edema  BREAST: NEGATIVE For soreness, pain, lumps or nipple discharge  GI: NEGATIVE for nausea, abdominal pain, heartburn, or change in bowel habits  : NEGATIVE for frequency, dysuria, hematuria, vaginal discharge, or irregular bleeding  Neuro: NEGATIVE for numbness, tingling, focal weakness, or headache  INT: NEGATIVE for rashes, lesions or pruritis   PSYCH: NEGATIVE for anxiety, depression, or halluciinations    Exam:   BP (!) 140/92 (BP Location: Left arm, Cuff Size: Adult Large)   Wt 111.1 kg (244 lb 14.4 oz)   LMP  (LMP Unknown)   Breastfeeding No   BMI 41.71 kg/m    General Appearance: Well nourished, well developed female, NAD,  AOx3  Neurological: Mental Status Normal and Station and Gait Normal   Skin: Normal skin turgor  HEET: Atraumatic, normocephalic, EOMI  Neck: Supple,no adenopathy,thyroid normal  Lungs: Good respiratory effort  Abdomen: Soft, NT, ND, no masses  Pelvic: deferred  Extremities: No clubbing, no cyanosis and no edema      Pelvic US 1/5/2021  CLINICAL INFORMATION   Indications for ultrasound:   Bleeding/Menses - Post munira bleeding   LMP: post munira 5 yrs    Hormones: none   Measurements:  Uterus:  8.6 x 4.2 x 3.8 cm     Position is anteverted.  Contour is smooth/regular.   Endo cav: 11.9 mm       Prominent   Right ovary: nv    Left ovary:   nv    Cul de sac: no free fluid   ===================================   Complete pelvic ultrasound using realtime transabdominal and transvaginal scanning.   Findings:  Normal appearing bladder.  Bilateral ovaries not visualized  Prominent irregular endometrial lining  No free fluid in the cul de sac   Impression:  Thickened heterogeneous endometrial lining in a postmenopausal woman: recommend endometrial sampling.   Marjorie Nixon MD FACOG    Pathology  Endometrial biopsy 1/7/2021  FINAL DIAGNOSIS:   Endometrium, biopsy   - Benign proliferative endometrium, foci consistent with endometrial polyp    present, no evidence of hyperplasia or malignancy   Electronically signed out by: Gorge Butler M.D.    A/P: Postmenopausal bleeding  -- firstly, assured patient of benign pathology and rule out of endometrial hyperplasia/malignancy   -- in light of mention of an endometrial polyp as well as mention of prominent endometrial lining seen on pelvic ultrasound, discussed the possibility of an endometrial polyp present in the uterine cavity that could be the likely cause of her postmenopausal bleeding; described that to address the polyp, a hysteroscopy with polypectomy would need to be pursued; reviewed method of the procedure and associated risk  -- patient however expresses desire to  pursue hysterectomy mainly due to anxiety surrounding future recurrence of vaginal bleeding and further work-up that would need to be done for it and the pain associated with it  -- therefore, discussed hysterectomy with patient; in her case, proposed surgery discussed was robotic assisted total laparoscopic hysterectomy, bilateral salpingectomy; reviewed sparing of ovaries or not and consequences associated with either predicament in that absence of ovaries may lead to worsening of other co-morbidities ie cardiovascular, osteoporosis; patient will think about fate of her ovaries if she elects to have then also removed at the time of her hysterectomy  -- reviewed method of procedure as well as slim chance of laparotomy conversion   -- reviewed risks including but not limited to bleeding, infection, need for blood transfusion, injury to surrounding organs (ie bowel/intestines, bladder, ureters, major blood vessels and nerves)., which if injured, then will be identified and repaired at time of surgery or will be repaired by surgeons that specialize in those areas. Also reviewed unintended injuries going unnoticed at the time of surgery, and that would require additional surgery to be done to have them repaired. Patient conveys understanding of the risks and agrees to proceed.   -- bleeding precautions reviewed  -- all other questions and concerns were addressed    Total time spent was 30 minutes on the date of the encounter in chart review, patient visit, review of tests, documentation and counseling on the above medical condition.    Jonathan Peters MD  [facility]

## 2021-01-21 NOTE — TELEPHONE ENCOUNTER
Type of surgery: robotic assisted total laparoscopic hysterectomy, bilateral salpingectomy, possible bilateral oophorectomy, cystoscopy  Location of surgery: [facility]  Date and time of surgery: 2/9/21 @ 11:15 am  Surgeon: Dr. Castillo  Pre-Op Appt Date: Patient advised to schedule.  Post-Op Appt Date: 3/8/21   Packet sent out: Yes  Pre-cert/Authorization completed:  No  Date: 1/21/21

## 2021-01-21 NOTE — NURSING NOTE
"Chief Complaint   Patient presents with     Follow Up     U/S 2021   EMB 2021   Discuss surgery        Initial BP (!) 140/92 (BP Location: Left arm, Cuff Size: Adult Large)   Wt 111.1 kg (244 lb 14.4 oz)   LMP  (LMP Unknown)   Breastfeeding No   BMI 41.71 kg/m   Estimated body mass index is 41.71 kg/m  as calculated from the following:    Height as of 21: 1.632 m (5' 4.25\").    Weight as of this encounter: 111.1 kg (244 lb 14.4 oz).  BP completed using cuff size: large    Questioned patient about current smoking habits.  Pt. currently smokes.  Advised about smoking cessation.          The following HM Due: NONE      Nguyen Quick CMA on 2021 at 7:59 AM       "

## 2021-01-25 ENCOUNTER — TELEPHONE (OUTPATIENT)
Dept: OBGYN | Facility: CLINIC | Age: 56
End: 2021-01-25

## 2021-01-27 DIAGNOSIS — Z11.59 ENCOUNTER FOR SCREENING FOR OTHER VIRAL DISEASES: ICD-10-CM

## 2021-02-01 ENCOUNTER — OFFICE VISIT (OUTPATIENT)
Dept: INTERNAL MEDICINE | Facility: CLINIC | Age: 56
End: 2021-02-01
Payer: COMMERCIAL

## 2021-02-01 VITALS
DIASTOLIC BLOOD PRESSURE: 80 MMHG | BODY MASS INDEX: 41.56 KG/M2 | WEIGHT: 244 LBS | TEMPERATURE: 97.6 F | HEART RATE: 88 BPM | RESPIRATION RATE: 18 BRPM | OXYGEN SATURATION: 99 % | SYSTOLIC BLOOD PRESSURE: 126 MMHG

## 2021-02-01 DIAGNOSIS — Z01.818 PREOPERATIVE EXAMINATION: Primary | ICD-10-CM

## 2021-02-01 DIAGNOSIS — N95.0 POST-MENOPAUSAL BLEEDING: ICD-10-CM

## 2021-02-01 PROCEDURE — 99213 OFFICE O/P EST LOW 20 MIN: CPT | Performed by: INTERNAL MEDICINE

## 2021-02-01 NOTE — H&P (VIEW-ONLY)
ASSESSMENT/PLAN:                                                      Jayne Tyler is a pleasant 55 year old female who presents for a preoperative evaluation. She is undergoing an intermediate risk procedure. Her risk of major cardiac event is 0 points: 0.4%.    (Z01.818) Preoperative examination  (primary encounter diagnosis)  (N95.0) Post-menopausal bleeding  Plan: no additional evaluation, cardiac or otherwise, indicated prior to surgery; no medication adjustments indicated prior to surgery.    RECOMMEND PROCEEDING WITH SURGERY: YES    Loli Aquino MD   [address] [address]  T: [phone], F: [phone]    SUBJECTIVE:                                                      Jayne Tyler is a very pleasant 55 year old female who presents for preoperative evaluation.    Surgeon: Fran  Date: 2/9/2021  Location:  OR  Surgery: Robotic assisted total laparoscopic hysterectomy, bilateral salpingectomy, possible bilateral oophorectomy, cystoscopy (intermediate risk)  Indication: Postmenopausal bleeding    Past Medical History:   Diagnosis Date     Morbid obesity (H)      Pure hypercholesterolemia      Personal or family history of excessive or prolonged bleeding? No  Personal or family history of blood clots? No  History of snoring/Sleep Apnea? YES - Snores, but has never been tested for ZHANE  History of blood transfusions? No    Cardiovascular risk: None (0 points)    Risk of major cardiac event: 0 points: 0.4%     Past Surgical History:   Procedure Laterality Date     ARTHROSCOPY SHOULDER Right 2015     ARTHROSCOPY SHOULDER, OPEN ROTATOR CUFF REPAIR, COMBINED Left 03/29/2017    Procedure:  Left shoulder arthroscopy and arthroscopic subacromial decompression (acromioplasty, bursectomy and coracoacromial ligament resection). Arthroscopic distal clavicle resection. Mini-open rotator cuff tear repair. Surgeon:  Kevin George MD  Location:  [facility]     BUNIONECTOMY Right 2008     DISKECTOMY, LUMBAR, SINGLE SP  1996    L5-S1     DISKECTOMY, LUMBAR, SINGLE SP  1997    L5-S1     FUSION LUMBAR ANTERIOR ONE LEVEL  1999    L5-S1     HC SACROPLASTY       OSTEOTOMY FOOT Right 06/06/2017    Procedure: OSTEOTOMY FOOT;  1)  WEIL OSTEOTOMY RIGHT SECOND METATARSAL, 2) PLANTAR CAPSULE DEBRIDEMENT/SYNOVECTOMY, RIGHT SECOND METATARSAL PHALANGEAL JOINT, 3) HAMMERTOE REPAIR RIGHT SECOND TOE;  Surgeon: Olvni Betancur DPM;  [facility]     RECONSTRUCT FOREFOOT WITH METATARSOPHALANGEAL (MTP) FUSION Right 06/06/2017    Procedure: RECONSTRUCT FOREFOOT WITH METATARSOPHALANGEAL (MTP) FUSION;;  Surgeon: Olvin Betancur DPM;  [facility]     REPAIR HAMMER TOE Right 06/06/2017    Procedure: REPAIR HAMMER TOE;;  Surgeon: Olvin Betancur DPM;  [facility]     ROTATOR CUFF REPAIR RT/LT Left 2017     Artificial assistive devices, such as pacemakers, artificial cardiac valves, or artificial joints? YES - Titanium hussein in her back, pain in her right foot     Allergies   Allergen Reactions     Penicillins Hives     Personal or family history of allergy to anesthesia? No  Allergy to local or topical analgesics? No  Allergy to latex? No  Allergy to adhesives/skin preparations (chlorhexadine)? No     Current Outpatient Medications   Medication Sig     buPROPion (WELLBUTRIN XL) 150 MG 24 hr tablet Take 1 tablet (150 mg) by mouth every morning     Cholecalciferol (VITAMIN D) 2000 units CAPS Take 2,000 Units by mouth daily     fluticasone (FLONASE) 50 MCG/ACT nasal spray Spray 2 sprays into both nostrils daily     vitamin D2 (ERGOCALCIFEROL) 85959 units (1250 mcg) capsule Take 1 capsule (50,000 Units) by mouth once a week     Over the counter medications? No  Vitamin Supplements? Other than above, no  Herbal Supplements? No     Family History   Problem Relation Age of Onset     Rheumatoid Arthritis Mother      Coronary Artery Disease Mother         s/p MI  and PCIs in 50s     Rheumatoid Arthritis Sister      Myocardial Infarction Maternal Grandmother         in her 60s     Myocardial Infarction Maternal Grandfather         later in life     Diabetes No family hx of      Cerebrovascular Disease No family hx of      Colon Cancer No family hx of      Breast Cancer No family hx of      Ovarian Cancer No family hx of      Social History     Occupational History     Occupation:    Tobacco Use     Smoking status: Current Some Day Smoker     Packs/day: 1.00     Years: 26.00     Pack years: 26.00     Types: Cigarettes     Smokeless tobacco: Never Used     Tobacco comment: 3-4 cigs per week    Substance and Sexual Activity     Alcohol use: Yes     Frequency: Monthly or less     Drinks per session: 1 or 2     Drug use: No     Sexual activity: Yes     Partners: Male   Social History Narrative    . In a long-term relationship.    3 adult kids.    No grandchildren.    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     Functional capacity: Can do heavy work around the house like scrubbing floors or moving heavy furniture (7 METs)    OBJECTIVE:                                                      /80 (BP Location: Left arm, Patient Position: Chair, Cuff Size: Adult Large)   Pulse 88   Temp 97.6  F (36.4  C) (Temporal)   Resp 18   Wt 110.7 kg (244 lb)   LMP  (LMP Unknown)   SpO2 99%   BMI 41.56 kg/m    Constitutional: well-appearing  Respiratory: normal respiratory effort; clear to auscultation bilaterally  Cardiovascular: regular rate and rhythm; no edema  Gastrointestinal: soft, non-tender, non-distended, and bowel sounds present; no organomegaly or masses  Musculoskeletal: normal gait and station  Psych: normal judgment and insight; normal mood and affect    ---    (Chart documentation was completed, in part, with [employer] voice-recognition software. Even though reviewed, some grammatical, spelling, and word errors may remain.)

## 2021-02-01 NOTE — PROGRESS NOTES
ASSESSMENT/PLAN:                                                      Jayne Tyler is a pleasant 55 year old female who presents for a preoperative evaluation. She is undergoing an intermediate risk procedure. Her risk of major cardiac event is 0 points: 0.4%.    (Z01.818) Preoperative examination  (primary encounter diagnosis)  (N95.0) Post-menopausal bleeding  Plan: no additional evaluation, cardiac or otherwise, indicated prior to surgery; no medication adjustments indicated prior to surgery.    RECOMMEND PROCEEDING WITH SURGERY: YES    Loli Aquino MD   [address] [address]  T: [phone], F: [phone]    SUBJECTIVE:                                                      Jayne Tyler is a very pleasant 55 year old female who presents for preoperative evaluation.    Surgeon: Fran  Date: 2/9/2021  Location:  OR  Surgery: Robotic assisted total laparoscopic hysterectomy, bilateral salpingectomy, possible bilateral oophorectomy, cystoscopy (intermediate risk)  Indication: Postmenopausal bleeding    Past Medical History:   Diagnosis Date     Morbid obesity (H)      Pure hypercholesterolemia      Personal or family history of excessive or prolonged bleeding? No  Personal or family history of blood clots? No  History of snoring/Sleep Apnea? YES - Snores, but has never been tested for ZHANE  History of blood transfusions? No    Cardiovascular risk: None (0 points)    Risk of major cardiac event: 0 points: 0.4%     Past Surgical History:   Procedure Laterality Date     ARTHROSCOPY SHOULDER Right 2015     ARTHROSCOPY SHOULDER, OPEN ROTATOR CUFF REPAIR, COMBINED Left 03/29/2017    Procedure:  Left shoulder arthroscopy and arthroscopic subacromial decompression (acromioplasty, bursectomy and coracoacromial ligament resection). Arthroscopic distal clavicle resection. Mini-open rotator cuff tear repair. Surgeon:  Kevin George MD  Location:  [facility]     BUNIONECTOMY Right 2008     DISKECTOMY, LUMBAR, SINGLE SP  1996    L5-S1     DISKECTOMY, LUMBAR, SINGLE SP  1997    L5-S1     FUSION LUMBAR ANTERIOR ONE LEVEL  1999    L5-S1     HC SACROPLASTY       OSTEOTOMY FOOT Right 06/06/2017    Procedure: OSTEOTOMY FOOT;  1)  WEIL OSTEOTOMY RIGHT SECOND METATARSAL, 2) PLANTAR CAPSULE DEBRIDEMENT/SYNOVECTOMY, RIGHT SECOND METATARSAL PHALANGEAL JOINT, 3) HAMMERTOE REPAIR RIGHT SECOND TOE;  Surgeon: Olvin Betancur DPM;  [facility]     RECONSTRUCT FOREFOOT WITH METATARSOPHALANGEAL (MTP) FUSION Right 06/06/2017    Procedure: RECONSTRUCT FOREFOOT WITH METATARSOPHALANGEAL (MTP) FUSION;;  Surgeon: Olvin Betancur DPM;  [facility]     REPAIR HAMMER TOE Right 06/06/2017    Procedure: REPAIR HAMMER TOE;;  Surgeon: Olvin Betancur DPM;  [facility]     ROTATOR CUFF REPAIR RT/LT Left 2017     Artificial assistive devices, such as pacemakers, artificial cardiac valves, or artificial joints? YES - Titanium hussein in her back, pain in her right foot     Allergies   Allergen Reactions     Penicillins Hives     Personal or family history of allergy to anesthesia? No  Allergy to local or topical analgesics? No  Allergy to latex? No  Allergy to adhesives/skin preparations (chlorhexadine)? No     Current Outpatient Medications   Medication Sig     buPROPion (WELLBUTRIN XL) 150 MG 24 hr tablet Take 1 tablet (150 mg) by mouth every morning     Cholecalciferol (VITAMIN D) 2000 units CAPS Take 2,000 Units by mouth daily     fluticasone (FLONASE) 50 MCG/ACT nasal spray Spray 2 sprays into both nostrils daily     vitamin D2 (ERGOCALCIFEROL) 27024 units (1250 mcg) capsule Take 1 capsule (50,000 Units) by mouth once a week     Over the counter medications? No  Vitamin Supplements? Other than above, no  Herbal Supplements? No     Family History   Problem Relation Age of Onset     Rheumatoid Arthritis Mother      Coronary Artery Disease Mother         s/p MI  and PCIs in 50s     Rheumatoid Arthritis Sister      Myocardial Infarction Maternal Grandmother         in her 60s     Myocardial Infarction Maternal Grandfather         later in life     Diabetes No family hx of      Cerebrovascular Disease No family hx of      Colon Cancer No family hx of      Breast Cancer No family hx of      Ovarian Cancer No family hx of      Social History     Occupational History     Occupation:    Tobacco Use     Smoking status: Current Some Day Smoker     Packs/day: 1.00     Years: 26.00     Pack years: 26.00     Types: Cigarettes     Smokeless tobacco: Never Used     Tobacco comment: 3-4 cigs per week    Substance and Sexual Activity     Alcohol use: Yes     Frequency: Monthly or less     Drinks per session: 1 or 2     Drug use: No     Sexual activity: Yes     Partners: Male   Social History Narrative    . In a long-term relationship.    3 adult kids.    No grandchildren.    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     Functional capacity: Can do heavy work around the house like scrubbing floors or moving heavy furniture (7 METs)    OBJECTIVE:                                                      /80 (BP Location: Left arm, Patient Position: Chair, Cuff Size: Adult Large)   Pulse 88   Temp 97.6  F (36.4  C) (Temporal)   Resp 18   Wt 110.7 kg (244 lb)   LMP  (LMP Unknown)   SpO2 99%   BMI 41.56 kg/m    Constitutional: well-appearing  Respiratory: normal respiratory effort; clear to auscultation bilaterally  Cardiovascular: regular rate and rhythm; no edema  Gastrointestinal: soft, non-tender, non-distended, and bowel sounds present; no organomegaly or masses  Musculoskeletal: normal gait and station  Psych: normal judgment and insight; normal mood and affect    ---    (Chart documentation was completed, in part, with [employer] voice-recognition software. Even though reviewed, some grammatical, spelling, and word errors may remain.)

## 2021-02-03 RX ORDER — MULTIPLE VITAMINS W/ MINERALS TAB 9MG-400MCG
1 TAB ORAL DAILY
COMMUNITY

## 2021-02-05 DIAGNOSIS — Z11.59 ENCOUNTER FOR SCREENING FOR OTHER VIRAL DISEASES: ICD-10-CM

## 2021-02-05 LAB
SARS-COV-2 RNA RESP QL NAA+PROBE: NORMAL
SPECIMEN SOURCE: NORMAL

## 2021-02-05 PROCEDURE — U0003 INFECTIOUS AGENT DETECTION BY NUCLEIC ACID (DNA OR RNA); SEVERE ACUTE RESPIRATORY SYNDROME CORONAVIRUS 2 (SARS-COV-2) (CORONAVIRUS DISEASE [COVID-19]), AMPLIFIED PROBE TECHNIQUE, MAKING USE OF HIGH THROUGHPUT TECHNOLOGIES AS DESCRIBED BY CMS-2020-01-R: HCPCS | Performed by: OBSTETRICS & GYNECOLOGY

## 2021-02-05 PROCEDURE — U0005 INFEC AGEN DETEC AMPLI PROBE: HCPCS | Performed by: OBSTETRICS & GYNECOLOGY

## 2021-02-06 LAB
LABORATORY COMMENT REPORT: NORMAL
SARS-COV-2 RNA RESP QL NAA+PROBE: NEGATIVE
SPECIMEN SOURCE: NORMAL

## 2021-02-09 ENCOUNTER — ANESTHESIA (OUTPATIENT)
Dept: SURGERY | Facility: CLINIC | Age: 56
End: 2021-02-09
Payer: COMMERCIAL

## 2021-02-09 ENCOUNTER — TELEPHONE (OUTPATIENT)
Dept: OBGYN | Facility: CLINIC | Age: 56
End: 2021-02-09

## 2021-02-09 ENCOUNTER — HOSPITAL ENCOUNTER (OUTPATIENT)
Facility: CLINIC | Age: 56
Discharge: HOME | End: 2021-02-09
Attending: OBSTETRICS & GYNECOLOGY | Admitting: OBSTETRICS & GYNECOLOGY
Payer: COMMERCIAL

## 2021-02-09 ENCOUNTER — ANESTHESIA EVENT (OUTPATIENT)
Dept: SURGERY | Facility: CLINIC | Age: 56
End: 2021-02-09
Payer: COMMERCIAL

## 2021-02-09 VITALS
HEIGHT: 64 IN | DIASTOLIC BLOOD PRESSURE: 69 MMHG | OXYGEN SATURATION: 97 % | BODY MASS INDEX: 41.32 KG/M2 | WEIGHT: 242 LBS | RESPIRATION RATE: 14 BRPM | HEART RATE: 75 BPM | SYSTOLIC BLOOD PRESSURE: 138 MMHG | TEMPERATURE: 98.9 F

## 2021-02-09 DIAGNOSIS — N95.0 POSTMENOPAUSAL BLEEDING: ICD-10-CM

## 2021-02-09 DIAGNOSIS — Z90.710 HISTORY OF ROBOT-ASSISTED LAPAROSCOPIC HYSTERECTOMY: Primary | ICD-10-CM

## 2021-02-09 DIAGNOSIS — Z90.722 S/P BILATERAL SALPINGO-OOPHORECTOMY: ICD-10-CM

## 2021-02-09 LAB
ABO + RH BLD: NORMAL
ABO + RH BLD: NORMAL
BLD GP AB SCN SERPL QL: NORMAL
BLOOD BANK CMNT PATIENT-IMP: NORMAL
HGB BLD-MCNC: 13.7 G/DL (ref 11.7–15.7)
SPECIMEN EXP DATE BLD: NORMAL

## 2021-02-09 PROCEDURE — 250N000009 HC RX 250: Performed by: OBSTETRICS & GYNECOLOGY

## 2021-02-09 PROCEDURE — 86901 BLOOD TYPING SEROLOGIC RH(D): CPT | Performed by: ANESTHESIOLOGY

## 2021-02-09 PROCEDURE — 360N000080 HC SURGERY LEVEL 7, PER MIN: Performed by: OBSTETRICS & GYNECOLOGY

## 2021-02-09 PROCEDURE — 88307 TISSUE EXAM BY PATHOLOGIST: CPT | Mod: 26 | Performed by: PATHOLOGY

## 2021-02-09 PROCEDURE — 250N000009 HC RX 250: Performed by: ANESTHESIOLOGY

## 2021-02-09 PROCEDURE — 86900 BLOOD TYPING SEROLOGIC ABO: CPT | Performed by: ANESTHESIOLOGY

## 2021-02-09 PROCEDURE — 250N000009 HC RX 250: Performed by: NURSE ANESTHETIST, CERTIFIED REGISTERED

## 2021-02-09 PROCEDURE — 88307 TISSUE EXAM BY PATHOLOGIST: CPT | Mod: TC | Performed by: OBSTETRICS & GYNECOLOGY

## 2021-02-09 PROCEDURE — 86850 RBC ANTIBODY SCREEN: CPT | Performed by: ANESTHESIOLOGY

## 2021-02-09 PROCEDURE — 36415 COLL VENOUS BLD VENIPUNCTURE: CPT | Performed by: ANESTHESIOLOGY

## 2021-02-09 PROCEDURE — 250N000013 HC RX MED GY IP 250 OP 250 PS 637: Performed by: OBSTETRICS & GYNECOLOGY

## 2021-02-09 PROCEDURE — 250N000011 HC RX IP 250 OP 636: Performed by: NURSE ANESTHETIST, CERTIFIED REGISTERED

## 2021-02-09 PROCEDURE — 258N000003 HC RX IP 258 OP 636: Performed by: OBSTETRICS & GYNECOLOGY

## 2021-02-09 PROCEDURE — 272N000004 HC RX 272: Performed by: OBSTETRICS & GYNECOLOGY

## 2021-02-09 PROCEDURE — 85018 HEMOGLOBIN: CPT | Performed by: ANESTHESIOLOGY

## 2021-02-09 PROCEDURE — 58571 TLH W/T/O 250 G OR LESS: CPT | Performed by: OBSTETRICS & GYNECOLOGY

## 2021-02-09 PROCEDURE — 93010 ELECTROCARDIOGRAM REPORT: CPT | Performed by: INTERNAL MEDICINE

## 2021-02-09 PROCEDURE — 370N000017 HC ANESTHESIA TECHNICAL FEE, PER MIN: Performed by: OBSTETRICS & GYNECOLOGY

## 2021-02-09 PROCEDURE — S2900 ROBOTIC SURGICAL SYSTEM: HCPCS | Performed by: OBSTETRICS & GYNECOLOGY

## 2021-02-09 PROCEDURE — 272N000001 HC OR GENERAL SUPPLY STERILE: Performed by: OBSTETRICS & GYNECOLOGY

## 2021-02-09 PROCEDURE — 710N000009 HC RECOVERY PHASE 1, LEVEL 1, PER MIN: Performed by: OBSTETRICS & GYNECOLOGY

## 2021-02-09 PROCEDURE — 999N000141 HC STATISTIC PRE-PROCEDURE NURSING ASSESSMENT: Performed by: OBSTETRICS & GYNECOLOGY

## 2021-02-09 PROCEDURE — 250N000011 HC RX IP 250 OP 636: Performed by: OBSTETRICS & GYNECOLOGY

## 2021-02-09 PROCEDURE — 258N000003 HC RX IP 258 OP 636: Performed by: NURSE ANESTHETIST, CERTIFIED REGISTERED

## 2021-02-09 PROCEDURE — 258N000003 HC RX IP 258 OP 636: Performed by: ANESTHESIOLOGY

## 2021-02-09 PROCEDURE — 710N000012 HC RECOVERY PHASE 2, PER MINUTE: Performed by: OBSTETRICS & GYNECOLOGY

## 2021-02-09 PROCEDURE — 250N000013 HC RX MED GY IP 250 OP 250 PS 637: Performed by: ANESTHESIOLOGY

## 2021-02-09 RX ORDER — NALOXONE HYDROCHLORIDE 0.4 MG/ML
0.4 INJECTION, SOLUTION INTRAMUSCULAR; INTRAVENOUS; SUBCUTANEOUS
Status: DISCONTINUED | OUTPATIENT
Start: 2021-02-09 | End: 2021-02-09 | Stop reason: HOSPADM

## 2021-02-09 RX ORDER — DEXAMETHASONE SODIUM PHOSPHATE 4 MG/ML
INJECTION, SOLUTION INTRA-ARTICULAR; INTRALESIONAL; INTRAMUSCULAR; INTRAVENOUS; SOFT TISSUE PRN
Status: DISCONTINUED | OUTPATIENT
Start: 2021-02-09 | End: 2021-02-09

## 2021-02-09 RX ORDER — METOPROLOL TARTRATE 1 MG/ML
1-2 INJECTION, SOLUTION INTRAVENOUS EVERY 5 MIN PRN
Status: DISCONTINUED | OUTPATIENT
Start: 2021-02-09 | End: 2021-02-09 | Stop reason: HOSPADM

## 2021-02-09 RX ORDER — FENTANYL CITRATE 50 UG/ML
INJECTION, SOLUTION INTRAMUSCULAR; INTRAVENOUS PRN
Status: DISCONTINUED | OUTPATIENT
Start: 2021-02-09 | End: 2021-02-09

## 2021-02-09 RX ORDER — KETOROLAC TROMETHAMINE 30 MG/ML
INJECTION, SOLUTION INTRAMUSCULAR; INTRAVENOUS PRN
Status: DISCONTINUED | OUTPATIENT
Start: 2021-02-09 | End: 2021-02-09

## 2021-02-09 RX ORDER — ONDANSETRON 2 MG/ML
4 INJECTION INTRAMUSCULAR; INTRAVENOUS EVERY 30 MIN PRN
Status: DISCONTINUED | OUTPATIENT
Start: 2021-02-09 | End: 2021-02-09 | Stop reason: HOSPADM

## 2021-02-09 RX ORDER — CLINDAMYCIN PHOSPHATE 900 MG/50ML
900 INJECTION, SOLUTION INTRAVENOUS
Status: COMPLETED | OUTPATIENT
Start: 2021-02-09 | End: 2021-02-09

## 2021-02-09 RX ORDER — ACETAMINOPHEN 325 MG/1
975 TABLET ORAL ONCE
Status: COMPLETED | OUTPATIENT
Start: 2021-02-09 | End: 2021-02-09

## 2021-02-09 RX ORDER — ONDANSETRON 4 MG/1
4 TABLET, ORALLY DISINTEGRATING ORAL
Status: DISCONTINUED | OUTPATIENT
Start: 2021-02-09 | End: 2021-02-09 | Stop reason: HOSPADM

## 2021-02-09 RX ORDER — ONDANSETRON 2 MG/ML
INJECTION INTRAMUSCULAR; INTRAVENOUS PRN
Status: DISCONTINUED | OUTPATIENT
Start: 2021-02-09 | End: 2021-02-09

## 2021-02-09 RX ORDER — SCOLOPAMINE TRANSDERMAL SYSTEM 1 MG/1
1 PATCH, EXTENDED RELEASE TRANSDERMAL
Status: DISCONTINUED | OUTPATIENT
Start: 2021-02-09 | End: 2021-02-09 | Stop reason: HOSPADM

## 2021-02-09 RX ORDER — CLINDAMYCIN PHOSPHATE 900 MG/50ML
900 INJECTION, SOLUTION INTRAVENOUS SEE ADMIN INSTRUCTIONS
Status: DISCONTINUED | OUTPATIENT
Start: 2021-02-09 | End: 2021-02-09 | Stop reason: HOSPADM

## 2021-02-09 RX ORDER — GLYCOPYRROLATE 0.2 MG/ML
INJECTION, SOLUTION INTRAMUSCULAR; INTRAVENOUS PRN
Status: DISCONTINUED | OUTPATIENT
Start: 2021-02-09 | End: 2021-02-09

## 2021-02-09 RX ORDER — BUPIVACAINE HYDROCHLORIDE 5 MG/ML
INJECTION, SOLUTION EPIDURAL; INTRACAUDAL PRN
Status: DISCONTINUED | OUTPATIENT
Start: 2021-02-09 | End: 2021-02-09 | Stop reason: HOSPADM

## 2021-02-09 RX ORDER — NEOSTIGMINE METHYLSULFATE 1 MG/ML
VIAL (ML) INJECTION PRN
Status: DISCONTINUED | OUTPATIENT
Start: 2021-02-09 | End: 2021-02-09

## 2021-02-09 RX ORDER — PROPOFOL 10 MG/ML
INJECTION, EMULSION INTRAVENOUS CONTINUOUS PRN
Status: DISCONTINUED | OUTPATIENT
Start: 2021-02-09 | End: 2021-02-09

## 2021-02-09 RX ORDER — ACETAMINOPHEN 325 MG/1
650 TABLET ORAL
Status: DISCONTINUED | OUTPATIENT
Start: 2021-02-09 | End: 2021-02-09 | Stop reason: HOSPADM

## 2021-02-09 RX ORDER — LIDOCAINE HYDROCHLORIDE 10 MG/ML
INJECTION, SOLUTION INFILTRATION; PERINEURAL PRN
Status: DISCONTINUED | OUTPATIENT
Start: 2021-02-09 | End: 2021-02-09

## 2021-02-09 RX ORDER — ACETAMINOPHEN 325 MG/1
650 TABLET ORAL EVERY 4 HOURS PRN
Qty: 50 TABLET | Refills: 0 | Status: SHIPPED | OUTPATIENT
Start: 2021-02-09 | End: 2022-12-05

## 2021-02-09 RX ORDER — NALOXONE HYDROCHLORIDE 0.4 MG/ML
0.2 INJECTION, SOLUTION INTRAMUSCULAR; INTRAVENOUS; SUBCUTANEOUS
Status: DISCONTINUED | OUTPATIENT
Start: 2021-02-09 | End: 2021-02-09 | Stop reason: HOSPADM

## 2021-02-09 RX ORDER — OXYCODONE HYDROCHLORIDE 5 MG/1
5-10 TABLET ORAL EVERY 4 HOURS PRN
Qty: 20 TABLET | Refills: 0 | Status: SHIPPED | OUTPATIENT
Start: 2021-02-09 | End: 2021-03-08

## 2021-02-09 RX ORDER — ONDANSETRON 4 MG/1
4-8 TABLET, ORALLY DISINTEGRATING ORAL EVERY 8 HOURS PRN
Qty: 4 TABLET | Refills: 0 | Status: SHIPPED | OUTPATIENT
Start: 2021-02-09 | End: 2021-03-08

## 2021-02-09 RX ORDER — PROPOFOL 10 MG/ML
INJECTION, EMULSION INTRAVENOUS PRN
Status: DISCONTINUED | OUTPATIENT
Start: 2021-02-09 | End: 2021-02-09

## 2021-02-09 RX ORDER — MEPERIDINE HYDROCHLORIDE 25 MG/ML
12.5 INJECTION INTRAMUSCULAR; INTRAVENOUS; SUBCUTANEOUS
Status: DISCONTINUED | OUTPATIENT
Start: 2021-02-09 | End: 2021-02-09 | Stop reason: HOSPADM

## 2021-02-09 RX ORDER — FENTANYL CITRATE 50 UG/ML
25-50 INJECTION, SOLUTION INTRAMUSCULAR; INTRAVENOUS
Status: DISCONTINUED | OUTPATIENT
Start: 2021-02-09 | End: 2021-02-09 | Stop reason: HOSPADM

## 2021-02-09 RX ORDER — IBUPROFEN 600 MG/1
600 TABLET, FILM COATED ORAL EVERY 6 HOURS PRN
Qty: 30 TABLET | Refills: 0 | Status: SHIPPED | OUTPATIENT
Start: 2021-02-09 | End: 2021-03-08

## 2021-02-09 RX ORDER — OXYCODONE HYDROCHLORIDE 5 MG/1
5-10 TABLET ORAL
Status: DISCONTINUED | OUTPATIENT
Start: 2021-02-09 | End: 2021-02-09 | Stop reason: HOSPADM

## 2021-02-09 RX ORDER — AMOXICILLIN 250 MG
1-2 CAPSULE ORAL 2 TIMES DAILY
Qty: 30 TABLET | Refills: 0 | Status: SHIPPED | OUTPATIENT
Start: 2021-02-09 | End: 2021-03-08

## 2021-02-09 RX ORDER — ALBUTEROL SULFATE 0.83 MG/ML
2.5 SOLUTION RESPIRATORY (INHALATION) EVERY 4 HOURS PRN
Status: DISCONTINUED | OUTPATIENT
Start: 2021-02-09 | End: 2021-02-09 | Stop reason: HOSPADM

## 2021-02-09 RX ORDER — EPHEDRINE SULFATE 50 MG/ML
INJECTION, SOLUTION INTRAMUSCULAR; INTRAVENOUS; SUBCUTANEOUS PRN
Status: DISCONTINUED | OUTPATIENT
Start: 2021-02-09 | End: 2021-02-09

## 2021-02-09 RX ORDER — PHENAZOPYRIDINE HYDROCHLORIDE 200 MG/1
200 TABLET, FILM COATED ORAL ONCE
Status: COMPLETED | OUTPATIENT
Start: 2021-02-09 | End: 2021-02-09

## 2021-02-09 RX ORDER — SODIUM CHLORIDE, SODIUM LACTATE, POTASSIUM CHLORIDE, CALCIUM CHLORIDE 600; 310; 30; 20 MG/100ML; MG/100ML; MG/100ML; MG/100ML
INJECTION, SOLUTION INTRAVENOUS CONTINUOUS
Status: DISCONTINUED | OUTPATIENT
Start: 2021-02-09 | End: 2021-02-09 | Stop reason: HOSPADM

## 2021-02-09 RX ORDER — ONDANSETRON 4 MG/1
4 TABLET, ORALLY DISINTEGRATING ORAL EVERY 30 MIN PRN
Status: DISCONTINUED | OUTPATIENT
Start: 2021-02-09 | End: 2021-02-09 | Stop reason: HOSPADM

## 2021-02-09 RX ADMIN — Medication 5 MG: at 11:52

## 2021-02-09 RX ADMIN — GLYCOPYRROLATE 0.4 MG: 0.2 INJECTION, SOLUTION INTRAMUSCULAR; INTRAVENOUS at 13:32

## 2021-02-09 RX ADMIN — FENTANYL CITRATE 100 MCG: 50 INJECTION, SOLUTION INTRAMUSCULAR; INTRAVENOUS at 11:10

## 2021-02-09 RX ADMIN — KETOROLAC TROMETHAMINE 30 MG: 30 INJECTION, SOLUTION INTRAMUSCULAR at 13:34

## 2021-02-09 RX ADMIN — GENTAMICIN SULFATE 160 MG: 40 INJECTION, SOLUTION INTRAMUSCULAR; INTRAVENOUS at 11:20

## 2021-02-09 RX ADMIN — GLYCOPYRROLATE 0.2 MG: 0.2 INJECTION, SOLUTION INTRAMUSCULAR; INTRAVENOUS at 11:10

## 2021-02-09 RX ADMIN — PHENAZOPYRIDINE 200 MG: 200 TABLET ORAL at 10:13

## 2021-02-09 RX ADMIN — SCOPALAMINE 1 PATCH: 1 PATCH, EXTENDED RELEASE TRANSDERMAL at 10:11

## 2021-02-09 RX ADMIN — PHENYLEPHRINE HYDROCHLORIDE 200 MCG: 10 INJECTION INTRAVENOUS at 11:22

## 2021-02-09 RX ADMIN — SODIUM CHLORIDE, POTASSIUM CHLORIDE, SODIUM LACTATE AND CALCIUM CHLORIDE: 600; 310; 30; 20 INJECTION, SOLUTION INTRAVENOUS at 12:05

## 2021-02-09 RX ADMIN — Medication 3 MG: at 13:32

## 2021-02-09 RX ADMIN — MIDAZOLAM 2 MG: 1 INJECTION INTRAMUSCULAR; INTRAVENOUS at 11:03

## 2021-02-09 RX ADMIN — ROCURONIUM BROMIDE 50 MG: 10 INJECTION INTRAVENOUS at 11:10

## 2021-02-09 RX ADMIN — PHENYLEPHRINE HYDROCHLORIDE 100 MCG: 10 INJECTION INTRAVENOUS at 11:29

## 2021-02-09 RX ADMIN — HYDROMORPHONE HYDROCHLORIDE 1 MG: 1 INJECTION, SOLUTION INTRAMUSCULAR; INTRAVENOUS; SUBCUTANEOUS at 11:43

## 2021-02-09 RX ADMIN — PHENYLEPHRINE HYDROCHLORIDE 100 MCG: 10 INJECTION INTRAVENOUS at 12:10

## 2021-02-09 RX ADMIN — SODIUM CHLORIDE, POTASSIUM CHLORIDE, SODIUM LACTATE AND CALCIUM CHLORIDE: 600; 310; 30; 20 INJECTION, SOLUTION INTRAVENOUS at 11:03

## 2021-02-09 RX ADMIN — ROCURONIUM BROMIDE 10 MG: 10 INJECTION INTRAVENOUS at 12:10

## 2021-02-09 RX ADMIN — PROPOFOL 200 MG: 10 INJECTION, EMULSION INTRAVENOUS at 11:10

## 2021-02-09 RX ADMIN — Medication 5 MG: at 11:50

## 2021-02-09 RX ADMIN — ONDANSETRON HYDROCHLORIDE 4 MG: 2 INJECTION, SOLUTION INTRAVENOUS at 13:07

## 2021-02-09 RX ADMIN — PROPOFOL 50 MCG/KG/MIN: 10 INJECTION, EMULSION INTRAVENOUS at 11:15

## 2021-02-09 RX ADMIN — CLINDAMYCIN PHOSPHATE 900 MG: 900 INJECTION, SOLUTION INTRAVENOUS at 11:28

## 2021-02-09 RX ADMIN — DEXAMETHASONE SODIUM PHOSPHATE 4 MG: 4 INJECTION, SOLUTION INTRA-ARTICULAR; INTRALESIONAL; INTRAMUSCULAR; INTRAVENOUS; SOFT TISSUE at 11:10

## 2021-02-09 RX ADMIN — PHENYLEPHRINE HYDROCHLORIDE 200 MCG: 10 INJECTION INTRAVENOUS at 11:37

## 2021-02-09 RX ADMIN — LIDOCAINE HYDROCHLORIDE 30 MG: 10 INJECTION, SOLUTION INFILTRATION; PERINEURAL at 11:10

## 2021-02-09 RX ADMIN — PHENYLEPHRINE HYDROCHLORIDE 200 MCG: 10 INJECTION INTRAVENOUS at 11:34

## 2021-02-09 RX ADMIN — ACETAMINOPHEN 975 MG: 325 TABLET, FILM COATED ORAL at 15:43

## 2021-02-09 ASSESSMENT — LIFESTYLE VARIABLES: TOBACCO_USE: 1

## 2021-02-09 ASSESSMENT — MIFFLIN-ST. JEOR: SCORE: 1686.67

## 2021-02-09 NOTE — ANESTHESIA CARE TRANSFER NOTE
Patient: Jayne Tyler    Procedure(s):  Robotic assisted total laparoscopic hysterectomy, bilateral salpingectomy, bilateral oophorectomy, cystoscopy    Diagnosis: Postmenopausal bleeding [N95.0]  Diagnosis Additional Information: No value filed.    Anesthesia Type:   General     Note:    Oropharynx: oropharynx clear of all foreign objects and spontaneously breathing  Level of Consciousness: drowsy  Oxygen Supplementation: face mask    Independent Airway: airway patency satisfactory and stable  Dentition: dentition unchanged  Vital Signs Stable: post-procedure vital signs reviewed and stable  Report to RN Given: handoff report given  Patient transferred to: PACU    Handoff Report: Identifed the Patient, Identified the Reponsible Provider, Reviewed the pertinent medical history, Discussed the surgical course, Reviewed Intra-OP anesthesia mangement and issues during anesthesia, Set expectations for post-procedure period and Allowed opportunity for questions and acknowledgement of understanding      Vitals: (Last set prior to Anesthesia Care Transfer)  CRNA VITALS  2/9/2021 1313 - 2/9/2021 1348      2/9/2021             Pulse:  91    SpO2:  98 %        Electronically Signed By: ELVIS Segal CRNA  February 9, 2021  1:48 PM

## 2021-02-09 NOTE — BRIEF OP NOTE
[facility]   Brief Op Note    Patient Name:Jayne Tyler  MRN: [id number]  YOB: 1965  Surgery Date: 2/9/2021    Surgeon: Jonathan Peters MD  Assistant: Glenny Smith CST, who assisted with entry into abdomen, placement of ports, docking of robot, retractions and closure of skin incisions.       Pre-operative Diagnosis: 1) Postmenopausal bleeding 2) Benign endometrial biopsy 3) Suspected endometrial polyp  Post-operative Diagnosis: Same  Procedure: Robotic assisted total laparoscopic hysterectomy, bilateral salpingo-oophorectomy, cystoscopy    Anesthesia: General endotracheal  FRA Score: 2    EBL: 50 mL   IV fluids: Refer to anesthesia records  Urine Output: Refer to anesthesia records for volume, but at the end of the case there was orange tinged (pyridium stained) urine noted in the Barber bag    Complications: None  Specimen: Uterus, cervix, bilateral fallopian tubes and ovaries  Drains: Barber catheter    Findings: Exam under anesthesia revealed a mobile anteverted 8 week size uterus without masses. No adnexal masses palpated. Laparoscopy revealed a normal appearing uterus, normal appearing bilateral fallopian tubes and ovaries. Ureters were seen vermiculating along the retroperitoneum at the pelvic side walls. Cystoscopy revealed efflux of orange tinged urine (pyridium stained) from bilateral ureteral orifices. Normal appearing appendix seen.     Technique: To follow with full operative note    Jonathan Peters MD  [facility]

## 2021-02-09 NOTE — ANESTHESIA PREPROCEDURE EVALUATION
Anesthesia Pre-Procedure Evaluation    Patient: Jayne Tyler   MRN: [id number] : 1965        Preoperative Diagnosis: Postmenopausal bleeding [N95.0]   Procedure : Procedure(s):  Robotic assisted total laparoscopic hysterectomy, bilateral salpingectomy, possible bilateral oophorectomy, cystoscopy     Past Medical History:   Diagnosis Date     Morbid obesity (H)      Pure hypercholesterolemia       Past Surgical History:   Procedure Laterality Date     ARTHROSCOPY SHOULDER Right      ARTHROSCOPY SHOULDER, OPEN ROTATOR CUFF REPAIR, COMBINED Left 2017    Procedure:  Left shoulder arthroscopy and arthroscopic subacromial decompression (acromioplasty, bursectomy and coracoacromial ligament resection). Arthroscopic distal clavicle resection. Mini-open rotator cuff tear repair. Surgeon:  Kevin George MD  Location: [facility]     BUNIONECTOMY Right      DISKECTOMY, LUMBAR, SINGLE SP      L5-S1     DISKECTOMY, LUMBAR, SINGLE SP      L5-S1     FUSION LUMBAR ANTERIOR ONE LEVEL      L5-S1     HC SACROPLASTY       OSTEOTOMY FOOT Right 2017    Procedure: OSTEOTOMY FOOT;  1)  WEIL OSTEOTOMY RIGHT SECOND METATARSAL, 2) PLANTAR CAPSULE DEBRIDEMENT/SYNOVECTOMY, RIGHT SECOND METATARSAL PHALANGEAL JOINT, 3) HAMMERTOE REPAIR RIGHT SECOND TOE;  Surgeon: Olvin Betancur DPM;  Location: [facility]     RECONSTRUCT FOREFOOT WITH METATARSOPHALANGEAL (MTP) FUSION Right 2017    Procedure: RECONSTRUCT FOREFOOT WITH METATARSOPHALANGEAL (MTP) FUSION;;  Surgeon: Olvin Betancur DPM;  Location:  [facility]     REPAIR HAMMER TOE Right 2017    Procedure: REPAIR HAMMER TOE;;  Surgeon: Olvin Betancur DPM;  Location:  [facility]     ROTATOR CUFF REPAIR RT/LT Left       Allergies   Allergen Reactions     Penicillins Hives      Social History     Tobacco Use     Smoking status: Current Some Day Smoker     Packs/day: 1.00     Years: 26.00     Pack years: 26.00     Types: Cigarettes      Smokeless tobacco: Never Used     Tobacco comment: 3-4 cigs per week    Substance Use Topics     Alcohol use: Yes     Frequency: Monthly or less     Drinks per session: 1 or 2     Comment: occ      Wt Readings from Last 1 Encounters:   02/09/21 109.8 kg (242 lb)        Anesthesia Evaluation            ROS/MED HX  ENT/Pulmonary:     (+) tobacco use,     Neurologic:  - neg neurologic ROS     Cardiovascular:     (+) Dyslipidemia -----    METS/Exercise Tolerance:     Hematologic:  - neg hematologic  ROS     Musculoskeletal:  - neg musculoskeletal ROS     GI/Hepatic:  - neg GI/hepatic ROS     Renal/Genitourinary:  - neg Renal ROS     Endo: Comment: .Body mass index is 41.22 kg/m .      (+) Obesity,     Psychiatric/Substance Use:  - neg psychiatric ROS     Infectious Disease:  - neg infectious disease ROS     Malignancy:  - neg malignancy ROS     Other: Comment: .Lab Test        01/04/21     05/10/19     03/10/17                       1734          0946          0837          WBC          11.5*        7.6          10.8          HGB          13.9         16.7*        15.1          MCV          96           95           98            PLT          268          220          296            Lab Test        11/09/20     05/10/19     03/10/17                       1758          0946          0837          NA           141          141          142           POTASSIUM    3.8          4.3          4.3           CHLORIDE     109          111*         109           CO2          25           26           28            BUN          11           17           13            CR           0.86         0.98         0.95          ANIONGAP     7            4            5             JEANETH          9.3          8.9          9.0           GLC          89           96           94             - neg other ROS          Physical Exam    Airway        Mallampati: II    Neck ROM: full     Respiratory Devices and Support         Dental            Cardiovascular   cardiovascular exam normal       Rhythm and rate: regular     Pulmonary   pulmonary exam normal                OUTSIDE LABS:  CBC:   Lab Results   Component Value Date    WBC 11.5 (H) 01/04/2021    WBC 7.6 05/10/2019    HGB 13.9 01/04/2021    HGB 16.7 (H) 05/10/2019    HCT 42.2 01/04/2021    HCT 50.2 (H) 05/10/2019     01/04/2021     05/10/2019     BMP:   Lab Results   Component Value Date     11/09/2020     05/10/2019    POTASSIUM 3.8 11/09/2020    POTASSIUM 4.3 05/10/2019    CHLORIDE 109 11/09/2020    CHLORIDE 111 (H) 05/10/2019    CO2 25 11/09/2020    CO2 26 05/10/2019    BUN 11 11/09/2020    BUN 17 05/10/2019    CR 0.86 11/09/2020    CR 0.98 05/10/2019    GLC 89 11/09/2020    GLC 96 05/10/2019     COAGS: No results found for: PTT, INR, FIBR  POC: No results found for: BGM, HCG, HCGS  HEPATIC:   Lab Results   Component Value Date    ALBUMIN 3.6 11/09/2020    PROTTOTAL 7.1 11/09/2020    ALT 26 11/09/2020    AST 22 11/09/2020    ALKPHOS 80 11/09/2020    BILITOTAL 0.5 11/09/2020     OTHER:   Lab Results   Component Value Date    JEANETH 9.3 11/09/2020    TSH 1.91 01/04/2021    SED 9 03/10/2017       Anesthesia Plan    ASA Status:  3      Anesthesia Type: General.   Induction: Intravenous, Propofol.     Maintenance: Balanced.        Consents    Anesthesia Plan(s) and associated risks, benefits, and realistic alternatives discussed. Questions answered and patient/representative(s) expressed understanding.     - Discussed with:  Patient         Postoperative Care    Pain management: IV analgesics, Oral pain medications, Multi-modal analgesia.   PONV prophylaxis: Ondansetron (or other 5HT-3), Dexamethasone or Solumedrol     Comments:                Fredy Ignacio DO

## 2021-02-09 NOTE — TELEPHONE ENCOUNTER
Form received from: Via Fax for pt; Jayne Tyler     Form requesting following info/need: [employer] STD     TAZ needed?: NA     Location of form: basket above [employer] de[employer]     When completed the route for return: fax to # on form

## 2021-02-09 NOTE — ANESTHESIA PROCEDURE NOTES
Airway   Date/Time: 2/9/2021 11:16 AM   Patient location during procedure: OR  Staff -   Performed By: CRNA    Consent for Airway   Urgency: elective    Indications and Patient Condition  Indications for airway management: diane-procedural  Induction type:intravenousMask difficulty assessment: 1 - vent by mask    Final Airway Details  Final airway type: endotracheal airway  Successful airway:ETT - single  Endotracheal Airway Details   ETT size (mm): 7.0  Cuffed: yes  Cuff volume (mL): 8  Successful intubation technique: direct laryngoscopy  Grade View of Cords: 1  Adjucts: stylet  Measured from: lips  Secured at (cm): 22  Secured with: plastic tape  Bite block used: Molar    Post intubation assessment   Placement verified by: capnometry, equal breath sounds and chest rise   Number of attempts at approach: 1  Number of other approaches attempted: 0  Secured with:plastic tape  Ease of procedure: easy  Dentition: Intact and Unchanged

## 2021-02-09 NOTE — DISCHARGE INSTRUCTIONS
LAPAROSCOPIC HYSTERECTOMY DISCHARGE INSTRUCTIONS    PLEASE RETURN TO THE CLINIC IN:  4 WEEKS    MAKE THIS APPOINTMENT AFTER YOU GET HOME IF IT HAS NOT ALREADY BEEN SCHEDULED.     YOU HAVE HAD A HYSTERECTOMY (SURGERY TO REMOVE YOUR UTERUS).  YOU CANNOT HAVE CHILDREN AFTER THIS SURGERY.  YOU WILL NO LONGER HAVE MONTHLY PERIODS, UNLESS YOU STILL HAVE YOUR CERVIX (CALLED SUBTOTAL HYSTERECTOMY).  IN THIS CASE, YOU MAY HAVE LIGHT MONTHLY BLEEDING.    DIET  YOU MAY FEEL LESS HUNGRY AT FIRST.  TRY TO EAT A WELL-BALANCED DIET WITH LOTS OF PROTEINS, FRUITS, VEGETABLES AND WHOLE GRAINS.  AVOID SPICY AND GREASY FOODS.    DRINK PLENTY OF FLUIDS--AT LEAST 8 TALL GLASSES A DAY.  WATER IS BEST.  TRY TO LIMIT CAFFEINE (FOUND IN COFFEE, TEA AND COLA DRINKS).  THIS HELPS PREVENT CONSTIPATION (HARD STOOLS THAT ARE DIFFICULT TO PASS).    IF CONSTIPATION IS A PROBLEM, YOU MAY TAKE ONE OF THESE MEDICINES:  DOCUSATE (COLACE), DOCUSATE WITH CASANTHRANOL (RIKY-COLACE), PSYLLIUM (METAMUCIL) OR MILK OF MAGNESIA.  YOU CAN BUY THESE AT THE DRUG STORE.  FOLLOW THE INSTRUCTIONS LISTED ON THE LABEL.    ACTIVITY  YOU WILL NEED PLENTY OF REST AT FIRST.  SLOWLY GO BACK TO YOUR NORMAL ACTIVITIES.  IT WILL HELP TO TAKE SEVERAL SHORT WALKS DURING THE DAY.  IT IS OKAY TO CLIMB STAIRS, BUT USE THE HANDRAIL IN CASE YOU GET DIZZY.    DO NOT DRIVE WHILE USING STRONG PAIN MEDICINE (NARCOTICS).  AFTER THAT, DO NOT DRIVE UNTIL YOU CAN STOMP ON THE BRAKES WITHOUT PAIN.    DO NOT USE TAMPONS, DOUCHE OR HAVE SEX (INTERCOURSE) UNTIL YOU SEE YOUR DOCTOR AGAIN.    DON'T LIFT OR PUSH ANYTHING OVER 20 POUNDS UNTIL YOUR DOCTOR SAYS IT'S SAFE.  AVOID HEAVY OR STRENUOUS EXERCISE.    YOUR DOCTOR WILL TELL YOU WHEN YOU CAN SAFELY RETURN TO WORK.    PAIN CONTROL  YOU MAY HAVE PAIN AROUND YOUR INCISIONS (SURGERY WOUNDS).  THIS SHOULD IMPROVE OVER THE NEXT WEEK.  USE YOUR PAIN MEDICINE AS DIRECTED.  IF YOU HAVE INCREASED PAIN, PLEASE CALL YOUR CLINIC.  IT IS NORMAL TO FEEL A  LITTLE SORE AFTER MILD EXERCISE.      FOR THE NEXT TWO DAYS, YOU MAY HAVE SOME PAIN IN YOUR SHOULDERS AND UPPER CHEST.  THIS IS FROM THE AIR PUMPED INTO YOUR BODY DURING THE SURGERY.  TO RELIEVE THIS TYPE OF PAIN, YOU MAY TAKE TYLENOL (ACETAMINOPHEN) OR ADVIL (IBUPROFEN).  FOLLOW THE INSTRUCTIONS LISTED ON THE LABEL.  DRINK A FULL GLASS OF WATER WITH EACH DOSE.  YOU CAN ALSO TRY LYING FLAT OR RAISING YOUR HIPS ABOVE SHOULDER LEVEL.    BATHING  YOU MAY SHOWER OR BATHE AT ANY TIME.  IF YOU TAKE A BATH, DON'T ADD ANYTHING TO THE BATH WATER.    CARING FOR YOUR STITCHES  YOUR BODY WILL ABSORB YOUR STITCHES OVER TIME.  KEEP THEM CLEAN AND DRY.  WEAR LOOSE, COMFORTABLE CLOTHES.    NORMAL SYMPTOMS  YOU MAY NOTICE A SMALL AMOUNT OF BLEEDING FROM YOUR VAGINA.  THIS COULD LAST UP TO A WEEK.  YOU MAY ALSO HAVE BROWN FLUID LEAKING FROM THE VAGINA.  THIS COULD LAST FOR A FEW WEEKS.  WEAR PADS AS NEEDED.    YOU MAY HAVE BRUISING ON YOUR BELLY.  YOU MIGHT ALSO HAVE A PUFFY FEELING IN YOUR BELLY.    YOU MAY SEE A LITTLE BLOOD OR FLUID OOZING FROM THE INCISION.    HORMONES  IF WE REMOVED YOUR OVARIES, YOU MAY NEED HORMONE MEDICINE (HT, OR HORMONE THERAPY).  DISCUSS THIS WITH YOUR DOCTOR.  IF WE DID NOT REMOVE YOUR OVARIES, YOU SHOULD REACH MENOPAUSE AT THE NORMAL TIME (IN GENERAL, BETWEEN AGES 40 AND 55).    CALL YOUR DOCTOR IF YOU HAVE:  SEVERE CHILLS AND FEVER .4 DEGREES FAHRENHEIT OR HIGHER, TAKEN UNDER THE TONGUE.   BRIGHT RED BLOOD OR LARGE CLOTS COMING OUT OF THE VAGINA--ENOUGH TO SOAK ONE PAD IN AN HOUR.  INCREASED PAIN, WARMTH, SWELLING, REDNESS, BLEEDING OR FLUID LEAKING FROM THE SURGERY SITE.  URINE OR VAGINAL FLUID THAT SMELLS BAD.  YOU CANNOT URINATE (USE THE TOILET), IT BURNS WHEN YOU URINATE OR YOU NEED TO GO MORE OFTEN.  SEVERE NAUSEA (FEELING SICK TO YOUR STOMACH) OR VOMITING (THROWING UP).  INCREASED PAIN THAT YOU CANNOT CONTROL WITH MEDICINE.          CYSTOSCOPY DISCHARGE INSTRUCTIONS    YOU MAY GO BACK TO  YOUR NORMAL DIET AND ACTIVITY, UNLESS YOUR DOCTOR TELLS YOU NOT TO.    FOR THE NEXT TWO DAYS, YOU MAY NOTICE:    SOME BLOOD IN YOUR URINE.  SOME BURNING WHEN YOU URINATE.  AN URGE TO URINATE MORE OFTEN.  BLADDER SPASMS.    THESE ARE NORMAL AFTER THE PROCEDURE.  THEY SHOULD GO AWAY AFTER A DAY OR TWO.  TO RELIEVE THESE PROBLEMS:     DRINK 6 TO 8 LARGE GLASSES OF WATER EACH DAY (INCLUDES DRINKS AT MEALS).  THIS WILL HELP CLEAR THE URINE.    TAKE WARM BATHS TO RELIEVE PAIN AND BLADDER SPASMS.  DO NOT ADD ANYTHING TO THE BATH WATER.    YOUR DOCTOR MAY PRESCRIBE PAIN MEDICINE.  YOU MAY ALSO TAKE TYLENOL (ACETAMINOPHEN) FOR PAIN.    CALL YOUR SURGEON IF YOU HAVE:    A FEVER OVER 101 DEGREES.  CHECK YOUR TEMPERATURE UNDER YOUR TONGUE.    CHILLS.    FAILURE TO URINATE (NO URINE COMES OUT WHEN YOU TRY TO USE THE TOILET).  TRY SOAKING IN A BATHTUB FULL OF WARM WATER.  IF STILL NO URINE, CALL YOUR DOCTOR.    A LOT OF BLOOD IN THE URINE, OR BLOOD CLOTS LARGER THAN A NICKEL.      PAIN IN THE BACK OR BELLY AREA (ABDOMEN).    PAIN OR SPASMS THAT ARE NOT RELIEVED BY WARM TUB BATHS AND PAIN MEDICINE.      SEVERE PAIN, BURNING OR OTHER PROBLEMS WHILE PASSING URINE.    PAIN THAT GETS WORSE AFTER TWO DAYS.          GENERAL ANESTHESIA OR SEDATION ADULT DISCHARGE INSTRUCTIONS   SPECIAL PRECAUTIONS FOR 24 HOURS AFTER SURGERY    IT IS NOT UNUSUAL TO FEEL LIGHT-HEADED OR FAINT, UP TO 24 HOURS AFTER SURGERY OR WHILE TAKING PAIN MEDICATION.  IF YOU HAVE THESE SYMPTOMS; SIT FOR A FEW MINUTES BEFORE STANDING AND HAVE SOMEONE ASSIST YOU WHEN YOU GET UP TO WALK OR USE THE BATHROOM.    YOU SHOULD REST AND RELAX FOR THE NEXT 24 HOURS AND YOU MUST MAKE ARRANGEMENTS TO HAVE SOMEONE STAY WITH YOU FOR AT LEAST 24 HOURS AFTER YOUR DISCHARGE.  AVOID HAZARDOUS AND STRENUOUS ACTIVITIES.  DO NOT MAKE IMPORTANT DECISIONS FOR 24 HOURS.    DO NOT DRIVE ANY VEHICLE OR OPERATE MECHANICAL EQUIPMENT FOR 24 HOURS FOLLOWING THE END OF YOUR SURGERY.  EVEN THOUGH YOU  MAY FEEL NORMAL, YOUR REACTIONS MAY BE AFFECTED BY THE MEDICATION YOU HAVE RECEIVED.    DO NOT DRINK ALCOHOLIC BEVERAGES FOR 24 HOURS FOLLOWING YOUR SURGERY.    DRINK CLEAR LIQUIDS (APPLE JUICE, GINGER ALE, 7-UP, BROTH, ETC.).  PROGRESS TO YOUR REGULAR DIET AS YOU FEEL ABLE.    YOU MAY HAVE A DRY MOUTH, A SORE THROAT, MUSCLES ACHES OR TROUBLE SLEEPING.  THESE SHOULD GO AWAY AFTER 24 HOURS.    CALL YOUR DOCTOR FOR ANY OF THE FOLLOWING:  SIGNS OF INFECTION (FEVER, GROWING TENDERNESS AT THE SURGERY SITE, A LARGE AMOUNT OF DRAINAGE OR BLEEDING, SEVERE PAIN, FOUL-SMELLING DRAINAGE, REDNESS OR SWELLING.    IT HAS BEEN OVER 8 TO 10 HOURS SINCE SURGERY AND YOU ARE STILL NOT ABLE TO URINATE (PASS WATER).         Maximum acetaminophen (Tylenol) dose from all sources should not exceed 4 grams (4000 mg) per day.  You received 975 mg of tylenol today at 3:45 p.m.        Transderm Scop (Scopolamine) Patch    The Transderm Scop patch placed behind your ear helps to prevent nausea and vomiting associated with the use of anesthesia and certain analgesics used during or after many types surgery.  You will need to remove this patch after 3 days.  However, it may be removed sooner if you are no longer concerned about nausea or vomiting.      The most common side effects:  ?  dryness of the mouth  ?  drowsiness  ?  blurred vision  ?  dilation of pupils  ?  disorientation, memory disturbances and/or confusion  ?  dizziness  ?  restlessness  ?  hallucinations  ?  difficulty urinating  ?  skin rash  ?  red or painful eyes    Avoid drinking alcohol while using Transderm Scop patch.  Be careful about driving or operating any machinery while using this medication since it may make you drowsy.  In the unlikely event that you experience pain in the eye, which may be accompanied by widening of the pupil, eye redness and blurred vision, remove the Transderm Scop Patch immediately and consult your doctor.    Removal of Transderm Scop Patch:  To  remove the patch simply peel it off your skin.  Be sure to wash your hands and the area behind your ear thoroughly with soap and water.  The Transderm Scop Patch will continue to have some active ingredient after use.  Therefore, to avoid accidental contact or ingestion by children or pets, fold the used patch in half with the sticky side together and dispose in the trash out of reach of children and pets.  If you wear contact lens, be sure to wash your hands first before handling contact lens.      Removal date:______________________.

## 2021-02-09 NOTE — ANESTHESIA POSTPROCEDURE EVALUATION
Patient: Jayne Tyler    Procedure(s):  Robotic assisted total laparoscopic hysterectomy, bilateral salpingectomy, bilateral oophorectomy, cystoscopy    Diagnosis:Postmenopausal bleeding [N95.0]  Diagnosis Additional Information: No value filed.    Anesthesia Type:  General    Note:     Postop Pain Control: Uneventful            Sign Out: Well controlled pain   PONV: No   Neuro/Psych: Uneventful            Sign Out: Acceptable/Baseline neuro status   Airway/Respiratory: Uneventful            Sign Out: Acceptable/Baseline resp. status   CV/Hemodynamics: Uneventful            Sign Out: Acceptable CV status   Other NRE: NONE   DID A NON-ROUTINE EVENT OCCUR? No         Last vitals:  Vitals:    02/09/21 1355 02/09/21 1400 02/09/21 1405   BP: 119/56 115/75 119/57   Pulse: 69 68 63   Resp: 12 12 12   Temp:      SpO2: 97% 99% 99%       Last vitals prior to Anesthesia Care Transfer:  CRNA VITALS  2/9/2021 1313 - 2/9/2021 1409      2/9/2021             Pulse:  91    SpO2:  98 %          Electronically Signed By: Fredy Ignacio DO  February 9, 2021  2:09 PM

## 2021-02-10 LAB
COPATH REPORT: NORMAL
INTERPRETATION ECG - MUSE: NORMAL

## 2021-02-11 NOTE — RESULT ENCOUNTER NOTE
Inform patient that her hysterectomy and oophorectomy pathology returned benign. No evidence of malignancy.     Jonathan Peters MD

## 2021-02-23 NOTE — OP NOTE
[facility]   Full Operative Note    Patient Name:Jayne Tyler  MRN: [id number]  YOB: 1965  Surgery Date: 2/9/2021    Surgeon: Jonathan Peters MD  Assistant: Glenny Smith CST, who assisted with entry into abdomen, placement of ports, docking of robot, retractions and closure of skin incisions.       Pre-operative Diagnosis: 1) Postmenopausal bleeding 2) Benign endometrial biopsy 3) Suspected endometrial polyp  Post-operative Diagnosis: Same  Procedure: Robotic assisted total laparoscopic hysterectomy, bilateral salpingo-oophorectomy, cystoscopy    Anesthesia: General endotracheal  FRA Score: 2    EBL: 50 mL   IV fluids: Refer to anesthesia records  Urine Output: Refer to anesthesia records for volume, but at the end of the case there was orange tinged (pyridium stained) urine noted in the Barber bag    Complications: None  Specimen: Uterus, cervix, bilateral fallopian tubes and ovaries  Drains: Barber catheter    Findings: Exam under anesthesia revealed a mobile anteverted 8 week size uterus without masses. No adnexal masses palpated. Laparoscopy revealed a normal appearing uterus, normal appearing bilateral fallopian tubes and ovaries. Ureters were seen vermiculating along the retroperitoneum at the pelvic side walls. Cystoscopy revealed efflux of orange tinged urine (pyridium stained) from bilateral ureteral orifices. Normal appearing appendix seen.     Indication: Ms. Jayne Tyler 55 year old presented for postmenopausal bleeding. Work-up revealed a benign endometrial biopsy but it was revealed that her endometrial biopsy showed a polyp. Her pelvic ultrasound revealed a prominent irregular endometrial lining as well. She was initially counseled on pursuit of either a saline sonohysterography vs hysteroscopy D&C to evaluate the presence of the polyp, but instead patient elected to proceed with a hysterectomy. Therefore, proposed hysterectomy and bilateral  salpingo-oophorectomy given her postmenopausal status were reviewed and that it would be performed in a laparoscopic robotic approach.  Risks were reviewed including but not limited to bleeding, infection, need for blood transfusion, injury to surrounding organs (ie bowel/intestines, bladder, ureters, major blood vessels and nerves)., which if injured, then will be identified and repaired at time of surgery or will be repaired by surgeons that specialize in those areas. Also reviewed unintended injuries going unnoticed at the time of surgery, and that would require additional surgery to be done to have them repaired.   Patient conveyed understanding of these risks and agreed to proceed. She signed the consent form.     Technique: After consent form was signed, she was brought to the operating room where she was placed under general anesthesia. She was positioned in the dorsal lithotomy position. She was prepped and draped in normal sterile fashion. A vaginal speculum was placed and cervix was identified and grasped using a single tooth tenaculum. She was sounded to 8 cm. A LK FREEMAN-care large KOH cup uterine manipulator was inserted, and  the tenaculum and speculum removed. Barber catheter was inserted in the usual sterile fashion.     Attention was then turned to the abdomen where penetrating towel clips were applied to the umbilicus. The skin was infiltrated with 0.25% bupivacaine without epinephrine. The abdominal skin was tented and a Veress needle inserted into the umbilicus. Syringe withdrawal and saline drip were normal, and opening pressure was < 5 mmHg, and good insufflation of the abdomen was observed. Pneumoperitoneum was achieved with good tympany of the abdomen.     An 8 mm air seal assistant port  was placed along the right upper quadrant of the abdomen along the midclavicular line using the visa view laparoscopic scope with adequate entry into the peritoneum without injury. An 8 mm incision was made  supraumbilical and the camera robot port was inserted under direct visualization within the abdomen. The first robot arm port was marked 10 cm right  lateral to the umbilicus, 0.25% bupivicaine injected, an 8 mm horizontal skin incision made and an 8 mm port was placed under direct visualization within the abdomen. The second robot port was placed in similar fashion 10 cm to the left lateral of the umbilicus. The robot was then docked to the patient's left side and all instruments and the camera placed into the abdomen through the ports. The following instruments were used to perform the surgery. At the right lateral robot arm the monopolar cautery scissors were used. At left lateral robot arm, the fenestrated bipolar cautery was used.       The abdomen was surveyed with the above findings.     Using the robot arm instruments mentioned above, the left infundibulopelvic ligament  was isolated. The left ureter was seen vermiculating in the pelvic side wall at the appropriate anatomical landmark as it traversed laterally to medially over the bifurcation of the common iliac vessels, and tracking down the posterior left pelvic side wall and noted to be clear from the left infundibulopelvic ligament. The left infundibulopelvic ligament was then clamped, cauterized and cut. Dissection was then continued caudally to the uterine cornua in serial fashion along the posterior broad ligament. Similarly, the right infundibulopelvic ligament was isolated. It, too, was noted to be well away from the right ureter which was seen vermiculating and tracking in the same anatomical as the left ureter only on the opposite side of the pelvis. The right infundibulopelvic ligament, was then clamped, cauterized and cut, continued to the uterine cornua in serial fashion along the posterior broad ligament.     Bilateral utero-ovarian ligaments and round ligaments were clamped, cauterized and cut and dissection of the vesico-uterine peritoneum  was made using cautery down to just below the level of the uterine manipulator cup.The rest of the peritoneum was skeletonized down to the level of the uterine arteries which were then cauterized and divided. The rest of the parametrial tissue was dissected off of the uterus serially cauterizing and divided sharply.     A colpotomy was made on the anterior side and carried around to the posterior side of the uterine manipulator cup. The uterus, bilateral fallopian tubes and ovaries were removed through the vagina.     At this point, to help close the colpotomy, the right lateral robot instrument was replaced with a robotic needle grasper. The colpotomy was was closed laparoscopically using V-lock absorbable suture in a continuous non-locking fashion in a two layer fashion. Flow seal was then applied to the raw pedicles areas lateral of the closed colpotomy as well as over of the colpotomy for hemostasis reinforcement.     Cystoscopy was performed in the usual sterile fashion. Efflux of orange tinged urine from bilateral ureteral orifices were seen. No injury or stitches to the bladder epithelium were seen. A speculum was inserted into the vagina and the closed vaginal cuff appeared hemostatic and no defect noted.     A final visual sweep of the pelvis showed no further pathology and hemostasis.     The robot arms were undocked.The carbon dioxide was then allowed to evacuate from the abdomen and the ports were removed under direct visualization. The pneumoperitoneum was expelled. The skin incisions were closed with 4-0 Vicryl  and dermabond.    The patient tolerated the procedure well. Sponges, laps and needle counts were good. Patient was taken to the recovery room in stable condition.     Jonathan Peters MD  [facility]

## 2021-03-08 ENCOUNTER — OFFICE VISIT (OUTPATIENT)
Dept: OBGYN | Facility: CLINIC | Age: 56
End: 2021-03-08
Payer: COMMERCIAL

## 2021-03-08 VITALS — DIASTOLIC BLOOD PRESSURE: 76 MMHG | BODY MASS INDEX: 41.9 KG/M2 | SYSTOLIC BLOOD PRESSURE: 126 MMHG | WEIGHT: 246 LBS

## 2021-03-08 DIAGNOSIS — Z48.89 POSTOPERATIVE VISIT: Primary | ICD-10-CM

## 2021-03-08 DIAGNOSIS — J32.9 RECURRENT SINUS INFECTIONS: ICD-10-CM

## 2021-03-08 DIAGNOSIS — R09.81 SINUS CONGESTION: ICD-10-CM

## 2021-03-08 PROCEDURE — 99024 POSTOP FOLLOW-UP VISIT: CPT | Performed by: OBSTETRICS & GYNECOLOGY

## 2021-03-08 NOTE — NURSING NOTE
"Chief Complaint   Patient presents with     Surgical Followup   c/o umbilical tenderness/ bladder symptoms and changes in smell since surgery    Initial /76   Wt 111.6 kg (246 lb)   LMP  (LMP Unknown)   BMI 41.90 kg/m   Estimated body mass index is 41.9 kg/m  as calculated from the following:    Height as of 21: 1.632 m (5' 4.25\").    Weight as of this encounter: 111.6 kg (246 lb).  BP completed using cuff size: large    Questioned patient about current smoking habits.  Pt. currently smokes.  Advised about smoking cessation.          The following HM Due: NONE     Apple Jackson CMA             "

## 2021-03-08 NOTE — PROGRESS NOTES
Post-op visit    Ms. Jayne Tyler 55 year old returns for post-op visit from her robotic assisted laparoscopy total hysterectomy and BSO.   She reports doing well overall.   Does acknowledge some umbilical discomfort but for the most part it is tolerable.   Denies vaginal bleeding or discharge.   Tolerating regular diet, although earlier on she reported aversion to foods given heightened smell sensitivities to certain foods.   Reports normal bladder and bowel habits.   Ambulating without difficulty.       /76   Wt 111.6 kg (246 lb)   LMP  (LMP Unknown)   BMI 41.90 kg/m    General Appearance: NAD  Abdomen: Incision appear well healed. NT, ND, soft  Pelvic: Deferred    A/P: Post-op visit  -- reviewed benign pathology of hysterectomy BSO  -- reviewed ongoing lifting and pelvic restrictions   -- all other concerns and questions were addressed    Jonathan Peters MD  [facility]

## 2021-03-09 RX ORDER — FLUTICASONE PROPIONATE 50 MCG
2 SPRAY, SUSPENSION (ML) NASAL DAILY
Qty: 16 G | Refills: 3 | Status: SHIPPED | OUTPATIENT
Start: 2021-03-09 | End: 2024-01-29

## 2021-03-30 ENCOUNTER — IMMUNIZATION (OUTPATIENT)
Dept: NURSING | Facility: CLINIC | Age: 56
End: 2021-03-30
Payer: COMMERCIAL

## 2021-03-30 PROCEDURE — 91300 PR COVID VAC PFIZER DIL RECON 30 MCG/0.3 ML IM: CPT

## 2021-03-30 PROCEDURE — 0001A PR COVID VAC PFIZER DIL RECON 30 MCG/0.3 ML IM: CPT

## 2021-04-20 ENCOUNTER — IMMUNIZATION (OUTPATIENT)
Dept: NURSING | Facility: CLINIC | Age: 56
End: 2021-04-20
Attending: INTERNAL MEDICINE
Payer: COMMERCIAL

## 2021-04-20 PROCEDURE — 0002A PR COVID VAC PFIZER DIL RECON 30 MCG/0.3 ML IM: CPT

## 2021-04-20 PROCEDURE — 91300 PR COVID VAC PFIZER DIL RECON 30 MCG/0.3 ML IM: CPT

## 2021-05-11 ENCOUNTER — ANCILLARY PROCEDURE (OUTPATIENT)
Dept: GENERAL RADIOLOGY | Facility: CLINIC | Age: 56
End: 2021-05-11
Attending: PHYSICIAN ASSISTANT
Payer: COMMERCIAL

## 2021-05-11 ENCOUNTER — OFFICE VISIT (OUTPATIENT)
Dept: INTERNAL MEDICINE | Facility: CLINIC | Age: 56
End: 2021-05-11
Payer: COMMERCIAL

## 2021-05-11 VITALS
WEIGHT: 241.7 LBS | BODY MASS INDEX: 41.17 KG/M2 | DIASTOLIC BLOOD PRESSURE: 80 MMHG | SYSTOLIC BLOOD PRESSURE: 120 MMHG | OXYGEN SATURATION: 98 % | TEMPERATURE: 98.5 F | HEART RATE: 84 BPM | RESPIRATION RATE: 16 BRPM

## 2021-05-11 DIAGNOSIS — S99.922A FOOT INJURY, LEFT, INITIAL ENCOUNTER: Primary | ICD-10-CM

## 2021-05-11 DIAGNOSIS — S99.922A FOOT INJURY, LEFT, INITIAL ENCOUNTER: ICD-10-CM

## 2021-05-11 PROCEDURE — 73630 X-RAY EXAM OF FOOT: CPT | Mod: LT | Performed by: RADIOLOGY

## 2021-05-11 PROCEDURE — 99213 OFFICE O/P EST LOW 20 MIN: CPT | Performed by: PHYSICIAN ASSISTANT
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
## 2021-07-12 ENCOUNTER — OFFICE VISIT (OUTPATIENT)
Dept: INTERNAL MEDICINE | Facility: CLINIC | Age: 56
End: 2021-07-12
Payer: COMMERCIAL

## 2021-07-12 VITALS
RESPIRATION RATE: 18 BRPM | BODY MASS INDEX: 40.16 KG/M2 | SYSTOLIC BLOOD PRESSURE: 122 MMHG | WEIGHT: 235.8 LBS | OXYGEN SATURATION: 96 % | HEART RATE: 80 BPM | DIASTOLIC BLOOD PRESSURE: 80 MMHG

## 2021-07-12 DIAGNOSIS — S99.922A FOOT INJURY, LEFT, INITIAL ENCOUNTER: Primary | ICD-10-CM

## 2021-07-12 PROCEDURE — 99213 OFFICE O/P EST LOW 20 MIN: CPT | Performed by: PHYSICIAN ASSISTANT

## 2021-07-12 RX ORDER — NABUMETONE 500 MG/1
500 TABLET, FILM COATED ORAL 2 TIMES DAILY
Qty: 15 TABLET | Refills: 0 | Status: SHIPPED | OUTPATIENT
Start: 2021-07-12 | End: 2021-07-16
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
## 2021-07-13 ENCOUNTER — TELEPHONE (OUTPATIENT)
Dept: INTERNAL MEDICINE | Facility: CLINIC | Age: 56
End: 2021-07-13

## 2021-07-13 NOTE — TELEPHONE ENCOUNTER
Phone call to patient and offered appointment on 7/23/21 with Dr. Dugan in [facility] [address]. Patient prefers the [facility] location. There are no available schedules in [facility] at this time. Patient does not want to drive to [place] and prefers the [place] location.  Appointment offered for 7/29/20 as next available with Dr. Dugan and she will be out of town the end of theat week. Appointment scheduled for 8/2/21 with 9:45 checkin with Dr. Dugan in [place]. Patient was familiar with our location. Suite and phone number provided. Also added patient to the wait list.   She verbalized understanding.     MARIA EUGENIA Edwards RN

## 2021-07-13 NOTE — TELEPHONE ENCOUNTER
Patient ok to wait to be seen until next acute available on 7/23/21 at 10:00m with 9:45 arrival time with Dr. Dugan. Please contact patient to offer appointment time on hold. Can only hold that time for the remainder of the day.     MARIA EUGENIA Edwards RN

## 2021-07-13 NOTE — TELEPHONE ENCOUNTER
Pt has urgent 3-5 day pod referral- please triage and advise/schedule appropriately.  No openings within 3-5 days     Thank you,     Callie HALL [facility]

## 2021-07-16 DIAGNOSIS — S99.922A FOOT INJURY, LEFT, INITIAL ENCOUNTER: ICD-10-CM

## 2021-07-16 RX ORDER — NABUMETONE 500 MG/1
TABLET, FILM COATED ORAL
Qty: 15 TABLET | Refills: 0 | Status: SHIPPED | OUTPATIENT
Start: 2021-07-16 | End: 2021-07-29

## 2021-07-16 NOTE — TELEPHONE ENCOUNTER
Routing refill request to provider for review/approval because:  Labs out of range:    CBC RESULTS:   Recent Labs   Lab Test 02/09/21  1002 01/04/21  1734   WBC  --  11.5*   RBC  --  4.38   HGB 13.7 13.9   HCT  --  42.2   MCV  --  96   MCH  --  31.7   MCHC  --  32.9   RDW  --  13.5   PLT  --  268             Jessica Smith RN  [facility]

## 2021-07-29 DIAGNOSIS — S99.922A FOOT INJURY, LEFT, INITIAL ENCOUNTER: ICD-10-CM

## 2021-07-29 RX ORDER — NABUMETONE 500 MG/1
TABLET, FILM COATED ORAL
Qty: 15 TABLET | Refills: 0 | Status: SHIPPED | OUTPATIENT
Start: 2021-07-29 | End: 2021-08-03

## 2021-07-29 NOTE — TELEPHONE ENCOUNTER
Routing refill request to provider for review/approval because:  Labs out of range:    Lab Results   Component Value Date    WBC 11.5 01/04/2021     Lab Results   Component Value Date    RBC 4.38 01/04/2021     Lab Results   Component Value Date    HGB 13.7 02/09/2021     Lab Results   Component Value Date    HCT 42.2 01/04/2021     Lab Results   Component Value Date    MCV 96 01/04/2021     Lab Results   Component Value Date    MCH 31.7 01/04/2021     Lab Results   Component Value Date    MCHC 32.9 01/04/2021     Lab Results   Component Value Date    RDW 13.5 01/04/2021     Lab Results   Component Value Date     01/04/2021         Alfonzo Lundberg RN  [facility] Triage Nurse

## 2021-08-02 ENCOUNTER — OFFICE VISIT (OUTPATIENT)
Dept: PODIATRY | Facility: CLINIC | Age: 56
End: 2021-08-02
Attending: PHYSICIAN ASSISTANT
Payer: COMMERCIAL

## 2021-08-02 ENCOUNTER — ANCILLARY PROCEDURE (OUTPATIENT)
Dept: GENERAL RADIOLOGY | Facility: CLINIC | Age: 56
End: 2021-08-02
Attending: PODIATRIST
Payer: COMMERCIAL

## 2021-08-02 VITALS
DIASTOLIC BLOOD PRESSURE: 82 MMHG | WEIGHT: 238 LBS | BODY MASS INDEX: 38.25 KG/M2 | HEIGHT: 66 IN | SYSTOLIC BLOOD PRESSURE: 130 MMHG

## 2021-08-02 DIAGNOSIS — M79.672 FOOT PAIN, LEFT: ICD-10-CM

## 2021-08-02 DIAGNOSIS — S99.922A FOOT INJURY, LEFT, INITIAL ENCOUNTER: ICD-10-CM

## 2021-08-02 DIAGNOSIS — M79.672 FOOT PAIN, LEFT: Primary | ICD-10-CM

## 2021-08-02 PROCEDURE — 73610 X-RAY EXAM OF ANKLE: CPT | Mod: LT | Performed by: RADIOLOGY

## 2021-08-02 PROCEDURE — 99244 OFF/OP CNSLTJ NEW/EST MOD 40: CPT | Performed by: PODIATRIST

## 2021-08-02 ASSESSMENT — MIFFLIN-ST. JEOR: SCORE: 1691.31

## 2021-08-02 NOTE — PATIENT INSTRUCTIONS
"Thank you for choosing [facility] Podiatry / Foot & Ankle Surgery!    DR SON[facility]S CLINIC LOCATIONS  [facility]   [address] [facility] [address]   [place] [address] [place] [address] [phone]       SET UP SURGERY: [phone]    APPOINTMENTS: [phone]    BILLING QUESTIONS: [phone]    FAX NUMBER: [phone]        Follow up: If pain not improving in 3 weeks, send us a [employer] message to let us know and Dr. Son can order an MRI to further evaluate      PRICE Therapy    Many aches and pains throughout the foot and ankle can be helped with many simple treatments.  This is usually described as PRICE Therapy.      P - Protection - often times, inflammation/pain in the lower extremity is not able to improve simply because the areas involved are never allowed to rest.  Every step we take can bother the problematic area.  Protecting those areas is an important step in the healing process.  This may involve a walking cast boot, a special insert/orthotic device, an ankle brace, or simply avoiding barefoot walking.    R - Rest - in addition to protecting the foot/ankle, resting is an important, but often times difficult, treatment option.  Getting off your feet when they bother you, and specifically avoiding activities that cause pain/discomfort, are very beneficial to prevent, and treat, foot/ankle pain.  \"If there's something that makes it hurt(eg activities, shoe gear), and you keep doing the thing that makes it hurt, it's just going to keep hurting\".      I - Ice - icing regularly can help to decrease inflammation and swelling in the foot, thus decreasing pain.  Using an ice pack or a bag of frozen peas works very well.  Ice for 20 minutes multiple times per day as needed.  Do not place the ice directly on the skin as this can cause tissue damage.    C - Compression - using a compression wrap or an ACE wrap can help to decrease swelling, " which can help to decrease pain.  Wearing the wraps is generally not needed at night, but they should be worn on a regular basis when you are going to be on your feet for prolonged periods as gravity tends to pull fluids down to your feet/ankles.    E - Elevation - elevating your lower extremities multiple times daily for 15-20 minutes can help to decrease swelling, which works well in decreasing pain levels.      NSAID/Tylenol - An anti-inflammatory, like Aleve or ibuprofen, and/or a pain medication, such as Tylenol, can help to improve pain levels and get the issue resolved sooner rather than later.  Anyone with liver issues should be careful with Tylenol, and anyone with high blood pressure or heart, stomach or kidney issues should be careful with anti-inflammatories.  Please ask if you have questions about these medications, including dosage.
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
## 2021-08-22 ENCOUNTER — MYC MEDICAL ADVICE (OUTPATIENT)
Dept: PODIATRY | Facility: CLINIC | Age: 56
End: 2021-08-22

## 2021-08-22 DIAGNOSIS — S99.922A FOOT INJURY, LEFT, INITIAL ENCOUNTER: Primary | ICD-10-CM

## 2021-08-23 NOTE — TELEPHONE ENCOUNTER
Per office note on 8/2/21    Plan:  Discussed etiologies, anatomy and options  1.  Contusion left lower extremity 2 and half months ago with continued fibular and fifth metatarsal pain  -I personally reviewed and interpreted the patient's lower extremity history pertinent to today's visit, including imaging/labs, in preparation for initiating a treatment program.  -3 views weightbearing left ankle today -I personally read and interpreted the results.  No obvious fibular pathology noted  -Weightbearing as tolerated in a walking cast boot, dispensed.  Advised utilizing crutches if symptoms persist despite the boot.  -RICE/NSAID versus Tylenol as needed based on pain.  She has GI issues and would like to avoid NSAIDs if able  -Tensogrip dispensed for swelling and pain control  -I advise she call in 3 weeks if insufficient improvement is noted and I will order an MRI.  Otherwise, if doing well, transition back to shoes and activities as tolerated     Follow up:  prn or sooner with acute issues        Please advise of MRI, foot vs ankle    T.thao

## 2021-09-14 ENCOUNTER — HOSPITAL ENCOUNTER (OUTPATIENT)
Dept: MRI IMAGING | Facility: CLINIC | Age: 56
Discharge: HOME | End: 2021-09-14
Attending: PODIATRIST | Admitting: PODIATRIST
Payer: COMMERCIAL

## 2021-09-14 DIAGNOSIS — S99.922A FOOT INJURY, LEFT, INITIAL ENCOUNTER: ICD-10-CM

## 2021-09-14 PROCEDURE — 73721 MRI JNT OF LWR EXTRE W/O DYE: CPT | Mod: LT

## 2021-09-14 PROCEDURE — 73718 MRI LOWER EXTREMITY W/O DYE: CPT | Mod: LT

## 2021-09-14 PROCEDURE — 73718 MRI LOWER EXTREMITY W/O DYE: CPT | Mod: 26 | Performed by: RADIOLOGY

## 2021-09-14 PROCEDURE — 73721 MRI JNT OF LWR EXTRE W/O DYE: CPT | Mod: 26 | Performed by: RADIOLOGY

## 2021-09-16 RX ORDER — PREDNISONE 5 MG/1
TABLET ORAL
Qty: 21 TABLET | Refills: 0 | Status: SHIPPED | OUTPATIENT
Start: 2021-09-16 | End: 2022-04-05

## 2021-09-16 NOTE — TELEPHONE ENCOUNTER
I called and spoke with Jayne about her MRI results:    IMPRESSION:  1. Scattered foci of subchondral bone marrow edema in the bases of the  second and third metatarsal and lateral cuneiform, nonspecific,  considerations include degenerative osteoarthrosis versus stress  changes. No associated fracture line. Slightly more prominent bone  marrow edema in the lateral cuneiform, additional consideration is a  bone contusion, correlate clinically.  2. The tendinous and ligamentous structures about the left ankle are  Intact.    She does have pain across the dorsal foot.  No acute pathology noted at lateral ankle.    Options discussed included WBAT in boot,  RICE/tylenol, a Rx for tapered steroid vs NWB.  She will do option 1, a Rx for tapered prednisone dose was sent to her pharmacy, and I advised she follow up with me in 3 weeks via phone/MyChart about how her ankle  Is doing.  Consider NWB if above plan fails to provide sufficient relief    David Dugan DPM FACFAS FACFAOM  Podiatric Foot & Ankle Surgeon  [facility]  [phone]

## 2021-10-24 ENCOUNTER — HEALTH MAINTENANCE LETTER (OUTPATIENT)
Age: 56
End: 2021-10-24

## 2021-11-05 DIAGNOSIS — Z71.6 ENCOUNTER FOR SMOKING CESSATION COUNSELING: ICD-10-CM

## 2021-11-05 RX ORDER — BUPROPION HYDROCHLORIDE 150 MG/1
150 TABLET ORAL EVERY MORNING
Qty: 90 TABLET | Refills: 0 | Status: SHIPPED | OUTPATIENT
Start: 2021-11-05 | End: 2022-02-02

## 2021-11-05 NOTE — TELEPHONE ENCOUNTER
Prescription approved per [facility] Refill Protocol.  Jessica Smith RN

## 2021-12-19 ENCOUNTER — HEALTH MAINTENANCE LETTER (OUTPATIENT)
Age: 56
End: 2021-12-19

## 2022-02-02 DIAGNOSIS — Z71.6 ENCOUNTER FOR SMOKING CESSATION COUNSELING: ICD-10-CM

## 2022-02-02 RX ORDER — BUPROPION HYDROCHLORIDE 150 MG/1
TABLET ORAL
Qty: 90 TABLET | Refills: 0 | Status: SHIPPED | OUTPATIENT
Start: 2022-02-02 | End: 2022-04-05

## 2022-02-02 NOTE — TELEPHONE ENCOUNTER
Medication filled one time only as pt is due for a follow-up for further refills.  Pharmacy has been notified to inform patient to call clinic and schedule appointment.  Stacey Franklin RN

## 2022-04-05 ENCOUNTER — ANCILLARY PROCEDURE (OUTPATIENT)
Dept: GENERAL RADIOLOGY | Facility: CLINIC | Age: 57
End: 2022-04-05
Attending: NURSE PRACTITIONER
Payer: COMMERCIAL

## 2022-04-05 ENCOUNTER — OFFICE VISIT (OUTPATIENT)
Dept: INTERNAL MEDICINE | Facility: CLINIC | Age: 57
End: 2022-04-05
Payer: COMMERCIAL

## 2022-04-05 VITALS
TEMPERATURE: 98.2 F | RESPIRATION RATE: 16 BRPM | HEART RATE: 92 BPM | WEIGHT: 240.2 LBS | BODY MASS INDEX: 40.02 KG/M2 | OXYGEN SATURATION: 99 % | SYSTOLIC BLOOD PRESSURE: 120 MMHG | DIASTOLIC BLOOD PRESSURE: 80 MMHG | HEIGHT: 65 IN

## 2022-04-05 DIAGNOSIS — R06.09 DOE (DYSPNEA ON EXERTION): ICD-10-CM

## 2022-04-05 DIAGNOSIS — R31.29 MICROSCOPIC HEMATURIA: ICD-10-CM

## 2022-04-05 DIAGNOSIS — R82.998 DARK URINE: ICD-10-CM

## 2022-04-05 DIAGNOSIS — R53.83 FATIGUE, UNSPECIFIED TYPE: Primary | ICD-10-CM

## 2022-04-05 LAB
ALBUMIN UR-MCNC: NEGATIVE MG/DL
APPEARANCE UR: CLEAR
BACTERIA #/AREA URNS HPF: ABNORMAL /HPF
BILIRUB UR QL STRIP: NEGATIVE
COLOR UR AUTO: YELLOW
ERYTHROCYTE [DISTWIDTH] IN BLOOD BY AUTOMATED COUNT: 13.7 % (ref 10–15)
GLUCOSE UR STRIP-MCNC: NEGATIVE MG/DL
HCT VFR BLD AUTO: 42.1 % (ref 35–47)
HGB BLD-MCNC: 13.7 G/DL (ref 11.7–15.7)
HGB UR QL STRIP: ABNORMAL
KETONES UR STRIP-MCNC: NEGATIVE MG/DL
LEUKOCYTE ESTERASE UR QL STRIP: NEGATIVE
MCH RBC QN AUTO: 31.2 PG (ref 26.5–33)
MCHC RBC AUTO-ENTMCNC: 32.5 G/DL (ref 31.5–36.5)
MCV RBC AUTO: 96 FL (ref 78–100)
NITRATE UR QL: NEGATIVE
PH UR STRIP: 6 [PH] (ref 5–7)
PLATELET # BLD AUTO: 286 10E3/UL (ref 150–450)
RBC # BLD AUTO: 4.39 10E6/UL (ref 3.8–5.2)
RBC #/AREA URNS AUTO: ABNORMAL /HPF
SP GR UR STRIP: <=1.005 (ref 1–1.03)
SQUAMOUS #/AREA URNS AUTO: ABNORMAL /LPF
UROBILINOGEN UR STRIP-ACNC: 0.2 E.U./DL
WBC # BLD AUTO: 10 10E3/UL (ref 4–11)
WBC #/AREA URNS AUTO: ABNORMAL /HPF

## 2022-04-05 PROCEDURE — 81001 URINALYSIS AUTO W/SCOPE: CPT | Performed by: NURSE PRACTITIONER

## 2022-04-05 PROCEDURE — 80053 COMPREHEN METABOLIC PANEL: CPT | Performed by: NURSE PRACTITIONER

## 2022-04-05 PROCEDURE — 84443 ASSAY THYROID STIM HORMONE: CPT | Performed by: NURSE PRACTITIONER

## 2022-04-05 PROCEDURE — 74019 RADEX ABDOMEN 2 VIEWS: CPT | Performed by: RADIOLOGY

## 2022-04-05 PROCEDURE — 85027 COMPLETE CBC AUTOMATED: CPT | Performed by: NURSE PRACTITIONER

## 2022-04-05 PROCEDURE — 36415 COLL VENOUS BLD VENIPUNCTURE: CPT | Performed by: NURSE PRACTITIONER

## 2022-04-05 PROCEDURE — 93000 ELECTROCARDIOGRAM COMPLETE: CPT | Performed by: NURSE PRACTITIONER

## 2022-04-05 PROCEDURE — 99214 OFFICE O/P EST MOD 30 MIN: CPT | Performed by: NURSE PRACTITIONER

## 2022-04-05 RX ORDER — MULTIVITAMIN WITH IRON
1 TABLET ORAL DAILY
COMMUNITY
End: 2023-07-31

## 2022-04-05 ASSESSMENT — ENCOUNTER SYMPTOMS: FATIGUE: 1

## 2022-04-05 NOTE — PROGRESS NOTES
"  Assessment & Plan     Fatigue, unspecified type  - Fatigue for the past 2 months.  Differentials include sleep apnea, worsening depression, thyroid abnormality, coronary artery disease, COPD versus other.  - Given PRESSLEY, extreme fatigue, and strong FH of early CAD, will rule this out- Her risk factors are advancing age, female, hyperlipidemia, morbid obesity, tobacco use  - Check CBC, BMP, TSH  - EKG done in clinic- shows NSR, no acute changes  - Lexiscan stress ordered; if this is unrevealing, next step is Sleep Study- discussed with pt  - Return sooner if symptoms worsen  - REVIEW OF HEALTH MAINTENANCE PROTOCOL ORDERS  - CBC with platelets  - Comprehensive metabolic panel (BMP + Alb, Alk Phos, ALT, AST, Total. Bili, TP)  - EKG 12-lead complete w/read - Clinics  - TSH with free T4 reflex  - NM Lexiscan stress test  - CBC with platelets  - Comprehensive metabolic panel (BMP + Alb, Alk Phos, ALT, AST, Total. Bili, TP)  - TSH with free T4 reflex    PRESSLEY (dyspnea on exertion)  - See above  - REVIEW OF HEALTH MAINTENANCE PROTOCOL ORDERS  - CBC with platelets  - Comprehensive metabolic panel (BMP + Alb, Alk Phos, ALT, AST, Total. Bili, TP)  - EKG 12-lead complete w/read - Clinics  - NM Lexiscan stress test  - CBC with platelets  - Comprehensive metabolic panel (BMP + Alb, Alk Phos, ALT, AST, Total. Bili, TP)    Dark urine  - UA shows trace blood, o/w normal  - UA with Microscopic reflex to Culture - lab collect  - Urine Microscopic    Microscopic hematuria  - Given trace blood, and LUQ/side pain, check KUB, r/o renal stone  - XR Abdomen 2 Views         BMI:   Estimated body mass index is 40.59 kg/m  as calculated from the following:    Height as of this encounter: 1.638 m (5' 4.5\").    Weight as of this encounter: 109 kg (240 lb 3.2 oz).       See Patient Instructions    Return in about 3 weeks (around 4/26/2022), or if symptoms worsen or fail to improve.    ELVIS Herring CNP  M [facility] " "[place] VICENTEWhite [facility] Regional Medical CenterROXANA Godoy is a 56 year old who presents for the following health issues     Fatigue  Associated symptoms include fatigue.   History of Present Illness       Reason for visit:  Fatigue, joint pain, back pain  Symptom onset:  More than a month  Symptoms include:  Fatigue, joint pain, back pain  Symptom intensity:  Moderate  Symptom progression:  Staying the same  Had these symptoms before:  Yes  Has tried/received treatment for these symptoms:  No    She eats 2-3 servings of fruits and vegetables daily.She consumes 0 sweetened beverage(s) daily.She exercises with enough effort to increase her heart rate 20 to 29 minutes per day.  She exercises with enough effort to increase her heart rate 5 days per week.   She is taking medications regularly.     Pt jammed right ring finger in December and cannot bend it fully with pain, and at the same time she sliced her left ring finger on a metal shelf and it still has not healed.     Fatigue:  Reports that in early February she noted feeling very tired, \"foggy\", \"scary tired\".  Initially thought she may have had COVID, though she did not have any other symptoms; home testing was negative.  Also reports noting left sided abdominal pain, describing this like a catch as if she had walked too much.  Thinks this discomfort was possibly there before she started experiencing fatigue; occurs with rest and activity.  Also reports intermittent upper back pain at times and endorses that her urine is darker than usual despite drinking plenty of water.  She reports today that when she came into clinic and took the 1 flight of stairs up to clinic she became very short of breath.  States she always takes the stairs in clinic and never gets short of breath.  She used to walk on her treadmill without any issues and has not been doing that as of late.  She states she is sleeping okay but does endorse that she wakes up feeling not rested.  There is some question as " "to whether or not she snores her boyfriend seems to think she does. Has hx of depression, and notes moods not as good as usual, which she attributes to feeling so fatigued.  Of note, mom has hx of CAD- had MI in her 50's.      Finger concern:  Jammed finger and got a cut a few months ago. Healing, but has skin that opens when she bends her finger.  Always heals, but opens at times.  Ok now.        Review of Systems   Constitutional: Positive for fatigue.      CONSTITUTIONAL:NEGATIVE for fever, chills, change in weight  INTEGUMENTARY/SKIN: NEGATIVE for worrisome rashes, moles or lesions  ENT/MOUTH: NEGATIVE for ear, mouth and throat problems  RESP:POSITIVE for dyspnea on exertion  CV: NEGATIVE for chest pain, palpitations or peripheral edema  GI: NEGATIVE for nausea, abdominal pain, heartburn, or change in bowel habits  : Urine appears darker than usual  MUSCULOSKELETAL: NEGATIVE for significant arthralgias or myalgia  PSYCHIATRIC: Mood worse due to extreme fatigue      Objective    /80 (BP Location: Left arm, Patient Position: Chair, Cuff Size: Adult Large)   Pulse 92   Temp 98.2  F (36.8  C) (Oral)   Resp 16   Ht 1.638 m (5' 4.5\")   Wt 109 kg (240 lb 3.2 oz)   LMP  (LMP Unknown)   SpO2 99%   BMI 40.59 kg/m    Body mass index is 40.59 kg/m .     Physical Exam   GENERAL APPEARANCE: healthy, alert and no distress  SKIN:  Ring finger noted cut on DIP joint- healing  EYES: Eyes grossly normal to inspection, PERRL and conjunctivae and sclerae normal  HENT: ear canals and TM's normal and nose and mouth without ulcers or lesions  NECK: no adenopathy, no asymmetry, masses, or scars and thyroid normal to palpation  RESP: lungs clear to auscultation - no rales, rhonchi or wheezes  CV: regular rates and rhythm, normal S1 S2, no S3 or S4 and no murmur, click or rub  ABDOMEN: soft, nontender, without hepatosplenomegaly or masses and bowel sounds normal  MS: extremities normal- no gross deformities noted  NEURO: " Normal strength and tone, mentation intact and speech normal  PSYCH: mentation appears normal and affect normal/bright

## 2022-04-05 NOTE — PATIENT INSTRUCTIONS
-Labs today, xray today  -Schedule stress test  -If stress test negative, next step is sleep medicine consult  -Follow up after stress test to go over results and symptoms- sooner if your symptoms worsen

## 2022-04-06 LAB
ALBUMIN SERPL-MCNC: 3.7 G/DL (ref 3.4–5)
ALP SERPL-CCNC: 76 U/L (ref 40–150)
ALT SERPL W P-5'-P-CCNC: 27 U/L (ref 0–50)
ANION GAP SERPL CALCULATED.3IONS-SCNC: 7 MMOL/L (ref 3–14)
AST SERPL W P-5'-P-CCNC: 20 U/L (ref 0–45)
BILIRUB SERPL-MCNC: 0.4 MG/DL (ref 0.2–1.3)
BUN SERPL-MCNC: 17 MG/DL (ref 7–30)
CALCIUM SERPL-MCNC: 9.2 MG/DL (ref 8.5–10.1)
CHLORIDE BLD-SCNC: 106 MMOL/L (ref 94–109)
CO2 SERPL-SCNC: 25 MMOL/L (ref 20–32)
CREAT SERPL-MCNC: 0.89 MG/DL (ref 0.52–1.04)
GFR SERPL CREATININE-BSD FRML MDRD: 76 ML/MIN/1.73M2
GLUCOSE BLD-MCNC: 93 MG/DL (ref 70–99)
POTASSIUM BLD-SCNC: 4.2 MMOL/L (ref 3.4–5.3)
PROT SERPL-MCNC: 7.3 G/DL (ref 6.8–8.8)
SODIUM SERPL-SCNC: 138 MMOL/L (ref 133–144)
TSH SERPL DL<=0.005 MIU/L-ACNC: 1.2 MU/L (ref 0.4–4)

## 2022-04-12 ENCOUNTER — HOSPITAL ENCOUNTER (OUTPATIENT)
Dept: CARDIOLOGY | Facility: CLINIC | Age: 57
Discharge: HOME | End: 2022-04-12
Attending: NURSE PRACTITIONER
Payer: COMMERCIAL

## 2022-04-12 ENCOUNTER — TELEPHONE (OUTPATIENT)
Dept: INTERNAL MEDICINE | Facility: CLINIC | Age: 57
End: 2022-04-12

## 2022-04-12 ENCOUNTER — HOSPITAL ENCOUNTER (OUTPATIENT)
Dept: NUCLEAR MEDICINE | Facility: CLINIC | Age: 57
Setting detail: NUCLEAR MEDICINE
Discharge: HOME | End: 2022-04-12
Attending: NURSE PRACTITIONER
Payer: COMMERCIAL

## 2022-04-12 DIAGNOSIS — R06.09 DOE (DYSPNEA ON EXERTION): ICD-10-CM

## 2022-04-12 DIAGNOSIS — R53.83 FATIGUE, UNSPECIFIED TYPE: ICD-10-CM

## 2022-04-12 DIAGNOSIS — R94.39 ABNORMAL CARDIOVASCULAR STRESS TEST: Primary | ICD-10-CM

## 2022-04-12 LAB
CV STRESS MAX HR HE: 121
NUC STRESS EJECTION FRACTION: 71 %
RATE PRESSURE PRODUCT: NORMAL
STRESS ECHO BASELINE DIASTOLIC HE: 68
STRESS ECHO BASELINE HR: 75 BPM
STRESS ECHO BASELINE SYSTOLIC BP: 137
STRESS ECHO CALCULATED PERCENT HR: 73 %
STRESS ECHO LAST STRESS DIASTOLIC BP: 72
STRESS ECHO LAST STRESS SYSTOLIC BP: 138
STRESS ECHO TARGET HR: 165

## 2022-04-12 PROCEDURE — 93017 CV STRESS TEST TRACING ONLY: CPT

## 2022-04-12 PROCEDURE — 343N000001 HC RX 343: Performed by: NURSE PRACTITIONER

## 2022-04-12 PROCEDURE — 93016 CV STRESS TEST SUPVJ ONLY: CPT | Performed by: INTERNAL MEDICINE

## 2022-04-12 PROCEDURE — A9502 TC99M TETROFOSMIN: HCPCS | Performed by: NURSE PRACTITIONER

## 2022-04-12 PROCEDURE — 250N000011 HC RX IP 250 OP 636: Performed by: NURSE PRACTITIONER

## 2022-04-12 PROCEDURE — 78452 HT MUSCLE IMAGE SPECT MULT: CPT | Mod: 26 | Performed by: INTERNAL MEDICINE

## 2022-04-12 PROCEDURE — 93018 CV STRESS TEST I&R ONLY: CPT | Performed by: INTERNAL MEDICINE

## 2022-04-12 PROCEDURE — 78452 HT MUSCLE IMAGE SPECT MULT: CPT

## 2022-04-12 RX ORDER — ATORVASTATIN CALCIUM 20 MG/1
20 TABLET, FILM COATED ORAL DAILY
Qty: 90 TABLET | Refills: 0 | Status: ON HOLD | OUTPATIENT
Start: 2022-04-12 | End: 2022-04-15

## 2022-04-12 RX ORDER — REGADENOSON 0.08 MG/ML
0.4 INJECTION, SOLUTION INTRAVENOUS ONCE
Status: COMPLETED | OUTPATIENT
Start: 2022-04-12 | End: 2022-04-12

## 2022-04-12 RX ORDER — AMINOPHYLLINE 25 MG/ML
50-100 INJECTION, SOLUTION INTRAVENOUS
Status: DISCONTINUED | OUTPATIENT
Start: 2022-04-12 | End: 2022-04-13 | Stop reason: HOSPADM

## 2022-04-12 RX ORDER — ACYCLOVIR 200 MG/1
0-1 CAPSULE ORAL
Status: DISCONTINUED | OUTPATIENT
Start: 2022-04-12 | End: 2022-04-13 | Stop reason: HOSPADM

## 2022-04-12 RX ORDER — ALBUTEROL SULFATE 90 UG/1
2 AEROSOL, METERED RESPIRATORY (INHALATION) EVERY 5 MIN PRN
Status: DISCONTINUED | OUTPATIENT
Start: 2022-04-12 | End: 2022-04-13 | Stop reason: HOSPADM

## 2022-04-12 RX ORDER — REGADENOSON 0.08 MG/ML
INJECTION, SOLUTION INTRAVENOUS
Status: DISCONTINUED
Start: 2022-04-12 | End: 2022-04-13 | Stop reason: HOSPADM

## 2022-04-12 RX ADMIN — TETROFOSMIN 31 MCI.: 1.38 INJECTION, POWDER, LYOPHILIZED, FOR SOLUTION INTRAVENOUS at 12:59

## 2022-04-12 RX ADMIN — REGADENOSON 0.4 MG: 0.08 INJECTION, SOLUTION INTRAVENOUS at 12:57

## 2022-04-12 RX ADMIN — TETROFOSMIN 11 MCI.: 1.38 INJECTION, POWDER, LYOPHILIZED, FOR SOLUTION INTRAVENOUS at 11:02

## 2022-04-12 NOTE — TELEPHONE ENCOUNTER
Call to patient re: stress test results:       The nuclear stress test is probably abnormal. There is a small area of infarction in the distal anterior and apical segment(s) of the left ventricle. The left ventricular ejection fraction at stress is 71%. Left ventricular function is normal.     There is no prior study for comparison.    ECG Baseline electrocardiogram demonstrates sinus rhythm.   The stress electrocardiogram is negative for inducible ischemic EKG changes.       -Referral to Cardiology  -Start ASA daily  -Lipids in 2020 elevated and with concern for abnormal stress/infarct, start atorvastatin 40 mg/day- counseled on use/side effects  -Get fasting lipids- ordered  -Strict return precautions reviewed  -Patient verbalizes an understanding and agrees to plan.    ELVIS Herring CNP

## 2022-04-13 ENCOUNTER — RESULTS ONLY (OUTPATIENT)
Dept: ADMINISTRATIVE | Facility: CLINIC | Age: 57
End: 2022-04-13

## 2022-04-13 ENCOUNTER — APPOINTMENT (OUTPATIENT)
Dept: GENERAL RADIOLOGY | Facility: CLINIC | Age: 57
End: 2022-04-13
Attending: EMERGENCY MEDICINE
Payer: COMMERCIAL

## 2022-04-13 ENCOUNTER — HOSPITAL ENCOUNTER (INPATIENT)
Facility: CLINIC | Age: 57
LOS: 2 days | Discharge: HOME | End: 2022-04-15
Attending: EMERGENCY MEDICINE | Admitting: INTERNAL MEDICINE
Payer: COMMERCIAL

## 2022-04-13 DIAGNOSIS — R07.2 PRECORDIAL PAIN: ICD-10-CM

## 2022-04-13 DIAGNOSIS — I21.4 NSTEMI (NON-ST ELEVATED MYOCARDIAL INFARCTION) (H): ICD-10-CM

## 2022-04-13 DIAGNOSIS — R94.39 ABNORMAL CARDIOVASCULAR STRESS TEST: Primary | ICD-10-CM

## 2022-04-13 DIAGNOSIS — E78.00 PURE HYPERCHOLESTEROLEMIA: ICD-10-CM

## 2022-04-13 LAB
ANION GAP SERPL CALCULATED.3IONS-SCNC: 3 MMOL/L (ref 3–14)
BASOPHILS # BLD AUTO: 0 10E3/UL (ref 0–0.2)
BASOPHILS NFR BLD AUTO: 0 %
BUN SERPL-MCNC: 13 MG/DL (ref 7–30)
CALCIUM SERPL-MCNC: 9.1 MG/DL (ref 8.5–10.1)
CHLORIDE BLD-SCNC: 106 MMOL/L (ref 94–109)
CO2 SERPL-SCNC: 28 MMOL/L (ref 20–32)
CREAT SERPL-MCNC: 0.81 MG/DL (ref 0.52–1.04)
EOSINOPHIL # BLD AUTO: 0.1 10E3/UL (ref 0–0.7)
EOSINOPHIL NFR BLD AUTO: 1 %
ERYTHROCYTE [DISTWIDTH] IN BLOOD BY AUTOMATED COUNT: 13.2 % (ref 10–15)
GFR SERPL CREATININE-BSD FRML MDRD: 85 ML/MIN/1.73M2
GLUCOSE BLD-MCNC: 124 MG/DL (ref 70–99)
HCT VFR BLD AUTO: 42.7 % (ref 35–47)
HGB BLD-MCNC: 14.2 G/DL (ref 11.7–15.7)
HOLD SPECIMEN: NORMAL
IMM GRANULOCYTES # BLD: 0 10E3/UL
IMM GRANULOCYTES NFR BLD: 0 %
LYMPHOCYTES # BLD AUTO: 3.7 10E3/UL (ref 0.8–5.3)
LYMPHOCYTES NFR BLD AUTO: 36 %
MCH RBC QN AUTO: 31.6 PG (ref 26.5–33)
MCHC RBC AUTO-ENTMCNC: 33.3 G/DL (ref 31.5–36.5)
MCV RBC AUTO: 95 FL (ref 78–100)
MONOCYTES # BLD AUTO: 0.8 10E3/UL (ref 0–1.3)
MONOCYTES NFR BLD AUTO: 7 %
NEUTROPHILS # BLD AUTO: 5.5 10E3/UL (ref 1.6–8.3)
NEUTROPHILS NFR BLD AUTO: 56 %
NRBC # BLD AUTO: 0 10E3/UL
NRBC BLD AUTO-RTO: 0 /100
PLATELET # BLD AUTO: 299 10E3/UL (ref 150–450)
POTASSIUM BLD-SCNC: 3.7 MMOL/L (ref 3.4–5.3)
RBC # BLD AUTO: 4.5 10E6/UL (ref 3.8–5.2)
SARS-COV-2 RNA RESP QL NAA+PROBE: NEGATIVE
SODIUM SERPL-SCNC: 137 MMOL/L (ref 133–144)
TROPONIN I SERPL HS-MCNC: 111 NG/L
TROPONIN I SERPL HS-MCNC: 116 NG/L
WBC # BLD AUTO: 10.1 10E3/UL (ref 4–11)

## 2022-04-13 PROCEDURE — 99285 EMERGENCY DEPT VISIT HI MDM: CPT | Mod: 25

## 2022-04-13 PROCEDURE — 85025 COMPLETE CBC W/AUTO DIFF WBC: CPT | Performed by: EMERGENCY MEDICINE

## 2022-04-13 PROCEDURE — 80048 BASIC METABOLIC PNL TOTAL CA: CPT | Performed by: EMERGENCY MEDICINE

## 2022-04-13 PROCEDURE — 84484 ASSAY OF TROPONIN QUANT: CPT | Performed by: EMERGENCY MEDICINE

## 2022-04-13 PROCEDURE — 71046 X-RAY EXAM CHEST 2 VIEWS: CPT

## 2022-04-13 PROCEDURE — C9803 HOPD COVID-19 SPEC COLLECT: HCPCS

## 2022-04-13 PROCEDURE — 36415 COLL VENOUS BLD VENIPUNCTURE: CPT | Performed by: EMERGENCY MEDICINE

## 2022-04-13 PROCEDURE — 93005 ELECTROCARDIOGRAM TRACING: CPT | Mod: 76

## 2022-04-13 PROCEDURE — 120N000001 HC R&B MED SURG/OB

## 2022-04-13 PROCEDURE — 250N000013 HC RX MED GY IP 250 OP 250 PS 637: Performed by: EMERGENCY MEDICINE

## 2022-04-13 PROCEDURE — U0005 INFEC AGEN DETEC AMPLI PROBE: HCPCS | Performed by: EMERGENCY MEDICINE

## 2022-04-13 PROCEDURE — 83036 HEMOGLOBIN GLYCOSYLATED A1C: CPT | Performed by: INTERNAL MEDICINE

## 2022-04-13 PROCEDURE — 99223 1ST HOSP IP/OBS HIGH 75: CPT | Mod: AI | Performed by: INTERNAL MEDICINE

## 2022-04-13 PROCEDURE — 93005 ELECTROCARDIOGRAM TRACING: CPT

## 2022-04-13 RX ORDER — ASPIRIN 81 MG/1
324 TABLET, CHEWABLE ORAL ONCE
Status: COMPLETED | OUTPATIENT
Start: 2022-04-13 | End: 2022-04-13

## 2022-04-13 RX ORDER — HEPARIN SODIUM 10000 [USP'U]/100ML
0-5000 INJECTION, SOLUTION INTRAVENOUS CONTINUOUS
Status: DISCONTINUED | OUTPATIENT
Start: 2022-04-13 | End: 2022-04-14

## 2022-04-13 RX ORDER — NITROGLYCERIN 0.4 MG/1
0.4 TABLET SUBLINGUAL EVERY 5 MIN PRN
Status: DISCONTINUED | OUTPATIENT
Start: 2022-04-13 | End: 2022-04-15 | Stop reason: HOSPADM

## 2022-04-13 RX ADMIN — NITROGLYCERIN 0.4 MG: 0.4 TABLET SUBLINGUAL at 21:42

## 2022-04-13 RX ADMIN — ASPIRIN 81 MG CHEWABLE TABLET 324 MG: 81 TABLET CHEWABLE at 21:43

## 2022-04-13 RX ADMIN — NITROGLYCERIN 0.4 MG: 0.4 TABLET SUBLINGUAL at 22:05

## 2022-04-13 ASSESSMENT — ENCOUNTER SYMPTOMS
ARTHRALGIAS: 1
CONFUSION: 1
SHORTNESS OF BREATH: 1
BACK PAIN: 1
FATIGUE: 1

## 2022-04-13 ASSESSMENT — ACTIVITIES OF DAILY LIVING (ADL): ADLS_ACUITY_SCORE: 12

## 2022-04-14 LAB
ANION GAP SERPL CALCULATED.3IONS-SCNC: 2 MMOL/L (ref 3–14)
ATRIAL RATE - MUSE: 70 BPM
ATRIAL RATE - MUSE: 70 BPM
ATRIAL RATE - MUSE: 71 BPM
BUN SERPL-MCNC: 11 MG/DL (ref 7–30)
CALCIUM SERPL-MCNC: 9.2 MG/DL (ref 8.5–10.1)
CHLORIDE BLD-SCNC: 107 MMOL/L (ref 94–109)
CHOLEST SERPL-MCNC: 194 MG/DL
CO2 SERPL-SCNC: 28 MMOL/L (ref 20–32)
CREAT SERPL-MCNC: 0.85 MG/DL (ref 0.52–1.04)
DIASTOLIC BLOOD PRESSURE - MUSE: NORMAL MMHG
ERYTHROCYTE [DISTWIDTH] IN BLOOD BY AUTOMATED COUNT: 13.3 % (ref 10–15)
GFR SERPL CREATININE-BSD FRML MDRD: 80 ML/MIN/1.73M2
GLUCOSE BLD-MCNC: 90 MG/DL (ref 70–99)
HBA1C MFR BLD: 5.4 % (ref 0–5.6)
HCT VFR BLD AUTO: 42.1 % (ref 35–47)
HDLC SERPL-MCNC: 70 MG/DL
HGB BLD-MCNC: 13.8 G/DL (ref 11.7–15.7)
INTERPRETATION ECG - MUSE: NORMAL
LDLC SERPL CALC-MCNC: 108 MG/DL
MAGNESIUM SERPL-MCNC: 2 MG/DL (ref 1.6–2.3)
MCH RBC QN AUTO: 31 PG (ref 26.5–33)
MCHC RBC AUTO-ENTMCNC: 32.8 G/DL (ref 31.5–36.5)
MCV RBC AUTO: 95 FL (ref 78–100)
NONHDLC SERPL-MCNC: 124 MG/DL
P AXIS - MUSE: 41 DEGREES
P AXIS - MUSE: 52 DEGREES
P AXIS - MUSE: 52 DEGREES
PLATELET # BLD AUTO: 282 10E3/UL (ref 150–450)
POTASSIUM BLD-SCNC: 3.8 MMOL/L (ref 3.4–5.3)
PR INTERVAL - MUSE: 136 MS
PR INTERVAL - MUSE: 140 MS
PR INTERVAL - MUSE: 142 MS
QRS DURATION - MUSE: 82 MS
QRS DURATION - MUSE: 86 MS
QRS DURATION - MUSE: 88 MS
QT - MUSE: 380 MS
QT - MUSE: 388 MS
QT - MUSE: 404 MS
QTC - MUSE: 410 MS
QTC - MUSE: 421 MS
QTC - MUSE: 436 MS
R AXIS - MUSE: -4 DEGREES
R AXIS - MUSE: 1 DEGREES
R AXIS - MUSE: 2 DEGREES
RBC # BLD AUTO: 4.45 10E6/UL (ref 3.8–5.2)
SODIUM SERPL-SCNC: 137 MMOL/L (ref 133–144)
SYSTOLIC BLOOD PRESSURE - MUSE: NORMAL MMHG
T AXIS - MUSE: 23 DEGREES
T AXIS - MUSE: 6 DEGREES
T AXIS - MUSE: 9 DEGREES
TRIGL SERPL-MCNC: 82 MG/DL
TROPONIN I SERPL HS-MCNC: 107 NG/L
UFH PPP CHRO-ACNC: 0.72 IU/ML
UFH PPP CHRO-ACNC: >1.1 IU/ML
VENTRICULAR RATE- MUSE: 70 BPM
VENTRICULAR RATE- MUSE: 70 BPM
VENTRICULAR RATE- MUSE: 71 BPM
WBC # BLD AUTO: 8.7 10E3/UL (ref 4–11)

## 2022-04-14 PROCEDURE — 82310 ASSAY OF CALCIUM: CPT | Performed by: INTERNAL MEDICINE

## 2022-04-14 PROCEDURE — 999N000099 HC STATISTIC MODERATE SEDATION < 10 MIN: Performed by: INTERNAL MEDICINE

## 2022-04-14 PROCEDURE — 93005 ELECTROCARDIOGRAM TRACING: CPT

## 2022-04-14 PROCEDURE — B2111ZZ FLUOROSCOPY OF MULTIPLE CORONARY ARTERIES USING LOW OSMOLAR CONTRAST: ICD-10-PCS | Performed by: INTERNAL MEDICINE

## 2022-04-14 PROCEDURE — 258N000003 HC RX IP 258 OP 636: Performed by: INTERNAL MEDICINE

## 2022-04-14 PROCEDURE — 85520 HEPARIN ASSAY: CPT | Performed by: INTERNAL MEDICINE

## 2022-04-14 PROCEDURE — 250N000011 HC RX IP 250 OP 636: Performed by: INTERNAL MEDICINE

## 2022-04-14 PROCEDURE — C1769 GUIDE WIRE: HCPCS | Performed by: INTERNAL MEDICINE

## 2022-04-14 PROCEDURE — 250N000013 HC RX MED GY IP 250 OP 250 PS 637: Performed by: INTERNAL MEDICINE

## 2022-04-14 PROCEDURE — 250N000009 HC RX 250: Performed by: INTERNAL MEDICINE

## 2022-04-14 PROCEDURE — 36415 COLL VENOUS BLD VENIPUNCTURE: CPT | Performed by: INTERNAL MEDICINE

## 2022-04-14 PROCEDURE — 250N000011 HC RX IP 250 OP 636: Performed by: EMERGENCY MEDICINE

## 2022-04-14 PROCEDURE — 93454 CORONARY ARTERY ANGIO S&I: CPT | Mod: 26 | Performed by: INTERNAL MEDICINE

## 2022-04-14 PROCEDURE — 99223 1ST HOSP IP/OBS HIGH 75: CPT | Mod: 25 | Performed by: INTERNAL MEDICINE

## 2022-04-14 PROCEDURE — 120N000001 HC R&B MED SURG/OB

## 2022-04-14 PROCEDURE — 272N000001 HC OR GENERAL SUPPLY STERILE: Performed by: INTERNAL MEDICINE

## 2022-04-14 PROCEDURE — 99232 SBSQ HOSP IP/OBS MODERATE 35: CPT | Performed by: INTERNAL MEDICINE

## 2022-04-14 PROCEDURE — C1894 INTRO/SHEATH, NON-LASER: HCPCS | Performed by: INTERNAL MEDICINE

## 2022-04-14 PROCEDURE — C1887 CATHETER, GUIDING: HCPCS | Performed by: INTERNAL MEDICINE

## 2022-04-14 PROCEDURE — 96366 THER/PROPH/DIAG IV INF ADDON: CPT

## 2022-04-14 PROCEDURE — 93454 CORONARY ARTERY ANGIO S&I: CPT | Performed by: INTERNAL MEDICINE

## 2022-04-14 PROCEDURE — 83735 ASSAY OF MAGNESIUM: CPT | Performed by: INTERNAL MEDICINE

## 2022-04-14 PROCEDURE — 85027 COMPLETE CBC AUTOMATED: CPT | Performed by: INTERNAL MEDICINE

## 2022-04-14 PROCEDURE — 84484 ASSAY OF TROPONIN QUANT: CPT | Performed by: INTERNAL MEDICINE

## 2022-04-14 PROCEDURE — 80061 LIPID PANEL: CPT | Performed by: INTERNAL MEDICINE

## 2022-04-14 PROCEDURE — 96365 THER/PROPH/DIAG IV INF INIT: CPT

## 2022-04-14 RX ORDER — NALOXONE HYDROCHLORIDE 0.4 MG/ML
0.2 INJECTION, SOLUTION INTRAMUSCULAR; INTRAVENOUS; SUBCUTANEOUS
Status: ACTIVE | OUTPATIENT
Start: 2022-04-14 | End: 2022-04-14

## 2022-04-14 RX ORDER — LIDOCAINE 40 MG/G
CREAM TOPICAL
Status: DISCONTINUED | OUTPATIENT
Start: 2022-04-14 | End: 2022-04-15 | Stop reason: HOSPADM

## 2022-04-14 RX ORDER — ASPIRIN 81 MG/1
243 TABLET, CHEWABLE ORAL ONCE
Status: COMPLETED | OUTPATIENT
Start: 2022-04-14 | End: 2022-04-14

## 2022-04-14 RX ORDER — ACETAMINOPHEN 325 MG/1
650 TABLET ORAL EVERY 4 HOURS PRN
Status: DISCONTINUED | OUTPATIENT
Start: 2022-04-14 | End: 2022-04-15 | Stop reason: HOSPADM

## 2022-04-14 RX ORDER — SODIUM CHLORIDE 9 MG/ML
75 INJECTION, SOLUTION INTRAVENOUS CONTINUOUS
Status: ACTIVE | OUTPATIENT
Start: 2022-04-14 | End: 2022-04-14

## 2022-04-14 RX ORDER — HEPARIN SODIUM 1000 [USP'U]/ML
INJECTION, SOLUTION INTRAVENOUS; SUBCUTANEOUS
Status: DISCONTINUED
Start: 2022-04-14 | End: 2022-04-14 | Stop reason: HOSPADM

## 2022-04-14 RX ORDER — POTASSIUM CHLORIDE 1500 MG/1
20 TABLET, EXTENDED RELEASE ORAL
Status: COMPLETED | OUTPATIENT
Start: 2022-04-14 | End: 2022-04-14

## 2022-04-14 RX ORDER — ONDANSETRON 4 MG/1
4 TABLET, ORALLY DISINTEGRATING ORAL EVERY 6 HOURS PRN
Status: DISCONTINUED | OUTPATIENT
Start: 2022-04-14 | End: 2022-04-15 | Stop reason: HOSPADM

## 2022-04-14 RX ORDER — NALOXONE HYDROCHLORIDE 0.4 MG/ML
0.4 INJECTION, SOLUTION INTRAMUSCULAR; INTRAVENOUS; SUBCUTANEOUS
Status: ACTIVE | OUTPATIENT
Start: 2022-04-14 | End: 2022-04-14

## 2022-04-14 RX ORDER — FENTANYL CITRATE 50 UG/ML
25 INJECTION, SOLUTION INTRAMUSCULAR; INTRAVENOUS
Status: DISCONTINUED | OUTPATIENT
Start: 2022-04-14 | End: 2022-04-15 | Stop reason: HOSPADM

## 2022-04-14 RX ORDER — ASPIRIN 81 MG/1
81 TABLET ORAL DAILY
Status: DISCONTINUED | OUTPATIENT
Start: 2022-04-14 | End: 2022-04-15 | Stop reason: HOSPADM

## 2022-04-14 RX ORDER — HEPARIN SODIUM 1000 [USP'U]/ML
INJECTION, SOLUTION INTRAVENOUS; SUBCUTANEOUS
Status: DISCONTINUED | OUTPATIENT
Start: 2022-04-14 | End: 2022-04-14 | Stop reason: HOSPADM

## 2022-04-14 RX ORDER — POLYETHYLENE GLYCOL 3350 17 G/17G
17 POWDER, FOR SOLUTION ORAL DAILY PRN
Status: DISCONTINUED | OUTPATIENT
Start: 2022-04-14 | End: 2022-04-15 | Stop reason: HOSPADM

## 2022-04-14 RX ORDER — LORAZEPAM 2 MG/ML
0.5 INJECTION INTRAMUSCULAR
Status: DISCONTINUED | OUTPATIENT
Start: 2022-04-14 | End: 2022-04-14 | Stop reason: HOSPADM

## 2022-04-14 RX ORDER — AMOXICILLIN 250 MG
1 CAPSULE ORAL 2 TIMES DAILY PRN
Status: DISCONTINUED | OUTPATIENT
Start: 2022-04-14 | End: 2022-04-15 | Stop reason: HOSPADM

## 2022-04-14 RX ORDER — VERAPAMIL HYDROCHLORIDE 2.5 MG/ML
INJECTION, SOLUTION INTRAVENOUS
Status: DISCONTINUED
Start: 2022-04-14 | End: 2022-04-14 | Stop reason: HOSPADM

## 2022-04-14 RX ORDER — HEPARIN SODIUM 1000 [USP'U]/ML
INJECTION, SOLUTION INTRAVENOUS; SUBCUTANEOUS
Status: DISCONTINUED
Start: 2022-04-14 | End: 2022-04-14 | Stop reason: WASHOUT

## 2022-04-14 RX ORDER — OXYCODONE HYDROCHLORIDE 5 MG/1
10 TABLET ORAL EVERY 4 HOURS PRN
Status: DISCONTINUED | OUTPATIENT
Start: 2022-04-14 | End: 2022-04-15 | Stop reason: HOSPADM

## 2022-04-14 RX ORDER — SODIUM CHLORIDE 9 MG/ML
INJECTION, SOLUTION INTRAVENOUS CONTINUOUS
Status: DISCONTINUED | OUTPATIENT
Start: 2022-04-14 | End: 2022-04-14 | Stop reason: HOSPADM

## 2022-04-14 RX ORDER — VERAPAMIL HYDROCHLORIDE 2.5 MG/ML
INJECTION, SOLUTION INTRAVENOUS
Status: DISCONTINUED | OUTPATIENT
Start: 2022-04-14 | End: 2022-04-14 | Stop reason: HOSPADM

## 2022-04-14 RX ORDER — IOPAMIDOL 755 MG/ML
INJECTION, SOLUTION INTRAVASCULAR
Status: DISCONTINUED | OUTPATIENT
Start: 2022-04-14 | End: 2022-04-14 | Stop reason: HOSPADM

## 2022-04-14 RX ORDER — ISOSORBIDE MONONITRATE 30 MG/1
30 TABLET, EXTENDED RELEASE ORAL DAILY
Status: DISCONTINUED | OUTPATIENT
Start: 2022-04-14 | End: 2022-04-14

## 2022-04-14 RX ORDER — ATORVASTATIN CALCIUM 40 MG/1
40 TABLET, FILM COATED ORAL EVERY EVENING
Status: DISCONTINUED | OUTPATIENT
Start: 2022-04-14 | End: 2022-04-15 | Stop reason: HOSPADM

## 2022-04-14 RX ORDER — LIDOCAINE HYDROCHLORIDE 10 MG/ML
INJECTION, SOLUTION EPIDURAL; INFILTRATION; INTRACAUDAL; PERINEURAL
Status: DISCONTINUED
Start: 2022-04-14 | End: 2022-04-14 | Stop reason: HOSPADM

## 2022-04-14 RX ORDER — LORAZEPAM 0.5 MG/1
0.5 TABLET ORAL
Status: DISCONTINUED | OUTPATIENT
Start: 2022-04-14 | End: 2022-04-14 | Stop reason: HOSPADM

## 2022-04-14 RX ORDER — AMOXICILLIN 250 MG
2 CAPSULE ORAL 2 TIMES DAILY PRN
Status: DISCONTINUED | OUTPATIENT
Start: 2022-04-14 | End: 2022-04-15 | Stop reason: HOSPADM

## 2022-04-14 RX ORDER — ONDANSETRON 2 MG/ML
4 INJECTION INTRAMUSCULAR; INTRAVENOUS EVERY 6 HOURS PRN
Status: DISCONTINUED | OUTPATIENT
Start: 2022-04-14 | End: 2022-04-15 | Stop reason: HOSPADM

## 2022-04-14 RX ORDER — FENTANYL CITRATE 50 UG/ML
INJECTION, SOLUTION INTRAMUSCULAR; INTRAVENOUS
Status: DISCONTINUED | OUTPATIENT
Start: 2022-04-14 | End: 2022-04-14 | Stop reason: HOSPADM

## 2022-04-14 RX ORDER — FLUMAZENIL 0.1 MG/ML
0.2 INJECTION, SOLUTION INTRAVENOUS
Status: ACTIVE | OUTPATIENT
Start: 2022-04-14 | End: 2022-04-14

## 2022-04-14 RX ORDER — LIDOCAINE 40 MG/G
CREAM TOPICAL
Status: DISCONTINUED | OUTPATIENT
Start: 2022-04-14 | End: 2022-04-14 | Stop reason: HOSPADM

## 2022-04-14 RX ORDER — ATROPINE SULFATE 0.1 MG/ML
0.5 INJECTION INTRAVENOUS
Status: ACTIVE | OUTPATIENT
Start: 2022-04-14 | End: 2022-04-14

## 2022-04-14 RX ORDER — NITROGLYCERIN 5 MG/ML
VIAL (ML) INTRAVENOUS
Status: DISCONTINUED | OUTPATIENT
Start: 2022-04-14 | End: 2022-04-14 | Stop reason: HOSPADM

## 2022-04-14 RX ORDER — FENTANYL CITRATE 50 UG/ML
INJECTION, SOLUTION INTRAMUSCULAR; INTRAVENOUS
Status: DISCONTINUED
Start: 2022-04-14 | End: 2022-04-14 | Stop reason: HOSPADM

## 2022-04-14 RX ORDER — AMLODIPINE BESYLATE 2.5 MG/1
2.5 TABLET ORAL DAILY
Status: DISCONTINUED | OUTPATIENT
Start: 2022-04-15 | End: 2022-04-15 | Stop reason: HOSPADM

## 2022-04-14 RX ORDER — PROCHLORPERAZINE 25 MG
25 SUPPOSITORY, RECTAL RECTAL EVERY 12 HOURS PRN
Status: DISCONTINUED | OUTPATIENT
Start: 2022-04-14 | End: 2022-04-15 | Stop reason: HOSPADM

## 2022-04-14 RX ORDER — HEPARIN SODIUM 10000 [USP'U]/100ML
0-5000 INJECTION, SOLUTION INTRAVENOUS CONTINUOUS
Status: DISCONTINUED | OUTPATIENT
Start: 2022-04-14 | End: 2022-04-14

## 2022-04-14 RX ORDER — NITROGLYCERIN 5 MG/ML
VIAL (ML) INTRAVENOUS
Status: DISCONTINUED
Start: 2022-04-14 | End: 2022-04-14 | Stop reason: HOSPADM

## 2022-04-14 RX ORDER — ASPIRIN 81 MG/1
81 TABLET ORAL EVERY EVENING
COMMUNITY
End: 2022-05-17

## 2022-04-14 RX ORDER — OXYCODONE HYDROCHLORIDE 5 MG/1
5 TABLET ORAL EVERY 4 HOURS PRN
Status: DISCONTINUED | OUTPATIENT
Start: 2022-04-14 | End: 2022-04-15 | Stop reason: HOSPADM

## 2022-04-14 RX ORDER — ASPIRIN 325 MG
325 TABLET ORAL ONCE
Status: COMPLETED | OUTPATIENT
Start: 2022-04-14 | End: 2022-04-14

## 2022-04-14 RX ORDER — PROCHLORPERAZINE MALEATE 5 MG
10 TABLET ORAL EVERY 6 HOURS PRN
Status: DISCONTINUED | OUTPATIENT
Start: 2022-04-14 | End: 2022-04-15 | Stop reason: HOSPADM

## 2022-04-14 RX ADMIN — HEPARIN SODIUM AND DEXTROSE 1200 UNITS/HR: 10000; 5 INJECTION INTRAVENOUS at 00:04

## 2022-04-14 RX ADMIN — ACETAMINOPHEN 650 MG: 325 TABLET, FILM COATED ORAL at 20:22

## 2022-04-14 RX ADMIN — ASPIRIN 325 MG ORAL TABLET 325 MG: 325 PILL ORAL at 10:42

## 2022-04-14 RX ADMIN — SODIUM CHLORIDE: 9 INJECTION, SOLUTION INTRAVENOUS at 10:44

## 2022-04-14 RX ADMIN — OMEPRAZOLE 20 MG: 20 CAPSULE, DELAYED RELEASE ORAL at 15:04

## 2022-04-14 RX ADMIN — HEPARIN SODIUM AND DEXTROSE 900 UNITS/HR: 10000; 5 INJECTION INTRAVENOUS at 08:02

## 2022-04-14 RX ADMIN — ATORVASTATIN CALCIUM 40 MG: 40 TABLET, FILM COATED ORAL at 20:23

## 2022-04-14 RX ADMIN — ISOSORBIDE MONONITRATE 30 MG: 30 TABLET, EXTENDED RELEASE ORAL at 10:43

## 2022-04-14 RX ADMIN — POTASSIUM CHLORIDE 20 MEQ: 1500 TABLET, EXTENDED RELEASE ORAL at 10:42

## 2022-04-14 RX ADMIN — SODIUM CHLORIDE 75 ML/HR: 9 INJECTION, SOLUTION INTRAVENOUS at 14:37

## 2022-04-14 ASSESSMENT — ACTIVITIES OF DAILY LIVING (ADL)
ADLS_ACUITY_SCORE: 3
ADLS_ACUITY_SCORE: 7
ADLS_ACUITY_SCORE: 12
ADLS_ACUITY_SCORE: 7
ADLS_ACUITY_SCORE: 12
ADLS_ACUITY_SCORE: 7
ADLS_ACUITY_SCORE: 12
ADLS_ACUITY_SCORE: 7
ADLS_ACUITY_SCORE: 12
ADLS_ACUITY_SCORE: 7
ADLS_ACUITY_SCORE: 3
ADLS_ACUITY_SCORE: 12
ADLS_ACUITY_SCORE: 7
ADLS_ACUITY_SCORE: 12
ADLS_ACUITY_SCORE: 7
ADLS_ACUITY_SCORE: 3
ADLS_ACUITY_SCORE: 12

## 2022-04-14 NOTE — ED TRIAGE NOTES
Here for left intermittent chest painassociated with fatigue, foggy, sweaty, nausea, and sob for couple months. Today feeling worse. Stated had stress test yesterday that shows old heart attack, has appointment with cardiologist tomorrow. ABCs intact.

## 2022-04-14 NOTE — H&P
[facility]  Hospitalist Admission Note  Name: Jayne Tyler    MRN: [id number]  YOB: 1965    Age: 56 year old  Date of admission: 4/13/2022  Primary care provider: Loli Aquino    Chief Complaint:  Fatigue, SOB, CP    Assessment and Plan:     Jayne Tyler is a 56 year old female with PMH including GERD who has had one month of profound fatigue and SOB who had an abnormal stress test on 4/12 who was admitted 04/13/22 with development of chest pain and found to have NSTEMI.    1.  NSTEMI: Few months ago patient woke up with profound fatigue and fogginess.  She saw her PCP last week and a stress test was ordered which was completed on 4/12.  This was abnormal with a small area of infarction in the distal anterior and apical segments of the left ventricle with normal LV function.  She was started on atorvastatin and aspirin yesterday by her PCP.  She was referred to cardiology but instructed to come to the emergency room if she had any new symptoms.  Today had pain which was in the left shoulder and radiated to the anterior chest as well as left neck which was associated with nausea and diaphoresis.  EKG showed no acute ischemic changes with no dynamic changes on repeat.  Her pain did resolve after receiving 2 nitroglycerin tablets.  Her initial troponin was elevated at 116.  -Heparin drip  -Serial troponins  -Cardiology consult  -Continue atorvastatin and aspirin  -No beta-blocker or ACE inhibitor given soft pressures  -N.p.o. at midnight for possible angiogram    2.  GERD: Continue PPI.    3.  Hyperglycemia: Glucose slightly elevated 124.  Will check hemoglobin A1c.    4.  HLD: LDL in 2020 was 166.  Will repeat lipid panel.  Continue atorvastatin.    5.  Tobacco use disorder: Patient quit smoking about 6 years ago.  She does state that she still smokes the occasional cigarette.  She states that this time she will no longer do this.    Diet:   NPO at midnight  DVT Prophylaxis:  "therapeutic heparin  Barber Catheter: Not present  Code Status:   Full code    Disposition Plan   Expected Discharge:  admit to inpatient status  Anticipated discharge location:  Awaiting care coordination huddle  Delays:         Entered: Chayo Ocampo MD 04/13/2022, 10:35 PM     The patient's care was discussed with the Patient.    Chayo Ocampo MD  [facility]        Clinically Significant Risk Factors Present on Admission                  # Obesity: Estimated body mass index is 39.48 kg/m  as calculated from the following:    Height as of this encounter: 1.626 m (5' 4\").    Weight as of this encounter: 104.3 kg (230 lb).           History of Present Illness:  Jayne Tyler is a 56 year old female with PMH including GERD who has had one month of profound fatigue and SOB who had an abnormal stress test on 4/12 who was admitted 04/13/22 with development of chest pain and found to have NSTEMI.  History was obtained through patient interview, chart review and discussion with Dr. Joseph in the ER.    Patient reports that a couple of months ago she got up 1 morning and felt extremely tired and foggy.  Her boyfriend had returned from a trip to [place] and she had maybe that he could have been exposed to COVID-19 and that is what her symptoms are from.  She took a few separate COVID-19 test which were all negative.  Because her symptoms were persistent and severe she booked an appoint with her primary care doctor.  She states it took over a month to get in and she saw her PCP last week.  At that time a stress test was ordered.    She underwent a stress test on 4/12 which showed a small area of infarction in the distal anterior and apical segments of the left ventricle with normal left ventricle function.  Her primary care doctor called her and started her on atorvastatin and baby aspirin.  It was recommended that should she have chest pain or worsening symptoms she should come to the emergency " room.    She states that after this news she has felt quite anxious.  She did have rotator cuff surgery in 2017 of the left shoulder and sometimes she will have pain related to that which can wraparound the anterior chest.  This afternoon she was having pain more up into the left neck which is not typical.  She thought it was anxiety and try to take a nap but then had nausea and diaphoresis which is also not typical.  She continues to think this was anxiety and tried to calm down but this evening due to her symptoms came to the emergency room.    She states that her shortness of breath has become a little bit worse since it began a few months ago.    She has not had fevers, chills, vomiting, abdominal pain, dizziness, lightheadedness, lower extremity swelling.  She does note that occasionally she will get some swelling of the left ankle which started after she had an injury last summer to this.  She has not had diarrhea, constipation, urinary symptoms.     Past Medical History:  Past Medical History:   Diagnosis Date     Morbid obesity (H)      Pure hypercholesterolemia      Past Surgical History:  Past Surgical History:   Procedure Laterality Date     ARTHROSCOPY SHOULDER Right 2015     ARTHROSCOPY SHOULDER, OPEN ROTATOR CUFF REPAIR, COMBINED Left 03/29/2017    Procedure:  Left shoulder arthroscopy and arthroscopic subacromial decompression (acromioplasty, bursectomy and coracoacromial ligament resection). Arthroscopic distal clavicle resection. Mini-open rotator cuff tear repair. Surgeon:  Kevin George MD  [facility]     [facility]     DAVINCI HYSTERECTOMY TOTAL, BILATERAL SALPINGO-OOPHORECTOMY, COMBINED N/A 2/9/2021    Procedure: Robotic assisted total laparoscopic hysterectomy, bilateral salpingectomy, bilateral oophorectomy, cystoscopy;  Surgeon: Jonathan Peters MD;  Location: [facility] OR     DISKECTOMY, LUMBAR, SINGLE SP  1996    L5-S1     DISKECTOMY, LUMBAR, SINGLE  SP  1997    L5-S1     FUSION LUMBAR ANTERIOR ONE LEVEL  1999    L5-S1     OSTEOTOMY FOOT Right 06/06/2017    Procedure: OSTEOTOMY FOOT;  1)  WEIL OSTEOTOMY RIGHT SECOND METATARSAL, 2) PLANTAR CAPSULE DEBRIDEMENT/SYNOVECTOMY, RIGHT SECOND METATARSAL PHALANGEAL JOINT, 3) HAMMERTOE REPAIR RIGHT SECOND TOE;  Surgeon: Olvin Betancur DPM;  Location: [facility]     UT SPINE SURGERY PROCEDURE UNLISTED       RECONSTRUCT FOREFOOT WITH METATARSOPHALANGEAL (MTP) FUSION Right 06/06/2017    Procedure: RECONSTRUCT FOREFOOT WITH METATARSOPHALANGEAL (MTP) FUSION;;  Surgeon: Olvin Betancur DPM;  Location: [facility]     REPAIR HAMMER TOE Right 06/06/2017    Procedure: REPAIR HAMMER TOE;;  Surgeon: Olvin Betancur DPM;  Location: [facility]     ROTATOR CUFF REPAIR RT/LT Left 2017     Social History:  Social History     Tobacco Use     Smoking status: Current Some Day Smoker     Packs/day: 1.00     Years: 26.00     Pack years: 26.00     Types: Cigarettes     Smokeless tobacco: Never Used     Tobacco comment: 3-4 cigs per week    Substance Use Topics     Alcohol use: Yes     Comment: occ     Social History     Social History Narrative    . In a long-term relationship.    3 adult kids.    No grandchildren.    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     Family History:  Family History   Problem Relation Age of Onset     Rheumatoid Arthritis Mother      Coronary Artery Disease Mother         s/p MI and PCIs in 50s     Rheumatoid Arthritis Sister      Myocardial Infarction Maternal Grandmother         in her 60s     Myocardial Infarction Maternal Grandfather         later in life     Diabetes No family hx of      Cerebrovascular Disease No family hx of      Colon Cancer No family hx of      Breast Cancer No family hx of      Ovarian Cancer No family hx of      Allergies:  Allergies   Allergen Reactions     Penicillins Hives     Medications:  Current Facility-Administered Medications   Medication     nitroGLYcerin (NITROSTAT) sublingual tablet 0.4  "mg     Current Outpatient Medications   Medication     acetaminophen (TYLENOL) 325 MG tablet     atorvastatin (LIPITOR) 20 MG tablet     Cholecalciferol (VITAMIN D) 2000 units CAPS     fluticasone (FLONASE) 50 MCG/ACT nasal spray     multivitamin w/minerals (THERA-VIT-M) tablet     omeprazole (PRILOSEC) 20 MG DR capsule     vitamin (B COMPLEX-C) tablet     vitamin D2 (ERGOCALCIFEROL) 93436 units (1250 mcg) capsule      Review of Systems:  A Comprehensive greater than 10 system review of systems was carried out.  Pertinent positives and negatives are noted above.  Otherwise negative for contributory information.     Physical Exam:  Blood pressure 128/75, pulse 90, temperature 97.6  F (36.4  C), temperature source Temporal, resp. rate 13, height 1.626 m (5' 4\"), weight 104.3 kg (230 lb), SpO2 100 %, not currently breastfeeding.  Wt Readings from Last 1 Encounters:   04/13/22 104.3 kg (230 lb)     Exam:   General: Alert, awake, no acute distress.  HEENT: NC/AT, eyes anicteric and without injection, EOMI, face symmetric.  Dentition WNL, MMM.  Cardiac: RRR, normal S1, S2.  No murmurs/g/r.  No LE edema  Pulmonary: Normal chest rise, normal work of breathing.  Lungs CTAB without crackles or wheezing  Abdomen: soft, non-tender, non-distended.  Normoactive BS.  No guarding or rebound tenderness.  Extremities: no deformities.  Warm, well perfused.  Skin: no rashes or lesions noted.  Warm and Dry.  Neuro: No focal deficits noted.  Speech clear.  Coordination and strength grossly normal.  Psych: Appropriate affect. Alert x3    Data Reviewed Today:  EKG: Normal sinus rhythm, no acute ischemic changes.  No dynamic changes on repeat  Imaging:  Results for orders placed or performed during the hospital encounter of 04/13/22   Chest XR,  PA & LAT    Narrative    EXAM: XR CHEST 2 VW  LOCATION: [facility]  DATE/TIME: 4/13/2022 9:53 PM    INDICATION: chest pain  COMPARISON: 11/2/2010      Impression    " IMPRESSION: Negative chest.     Labs:  Recent Labs   Lab 04/13/22 2026   WBC 10.1   HGB 14.2   HCT 42.7   MCV 95        Recent Labs   Lab 04/13/22 2026      POTASSIUM 3.7   CHLORIDE 106   CO2 28   ANIONGAP 3   *   BUN 13   CR 0.81   GFRESTIMATED 85   JEANETH 9.1     Recent Labs   Lab 04/13/22 2026   TROPONINIS 116*       Chayo Ocampo MD  Hospitalist  [facility]

## 2022-04-14 NOTE — PROGRESS NOTES
[facility]    Hospitalist Progress Note  Name: Jayne Tyler    MRN: [id number]  Provider:  Frederick Torres DO  Date of Service: 04/14/2022    Summary of Stay: Jayne Tyler is a 56 year old female with a history of GERD admitted on 4/13/2022 with fatigue, shortness of breath, and abnormal stress test on 4/12.  The patient had development of chest pain on 4/13 and presented to the emergency department.  In the emergency department the patient found of a temperature of 97.6  F, heart rate 103, respiratory rate 18, blood pressure 158/87, SPO2 96% on room air.  Initial lab work revealed an unremarkable BMP and CBC, high-sensitivity troponin 116.  EKG showed sinus rhythm.  The patient was provided an aspirin and started on a heparin drip.  Cardiology was consulted to see the patient.    TODAY'S PLAN: Cardiac cath today per cardiology appears normal with no disease and no intervention.  Discontinue heparin drip.  Appreciate Cardiology recommendations.  Anticipate discharge home tomorrow.  Continue asa 81 mg daily, atorvastatin, Imdur.    Problem List:   Chest Pain  NSTEMI - Type 1 vs Type 2  - Appreciate Cardiology recommendations  - ASA 81 mg daily, Atorvastatin 40 mg at bedtime  - s/p Cardiac Cath on 4/14 that showed normal coronary arteries  - Recommend lifestyle modifications including tobacco cessation, BP control, mediterranean-style diet, exercise  - Telemetry    Hypertension  - Continue Imdur per Cardiology    Active Nicotine Dependence with Cigarettes  - Recommended tobacco cessation    Obesity - BMI 39.48    DVT Prophylaxis: Pneumatic Compression Devices  Code Status: Full Code  Diet: Advance Diet as Tolerated: Regular Diet Adult    Barber Catheter: Not present  Disposition: Expected discharge tomorrow to home. Goals prior to discharge include cardiac work up complete.   Family updated today: No     Interval History   Pt seen and examined.  Pt denies cp, sob.    -Data reviewed today: I  personally reviewed all new labs and imaging results over the last 24 hours.     Physical Exam   Temp: 98  F (36.7  C) Temp src: Oral BP: 104/54 Pulse: 80   Resp: 18 SpO2: 99 % O2 Device: None (Room air) Oxygen Delivery: 2 LPM  Vitals:    04/13/22 2015   Weight: 104.3 kg (230 lb)     Vital Signs with Ranges  Temp:  [97.5  F (36.4  C)-98.6  F (37  C)] 98  F (36.7  C)  Pulse:  [] 80  Resp:  [12-22] 18  BP: ()/(37-87) 104/54  SpO2:  [94 %-100 %] 99 %  No intake/output data recorded.    GENERAL: No apparent distress. Awake, alert, and fully oriented.  HEENT: Normocephalic, atraumatic. Extraocular movements intact.  CARDIOVASCULAR: Regular rate and rhythm without murmurs or rubs. No S3.  PULMONARY: Clear bilaterally.  GASTROINTESTINAL: Soft, non-tender, non-distended. Bowel sounds normoactive.   EXTREMITIES: No cyanosis or clubbing. No edema.  NEUROLOGICAL: CN 2-12 grossly intact, no focal neurological deficits.  DERMATOLOGICAL: No rash, ulcer, bruising, nor jaundice.    Medications     heparin 900 Units/hr (04/14/22 0802)     - MEDICATION INSTRUCTIONS -       sodium chloride 75 mL/hr (04/14/22 1437)       aspirin  81 mg Oral Daily     atorvastatin  40 mg Oral QPM     isosorbide mononitrate  30 mg Oral Daily     omeprazole  20 mg Oral Daily     sodium chloride (PF)  3 mL Intracatheter Q8H     Data     Laboratory:  Recent Labs   Lab 04/14/22  0653 04/13/22 2026   WBC 8.7 10.1   HGB 13.8 14.2   HCT 42.1 42.7   MCV 95 95    299     Recent Labs   Lab 04/14/22  0654 04/13/22 2026    137   POTASSIUM 3.8 3.7   CHLORIDE 107 106   CO2 28 28   ANIONGAP 2* 3   GLC 90 124*   BUN 11 13   CR 0.85 0.81   GFRESTIMATED 80 85   JEANETH 9.2 9.1     No results for input(s): CULT in the last 168 hours.    Imaging:  Recent Results (from the past 24 hour(s))   Chest XR,  PA & LAT    Narrative    EXAM: XR CHEST 2 VW  LOCATION: [facility]  DATE/TIME: 4/13/2022 9:53 PM    INDICATION: chest  pain  COMPARISON: 11/2/2010      Impression    IMPRESSION: Negative chest.   Cardiac Catheterization    Narrative    Chest pain in setting of an abnormal nuclear stress test  smoking history,   dyslipidemia, and low level , but flat troponin elevation    Findings    Left main: Normal  LAD: Normal  Circumflex: Normal  Right coronary: Dominant.  Normal.         Frederick Torres DO  [facility] Hospitalist  [address] [place] [place] [address]  04/14/2022

## 2022-04-14 NOTE — ED PROVIDER NOTES
"  History     Chief Complaint:  Chest Pain     HPI:  The history is provided by the patient.      Jayne Tyler is a 56 year old female with a history of hyperlipidemia who presents with extreme fatigue, shortness of breath, and \"brain fog\" which she has been experiencing for the past few months. She reports that her symptoms initially began when her boyfriend returned from a business trip a few months ago after there was a COVID-19 surge in [place], where he had been. She states that she took a few COVID-19 tests at home which were negative. The extreme fatigue, \"brain fog,\" and shortness of breath have persisted. She reports that her shortness of breath worsens with exertion. She has also been experiencing some intermittent chest pain, which she initially attributed to left-sided shoulder bursitis and therefore was not significantly concerned about this. However, the persistence of her other symptoms ultimately prompted her to call her primary doctor to schedule an appointment. She saw her primary doctor and had a stress test yesterday. This returned abnormal, and she was scheduled to see cardiology tomorrow. Today, she presents to the ED as she feels like her left-sided chest pain has been different today. It has been radiating to her left shoulder and to her back. Here in the ED, she rates her chest pain as a 5 or 6 out of 10. She has been taking Tylenol at home for pain management. She notes that she smokes tobacco occasionally.    Review of Systems   Constitutional: Positive for fatigue.   Respiratory: Positive for shortness of breath.    Cardiovascular: Positive for chest pain.   Musculoskeletal: Positive for arthralgias and back pain.   Psychiatric/Behavioral: Positive for confusion.   All other systems reviewed and are negative.    Allergies:  Penicillins    Medications:  Lipitor  Flonase  Omeprazole    Past Medical History:     Morbid obesity  Hyperlipidemia  Acid reflux  Cellulitis/abscess  GI hemorrhage " "  Superficial thrombophlebitis  Hemangioma  Peptic ulcer    Past Surgical History:   Shoulder arthroscopy  Rotator cuff repair, left  Bunionectomy  Total hysterectomy with bilateral salpingo-oophorectomy   Lumbar discectomy x2  Lumbar fusion  Foot osteotomy, reconstruction, hammer toe repair, right     Family History:    Mother: rheumatoid arthritis, coronary artery disease, PCIs, fibromyalgia   Sister: rheumatoid arthritis, fibromyalgia  Brothers: tobacco use, COPD    Social History:  The patient presents to the ED alone.  The patient presents to the ED via car.  Occasional tobacco smoker.    Physical Exam     Patient Vitals for the past 24 hrs:   BP Temp Temp src Pulse Resp SpO2 Height Weight   04/14/22 0030 117/62 -- -- 71 15 94 % -- --   04/14/22 0015 -- -- -- 71 16 95 % -- --   04/14/22 0000 124/62 -- -- 75 18 97 % -- --   04/13/22 2240 -- -- -- 73 14 98 % -- --   04/13/22 2215 109/64 -- -- 77 12 97 % -- --   04/13/22 2145 128/75 -- -- 90 13 100 % -- --   04/13/22 2138 -- -- -- -- -- -- 1.626 m (5' 4\") --   04/13/22 2130 (!) 148/85 -- -- 79 22 98 % -- --   04/13/22 2015 (!) 158/87 97.6  F (36.4  C) Temporal 103 18 96 % 1.626 m (5' 4\") 104.3 kg (230 lb)     Physical Exam  General:              Well-nourished              Speaking in full sentences  Eyes:              Conjunctiva without injection or scleral icterus  ENT:              Moist mucous membranes              Nares patent              Pinnae normal  Neck:              Full ROM              No stiffness appreciated  Resp:              Lungs CTAB              No crackles, wheezing or audible rubs              Good air movement  CV:                    Normal rate, regular rhythm              S1 and S2 present              No murmur, gallop or rub              2+ radial pulse bilaterally and symmetric  GI:              BS present              Abdomen soft without distention              Non-tender to light and deep palpation              No guarding or " rebound tenderness  Skin:              Warm, dry, well perfused              No rashes or open wounds on exposed skin  MSK:              Moves all extremities              No focal deformities or swelling  Neuro:              Alert              Answers questions appropriately              Moves all extremities equally              Gait stable  Psych:              Normal affect, normal mood    Emergency Department Course     ECG #1:  ECG taken at 2038, ECG read at 2038  Normal sinus rhythm  Normal ECG  No significant change as compared to prior, dated 4/5/22.  Rate 70 bpm. MD interval 136 ms. QRS duration 88 ms. QT/QTc 380/410 ms. P-R-T axes 52 2 23.     ECG #2:  ECG taken at 2215, ECG read at 2218  Normal sinus rhythm  Normal ECG  Rate 71 bpm. MD interval 140 ms. QRS duration 86 ms. QT/QTc 388/421 ms. P-R-T axes 41 -4 6.      ECG #3:  ECG taken at 0026, ECG read at 0030  Normal sinus rhythm  Normal ECG  Rate 70 bpm. MD interval 142 ms. QRS duration 82 ms. QT/QTc 404/436 ms. P-R-T axes 52 1 9.      Imaging:  Chest XR,  PA & LAT   Final Result   IMPRESSION: Negative chest.      Report per radiology    Laboratory:  Labs Ordered and Resulted from Time of ED Arrival to Time of ED Departure   BASIC METABOLIC PANEL - Abnormal       Result Value    Sodium 137      Potassium 3.7      Chloride 106      Carbon Dioxide (CO2) 28      Anion Gap 3      Urea Nitrogen 13      Creatinine 0.81      Calcium 9.1      Glucose 124 (*)     GFR Estimate 85     TROPONIN I - Abnormal    Troponin I High Sensitivity 116 (*)    TROPONIN I - Abnormal    Troponin I High Sensitivity 111 (*)    COVID-19 VIRUS (CORONAVIRUS) BY PCR - Normal    SARS CoV2 PCR Negative     CBC WITH PLATELETS AND DIFFERENTIAL    WBC Count 10.1      RBC Count 4.50      Hemoglobin 14.2      Hematocrit 42.7      MCV 95      MCH 31.6      MCHC 33.3      RDW 13.2      Platelet Count 299      % Neutrophils 56      % Lymphocytes 36      % Monocytes 7      % Eosinophils 1      %  Basophils 0      % Immature Granulocytes 0      NRBCs per 100 WBC 0      Absolute Neutrophils 5.5      Absolute Lymphocytes 3.7      Absolute Monocytes 0.8      Absolute Eosinophils 0.1      Absolute Basophils 0.0      Absolute Immature Granulocytes 0.0      Absolute NRBCs 0.0        Emergency Department Course:       Reviewed:  I reviewed nursing notes, vitals, past medical history, Care Everywhere    Assessments:  2128 I obtained history and examined the patient as noted above.   2202 I rechecked the patient and explained findings. She feels improved after the nitroglycerin.  2300 The patient was rechecked and updated. She is chest pain-free.    Consults:  2211 I spoke with Dr. Ocampo from the hospitalist service regarding the patient's presentation, findings here in the ED, and plan of care.     Interventions:  2142 Nitroglycerin 0.4 mg sublingual  2143 Aspirin 324 mg PO  2205 Nitroglycerin 0.4 mg sublingual  0003 Heparin loading dose 6,000 units IV  0004 Heparin 1,200 units/hr IV    Disposition:  The patient was admitted to the hospital under the care of Dr. Ocampo.     Impression & Plan     Medical Decision Making:  Jayne Tyler is a very pleasant 56-year-old female with a PMH significant for HLD, obesity, prior tobacco use, and family history for CAD, presenting to the ED for evaluation of chest pain.  DDx includes ACS, PE, pneumothorax, pneumonia, aortic dissection, anxiety, among others.  Present work-up concerning for ACS and NSTEMI.  Stress test reviewed from yesterday demonstrating a small area of infarction to the distal anterior and apical segments of the left ventricle.  EKG demonstrates sinus rhythm without findings of ST segment elevation or depression.  Initial troponin returned elevated at 116.  Patient provided aspirin and sublingual nitroglycerin x2 with resolution of pain.  Repeat EKG without evidence of dynamic or evolving changes.  Repeat troponin returned at 111.  Based on the above history, and  exam findings I feel this is unlikely to represent PE (no pleuritic pain, no tachycardia nor hypoxia) nor aortic dissection (no ripping or tearing pain radiating towards back, 2+ radial pulse bilaterally and symmetric, unremarkable cardiac mediastinum).  Chest x-ray negative for pneumonia or pneumothorax.  No evidence of widened mediastinum.  Patient was started on heparin drip and will require admission to the cardiac telemetry unit for cardiology evaluation and likely coronary angiography.  Case discussed with Dr. Ocampo who has seen and evaluated the patient at bedside.  Will initiate heparin gtt and plan admission.  Patient signed out to overnight team while patient is boarding in ED.    Diagnosis:    ICD-10-CM    1. NSTEMI (non-ST elevated myocardial infarction) (H)  I21.4      Scribe Disclosure:  I, Ashley Lilly, am serving as a scribe at 9:23 PM on 4/13/2022 to document services personally performed by Bib Joseph MD based on my observations and the provider's statements to me.        Bib Joseph MD  04/14/22 0207

## 2022-04-14 NOTE — PLAN OF CARE
Goal Outcome Evaluation:      VSS. A&O x4. SBA. Critical value Hep10a: 1.10. MD notified. Denies pain, sob or cp. Angio site clean, dry and intact. No interventions done. IV fluids started at 75/hr. Waiting for cards recommendations. Continue to follow POC.

## 2022-04-14 NOTE — CONSULTS
Cardiology Consultation:    Jayne Tyler MRN#: [id number]   YOB: 1965 Age: 56 year old     Date of Admission: 4/13/2022  Consult indication: NSTEMI         Assessment and Plan / Recommendations:      # NSTEMI, troponin mildly elevated, flat trajectory is not classically characteristic of ACS, however patient recently had a nuclear stress test that demonstrated infarction in the distal anterior/apical segments, and she does have risk factors for CAD including age, smoking, obesity, family history of early ASCVD.  Alternatively, possible that minimally elevated troponin may be related to hypertension.  # HTN, BP elevated in ED on arrival 158/87 mmHg, now normal  # Smoking  # FH of early ASCVD  # Obesity  # GERD    - Discussed options for further evaluation and management including conservative medical management with close follow up, additional non-invasive stress testing, or cardiac catheterization/coronary angiography +/- PCI.  Reviewed the risks and benefits of each option at length.   - Recommend cardiac catheterization/coronary angiography +/- PCI.   - Discussed the risks of cardiac catheterization/angiography +/- PCI that include but are not limited to the risk of stroke, heart attack, death, cardiac injury, emergent intervention such as stenting or bypass, contrast induced allergic reaction, renal dysfunction (including risks of temporary or permanent dialysis), and complications related to sedation and respiratory/pulmonary compromise, vascular complications (including bleeding and transfusion). Patient denies any major active bleeding issues and is willing to take and comply with dual antiplatelet therapy and understands the associated bleeding risks. Patient understands the overall risks of the procedure and wishes to proceed.  - Discussed with Pt that they should seek medical attention if they experience any future episodes of chest pain/discomfort, worsening exertional capacity,  "dyspnea, or other concerning symptoms, and they voiced understanding  - Will start Imdur 30mg daily  - Continue heparin GTT  - Continue ASA, statin  - NPO    Thank you for allowing our team to participate in the care of Jayne Tyler. Please do not hesitate to page me with any questions or concerns.    Bin Raines MD, [facility]  Cardiology  Text Page   April 14, 2022    Voice recognition software utilized.          History of Present Illness:     I had the opportunity to see patient Jayne Tyler at [facility] for a cardiology consultation.     As you know, patient is a pleasant 56-year-old female with a past medical history significant for obesity, smoking, GERD, who presents for further evaluation management of chest pain in the setting of a recent abnormal stress test.    Patient reports that at baseline she is fairly sedentary, used to be quite active however had a foot injury and so has not been exercising regularly over this past year.  Approx 1 to 2 months ago, she started developing a \"brain fog\" that she has attributed to possible COVID-19 illness related to her boyfriend having exposure on a business trip.  Over the past month, she has developed intermittent dyspnea, decreased exertional capacity, with profound fatigue.  She has baseline bursitis in her shoulders, as well as intermittent pain related to this.  For further evaluation she was seen by her PCP, she underwent a nuclear stress test 4/12/2022 that was read as probably abnormal, small area of infarction in the distal anterior and apical segments.  She presented to the ED after experiencing chest pain with radiation to her neck, associated with diaphoresis, onset was at rest, resolved with aspirin nitroglycerin in ED.    In the ED, initial blood pressure was 158/87 mmHg, ECG demonstrated normal sinus rhythm, no acute ischemic changes,  ms.  Labs were notable for potassium 3.8, creatinine 0.85, , CBC normal.  Chest x-ray was " negative.    Patient smokes 1 to 2 cigarettes a day, drinks alcohol occasionally.  No recreational drug use.  Family history notable for mother with MI in her 50s for which she underwent stenting.           Past Medical History:   I have reviewed this patient's past medical history    Past Medical History:   Diagnosis Date     Morbid obesity (H)      Pure hypercholesterolemia              Past Surgical History:   I have reviewed this patient's past surgical history   Past Surgical History:   Procedure Laterality Date     ARTHROSCOPY SHOULDER Right 2015     ARTHROSCOPY SHOULDER, OPEN ROTATOR CUFF REPAIR, COMBINED Left 03/29/2017    Procedure:  Left shoulder arthroscopy and arthroscopic subacromial decompression (acromioplasty, bursectomy and coracoacromial ligament resection). Arthroscopic distal clavicle resection. Mini-open rotator cuff tear repair. Surgeon:  Kevin George MD  [facility]     [facility]     DAVINCI HYSTERECTOMY TOTAL, BILATERAL SALPINGO-OOPHORECTOMY, COMBINED N/A 2/9/2021    Procedure: Robotic assisted total laparoscopic hysterectomy, bilateral salpingectomy, bilateral oophorectomy, cystoscopy;  Surgeon: Jonathan Peters MD;  Location: [facility] OR     DISKECTOMY, LUMBAR, SINGLE SP  1996    L5-S1     DISKECTOMY, LUMBAR, SINGLE SP  1997    L5-S1     FUSION LUMBAR ANTERIOR ONE LEVEL  1999    L5-S1     OSTEOTOMY FOOT Right 06/06/2017    Procedure: OSTEOTOMY FOOT;  1)  WEIL OSTEOTOMY RIGHT SECOND METATARSAL, 2) PLANTAR CAPSULE DEBRIDEMENT/SYNOVECTOMY, RIGHT SECOND METATARSAL PHALANGEAL JOINT, 3) HAMMERTOE REPAIR RIGHT SECOND TOE;  Surgeon: Olvin Betancur DPM;  Location: [facility]     NH SPINE SURGERY PROCEDURE UNLISTED       RECONSTRUCT FOREFOOT WITH METATARSOPHALANGEAL (MTP) FUSION Right 06/06/2017    Procedure: RECONSTRUCT FOREFOOT WITH METATARSOPHALANGEAL (MTP) FUSION;;  Surgeon: Olvin Betancur DPM;  Location: [facility]     REPAIR HAMMER TOE Right 06/06/2017     Procedure: REPAIR HAMMER TOE;;  Surgeon: Olvin Betancur DPM;  Location: [facility]     ROTATOR CUFF REPAIR RT/LT Left 2017             Social History:   I have reviewed this patient's social history  Social History     Tobacco Use     Smoking status: Current Some Day Smoker     Packs/day: 1.00     Years: 26.00     Pack years: 26.00     Types: Cigarettes     Smokeless tobacco: Never Used     Tobacco comment: 3-4 cigs per week    Substance Use Topics     Alcohol use: Yes     Comment: occ             Family History:   I have reviewed this patient's family history  Family History   Problem Relation Age of Onset     Rheumatoid Arthritis Mother      Coronary Artery Disease Mother         s/p MI and PCIs in 50s     Rheumatoid Arthritis Sister      Myocardial Infarction Maternal Grandmother         in her 60s     Myocardial Infarction Maternal Grandfather         later in life     Diabetes No family hx of      Cerebrovascular Disease No family hx of      Colon Cancer No family hx of      Breast Cancer No family hx of      Ovarian Cancer No family hx of              Allergies:   I have reviewed this patient's allergy history  Allergies   Allergen Reactions     Penicillins Hives             Medications reviewed:   Prior to admission medications:  Prior to Admission medications    Medication Sig Start Date End Date Taking? Authorizing Provider   acetaminophen (TYLENOL) 325 MG tablet Take 2 tablets (650 mg) by mouth every 4 hours as needed for mild pain 2/9/21   Jonathan Peters MD   atorvastatin (LIPITOR) 20 MG tablet Take 1 tablet (20 mg) by mouth daily 4/12/22   Leonila Collins APRN CNP   Cholecalciferol (VITAMIN D) 2000 units CAPS Take 2,000 Units by mouth daily 5/14/19   Loli Aquino MD   fluticasone (FLONASE) 50 MCG/ACT nasal spray Spray 2 sprays into both nostrils daily 3/9/21   Loli Aquino MD   multivitamin w/minerals (THERA-VIT-M) tablet Take 1 tablet by mouth daily    Reported, Patient    omeprazole (PRILOSEC) 20 MG DR capsule Take 20 mg by mouth daily    Reported, Patient   vitamin (B COMPLEX-C) tablet Take 1 tablet by mouth daily    Reported, Patient   vitamin D2 (ERGOCALCIFEROL) 55116 units (1250 mcg) capsule Take 1 capsule (50,000 Units) by mouth once a week  Patient not taking: Reported on 4/5/2022 5/14/19   Loli Aquino MD      Current medications:  Current Facility-Administered Medications Ordered in Epic   Medication Dose Route Frequency Last Rate Last Admin     acetaminophen (TYLENOL) tablet 650 mg  650 mg Oral Q4H PRN         aspirin (ASA) tablet 325 mg  325 mg Oral Once        Or     aspirin (ASA) chewable tablet 243 mg  243 mg Oral Once         aspirin EC tablet 81 mg  81 mg Oral Daily         atorvastatin (LIPITOR) tablet 40 mg  40 mg Oral QPM         heparin infusion 25,000 units in D5W 250 mL ANTICOAGULANT  0-5,000 Units/hr Intravenous Continuous 9 mL/hr at 04/14/22 0802 900 Units/hr at 04/14/22 0802     isosorbide mononitrate (IMDUR) 24 hr tablet 30 mg  30 mg Oral Daily         lidocaine (LMX4) cream   Topical Q1H PRN         lidocaine (LMX4) cream   Topical Q1H PRN         lidocaine 1 % 0.1-1 mL  0.1-1 mL Other Q1H PRN         lidocaine 1 % 0.1-1 mL  0.1-1 mL Other Q1H PRN         LORazepam (ATIVAN) injection 0.5 mg  0.5 mg Intravenous Q2H PRN        Or     LORazepam (ATIVAN) tablet 0.5 mg  0.5 mg Oral Q2H PRN         melatonin tablet 1 mg  1 mg Oral At Bedtime PRN         nitroGLYcerin (NITROSTAT) sublingual tablet 0.4 mg  0.4 mg Sublingual Q5 Min PRN   0.4 mg at 04/13/22 2205     omeprazole (priLOSEC) CR capsule 20 mg  20 mg Oral Daily         ondansetron (ZOFRAN-ODT) ODT tab 4 mg  4 mg Oral Q6H PRN        Or     ondansetron (ZOFRAN) injection 4 mg  4 mg Intravenous Q6H PRN         Patient is already receiving anticoagulation with heparin, enoxaparin (LOVENOX), warfarin (COUMADIN)  or other anticoagulant medication   Does not apply Continuous PRN         Patient is NOT  allergic to contrast dye OR was given pre-procedure oral steroids for treatment   Does not apply DOES NOT GO TO MAR         polyethylene glycol (MIRALAX) Packet 17 g  17 g Oral Daily PRN         potassium chloride ER (KLOR-CON M) CR tablet 20 mEq  20 mEq Oral Once PRN         prochlorperazine (COMPAZINE) injection 10 mg  10 mg Intravenous Q6H PRN        Or     prochlorperazine (COMPAZINE) tablet 10 mg  10 mg Oral Q6H PRN        Or     prochlorperazine (COMPAZINE) suppository 25 mg  25 mg Rectal Q12H PRN         senna-docusate (SENOKOT-S/PERICOLACE) 8.6-50 MG per tablet 1 tablet  1 tablet Oral BID PRN        Or     senna-docusate (SENOKOT-S/PERICOLACE) 8.6-50 MG per tablet 2 tablet  2 tablet Oral BID PRN         sodium chloride (PF) 0.9% PF flush 3 mL  3 mL Intracatheter Q8H         sodium chloride (PF) 0.9% PF flush 3 mL  3 mL Intracatheter q1 min prn         sodium chloride (PF) 0.9% PF flush 3 mL  3 mL Intracatheter Q8H         sodium chloride (PF) 0.9% PF flush 3 mL  3 mL Intracatheter Q1H PRN         sodium chloride (PF) 0.9% PF flush 3 mL  3 mL Intracatheter q1 min prn         sodium chloride 0.9% infusion   Intravenous Continuous         Current Outpatient Medications Ordered in Epic   Medication     acetaminophen (TYLENOL) 325 MG tablet     atorvastatin (LIPITOR) 20 MG tablet     Cholecalciferol (VITAMIN D) 2000 units CAPS     fluticasone (FLONASE) 50 MCG/ACT nasal spray     multivitamin w/minerals (THERA-VIT-M) tablet     omeprazole (PRILOSEC) 20 MG DR capsule     vitamin (B COMPLEX-C) tablet     vitamin D2 (ERGOCALCIFEROL) 93777 units (1250 mcg) capsule             Review of Systems:   A complete review of systems was performed and was negative except as mentioned in the HPI.          Physical Exam:   Vital signs were personally reviewed:  Temperatures:  Current - Temp: 98.6  F (37  C); Max - Temp  Av.2  F (36.8  C)  Min: 97.6  F (36.4  C)  Max: 98.6  F (37  C)  Respiration range: Resp  Av.5  Min:  12  Max: 22  Pulse range: Pulse  Av.9  Min: 71  Max: 103  Blood pressure range: Systolic (24hrs), Av , Min:109 , Max:158   ; Diastolic (24hrs), Av, Min:62, Max:87    Pulse oximetry range: SpO2  Av.1 %  Min: 94 %  Max: 100 %  No intake or output data in the 24 hours ending 22 0856  230 lbs 0 oz  Body mass index is 39.48 kg/m .   Body surface area is 2.17 meters squared.    Constitutional: appears stated age, in no apparent distress, appears to be well nourished  Head: normocephalic, atraumatic  Neck: supple, trachea midline, no bruit bilaterally   Pulmonary: clear to auscultation bilaterally, no wheezes, no rales, no increased work of breathing  Cardiovascular: JVP normal, regular rate, regular rhythm, normal S1 and S2, no S3, S4, no murmur appreciated, no lower extremity edema  Gastrointestinal: no guarding, non-rigid   Neurologic: awake, alert, moves all extremities  Skin: no jaundice, warm on limited exam  Psychiatric: affect is normal, answers questions appropriately, oriented to self and place         Laboratory tests:   Laboratory tests personally reviewed:   CMP  Recent Labs   Lab 22  0654 22    137   POTASSIUM 3.8 3.7   CHLORIDE 107 106   CO2 28 28   ANIONGAP 2* 3   GLC 90 124*   BUN 11 13   CR 0.85 0.81   GFRESTIMATED 80 85   JEANETH 9.2 9.1     CBC  Recent Labs   Lab 22  0653 22   WBC 8.7 10.1   RBC 4.45 4.50   HGB 13.8 14.2   HCT 42.1 42.7   MCV 95 95   MCH 31.0 31.6   MCHC 32.8 33.3   RDW 13.3 13.2    299     INRNo lab results found in last 7 days.  No results found for: TROPI, TROPONIN, TROPR, TROPN  Recent Labs   Lab Test 22  0654 20  1758   CHOL 194 246*   HDL 70 63   * 166*   TRIG 82 87     Lab Results   Component Value Date    A1C 5.4 2022     TSH   Date Value Ref Range Status   2022 1.20 0.40 - 4.00 mU/L Final   2021 1.91 0.40 - 4.00 mU/L Final     Clinically Significant Risk Factors Present on  "Admission                 # Obesity: Estimated body mass index is 39.48 kg/m  as calculated from the following:    Height as of this encounter: 1.626 m (5' 4\").    Weight as of this encounter: 104.3 kg (230 lb).    "

## 2022-04-14 NOTE — PROGRESS NOTES
LINN giving report:Annie ESTEVES receiving report:      S:  Procedure performed:Cor Angio    B:  Why procedure needed:  Admitted with H-Bgtou-Dytybvbo +Nuc stress test  A:  Assessment of area:  No blockages seen- no intervention  R:  Recommendations:  Follow up with Primary MD re symptoms  TR band on right wrist 10ml- at 1245    Family updated: per MD    Sedation given 1mg Versed and 50mcg Fentanyl

## 2022-04-14 NOTE — PROGRESS NOTES
Brief cardiology progress note:    Patient now status post cardiac catheterization/coronary angiogram demonstrating normal coronary arteries without angiographic evidence of coronary artery disease.  Etiology of elevated troponin likely demand ischemia in the setting of hypertension.  Patient did note that the abnormal stress test resulted in significant emotional distress/anxiety.  Radial cath site without hematoma or ecchymosis.  We will start low-dose amlodipine 2.5 mg daily, should have follow-up with your PCP for dose adjustment, may need to have this discontinued altogether if having issues with hypotension.  Reasonable to continue aspirin, statin.  Patient reports that she will improve her overall lifestyle choices, including cessation of smoking, which is encouraging.  Nuclear stress test results likely due to artifact related to soft tissue attenuation, in the future if needed, recommend alternative diagnostic modality for the assessment of myocardial ischemia.  Cardiology will sign off.    Bin Raines MD, [facility]  Cardiology  Text Page   April 14, 2022

## 2022-04-14 NOTE — PRE-PROCEDURE
GENERAL PRE-PROCEDURE:   Procedure:  CAG possible PCI  Date/Time:  4/14/2022 12:09 PM    Verbal consent obtained?: Yes    Written consent obtained?: Yes    Risks and benefits: Risks, benefits and alternatives were discussed    DC Plan: Appropriate discharge home plan in place for patients who are going home after procedure   Consent given by:  Patient  Patient states understanding of procedure being performed: Yes    Patient's understanding of procedure matches consent: Yes    Procedure consent matches procedure scheduled: Yes    Expected level of sedation:  Moderate  Appropriately NPO:  Yes  ASA Class:  2  Mallampati  :  Grade 2- soft palate, base of uvula, tonsillar pillars, and portion of posterior pharyngeal wall visible  Lungs:  Lungs clear with good breath sounds bilaterally  Heart:  Normal heart sounds and rate  Statement of review:  I have reviewed the lab findings, diagnostic data, medications, and the plan for sedation  I have examined the patient, reviewed the history, medications and pre procedural tests. She has a history of multiple CAD risk factors, abnormal but low level flat troponin elevation and an abnormal nuclear stress test. Diagnostic CAG with possible PCI was advised by .  I have explained to the patient the risks of death, MI, stroke, hematoma, possible urgent bypass surgery for failed PCI, use of stents, thienopyridine agents, possible peripheral vascular complications, arrhythmia, the use of FFR in clinical decision-making and alternative of medical therapy alone in regards to left heart catheterization, left ventriculography, coronary angiography, and possible percutaneous coronary intervention. The patient voiced understanding and wishes to proceed. The patient has a good right radial pulse, normal ulnar pulse and a normal Gregory's sign.

## 2022-04-14 NOTE — PHARMACY-ADMISSION MEDICATION HISTORY
Admission medication history interview status for this patient is complete. See [facility] admission navigator for allergy information, prior to admission medications and immunization status.     Medication history interview done, indicate source(s): Patient  Medication history resources (including written lists, pill bottles, clinic record):[facility]  Pharmacy: [facility] [address]    Changes made to [facility] medication list:  Added: Aspirin  Changed: Flonase from scheduled to prn  Reported as Not Taking: Vitamin D 50,000 units  Removed: Vitamin D 50,000 units    Actions taken by pharmacist (provider contacted, etc):None     Additional medication history information:None    Medication reconciliation/reorder completed by provider prior to medication history?  Y    Prior to Admission medications    Medication Sig Last Dose Taking? Auth Provider   acetaminophen (TYLENOL) 325 MG tablet Take 2 tablets (650 mg) by mouth every 4 hours as needed for mild pain Unknown at Unknown time Yes Jonathan Peters MD   aspirin 81 MG EC tablet Take 81 mg by mouth every evening 4/12/2022 at pm Yes Unknown, Entered By History   atorvastatin (LIPITOR) 20 MG tablet Take 1 tablet (20 mg) by mouth daily 4/12/2022 at pm Yes Leonila Collins APRN CNP   Cholecalciferol (VITAMIN D) 2000 units CAPS Take 2,000 Units by mouth daily 4/13/2022 at am Yes Loli Aquino MD   fluticasone (FLONASE) 50 MCG/ACT nasal spray Spray 2 sprays into both nostrils daily  Patient taking differently: Spray 2 sprays into both nostrils daily as needed Past Week at Unknown time Yes Loli Aquino MD   multivitamin w/minerals (THERA-VIT-M) tablet Take 1 tablet by mouth daily 4/13/2022 at am Yes Reported, Patient   omeprazole (PRILOSEC) 20 MG DR capsule Take 20 mg by mouth daily 4/13/2022 at am Yes Reported, Patient   vitamin (B COMPLEX-C) tablet Take 1 tablet by mouth daily 4/13/2022 at am Yes Reported, Patient

## 2022-04-14 NOTE — ED NOTES
Pt ambulated to bathroom. Pt steady on feet. Pt remained pain free during walk. Pt reattached to monitoring equipment upon return to room.

## 2022-04-14 NOTE — ED NOTES
"[facility]  ED Nurse Handoff Report    Jayne Tyler is a 56 year old female   ED Chief complaint: Chest Pain  . ED Diagnosis:   Final diagnoses:   None     Allergies:   Allergies   Allergen Reactions     Penicillins Hives       Code Status: Full Code  Activity level - Baseline/Home:  Independent. Activity Level - Current:   Independent. Lift room needed: No. Bariatric: No   Needed: No   Isolation: No. Infection: Not Applicable.     Vital Signs:   Vitals:    04/13/22 2015 04/13/22 2130 04/13/22 2138 04/13/22 2145   BP: (!) 158/87 (!) 148/85  128/75   Pulse: 103 79  90   Resp: 18 22 13   Temp: 97.6  F (36.4  C)      TempSrc: Temporal      SpO2: 96% 98%  100%   Weight: 104.3 kg (230 lb)      Height: 1.626 m (5' 4\")  1.626 m (5' 4\")        Cardiac Rhythm:  ,   Cardiac  Cardiac Rhythm: Normal sinus rhythm  Pain level:    Patient confused: No. Patient Falls Risk: Yes.   Elimination Status: Has voided   Patient Report - Initial Complaint: Chest pain. Focused Assessment:  Cardiac - Cardiac WDL: .WDL except; all; chest pain (Pt states that she had stress test done yesterday that showed some abnormalities. Apt with cardiologoy tomorrow. Pt states that over the past 2 months she has been more fatigued and SOB. States she is normally very active. )  Review of Systems (Cardiac) - Cardiovascular Symptoms/Conditions: chest pain   Chest Pain Assessment - Chest Pain Location: anterior chest, left; arm, left (Pt states that she has been having some pain on left side of chest that has been intermittent. Pain will go through to the back, down the left arm and on left side of neck.) Chest Pain Radiation: back; arm; neck  Precipitating Factors: activity  Associated Signs/Symptoms: dyspnea; diaphoresis   Cardiac Monitoring - EKG Monitoring: Yes  Cardiac Regularity: Regular  Cardiac Rhythm: NSR   Chest Pain Assessment - Rating (0-10): 6 (When the pain is at its worst, she rates at a 6/10)  Cardiac Care - Chest " Pain Intervention: cardiac biomarkers drawn; cardiac monitor placed; 12-lead ECG obtained; activity minimized   Tests Performed: lab work, chest xray, EKG. Abnormal Results:   Labs Ordered and Resulted from Time of ED Arrival to Time of ED Departure   BASIC METABOLIC PANEL - Abnormal       Result Value    Sodium 137      Potassium 3.7      Chloride 106      Carbon Dioxide (CO2) 28      Anion Gap 3      Urea Nitrogen 13      Creatinine 0.81      Calcium 9.1      Glucose 124 (*)     GFR Estimate 85     TROPONIN I - Abnormal    Troponin I High Sensitivity 116 (*)    CBC WITH PLATELETS AND DIFFERENTIAL    WBC Count 10.1      RBC Count 4.50      Hemoglobin 14.2      Hematocrit 42.7      MCV 95      MCH 31.6      MCHC 33.3      RDW 13.2      Platelet Count 299      % Neutrophils 56      % Lymphocytes 36      % Monocytes 7      % Eosinophils 1      % Basophils 0      % Immature Granulocytes 0      NRBCs per 100 WBC 0      Absolute Neutrophils 5.5      Absolute Lymphocytes 3.7      Absolute Monocytes 0.8      Absolute Eosinophils 0.1      Absolute Basophils 0.0      Absolute Immature Granulocytes 0.0      Absolute NRBCs 0.0     TROPONIN I   COVID-19 VIRUS (CORONAVIRUS) BY PCR     Chest XR,  PA & LAT    (Results Pending)   .   Treatments provided: Aspirin, nitrogylcerin  Family Comments: Pt here by herself  OBS brochure/video discussed/provided to patient:  N/A  ED Medications:   Medications   nitroGLYcerin (NITROSTAT) sublingual tablet 0.4 mg (0.4 mg Sublingual Given 4/13/22 2142)   aspirin (ASA) chewable tablet 324 mg (324 mg Oral Given 4/13/22 2143)     Drips infusing:  No  For the majority of the shift, the patient's behavior Green. Interventions performed were n/a.    Sepsis treatment initiated: No     Patient tested for COVID 19 prior to admission: YES    ED Nurse Name/Phone Number: Mandy Tafoya RN,   9:53 PM

## 2022-04-14 NOTE — PROCEDURES
[facility]    Procedure: *Cath without PCI    Date/Time: 4/14/2022 12:35 PM  Performed by: Olvin Roy MD  Authorized by: Olvin Roy MD       UNIVERSAL PROTOCOL   Site Marked: Yes  Prior Images Obtained and Reviewed:  Yes  Required items: Required blood products, implants, devices and special equipment available    Patient identity confirmed:  Verbally with patient  Patient was reevaluated immediately before administering moderate or deep sedation or anesthesia  Confirmation Checklist:  Patient's identity using two indicators  Time out: Immediately prior to the procedure a time out was called    Universal Protocol: the Joint Commission Universal Protocol was followed    Preparation: Patient was prepped and draped in usual sterile fashion       ANESTHESIA    Local Anesthetic: Lidocaine 1% without epinephrine      SEDATION  Patient Sedated: Yes    Sedation:  Fentanyl and midazolam  Vital signs: Vital signs monitored during sedation      PROCEDURE  Describe Procedure: Procedure  1) CAG  Approach RTR  Complications none  Findings    Normal coronaries RCA dominant     Plan  Per consult team  Patient Tolerance:  Patient tolerated the procedure well with no immediate complications  Length of time physician/provider present for 1:1 monitoring during sedation: 10

## 2022-04-14 NOTE — PROGRESS NOTES
"AOx4. Independent in room. Intermittent chest pain. Heparin gtt. Next Xa draw at 1330. NPO w meds.    /65   Pulse 74   Temp 98.5  F (36.9  C)   Resp 17   Ht 1.626 m (5' 4\")   Wt 104.3 kg (230 lb)   LMP  (LMP Unknown)   SpO2 96%   BMI 39.48 kg/m      "

## 2022-04-15 VITALS
HEART RATE: 89 BPM | OXYGEN SATURATION: 96 % | BODY MASS INDEX: 39.27 KG/M2 | WEIGHT: 230 LBS | DIASTOLIC BLOOD PRESSURE: 68 MMHG | SYSTOLIC BLOOD PRESSURE: 122 MMHG | RESPIRATION RATE: 18 BRPM | HEIGHT: 64 IN | TEMPERATURE: 98.1 F

## 2022-04-15 LAB
ANION GAP SERPL CALCULATED.3IONS-SCNC: 5 MMOL/L (ref 3–14)
BUN SERPL-MCNC: 11 MG/DL (ref 7–30)
CALCIUM SERPL-MCNC: 8.7 MG/DL (ref 8.5–10.1)
CHLORIDE BLD-SCNC: 108 MMOL/L (ref 94–109)
CO2 SERPL-SCNC: 25 MMOL/L (ref 20–32)
CREAT SERPL-MCNC: 0.82 MG/DL (ref 0.52–1.04)
ERYTHROCYTE [DISTWIDTH] IN BLOOD BY AUTOMATED COUNT: 13.5 % (ref 10–15)
GFR SERPL CREATININE-BSD FRML MDRD: 84 ML/MIN/1.73M2
GLUCOSE BLD-MCNC: 85 MG/DL (ref 70–99)
HCT VFR BLD AUTO: 41 % (ref 35–47)
HGB BLD-MCNC: 13.1 G/DL (ref 11.7–15.7)
MCH RBC QN AUTO: 30.7 PG (ref 26.5–33)
MCHC RBC AUTO-ENTMCNC: 32 G/DL (ref 31.5–36.5)
MCV RBC AUTO: 96 FL (ref 78–100)
PLATELET # BLD AUTO: 266 10E3/UL (ref 150–450)
POTASSIUM BLD-SCNC: 4.1 MMOL/L (ref 3.4–5.3)
RBC # BLD AUTO: 4.27 10E6/UL (ref 3.8–5.2)
SODIUM SERPL-SCNC: 138 MMOL/L (ref 133–144)
WBC # BLD AUTO: 10.8 10E3/UL (ref 4–11)

## 2022-04-15 PROCEDURE — 250N000013 HC RX MED GY IP 250 OP 250 PS 637: Performed by: INTERNAL MEDICINE

## 2022-04-15 PROCEDURE — 80048 BASIC METABOLIC PNL TOTAL CA: CPT | Performed by: INTERNAL MEDICINE

## 2022-04-15 PROCEDURE — 99239 HOSP IP/OBS DSCHRG MGMT >30: CPT | Performed by: INTERNAL MEDICINE

## 2022-04-15 PROCEDURE — 85027 COMPLETE CBC AUTOMATED: CPT | Performed by: INTERNAL MEDICINE

## 2022-04-15 PROCEDURE — 36415 COLL VENOUS BLD VENIPUNCTURE: CPT | Performed by: INTERNAL MEDICINE

## 2022-04-15 RX ORDER — AMLODIPINE BESYLATE 2.5 MG/1
2.5 TABLET ORAL DAILY
Qty: 30 TABLET | Refills: 0 | Status: SHIPPED | OUTPATIENT
Start: 2022-04-16 | End: 2022-04-26

## 2022-04-15 RX ORDER — ATORVASTATIN CALCIUM 40 MG/1
40 TABLET, FILM COATED ORAL EVERY EVENING
Refills: 0 | COMMUNITY
Start: 2022-04-15 | End: 2022-04-26

## 2022-04-15 RX ADMIN — OMEPRAZOLE 20 MG: 20 CAPSULE, DELAYED RELEASE ORAL at 08:18

## 2022-04-15 RX ADMIN — AMLODIPINE BESYLATE 2.5 MG: 2.5 TABLET ORAL at 08:18

## 2022-04-15 RX ADMIN — ACETAMINOPHEN 650 MG: 325 TABLET, FILM COATED ORAL at 08:18

## 2022-04-15 RX ADMIN — ASPIRIN 81 MG: 81 TABLET, COATED ORAL at 08:18

## 2022-04-15 ASSESSMENT — ACTIVITIES OF DAILY LIVING (ADL)
ADLS_ACUITY_SCORE: 3

## 2022-04-15 NOTE — PLAN OF CARE
Vitals are Temp: 98.3  F (36.8  C) Temp src: Oral BP: 102/51 Pulse: 79   Resp: 16 SpO2: 98 %.    Patient is Alert and Oriented x4. Ambulates independently.  Pt is a Regular diet.  Complaining of headache 3/10, denies need for pain meds. Denies chest pain. Patient is Saline locked. Tele SR. Right radial site CDI. Possible discharge today.

## 2022-04-15 NOTE — PLAN OF CARE
Goal Outcome Evaluation:    VS stable, TR off at 1615. Site intact. Tylenol for headache given. Denied chest pain. Continue to monitor.

## 2022-04-15 NOTE — DISCHARGE SUMMARY
Hospitalist Discharge Summary  [facility]    Jayne Tyler MRN# [id number]   YOB: 1965 Age: 56 year old     Date of Admission:  4/13/2022  Date of Discharge:  4/15/2022 12:22 PM  Admitting Physician:  Chayo Ocampo MD  Discharge Physician:  Frederick Torres DO  Discharging Service:  Hospitalist     Primary Provider: Loli Aquino          Discharge Diagnosis:     Chest Pain  NSTEMI - Suspect Type 2  - Appreciate Cardiology recommendations  - Suspect secondary to hypertension during stress test  - ASA 81 mg daily, Atorvastatin 40 mg at bedtime  - s/p Cardiac Cath on 4/14 that showed normal coronary arteries  - Recommend lifestyle modifications including tobacco cessation, BP control, mediterranean-style diet, exercise  - Started Amlodipine 2.5 mg daily  - Telemetry     Hypertension  - Started Amlodipine 2.5 mg daily     Active Nicotine Dependence with Cigarettes  - Recommended tobacco cessation  - Previously on Wellbutrin, however she stopped this due to change of insurance     Obesity - BMI 39.48             Discharge Disposition:     Discharged to home           Allergies:     Allergies   Allergen Reactions     Penicillins Hives              Discharge Medications:     Discharge Medication List as of 4/15/2022 10:31 AM      START taking these medications    Details   amLODIPine (NORVASC) 2.5 MG tablet Take 1 tablet (2.5 mg) by mouth daily, Disp-30 tablet, R-0, E-Prescribe         CONTINUE these medications which have CHANGED    Details   atorvastatin (LIPITOR) 40 MG tablet Take 1 tablet (40 mg) by mouth every evening, R-0, Historical         CONTINUE these medications which have NOT CHANGED    Details   acetaminophen (TYLENOL) 325 MG tablet Take 2 tablets (650 mg) by mouth every 4 hours as needed for mild pain, Disp-50 tablet, R-0, E-Prescribe      aspirin 81 MG EC tablet Take 81 mg by mouth every evening, Historical      Cholecalciferol (VITAMIN D) 2000 units CAPS Take  "2,000 Units by mouth daily, Disp-90 capsule, R-3, E-PrescribeMay substitute with tablets if capsules not available or more expensive      fluticasone (FLONASE) 50 MCG/ACT nasal spray Spray 2 sprays into both nostrils daily, Disp-16 g, R-3, E-Prescribe      multivitamin w/minerals (THERA-VIT-M) tablet Take 1 tablet by mouth daily, Historical      omeprazole (PRILOSEC) 20 MG DR capsule Take 20 mg by mouth daily, Historical      vitamin (B COMPLEX-C) tablet Take 1 tablet by mouth daily, Historical                    Condition on Discharge:     Discharge condition: Fair   Discharge vitals: Blood pressure 122/68, pulse 89, temperature 98.1  F (36.7  C), temperature source Oral, resp. rate 18, height 1.626 m (5' 4\"), weight 104.3 kg (230 lb), SpO2 96 %, not currently breastfeeding.   Code status on discharge: Full Code      BASIC PHYSICAL EXAMINATION:  GENERAL: No apparent distress.  CARDIOVASCULAR: Regular rate and rhythm without murmurs.  PULMONARY: Clear to auscultation bilaterally.   GASTROINTESTINAL: Abdomen soft, non-tender.  EXTREMITIES: No edema, pulses intact.  NEUROLOGIC: No focal deficits.            History of Illness:   See detailed admission note for full details.               Procedures excluding imaging which is summarized below:     Please see details in the electronic medical record.           Consultations:     PHARMACY IP CONSULT  PHARMACY IP CONSULT  PHARMACY IP CONSULT  PHARMACY IP CONSULT  CARDIOLOGY IP CONSULT  PHARMACY IP CONSULT  PHARMACY IP CONSULT  SMOKING CESSATION PROGRAM IP CONSULT          Significant Results:     Results for orders placed or performed during the hospital encounter of 04/13/22   Chest XR,  PA & LAT    Narrative    EXAM: XR CHEST 2 VW  LOCATION: [facility]  DATE/TIME: 4/13/2022 9:53 PM    INDICATION: chest pain  COMPARISON: 11/2/2010      Impression    IMPRESSION: Negative chest.   Cardiac Catheterization    Narrative    Chest pain in setting of an " abnormal nuclear stress test  smoking history,   dyslipidemia, and low level , but flat troponin elevation    Findings    Left main: Normal  LAD: Normal  Circumflex: Normal  Right coronary: Dominant.  Normal.       Transthoracic Echocardiogram Results:  No results found for this or any previous visit (from the past 4320 hour(s)).             Pending Results:     Unresulted Labs Ordered in the Past 30 Days of this Admission     No orders found from 3/14/2022 to 4/14/2022.                      Discharge Instructions and Follow-Up:     Discharge instructions and follow-up:   Discharge Procedure Orders   Medication Instructions - Anticoagulants   Order Comments: Do NOT stop your aspirin or platelet inhibitor unless directed by your Cardiologist.  These medications help to prevent platelets in your blood from sticking together and forming a clot.  Examples of these medications are:  Ticagrelor (Brilinta), Clopidigrel (Plavix), Prasugrel (Effient)     When to call - Contact the Heart Clinic   Order Comments: You may experience symptoms that require follow-up before your scheduled appointment. Contact the Heart Clinic if you develop: Fever over 100.4o Fahrenheit, that lasts more than one day; Redness, heat, or pus at the puncture site; Change in color or temperature in your hand or arm.     When to call - Reasons to Call 911   Order Comments: If your wrist puncture site starts bleeding after discharge, sit down and apply firm pressure with your thumb against the puncture site and fingers against the back of the wrist for 10 minutes. If the bleeding stops, continue to rest, keeping your wrist still for 2 hours. Notify your doctor as soon as possible.  IF BLEEDING DOES NOT STOP OR THERE IS A LARGER AMOUNT OF BLEEDING OR SPURTING CALL 9-1-1 immediately.DO NOT drive yourself to the hospital.     Precautions - Lifting   Order Comments: DO NOT lift more than 5 pounds with affected arm for 48 hours     Precautions - Household  Activities   Order Comments: Avoid excessive bending or movement of your wrist for 72 hours.  Do not subject hand/arm to any forceful movements for 24 hours, such as supporting weight when rising from a chair or bed.     Remove the band-aid on the puncture site after 24 hours and leave open to air. If minor oozing, you may apply a band-aid and remove after 12 hours.     Precautions - Active Sports Activities   Order Comments: DO NOT engage in vigorous exercise using your affected arm for 3 days after discharge.  This includes golf, tennis or swimming.     Precautions - Operating yard equipment or vehicles   Order Comments: Do not operate a chainsaw, lawnmower, motorcycle, or all-terrain vehicle for 48 hours after the procedure.     Precautions - Elective Dental Work   Order Comments: NO elective dental work for 6 weeks after receiving a stent.     Comfort and Pain Management - Bruising after Surgery   Order Comments: Expect mild tingling of hand and tenderness at the wrist puncture site for up to 3 days. You may take Tylenol or a pain medicine recommended by your doctor.     Activity - Cardiac Rehab   Order Comments: You are encouraged to enroll in an Outpatient Cardiac Rehab program after discharge from the hospital.  Our Cardiac Rehab staff may visit briefly with you while you're in the hospital.  If they miss you, someone will contact you after you are home.     Return to Driving   Order Comments: Driving is NOT permitted for 24 hours after surgery     Return to work   Order Comments: You may return to work after 72 hours if you are feeling well and your job does not involve heavy lifting.     Shower / Bathing   Order Comments: You may shower on the day after your procedure.  DO NOT soak of wrist with the puncture site in water for 3 days to prevent infection. DO NOT take a tub bath or wash dishes for 3 days after the procedure     Dressing Removal   Order Comments: Remove the band-aid on the puncture site after  24 hours and leave open to air. If minor oozing, you may apply a band-aid and remove after 12 hours     Reason for your hospital stay   Order Comments: Non ST Elevation Myocardial Infarction, likely Hypertension induced     Follow-up and recommended labs and tests    Order Comments: Follow up with primary care provider, Loli Aquino, within 7 days for hospital follow- up.  No follow up labs or test are needed.      Recommend you purchase a blood pressure cuff and monitor your blood pressure at least once daily in the morning after your morning restroom before your morning coffee.  Record these values and take them to your primary care doctor in 1 week for further titration of your antihypertensives.    Recommend you quit smoking.  There are other medications available for assistance with this and also programs available.  Recommend you discuss this with your primary care doctor if needed.     Activity   Order Comments: Your activity upon discharge: activity as tolerated     Order Specific Question Answer Comments   Is discharge order? Yes      Diet   Order Comments: Follow this diet upon discharge: Orders Placed This Encounter      Mediterranean Diet     Order Specific Question Answer Comments   Is discharge order? Yes              Hospital Course:     Jayne Tyler is a 56 year old female with a history of GERD admitted on 4/13/2022 with fatigue, shortness of breath, and abnormal stress test on 4/12.  The patient had development of chest pain on 4/13 and presented to the emergency department.  In the emergency department the patient found of a temperature of 97.6  F, heart rate 103, respiratory rate 18, blood pressure 158/87, SPO2 96% on room air.  Initial lab work revealed an unremarkable BMP and CBC, high-sensitivity troponin 116.  EKG showed sinus rhythm.  The patient was provided an aspirin and started on a heparin drip.  Cardiology was consulted to see the patient.  On 4/14, the patient was taken for  coronary angiogram.  The patient was found to have normal coronary arteries and no intervention was made.  It was recommended to the patient that she pursue lifestyle modifications including tobacco cessation, Mediterranean style diet, and exercise program.  The patient was started on low-dose amlodipine and recommended she purchase a blood pressure cuff to monitor her blood pressure at home.  On 4/15/2022, the patient was medically stable for discharge home.    The patient was seen, examined, and counseled on this day. The hospitalization and plan of care was reviewed with the patient extensively. All questions were addressed and the patient agreed to follow-up as noted above.      Total time spent in face to face contact with the patient and coordinating discharge was:  35 Minutes    Frederick Torres DO  [facility] Hospitalist  [address] [place] [address]  04/15/2022

## 2022-04-15 NOTE — PROGRESS NOTES
[facility]    Hospitalist Progress Note  Name: Jayne Tyler    MRN: [id number]  Provider:  Frederick Torres DO  Date of Service: 04/15/2022    Summary of Stay: Jayne Tyler is a 56 year old female with a history of GERD admitted on 4/13/2022 with fatigue, shortness of breath, and abnormal stress test on 4/12.  The patient had development of chest pain on 4/13 and presented to the emergency department.  In the emergency department the patient found of a temperature of 97.6  F, heart rate 103, respiratory rate 18, blood pressure 158/87, SPO2 96% on room air.  Initial lab work revealed an unremarkable BMP and CBC, high-sensitivity troponin 116.  EKG showed sinus rhythm.  The patient was provided an aspirin and started on a heparin drip.  Cardiology was consulted to see the patient.  On 4/14, the patient was taken for coronary angiogram.  The patient was found to have normal coronary arteries and no intervention was made.  It was recommended to the patient that she pursue lifestyle modifications including tobacco cessation, Mediterranean style diet, and exercise program.  The patient was started on low-dose amlodipine and recommended she purchase a blood pressure cuff to monitor her blood pressure at home.  On 4/15/2022, the patient was medically stable for discharge home.    TODAY'S PLAN: Discharge home this morning.  Discussed discharge medications including low-dose amlodipine.  Recommended patient purchase blood pressure cuff and check her blood pressure once daily in the morning after her morning restroom and before her morning coffee.  Encouraged her to record these values and take them to her primary care doctor in 1 week for further titration of her antihypertensives.  We discussed lifestyle modifications including tobacco cessation, Mediterranean diet, and exercise.  The patient's significant other will pick her up.    Problem List:   Chest Pain  NSTEMI - Suspect Type 2  - Appreciate  Cardiology recommendations  - Suspect secondary to hypertension during stress test  - ASA 81 mg daily, Atorvastatin 40 mg at bedtime  - s/p Cardiac Cath on 4/14 that showed normal coronary arteries  - Recommend lifestyle modifications including tobacco cessation, BP control, mediterranean-style diet, exercise  - Started Amlodipine 2.5 mg daily  - Telemetry     Hypertension  - Started Amlodipine 2.5 mg daily     Active Nicotine Dependence with Cigarettes  - Recommended tobacco cessation  - Previously on Wellbutrin, however she stopped this due to change of insurance     Obesity - BMI 39.48    DVT Prophylaxis: Pneumatic Compression Devices  Code Status: Full Code  Diet: Advance Diet as Tolerated: Regular Diet Adult    Barber Catheter: Not present  Disposition: Expected discharge today to home. Goals prior to discharge include cardiac work up complete.   Family updated today: No     Interval History   Pt seen and examined.  Pt states she slept a bit better last night.  Denies cp, sob.    -Data reviewed today: I personally reviewed all new labs and imaging results over the last 24 hours.     Physical Exam   Temp: 98.1  F (36.7  C) Temp src: Oral BP: 122/68 Pulse: 89   Resp: 18 SpO2: 96 % O2 Device: None (Room air) Oxygen Delivery: 2 LPM  Vitals:    04/13/22 2015   Weight: 104.3 kg (230 lb)     Vital Signs with Ranges  Temp:  [97.5  F (36.4  C)-98.7  F (37.1  C)] 98.1  F (36.7  C)  Pulse:  [62-89] 89  Resp:  [16-20] 18  BP: ()/(37-68) 122/68  SpO2:  [95 %-100 %] 96 %  I/O last 3 completed shifts:  In: 240 [P.O.:240]  Out: -     GENERAL: No apparent distress. Awake, alert, and fully oriented.  HEENT: Normocephalic, atraumatic. Extraocular movements intact.  CARDIOVASCULAR: Regular rate and rhythm without murmurs or rubs. No S3.  PULMONARY: Clear bilaterally.  GASTROINTESTINAL: Soft, non-tender, non-distended. Bowel sounds normoactive.   EXTREMITIES: No cyanosis or clubbing. No edema.  NEUROLOGICAL: CN 2-12 grossly  intact, no focal neurological deficits.  DERMATOLOGICAL: No rash, ulcer, bruising, nor jaundice.    Medications     - MEDICATION INSTRUCTIONS -         amLODIPine  2.5 mg Oral Daily     aspirin  81 mg Oral Daily     atorvastatin  40 mg Oral QPM     omeprazole  20 mg Oral Daily     sodium chloride (PF)  3 mL Intracatheter Q8H     Data     Laboratory:  Recent Labs   Lab 04/15/22  0549 04/14/22  0653 04/13/22 2026   WBC 10.8 8.7 10.1   HGB 13.1 13.8 14.2   HCT 41.0 42.1 42.7   MCV 96 95 95    282 299     Recent Labs   Lab 04/15/22  0549 04/14/22  0654 04/13/22 2026    137 137   POTASSIUM 4.1 3.8 3.7   CHLORIDE 108 107 106   CO2 25 28 28   ANIONGAP 5 2* 3   GLC 85 90 124*   BUN 11 11 13   CR 0.82 0.85 0.81   GFRESTIMATED 84 80 85   JEANETH 8.7 9.2 9.1     Recent Labs   Lab 04/14/22  0256 04/13/22  2231 04/13/22 2026   TROPONINIS 107* 111* 116*     No results for input(s): CULT in the last 168 hours.    Imaging:  Recent Results (from the past 24 hour(s))   Cardiac Catheterization    Narrative    Chest pain in setting of an abnormal nuclear stress test  smoking history,   dyslipidemia, and low level , but flat troponin elevation    Findings    Left main: Normal  LAD: Normal  Circumflex: Normal  Right coronary: Dominant.  Normal.         Frederick Torres DO  [facility] Hospitalist  [address] [place] [address]  04/15/2022

## 2022-04-15 NOTE — PLAN OF CARE
Goal Outcome Evaluation:                    Pt is a/o, up independent in room. VSS. Denies chest pain. Gave tylenols for headache. Angio Right wrist WDL. Appetite good. Voiding. Plan to discharge home today.   Patient's After Visit Summary was reviewed with patient   Patient verbalized understanding of After Visit Summary, recommended follow up and was given an opportunity to ask questions.   Discharge medications sent home with patient/family: YES   Discharged with significant other    All belongings including cell phone, , clothes, suitcase sent home with pt.    Pt stated that her SO sent her flowers that have not arrived to the hospital yet. Writer checked ER, main entrence desk and volunteer services for them.  Gerard were not there, writer took pt cell [employer] number to call if flowers show up. Pt stated she will call [employer] and follow up with them.

## 2022-04-16 DIAGNOSIS — Z71.89 OTHER SPECIFIED COUNSELING: ICD-10-CM

## 2022-04-18 ENCOUNTER — PATIENT OUTREACH (OUTPATIENT)
Dept: CARE COORDINATION | Facility: CLINIC | Age: 57
End: 2022-04-18
Payer: COMMERCIAL

## 2022-04-18 NOTE — PROGRESS NOTES
"Clinic Care Coordination Contact  [facility]: Post-Discharge Note  SITUATION                                                      Admission:    Admission Date: 04/13/22   Reason for Admission: CP  Discharge:   Discharge Date: 04/15/22  Discharge Diagnosis: Chest Pain  NSTEMI - Suspect Type 2    BACKGROUND                                                      Per hospital discharge summary and inpatient provider notes:Jayne Tyler is a 56 year old female with a history of GERD admitted on 4/13/2022 with fatigue, shortness of breath, and abnormal stress test on 4/12.  The patient had development of chest pain on 4/13 and presented to the emergency department.  In the emergency department the patient found of a temperature of 97.6  F, heart rate 103, respiratory rate 18, blood pressure 158/87, SPO2 96% on room air.  Initial lab work revealed an unremarkable BMP and CBC, high-sensitivity troponin 116.  EKG showed sinus rhythm.  The patient was provided an aspirin and started on a heparin drip.  Cardiology was consulted to see the patient.  On 4/14, the patient was taken for coronary angiogram.  The patient was found to have normal coronary arteries and no intervention was made.  It was recommended to the patient that she pursue lifestyle modifications including tobacco cessation, Mediterranean style diet, and exercise program.  The patient was started on low-dose amlodipine and recommended she purchase a blood pressure cuff to monitor her blood pressure at home.  On 4/15/2022, the patient was medically stable for discharge home.      ASSESSMENT           Discharge Assessment  How are you doing now that you are home?: \" I am doing ok \"  How are your symptoms? (Red Flag symptoms escalate to triage hotline per guidelines): Improved  Do you feel your condition is stable enough to be safe at home until your provider visit?: Yes  Does the patient have their discharge instructions? : Yes  Does the patient have " questions regarding their discharge instructions? : No  Were you started on any new medications or were there changes to any of your previous medications? : Yes  Does the patient have all of their medications?: Yes  Do you have questions regarding any of your medications? : No  Do you have all of your needed medical supplies or equipment (DME)?  (i.e. oxygen tank, CPAP, cane, etc.): Yes  Discharge follow-up appointment scheduled within 14 calendar days? : Yes  Discharge Follow Up Appointment Date: 04/26/22  Discharge Follow Up Appointment Scheduled with?: Primary Care Provider    Post-op ([facility] Only)  If the patient had a surgery or procedure, do they have any questions for a nurse?: No             PLAN                                                      Outpatient Plan: Follow up with primary care provider, Loli Aquino, within 7 days for hospital follow- up.  No follow up labs or test are needed.      Future Appointments   Date Time Provider Department Center   4/22/2022  9:15 AM [facility]   4/26/2022  3:00 PM Leonila Collins APRN CNP [facility]   5/17/2022  8:45 AM Olvin Stokes MD [facility]         For any urgent concerns, please contact our 24 hour nurse triage line: [phone] ([phone])         Kyara Johnson

## 2022-04-20 ENCOUNTER — TELEPHONE (OUTPATIENT)
Dept: CARDIOLOGY | Facility: CLINIC | Age: 57
End: 2022-04-20
Payer: COMMERCIAL

## 2022-04-20 NOTE — TELEPHONE ENCOUNTER
Patient was evaluated by cardiology while inpatient for chest pain with abnormal stress test. PMH: HLD, smoker. 4/14/22: Coronary angiogram via RRA showed normal coronary arteries. Pt was started on Norvasc for improved HTN control at time of discharge. Called patient to discuss any post hospital d/c questions she may have, review medication changes, and confirm f/u appts. Patient denied any questions regarding new medications or changes with their PTA medications. Patient denied any SOB, chest pain, fever or light headedness. RRA cardiac cath site is without bleeding, swelling, redness or signs of infection. RN confirmed with patient that she has an OV scheduled on 5/17/22 at 0845 with Dr. Stokes at our [place] Office. Patient advised to call clinic with any cardiac related questions or concerns prior to this remberto't. Patient verbalized understanding and agreed with plan. CHAYO Geller RN.

## 2022-04-22 ENCOUNTER — LAB (OUTPATIENT)
Dept: LAB | Facility: CLINIC | Age: 57
End: 2022-04-22
Payer: COMMERCIAL

## 2022-04-22 DIAGNOSIS — R94.39 ABNORMAL CARDIOVASCULAR STRESS TEST: ICD-10-CM

## 2022-04-22 LAB
CHOLEST SERPL-MCNC: 137 MG/DL
FASTING STATUS PATIENT QL REPORTED: YES
HDLC SERPL-MCNC: 62 MG/DL
LDLC SERPL CALC-MCNC: 58 MG/DL
NONHDLC SERPL-MCNC: 75 MG/DL
TRIGL SERPL-MCNC: 83 MG/DL

## 2022-04-22 PROCEDURE — 80061 LIPID PANEL: CPT

## 2022-04-22 PROCEDURE — 36415 COLL VENOUS BLD VENIPUNCTURE: CPT

## 2022-04-26 ENCOUNTER — OFFICE VISIT (OUTPATIENT)
Dept: INTERNAL MEDICINE | Facility: CLINIC | Age: 57
End: 2022-04-26
Payer: COMMERCIAL

## 2022-04-26 VITALS
RESPIRATION RATE: 16 BRPM | BODY MASS INDEX: 40.9 KG/M2 | DIASTOLIC BLOOD PRESSURE: 70 MMHG | SYSTOLIC BLOOD PRESSURE: 115 MMHG | OXYGEN SATURATION: 98 % | HEART RATE: 85 BPM | TEMPERATURE: 98.3 F | WEIGHT: 238.3 LBS

## 2022-04-26 DIAGNOSIS — Z09 HOSPITAL DISCHARGE FOLLOW-UP: Primary | ICD-10-CM

## 2022-04-26 DIAGNOSIS — F41.9 ANXIETY: ICD-10-CM

## 2022-04-26 DIAGNOSIS — G89.29 CHRONIC LEFT SHOULDER PAIN: ICD-10-CM

## 2022-04-26 DIAGNOSIS — R53.83 FATIGUE, UNSPECIFIED TYPE: ICD-10-CM

## 2022-04-26 DIAGNOSIS — M25.512 CHRONIC LEFT SHOULDER PAIN: ICD-10-CM

## 2022-04-26 DIAGNOSIS — I21.4 NSTEMI (NON-ST ELEVATED MYOCARDIAL INFARCTION) (H): ICD-10-CM

## 2022-04-26 PROCEDURE — 99495 TRANSJ CARE MGMT MOD F2F 14D: CPT | Performed by: NURSE PRACTITIONER

## 2022-04-26 RX ORDER — ESCITALOPRAM OXALATE 10 MG/1
TABLET ORAL
Qty: 30 TABLET | Refills: 1 | Status: SHIPPED | OUTPATIENT
Start: 2022-04-26 | End: 2022-05-31

## 2022-04-26 RX ORDER — AMLODIPINE BESYLATE 2.5 MG/1
2.5 TABLET ORAL DAILY
Qty: 90 TABLET | Refills: 3 | Status: SHIPPED | OUTPATIENT
Start: 2022-04-26 | End: 2022-05-17

## 2022-04-26 RX ORDER — ATORVASTATIN CALCIUM 40 MG/1
40 TABLET, FILM COATED ORAL EVERY EVENING
Qty: 90 TABLET | Refills: 3 | Status: SHIPPED | OUTPATIENT
Start: 2022-04-26 | End: 2022-06-14

## 2022-04-26 ASSESSMENT — PATIENT HEALTH QUESTIONNAIRE - PHQ9: 5. POOR APPETITE OR OVEREATING: MORE THAN HALF THE DAYS

## 2022-04-26 ASSESSMENT — ANXIETY QUESTIONNAIRES
2. NOT BEING ABLE TO STOP OR CONTROL WORRYING: NEARLY EVERY DAY
GAD7 TOTAL SCORE: 16
7. FEELING AFRAID AS IF SOMETHING AWFUL MIGHT HAPPEN: NEARLY EVERY DAY
1. FEELING NERVOUS, ANXIOUS, OR ON EDGE: NEARLY EVERY DAY
5. BEING SO RESTLESS THAT IT IS HARD TO SIT STILL: SEVERAL DAYS
IF YOU CHECKED OFF ANY PROBLEMS ON THIS QUESTIONNAIRE, HOW DIFFICULT HAVE THESE PROBLEMS MADE IT FOR YOU TO DO YOUR WORK, TAKE CARE OF THINGS AT HOME, OR GET ALONG WITH OTHER PEOPLE: SOMEWHAT DIFFICULT
6. BECOMING EASILY ANNOYED OR IRRITABLE: MORE THAN HALF THE DAYS
3. WORRYING TOO MUCH ABOUT DIFFERENT THINGS: MORE THAN HALF THE DAYS

## 2022-04-26 NOTE — PATIENT INSTRUCTIONS
-Continue current medications  -Start Lexapro, take 1/2 tablet daily for 1 week, then increase to 1 pill daily thereafter,   -Please follow up in 4-6 weeks; sooner if needed, for mood check

## 2022-04-26 NOTE — PROGRESS NOTES
"  Assessment & Plan     Hospital discharge follow-up  NSTEMI (non-ST elevated myocardial infarction) (H)  - NSTEMI, Type II MI thought due to elevated BP.  BP is very well controlled and historically, her BP has been well controlled.  Doing better; SOB is better, no further CP- does have left upper shoulder pain still which has not changed/not new  - Continue current medications- refilled  - Follow up with Cardiology as scheduled  - amLODIPine (NORVASC) 2.5 MG tablet  Dispense: 90 tablet; Refill: 3  - atorvastatin (LIPITOR) 40 MG tablet  Dispense: 90 tablet; Refill: 3    Fatigue, unspecified type  - Referral for Sleep eval r/o Sleep apnea; treat mood as below  - Adult Sleep Eval & Management  Referral    Anxiety  - Given significant fatigue and brain fog as of late, discussed this could be r/t to mood as well.  Patient agrees to restart Lexapro- start 5 mg daily for 1 week, then increase to 10 mg daily  - Follow up 5 weeks; sooner if concerns  - escitalopram (LEXAPRO) 10 MG tablet  Dispense: 30 tablet; Refill: 1    Chronic left shoulder pain  - Orthopedic  Referral       BMI:   Estimated body mass index is 40.9 kg/m  as calculated from the following:    Height as of 4/13/22: 1.626 m (5' 4\").    Weight as of this encounter: 108.1 kg (238 lb 4.8 oz).   Weight management plan: Discussed healthy diet and exercise guidelines    See Patient Instructions    Return in about 5 weeks (around 5/31/2022).    ELVIS Herring CNP  [facility]    Julio Godoy is a 56 year old who presents for the following health issues      HPI     Post Discharge Outreach 4/18/2022   Admission Date 4/13/2022   Reason for Admission CP   Discharge Date 4/15/2022   Discharge Diagnosis Chest Pain  NSTEMI - Suspect Type 2   How are you doing now that you are home? \" I am doing ok \"   How are your symptoms? (Red Flag symptoms escalate to triage hotline per guidelines) Improved   Do you " feel your condition is stable enough to be safe at home until your provider visit? Yes   Does the patient have their discharge instructions?  Yes   Does the patient have questions regarding their discharge instructions?  No   Were you started on any new medications or were there changes to any of your previous medications?  Yes   Does the patient have all of their medications? Yes   Do you have questions regarding any of your medications?  No   Do you have all of your needed medical supplies or equipment (DME)?  (i.e. oxygen tank, CPAP, cane, etc.) Yes   Discharge follow-up appointment scheduled within 14 calendar days?  Yes   Discharge Follow Up Appointment Date 4/26/2022   Discharge Follow Up Appointment Scheduled with? Primary Care Provider     Hospital Follow-up Visit:    Hospital/Nursing Home/IP Rehab Facility: [facility]  Date of Admission: 04/13/2022  Date of Discharge: 04/15/2022  Reason(s) for Admission: NSTEMI (non-ST elevated myocardial infarction)      Was your hospitalization related to COVID-19? No   Problems taking medications regularly:  None  Medication changes since discharge: Start amlodipine  Problems adhering to non-medication therapy:  None    Summary of hospitalization:  [facility] discharge summary reviewed  Diagnostic Tests/Treatments reviewed.  Follow up needed: Cardiology Clinic scheduled  Other Healthcare Providers Involved in Patient s Care:         None  Update since discharge: stable.       Post Discharge Medication Reconciliation: discharge medications reconciled, continue medications without change.  Plan of care communicated with patient              Discuss anxiety medications  Bursitis is left shoulder   Stressed out about if she is having a heart attack or just panic attacks so every little twinge or pain it stresses out.     Hospital Discharge Summary:  Chest Pain  NSTEMI - Suspect Type 2  - Appreciate Cardiology recommendations  - Suspect  "secondary to hypertension during stress test  - ASA 81 mg daily, Atorvastatin 40 mg at bedtime  - s/p Cardiac Cath on 4/14 that showed normal coronary arteries  - Recommend lifestyle modifications including tobacco cessation, BP control, mediterranean-style diet, exercise  - Started Amlodipine 2.5 mg daily  - Telemetry     Hypertension  - Started Amlodipine 2.5 mg daily     Active Nicotine Dependence with Cigarettes  - Recommended tobacco cessation  - Previously on Wellbutrin, however she stopped this due to change of insurance     Obesity - BMI 39.48      Patient is seen in clinic today for hospital discharge follow-up.      I saw the patient on 4/5/2022 for concerns of fatigue, dyspnea on exertion, and left sided abdominal discomfort that occurred with activity.  She did also have some issues with left upper back and shoulder pain radiating to her left upper chest which she felt was due to a shoulder issue.  At that time we had considered possible differentials for her symptoms including sleep apnea, depression/anxiety, and CAD.  Has a very notable history of early heart disease with her mom.  Given her dyspnea on exertion and extreme fatigue, we discussed obtaining a cardiac stress test.  This was completed on 4/12/2022 and was felt to probably be \"probably abnormal with a small area of infarction in the distal anterior and apical segments of the left ventricle.\"  Patient was called with these results and reviewed, and I referred her to cardiology.  At that time I also put her on a low-dose aspirin and started a statin given her history of high cholesterol, until she could be further evaluated by cardiology.    However, patient then presented to the emergency room on 4/13/2022 with concerns of experiencing intermittent left-sided chest pain which felt different than her previous pain/shoulder issues.  Her pain was reportedly a 5-6 out of 10.  Her blood pressure was initially elevated at 158/87, heart rate 103 on " presentation to the emergency room per review of chart.  An EKG was obtained that showed normal sinus rhythm, no significant changes.  Chest x-ray was negative.  Troponin returned elevated at 116, repeat 111.  She was given nitroglycerin, aspirin, and started on a heparin drip and admitted for concerns of an NSTEMI.  Of note per review of chart, her chest pain resolved with nitroglycerin x2.  Cardiology was consulted and the patient was taken to the Cath Lab.  Her coronary angiogram showed entirely clear coronary arteries.  Was felt that her elevated troponin was likely a type II MI in the setting of possible hypertension during a stress test.  She was advised to continue aspirin, statin, and was started on amlodipine 2.5 mg at discharge.  Patient does have a follow-up with cardiology scheduled.    Patient reports today that she continues to have significant fatigue however, she does state that her shortness of breath is not as bad as it used to be.  She wonders if she had a panic attack causing her symptoms as she got quite concerned after we talked about her stress test over the phone.  She does have a history of anxiety and panic attack that was due to a stressful divorce in 2015.  She was taking Lexapro for a period of time but did wean off in 2016.  She states she has not had any issues with panic attacks since then.  She has struggled to do her usual activities over the past few months due to the fatigue.  She used to walk 3 to 5 miles per day with her significant other but has not been doing this lately she also reports that before Christmas she was doing some scrap books for her kids but has not been able to complete these tasks.  She reports that she felt fine at the end of January but in February the following day she suddenly felt unwell.  Had previously that her symptoms were due to a COVID exposure however she continued to test negative for COVID.  She is wondering if she should start medication for her  mood.  She is also making some lifestyle changes since this hospitalization.  She is no longer smoking.  She is working on following a heart healthy diet.    Patient reports being told by inpatient Cardiology that the defect noted on her stress test may have been due to tissue attenuation.      Review of Systems   CONSTITUTIONAL:NEGATIVE for fever, chills, change in weight  RESP:NEGATIVE for significant cough or SOB  CV: NEGATIVE for chest pain, palpitations or peripheral edema  PSYCHIATRIC: POSITIVE foranxiety and fatigue      Objective    /70 (BP Location: Left arm, Patient Position: Chair, Cuff Size: Adult Large)   Pulse 85   Temp 98.3  F (36.8  C) (Oral)   Resp 16   Wt 108.1 kg (238 lb 4.8 oz)   LMP  (LMP Unknown)   SpO2 98%   BMI 40.90 kg/m    Body mass index is 40.9 kg/m .     Physical Exam   GENERAL APPEARANCE: healthy, alert and no distress  EYES: Eyes grossly normal to inspection, PERRL and conjunctivae and sclerae normal  RESP: lungs clear to auscultation - no rales, rhonchi or wheezes  CV: regular rates and rhythm, normal S1 S2, no S3 or S4 and no murmur, click or rub  MS: extremities normal- no gross deformities noted  SKIN: no suspicious lesions or rashes  PSYCH: mentation appears normal and affect normal/bright    - Discharge summary, lab results, imaging results reviewed

## 2022-04-27 ASSESSMENT — ANXIETY QUESTIONNAIRES: GAD7 TOTAL SCORE: 16

## 2022-05-03 NOTE — PROGRESS NOTES
ASSESSMENT & PLAN  Patient Instructions     1. Adhesive capsulitis of left shoulder    2. Chronic left shoulder pain      -Patient has chronic left shoulder pain due to a frozen shoulder  -Patient has a history of rotator cuff tear status post repair in 2017.  Patient then followed up after recurrent injuries, had an MRI which showed subacromial bursitis.  Patient had a subacromial cortisone injection which only gave short-term relief.  -However, patient reports 6 months of difficulty putting on her close with progressive loss of range of motion  -Patient will follow up in the next couple of weeks for a left glenohumeral intra-articular cortisone injection.  -I suspect patient may also have a component of shoulder pain radiating from her cervical spine.  If patient does not have significant pain relief from the glenohumeral cortisone injection, to consider work-up and treatment of the cervical spine as well.  -Patient will start Flexeril 10 mg at bedtime for nighttime pain  -Patient recently suffered a heart attack and so, should avoid oral anti-inflammatory medications.  -Patient may continue with oral Tylenol as needed for pain.  -Call direct clinic number [[phone]] at any time with questions or concerns.    Albert Yeo MD [facility]  [facility]          -----    SUBJECTIVE  Jayne Tyler is a/an 56 year old Right handed female who is seen in consultation at the request of  Leonila Collins M.D. for evaluation of left shoulder pain. The patient is seen by themselves.    Onset: 5+ years(s) ago. Reports insidious onset without acute precipitating event.  Location of Pain: left shoulder, anterior shoulder radiating into lateral shoulder, also posterior shoulder pain along scapular spine    Rating of Pain at worst: 7/10  Rating of Pain Currently: 4/10  Worsened by: standing, rowing, shoulder abduction with internal rotation   Better with: rest, ice,  "Tylenol  Treatments tried: ice, Tylenol, corticosteroid injection (most recent date: 19) that provided  4-8 week(s) of relief, physical therapy in 2017, states she does at home exercises \"every once in a while\"  Associated symptoms: no distal numbness or tingling; denies swelling or warmth  Orthopedic history: YES - Date: TAYLOR shoulder RCR  2017  Relevant surgical history: YES - Date: TAYLOR shoulder RCR  2017  Social history: social history: works as  - [employer] work     Past Medical History:   Diagnosis Date     Morbid obesity (H)      Pure hypercholesterolemia      Social History     Socioeconomic History     Marital status:    Occupational History     Occupation:    Tobacco Use     Smoking status: Former Smoker     Packs/day: 1.00     Years: 26.00     Pack years: 26.00     Types: Cigarettes     Quit date: 2022     Years since quittin.0     Smokeless tobacco: Never Used     Tobacco comment: 3-4 cigs per week    Substance and Sexual Activity     Alcohol use: Yes     Comment: occ     Drug use: No     Sexual activity: Yes     Partners: Male   Other Topics Concern     Parent/sibling w/ CABG, MI or angioplasty before 65F 55M? No   Social History Narrative    . In a long-term relationship.    3 adult kids.    No grandchildren.    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         Patient's past medical, surgical, social, and family histories were reviewed today and no changes are noted.    REVIEW OF SYSTEMS:  10 point ROS is negative other than symptoms noted above in HPI, Past Medical History or as stated below  Constitutional: NEGATIVE for fever, chills, change in weight  Skin: NEGATIVE for worrisome rashes, moles or lesions  GI/: NEGATIVE for bowel or bladder changes  Neuro: NEGATIVE for weakness, dizziness or paresthesias    OBJECTIVE:  /80   Ht 1.626 m (5' 4\")   Wt 107.2 kg (236 lb 6.4 oz)   LMP  (LMP Unknown)   BMI 40.58 kg/m     General: " healthy, alert and in no distress  HEENT: no scleral icterus or conjunctival erythema  Skin: no suspicious lesions or rash. No jaundice.  CV: regular rhythm by palpation  Resp: normal respiratory effort without conversational dyspnea   Psych: normal mood and affect  Gait: normal steady gait with appropriate coordination and balance  Neuro: normal light touch sensory exam of the bilateral upper extremities.    MSK:  LEFT SHOULDER  Inspection:    no swelling, bruising, discoloration, or obvious deformity or asymmetry  Palpation:    Tender about the upper trapezius region. Remainder of bony and tendinous landmarks are nontender.  Active Range of Motion:     Abduction 900, FF 1350, , IR L4.      Scapular dyskinesis absent  Strength:    Scapular plane abduction limited by pain,  ER limited by pain, IR grossly intact, biceps grossly intact, triceps grossly intact  Special Tests:    Positive: Neer's, supraspinatus (empty can) and crossed arm adduction    Negative: Zapata', drop arm/painful arc, Dolliver's, Speed's and Yergason's      Independent visualization of the below image:  No results found for this or any previous visit (from the past 24 hour(s)).    MR SHOULDER LEFT WITHOUT CONTRAST 6/10/2019 8:41 AM      HISTORY: Shoulder pain, prior rotator cuff repair, re-tear suspected,  negative x-ray. Status post left shoulder DEC/DCR. Rotator cuff repair  2017 with chronic pain and weakness, no trauma history. Chronic pain  in left shoulder.  Status post rotator cuff surgery. Surgery in March 2017.     COMPARISON: 3/8/2017     TECHNIQUE:  Multiplanar, multisequence without contrast.     FINDINGS:  Osseous Acromion Outlet: Postsurgical changes including resection at  the AC joint and acromioplasty. No os acromiale.     Rotator Cuff: Supraspinatus tendon -  Postsurgical changes. No  recurrent tear or significant tendinosis identified.  Infraspinatus  tendon -  mild tendinosis.  Subscapularis tendon -  no tear  or  significant tendinosis.  Intact teres minor tendon.  No asymmetric  muscle atrophy.      Labral Structures: No superior labral tear (SLAP lesion) identified.  No labral cyst identified. No anterior or posterior labral tear  identified.     Biceps Tendon: No tear, dislocation, or significant tendinosis.     Osseous and Cartilaginous Structures:  No bone contusion or fracture.  No glenohumeral osteoarthritis or apparent chondromalacia identified.     Joint Space: No significant overall joint effusion.     Additional Findings: No deltoid muscle edema. Medium amount of fluid  within the subacromial-subdeltoid bursa.                                                                      IMPRESSION:  1. Postsurgical changes. No recurrent supraspinatus tendon tear  identified.  2. Mild infraspinatus tendinosis.  3. Subacromial bursal fluid.     ALAN LAORR, MD Albert Yeo MD [facility]

## 2022-05-09 ENCOUNTER — OFFICE VISIT (OUTPATIENT)
Dept: ORTHOPEDICS | Facility: CLINIC | Age: 57
End: 2022-05-09
Payer: COMMERCIAL

## 2022-05-09 VITALS
BODY MASS INDEX: 40.36 KG/M2 | DIASTOLIC BLOOD PRESSURE: 80 MMHG | WEIGHT: 236.4 LBS | HEIGHT: 64 IN | SYSTOLIC BLOOD PRESSURE: 124 MMHG

## 2022-05-09 DIAGNOSIS — G89.29 CHRONIC LEFT SHOULDER PAIN: ICD-10-CM

## 2022-05-09 DIAGNOSIS — M25.512 CHRONIC LEFT SHOULDER PAIN: ICD-10-CM

## 2022-05-09 DIAGNOSIS — M75.02 ADHESIVE CAPSULITIS OF LEFT SHOULDER: Primary | ICD-10-CM

## 2022-05-09 PROCEDURE — 99204 OFFICE O/P NEW MOD 45 MIN: CPT | Performed by: FAMILY MEDICINE

## 2022-05-09 RX ORDER — CYCLOBENZAPRINE HCL 10 MG
10 TABLET ORAL
Qty: 30 TABLET | Refills: 0 | Status: SHIPPED | OUTPATIENT
Start: 2022-05-09 | End: 2022-12-05

## 2022-05-09 NOTE — PATIENT INSTRUCTIONS
1. Adhesive capsulitis of left shoulder    2. Chronic left shoulder pain      -Patient has chronic left shoulder pain due to a frozen shoulder  -Patient has a history of rotator cuff tear status post repair in 2017.  Patient then followed up after recurrent injuries, had an MRI which showed subacromial bursitis.  Patient had a subacromial cortisone injection which only gave short-term relief.  -However, patient reports 6 months of difficulty putting on her close with progressive loss of range of motion  -Patient will follow up in the next couple of weeks for a left glenohumeral intra-articular cortisone injection.  -I suspect patient may also have a component of shoulder pain radiating from her cervical spine.  If patient does not have significant pain relief from the glenohumeral cortisone injection, to consider work-up and treatment of the cervical spine as well.  -Patient will start Flexeril 10 mg at bedtime for nighttime pain  -Patient recently suffered a heart attack and so, should avoid oral anti-inflammatory medications.  -Patient may continue with oral Tylenol as needed for pain.  -Call direct clinic number [[phone]] at any time with questions or concerns.    Albert Yeo MD [facility]  [facility] [place]

## 2022-05-09 NOTE — LETTER
5/9/2022         RE: Jayne Tyler  [address] [place] [facility] [address]        Dear Colleague,    Thank you for referring your patient, Jayne Tyler, to the [facility] [place]. Please see a copy of my visit note below.    ASSESSMENT & PLAN  Patient Instructions     1. Adhesive capsulitis of left shoulder    2. Chronic left shoulder pain      -Patient has chronic left shoulder pain due to a frozen shoulder  -Patient has a history of rotator cuff tear status post repair in 2017.  Patient then followed up after recurrent injuries, had an MRI which showed subacromial bursitis.  Patient had a subacromial cortisone injection which only gave short-term relief.  -However, patient reports 6 months of difficulty putting on her close with progressive loss of range of motion  -Patient will follow up in the next couple of weeks for a left glenohumeral intra-articular cortisone injection.  -I suspect patient may also have a component of shoulder pain radiating from her cervical spine.  If patient does not have significant pain relief from the glenohumeral cortisone injection, to consider work-up and treatment of the cervical spine as well.  -Patient will start Flexeril 10 mg at bedtime for nighttime pain  -Patient recently suffered a heart attack and so, should avoid oral anti-inflammatory medications.  -Patient may continue with oral Tylenol as needed for pain.  -Call direct clinic number [[phone]] at any time with questions or concerns.    Albert Yeo MD [facility] and [facility] Medicine  [facility] [place]          -----    SUBJECTIVE  Jayne Tyler is a/an 56 year old Right handed female who is seen in consultation at the request of  Leonila Collins M.D. for evaluation of left shoulder pain. The patient is seen by themselves.    Onset: 5+ years(s) ago. Reports insidious onset without acute precipitating event.  Location of Pain: left shoulder,  "anterior shoulder radiating into lateral shoulder, also posterior shoulder pain along scapular spine    Rating of Pain at worst: 7/10  Rating of Pain Currently: 4/10  Worsened by: standing, rowing, shoulder abduction with internal rotation   Better with: rest, ice, Tylenol  Treatments tried: ice, Tylenol, corticosteroid injection (most recent date: 19) that provided  4-8 week(s) of relief, physical therapy in 2017, states she does at home exercises \"every once in a while\"  Associated symptoms: no distal numbness or tingling; denies swelling or warmth  Orthopedic history: YES - Date: L shoulder RCR  2017  Relevant surgical history: YES - Date: L shoulder RCR  2017  Social history: social history: works as  - [employer] work     Past Medical History:   Diagnosis Date     Morbid obesity (H)      Pure hypercholesterolemia      Social History     Socioeconomic History     Marital status:    Occupational History     Occupation:    Tobacco Use     Smoking status: Former Smoker     Packs/day: 1.00     Years: 26.00     Pack years: 26.00     Types: Cigarettes     Quit date: 2022     Years since quittin.0     Smokeless tobacco: Never Used     Tobacco comment: 3-4 cigs per week    Substance and Sexual Activity     Alcohol use: Yes     Comment: occ     Drug use: No     Sexual activity: Yes     Partners: Male   Other Topics Concern     Parent/sibling w/ CABG, MI or angioplasty before 65F 55M? No   Social History Narrative    . In a long-term relationship.    3 adult kids.    No grandchildren.    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         Patient's past medical, surgical, social, and family histories were reviewed today and no changes are noted.    REVIEW OF SYSTEMS:  10 point ROS is negative other than symptoms noted above in HPI, Past Medical History or as stated below  Constitutional: NEGATIVE for fever, chills, change in weight  Skin: NEGATIVE for " "worrisome rashes, moles or lesions  GI/: NEGATIVE for bowel or bladder changes  Neuro: NEGATIVE for weakness, dizziness or paresthesias    OBJECTIVE:  /80   Ht 1.626 m (5' 4\")   Wt 107.2 kg (236 lb 6.4 oz)   LMP  (LMP Unknown)   BMI 40.58 kg/m     General: healthy, alert and in no distress  HEENT: no scleral icterus or conjunctival erythema  Skin: no suspicious lesions or rash. No jaundice.  CV: regular rhythm by palpation  Resp: normal respiratory effort without conversational dyspnea   Psych: normal mood and affect  Gait: normal steady gait with appropriate coordination and balance  Neuro: normal light touch sensory exam of the bilateral upper extremities.    MSK:  LEFT SHOULDER  Inspection:    no swelling, bruising, discoloration, or obvious deformity or asymmetry  Palpation:    Tender about the upper trapezius region. Remainder of bony and tendinous landmarks are nontender.  Active Range of Motion:     Abduction 900, FF 1350, , IR L4.      Scapular dyskinesis absent  Strength:    Scapular plane abduction limited by pain,  ER limited by pain, IR grossly intact, biceps grossly intact, triceps grossly intact  Special Tests:    Positive: Neer's, supraspinatus (empty can) and crossed arm adduction    Negative: Zapata', drop arm/painful arc, Clinton's, Speed's and Yergason's      Independent visualization of the below image:  No results found for this or any previous visit (from the past 24 hour(s)).    MR SHOULDER LEFT WITHOUT CONTRAST 6/10/2019 8:41 AM      HISTORY: Shoulder pain, prior rotator cuff repair, re-tear suspected,  negative x-ray. Status post left shoulder DEC/DCR. Rotator cuff repair  2017 with chronic pain and weakness, no trauma history. Chronic pain  in left shoulder.  Status post rotator cuff surgery. Surgery in March 2017.     COMPARISON: 3/8/2017     TECHNIQUE:  Multiplanar, multisequence without contrast.     FINDINGS:  Osseous Acromion Outlet: Postsurgical changes including " resection at  the AC joint and acromioplasty. No os acromiale.     Rotator Cuff: Supraspinatus tendon -  Postsurgical changes. No  recurrent tear or significant tendinosis identified.  Infraspinatus  tendon -  mild tendinosis.  Subscapularis tendon -  no tear or  significant tendinosis.  Intact teres minor tendon.  No asymmetric  muscle atrophy.      Labral Structures: No superior labral tear (SLAP lesion) identified.  No labral cyst identified. No anterior or posterior labral tear  identified.     Biceps Tendon: No tear, dislocation, or significant tendinosis.     Osseous and Cartilaginous Structures:  No bone contusion or fracture.  No glenohumeral osteoarthritis or apparent chondromalacia identified.     Joint Space: No significant overall joint effusion.     Additional Findings: No deltoid muscle edema. Medium amount of fluid  within the subacromial-subdeltoid bursa.                                                                      IMPRESSION:  1. Postsurgical changes. No recurrent supraspinatus tendon tear  identified.  2. Mild infraspinatus tendinosis.  3. Subacromial bursal fluid.     ALAN LAORR, MD Albert Yeo MD [facility]        Again, thank you for allowing me to participate in the care of your patient.        Sincerely,        Albert Yeo, MD

## 2022-05-17 ENCOUNTER — OFFICE VISIT (OUTPATIENT)
Dept: CARDIOLOGY | Facility: CLINIC | Age: 57
End: 2022-05-17
Payer: COMMERCIAL

## 2022-05-17 VITALS
SYSTOLIC BLOOD PRESSURE: 112 MMHG | HEART RATE: 80 BPM | DIASTOLIC BLOOD PRESSURE: 73 MMHG | WEIGHT: 232 LBS | HEIGHT: 64 IN | BODY MASS INDEX: 39.61 KG/M2

## 2022-05-17 DIAGNOSIS — R94.39 ABNORMAL CARDIOVASCULAR STRESS TEST: ICD-10-CM

## 2022-05-17 PROCEDURE — 99213 OFFICE O/P EST LOW 20 MIN: CPT | Performed by: INTERNAL MEDICINE

## 2022-05-17 NOTE — LETTER
"5/17/2022    Loli Aquino MD  [address] [facility] [place] [address]    RE: Jayne Tyler       Dear Colleague,     I had the pleasure of seeing Jayne Tyler in the [facility].  HISTORY:    Jayne Tyler is a pleasant 56-year-old female who presented earlier this year with complaints of feeling \"really tired\".  He has a strong family history of coronary disease so a stress nuclear scan was done.  That study showed inducible ischemia and she underwent coronary angiography.  I reviewed that study which showed that all vessels were normal with no detectable atherosclerosis.  She was set up for today's visit for a follow-up of her angiogram.  I am meeting her for the first time today.    Today Jayne reports that she has done well since her angiogram several months ago.  She still has some tiredness but states that she sleeps poorly at night, awakening frequently.  She does not snore but she does not wake up feeling she got a good night sleep and she is tired throughout the day.  She is being considered for a sleep study and I encouraged her to pursue that further.  Her boyfriend has stated that she really does not snore much.    Jayne also had some elevated blood pressure presentation and was started on amlodipine 2.5 mg daily.  She was instructed to check her blood pressure on a daily basis that she brings in a long list of those blood pressure readings.  Most of the blood pressures are between 100 mmHg systolic and 120.  Her blood pressure in the office today at 112/73 is about the average for her.  She also continues to use an aspirin a day and is on Lipitor for her elevated cholesterol in the setting of positive family history.      ASSESSMENT/PLAN:    1.  Tiredness.  It sounds like this is due to poor sleeping and this may very well be secondary to sleep apnea so I suggested that she follow-up with her study, she promised to do so.  I do not think this is cardiac related.  2.  " Hypertension.  She had a high blood pressure on presentation but states that her blood pressures never been high in the past.  I stopped her amlodipine but asked her to continue to check her blood pressure and let us know if it is consistently greater than 130 in which case we will plan on reinstituting the amlodipine.  She has promised to do so.  3.  Coronary artery disease.  It was suggested by a stress test but turned out to be a false positive.  I do not think that aspirin is indicated in this patient and I have asked her to stop it.  Risks exceed benefit.  4.  Hyperlipidemia.  With completely normal coronaries at age 56 this patient's risk of artery disease is actually rather low, but given her family history and age I think ongoing Lipitor is reasonable but certainly could be discontinued if she prefers.  We discussed this and she wants to stay on it.    Thank you for inviting me to participate in the care of your patient.  Please don't hesitate to call if I can be of further assistance.  20 minutes were spent today reviewing the chart and other records, interviewing and examining the patient, and documenting our visit.    Chart documentation was completed, in part, with [employer] voice-recognition software. Even though reviewed, some grammatical, spelling, and word errors may remain.       No orders of the defined types were placed in this encounter.    No orders of the defined types were placed in this encounter.    Medications Discontinued During This Encounter   Medication Reason     amLODIPine (NORVASC) 2.5 MG tablet      aspirin 81 MG EC tablet        10 year ASCVD risk: The ASCVD Risk score (Vassarelin STRICKLAND Jr., et al., 2013) failed to calculate for the following reasons:    The patient has a prior MI or stroke diagnosis    Encounter Diagnosis   Name Primary?     Abnormal cardiovascular stress test        CURRENT MEDICATIONS:  Current Outpatient Medications   Medication Sig Dispense Refill     acetaminophen  (TYLENOL) 325 MG tablet Take 2 tablets (650 mg) by mouth every 4 hours as needed for mild pain 50 tablet 0     atorvastatin (LIPITOR) 40 MG tablet Take 1 tablet (40 mg) by mouth every evening 90 tablet 3     Cholecalciferol (VITAMIN D) 2000 units CAPS Take 2,000 Units by mouth daily 90 capsule 3     cyclobenzaprine (FLEXERIL) 10 MG tablet Take 1 tablet (10 mg) by mouth nightly as needed for muscle spasms 30 tablet 0     escitalopram (LEXAPRO) 10 MG tablet 1/2 tab daily for 1 week, then increase to 1 pill daily 30 tablet 1     fluticasone (FLONASE) 50 MCG/ACT nasal spray Spray 2 sprays into both nostrils daily (Patient taking differently: Spray 2 sprays into both nostrils daily as needed) 16 g 3     multivitamin w/minerals (THERA-VIT-M) tablet Take 1 tablet by mouth daily       omeprazole (PRILOSEC) 20 MG DR capsule Take 20 mg by mouth daily       vitamin (B COMPLEX-C) tablet Take 1 tablet by mouth daily         ALLERGIES     Allergies   Allergen Reactions     Penicillins Hives       PAST MEDICAL HISTORY:  Past Medical History:   Diagnosis Date     Morbid obesity (H)      Pure hypercholesterolemia        PAST SURGICAL HISTORY:  Past Surgical History:   Procedure Laterality Date     ARTHROSCOPY SHOULDER Right 2015     ARTHROSCOPY SHOULDER, OPEN ROTATOR CUFF REPAIR, COMBINED Left 03/29/2017    Procedure:  Left shoulder arthroscopy and arthroscopic subacromial decompression (acromioplasty, bursectomy and coracoacromial ligament resection). Arthroscopic distal clavicle resection. Mini-open rotator cuff tear repair. Surgeon:  Kevin George MD  Location: [facility] Right 2008     CV CORONARY ANGIOGRAM N/A 4/14/2022    Procedure: Coronary Angiogram;  Surgeon: Olvin Roy MD;  Location:  HEART CARDIAC CATH LAB     DAVINC HYSTERECTOMY TOTAL, BILATERAL SALPINGO-OOPHORECTOMY, COMBINED N/A 2/9/2021    Procedure: Robotic assisted total laparoscopic hysterectomy, bilateral  salpingectomy, bilateral oophorectomy, cystoscopy;  Surgeon: Jonathan Peters MD;  Location: [facility] OR     DISKECTOMY, LUMBAR, SINGLE SP      L5-S1     DISKECTOMY, LUMBAR, SINGLE SP      L5-S1     FUSION LUMBAR ANTERIOR ONE LEVEL      L5-S1     OSTEOTOMY FOOT Right 2017    Procedure: OSTEOTOMY FOOT;  1)  WEIL OSTEOTOMY RIGHT SECOND METATARSAL, 2) PLANTAR CAPSULE DEBRIDEMENT/SYNOVECTOMY, RIGHT SECOND METATARSAL PHALANGEAL JOINT, 3) HAMMERTOE REPAIR RIGHT SECOND TOE;  Surgeon: Olvin Betancur DPM;  Location: [facility]     PA SPINE SURGERY PROCEDURE UNLISTED       RECONSTRUCT FOREFOOT WITH METATARSOPHALANGEAL (MTP) FUSION Right 2017    Procedure: RECONSTRUCT FOREFOOT WITH METATARSOPHALANGEAL (MTP) FUSION;;  Surgeon: Olvin Betancur DPM;  Location: [facility]     REPAIR HAMMER TOE Right 2017    Procedure: REPAIR HAMMER TOE;;  Surgeon: Olvin Betancur DPM;  Location: [facility]     ROTATOR CUFF REPAIR RT/LT Left        FAMILY HISTORY:  Family History   Problem Relation Age of Onset     Rheumatoid Arthritis Mother      Coronary Artery Disease Mother         s/p MI and PCIs in 50s     Rheumatoid Arthritis Sister      Myocardial Infarction Maternal Grandmother         in her 60s     Myocardial Infarction Maternal Grandfather         later in life     Diabetes No family hx of      Cerebrovascular Disease No family hx of      Colon Cancer No family hx of      Breast Cancer No family hx of      Ovarian Cancer No family hx of        SOCIAL HISTORY:  Social History     Socioeconomic History     Marital status:      Spouse name: None     Number of children: None     Years of education: None     Highest education level: None   Occupational History     Occupation:    Tobacco Use     Smoking status: Former Smoker     Packs/day: 1.00     Years: 26.00     Pack years: 26.00     Types: Cigarettes     Quit date: 2022     Years since quittin.0     Smokeless tobacco:  "Never Used     Tobacco comment: 3-4 cigs per week    Substance and Sexual Activity     Alcohol use: Yes     Comment: occ     Drug use: No     Sexual activity: Yes     Partners: Male   Other Topics Concern     Parent/sibling w/ CABG, MI or angioplasty before 65F 55M? No   Social History Narrative    . In a long-term relationship.    3 adult kids.    No grandchildren.    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       Review of Systems:  Skin:  Positive for bruising   Eyes:  Positive for glasses  ENT:  Negative    Respiratory:  Negative    Cardiovascular:    palpitations;Positive for;fatigue;lightheadedness  Gastroenterology: Positive for heartburn  Genitourinary:  not assessed    Musculoskeletal:  Positive for back pain;neck pain;joint pain;arthritis  Neurologic:  Positive for headaches;numbness or tingling of hands  Psychiatric:  Positive for anxiety;sleep disturbances  Heme/Lymph/Imm:  Positive for allergies  Endocrine:  Negative      Physical Exam:  Vitals: /73   Pulse 80   Ht 1.626 m (5' 4\")   Wt 105.2 kg (232 lb)   LMP  (LMP Unknown)   BMI 39.82 kg/m      Constitutional:           Skin:           Head:           Eyes:           ENT:           Neck:           Chest:           Cardiac:                    Abdomen:           Vascular:                                        Extremities and Muscular Skeletal:              Neurological:           Psych:        Recent Lab Results:  LIPID RESULTS:  Lab Results   Component Value Date    CHOL 137 04/22/2022    CHOL 246 (H) 11/09/2020    HDL 62 04/22/2022    HDL 63 11/09/2020    LDL 58 04/22/2022     (H) 11/09/2020    TRIG 83 04/22/2022    TRIG 87 11/09/2020       LIVER ENZYME RESULTS:  Lab Results   Component Value Date    AST 20 04/05/2022    AST 22 11/09/2020    ALT 27 04/05/2022    ALT 26 11/09/2020       CBC RESULTS:  Lab Results   Component Value Date    WBC 10.8 04/15/2022    WBC 11.5 (H) 01/04/2021    RBC 4.27 04/15/2022    RBC 4.38 01/04/2021    HGB 13.1 " 04/15/2022    HGB 13.7 02/09/2021    HCT 41.0 04/15/2022    HCT 42.2 01/04/2021    MCV 96 04/15/2022    MCV 96 01/04/2021    MCH 30.7 04/15/2022    MCH 31.7 01/04/2021    MCHC 32.0 04/15/2022    MCHC 32.9 01/04/2021    RDW 13.5 04/15/2022    RDW 13.5 01/04/2021     04/15/2022     01/04/2021       BMP RESULTS:  Lab Results   Component Value Date     04/15/2022     11/09/2020    POTASSIUM 4.1 04/15/2022    POTASSIUM 3.8 11/09/2020    CHLORIDE 108 04/15/2022    CHLORIDE 109 11/09/2020    CO2 25 04/15/2022    CO2 25 11/09/2020    ANIONGAP 5 04/15/2022    ANIONGAP 7 11/09/2020    GLC 85 04/15/2022    GLC 89 11/09/2020    BUN 11 04/15/2022    BUN 11 11/09/2020    CR 0.82 04/15/2022    CR 0.86 11/09/2020    GFRESTIMATED 84 04/15/2022    GFRESTIMATED 76 11/09/2020    GFRESTBLACK 88 11/09/2020    JEANETH 8.7 04/15/2022    JEANETH 9.3 11/09/2020        A1C RESULTS:  Lab Results   Component Value Date    A1C 5.4 04/13/2022       INR RESULTS:  No results found for: INR      Olvin Stokes MD, [facility]    CC  Referred Self,    Thank you for allowing me to participate in the care of your patient.      Sincerely,     Olvin Stokes MD   [facility]

## 2022-05-17 NOTE — PROGRESS NOTES
"HISTORY:    Jayne Tyler is a pleasant 56-year-old female who presented earlier this year with complaints of feeling \"really tired\".  He has a strong family history of coronary disease so a stress nuclear scan was done.  That study showed inducible ischemia and she underwent coronary angiography.  I reviewed that study which showed that all vessels were normal with no detectable atherosclerosis.  She was set up for today's visit for a follow-up of her angiogram.  I am meeting her for the first time today.    Today Jayne reports that she has done well since her angiogram several months ago.  She still has some tiredness but states that she sleeps poorly at night, awakening frequently.  She does not snore but she does not wake up feeling she got a good night sleep and she is tired throughout the day.  She is being considered for a sleep study and I encouraged her to pursue that further.  Her boyfriend has stated that she really does not snore much.    Jayne also had some elevated blood pressure presentation and was started on amlodipine 2.5 mg daily.  She was instructed to check her blood pressure on a daily basis that she brings in a long list of those blood pressure readings.  Most of the blood pressures are between 100 mmHg systolic and 120.  Her blood pressure in the office today at 112/73 is about the average for her.  She also continues to use an aspirin a day and is on Lipitor for her elevated cholesterol in the setting of positive family history.      ASSESSMENT/PLAN:    1.  Tiredness.  It sounds like this is due to poor sleeping and this may very well be secondary to sleep apnea so I suggested that she follow-up with her study, she promised to do so.  I do not think this is cardiac related.  2.  Hypertension.  She had a high blood pressure on presentation but states that her blood pressures never been high in the past.  I stopped her amlodipine but asked her to continue to check her blood pressure and " let us know if it is consistently greater than 130 in which case we will plan on reinstituting the amlodipine.  She has promised to do so.  3.  Coronary artery disease.  It was suggested by a stress test but turned out to be a false positive.  I do not think that aspirin is indicated in this patient and I have asked her to stop it.  Risks exceed benefit.  4.  Hyperlipidemia.  With completely normal coronaries at age 56 this patient's risk of artery disease is actually rather low, but given her family history and age I think ongoing Lipitor is reasonable but certainly could be discontinued if she prefers.  We discussed this and she wants to stay on it.    Thank you for inviting me to participate in the care of your patient.  Please don't hesitate to call if I can be of further assistance.  20 minutes were spent today reviewing the chart and other records, interviewing and examining the patient, and documenting our visit.    Chart documentation was completed, in part, with [employer] voice-recognition software. Even though reviewed, some grammatical, spelling, and word errors may remain.       No orders of the defined types were placed in this encounter.    No orders of the defined types were placed in this encounter.    Medications Discontinued During This Encounter   Medication Reason     amLODIPine (NORVASC) 2.5 MG tablet      aspirin 81 MG EC tablet        10 year ASCVD risk: The ASCVD Risk score ([place] DC Jr., et al., 2013) failed to calculate for the following reasons:    The patient has a prior MI or stroke diagnosis    Encounter Diagnosis   Name Primary?     Abnormal cardiovascular stress test        CURRENT MEDICATIONS:  Current Outpatient Medications   Medication Sig Dispense Refill     acetaminophen (TYLENOL) 325 MG tablet Take 2 tablets (650 mg) by mouth every 4 hours as needed for mild pain 50 tablet 0     atorvastatin (LIPITOR) 40 MG tablet Take 1 tablet (40 mg) by mouth every evening 90 tablet 3      Cholecalciferol (VITAMIN D) 2000 units CAPS Take 2,000 Units by mouth daily 90 capsule 3     cyclobenzaprine (FLEXERIL) 10 MG tablet Take 1 tablet (10 mg) by mouth nightly as needed for muscle spasms 30 tablet 0     escitalopram (LEXAPRO) 10 MG tablet 1/2 tab daily for 1 week, then increase to 1 pill daily 30 tablet 1     fluticasone (FLONASE) 50 MCG/ACT nasal spray Spray 2 sprays into both nostrils daily (Patient taking differently: Spray 2 sprays into both nostrils daily as needed) 16 g 3     multivitamin w/minerals (THERA-VIT-M) tablet Take 1 tablet by mouth daily       omeprazole (PRILOSEC) 20 MG DR capsule Take 20 mg by mouth daily       vitamin (B COMPLEX-C) tablet Take 1 tablet by mouth daily         ALLERGIES     Allergies   Allergen Reactions     Penicillins Hives       PAST MEDICAL HISTORY:  Past Medical History:   Diagnosis Date     Morbid obesity (H)      Pure hypercholesterolemia        PAST SURGICAL HISTORY:  Past Surgical History:   Procedure Laterality Date     ARTHROSCOPY SHOULDER Right 2015     ARTHROSCOPY SHOULDER, OPEN ROTATOR CUFF REPAIR, COMBINED Left 03/29/2017    Procedure:  Left shoulder arthroscopy and arthroscopic subacromial decompression (acromioplasty, bursectomy and coracoacromial ligament resection). Arthroscopic distal clavicle resection. Mini-open rotator cuff tear repair. Surgeon:  Kevin George MD  Location: [facility] Right 2008     CV CORONARY ANGIOGRAM N/A 4/14/2022    Procedure: Coronary Angiogram;  Surgeon: Olvin Roy MD;  Location:  HEART CARDIAC CATH LAB     DAVINC HYSTERECTOMY TOTAL, BILATERAL SALPINGO-OOPHORECTOMY, COMBINED N/A 2/9/2021    Procedure: Robotic assisted total laparoscopic hysterectomy, bilateral salpingectomy, bilateral oophorectomy, cystoscopy;  Surgeon: Jonathan Peters MD;  Location:  OR     DISKECTOMY, LUMBAR, SINGLE SP  1996    L5-S1     DISKECTOMY, LUMBAR, SINGLE SP  1997    L5-S1     FUSION  LUMBAR ANTERIOR ONE LEVEL      L5-S1     OSTEOTOMY FOOT Right 2017    Procedure: OSTEOTOMY FOOT;  1)  WEIL OSTEOTOMY RIGHT SECOND METATARSAL, 2) PLANTAR CAPSULE DEBRIDEMENT/SYNOVECTOMY, RIGHT SECOND METATARSAL PHALANGEAL JOINT, 3) HAMMERTOE REPAIR RIGHT SECOND TOE;  Surgeon: Olvin Betancur DPM;  Location: [facility]     DC SPINE SURGERY PROCEDURE UNLISTED       RECONSTRUCT FOREFOOT WITH METATARSOPHALANGEAL (MTP) FUSION Right 2017    Procedure: RECONSTRUCT FOREFOOT WITH METATARSOPHALANGEAL (MTP) FUSION;;  Surgeon: Olvin Betancur DPM;  Location: [facility]     REPAIR HAMMER TOE Right 2017    Procedure: REPAIR HAMMER TOE;;  Surgeon: Olvin Betancur DPM;  Location: [facility]     ROTATOR CUFF REPAIR RT/LT Left 2017       FAMILY HISTORY:  Family History   Problem Relation Age of Onset     Rheumatoid Arthritis Mother      Coronary Artery Disease Mother         s/p MI and PCIs in 50s     Rheumatoid Arthritis Sister      Myocardial Infarction Maternal Grandmother         in her 60s     Myocardial Infarction Maternal Grandfather         later in life     Diabetes No family hx of      Cerebrovascular Disease No family hx of      Colon Cancer No family hx of      Breast Cancer No family hx of      Ovarian Cancer No family hx of        SOCIAL HISTORY:  Social History     Socioeconomic History     Marital status:      Spouse name: None     Number of children: None     Years of education: None     Highest education level: None   Occupational History     Occupation:    Tobacco Use     Smoking status: Former Smoker     Packs/day: 1.00     Years: 26.00     Pack years: 26.00     Types: Cigarettes     Quit date: 2022     Years since quittin.0     Smokeless tobacco: Never Used     Tobacco comment: 3-4 cigs per week    Substance and Sexual Activity     Alcohol use: Yes     Comment: occ     Drug use: No     Sexual activity: Yes     Partners: Male   Other Topics Concern      "Parent/sibling w/ CABG, MI or angioplasty before 65F 55M? No   Social History Narrative    . In a long-term relationship.    3 adult kids.    No grandchildren.    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       Review of Systems:  Skin:  Positive for bruising   Eyes:  Positive for glasses  ENT:  Negative    Respiratory:  Negative    Cardiovascular:    palpitations;Positive for;fatigue;lightheadedness  Gastroenterology: Positive for heartburn  Genitourinary:  not assessed    Musculoskeletal:  Positive for back pain;neck pain;joint pain;arthritis  Neurologic:  Positive for headaches;numbness or tingling of hands  Psychiatric:  Positive for anxiety;sleep disturbances  Heme/Lymph/Imm:  Positive for allergies  Endocrine:  Negative      Physical Exam:  Vitals: /73   Pulse 80   Ht 1.626 m (5' 4\")   Wt 105.2 kg (232 lb)   LMP  (LMP Unknown)   BMI 39.82 kg/m      Constitutional:           Skin:           Head:           Eyes:           ENT:           Neck:           Chest:           Cardiac:                    Abdomen:           Vascular:                                        Extremities and Muscular Skeletal:              Neurological:           Psych:        Recent Lab Results:  LIPID RESULTS:  Lab Results   Component Value Date    CHOL 137 04/22/2022    CHOL 246 (H) 11/09/2020    HDL 62 04/22/2022    HDL 63 11/09/2020    LDL 58 04/22/2022     (H) 11/09/2020    TRIG 83 04/22/2022    TRIG 87 11/09/2020       LIVER ENZYME RESULTS:  Lab Results   Component Value Date    AST 20 04/05/2022    AST 22 11/09/2020    ALT 27 04/05/2022    ALT 26 11/09/2020       CBC RESULTS:  Lab Results   Component Value Date    WBC 10.8 04/15/2022    WBC 11.5 (H) 01/04/2021    RBC 4.27 04/15/2022    RBC 4.38 01/04/2021    HGB 13.1 04/15/2022    HGB 13.7 02/09/2021    HCT 41.0 04/15/2022    HCT 42.2 01/04/2021    MCV 96 04/15/2022    MCV 96 01/04/2021    MCH 30.7 04/15/2022    MCH 31.7 01/04/2021    MCHC 32.0 04/15/2022    MCHC 32.9 " 01/04/2021    RDW 13.5 04/15/2022    RDW 13.5 01/04/2021     04/15/2022     01/04/2021       BMP RESULTS:  Lab Results   Component Value Date     04/15/2022     11/09/2020    POTASSIUM 4.1 04/15/2022    POTASSIUM 3.8 11/09/2020    CHLORIDE 108 04/15/2022    CHLORIDE 109 11/09/2020    CO2 25 04/15/2022    CO2 25 11/09/2020    ANIONGAP 5 04/15/2022    ANIONGAP 7 11/09/2020    GLC 85 04/15/2022    GLC 89 11/09/2020    BUN 11 04/15/2022    BUN 11 11/09/2020    CR 0.82 04/15/2022    CR 0.86 11/09/2020    GFRESTIMATED 84 04/15/2022    GFRESTIMATED 76 11/09/2020    GFRESTBLACK 88 11/09/2020    JEANETH 8.7 04/15/2022    JEANETH 9.3 11/09/2020        A1C RESULTS:  Lab Results   Component Value Date    A1C 5.4 04/13/2022       INR RESULTS:  No results found for: INR      Olvin Stokes MD, [facility]    CC  Referred Self, MD  No address on file

## 2022-05-19 ENCOUNTER — OFFICE VISIT (OUTPATIENT)
Dept: ORTHOPEDICS | Facility: CLINIC | Age: 57
End: 2022-05-19
Payer: COMMERCIAL

## 2022-05-19 DIAGNOSIS — G89.29 CHRONIC LEFT SHOULDER PAIN: Primary | ICD-10-CM

## 2022-05-19 DIAGNOSIS — M25.512 CHRONIC LEFT SHOULDER PAIN: Primary | ICD-10-CM

## 2022-05-19 DIAGNOSIS — M75.02 ADHESIVE CAPSULITIS OF LEFT SHOULDER: ICD-10-CM

## 2022-05-19 PROCEDURE — 20611 DRAIN/INJ JOINT/BURSA W/US: CPT | Mod: LT | Performed by: STUDENT IN AN ORGANIZED HEALTH CARE EDUCATION/TRAINING PROGRAM

## 2022-05-19 RX ORDER — TRIAMCINOLONE ACETONIDE 40 MG/ML
80 INJECTION, SUSPENSION INTRA-ARTICULAR; INTRAMUSCULAR
Status: DISCONTINUED | OUTPATIENT
Start: 2022-05-19 | End: 2023-03-06

## 2022-05-19 RX ORDER — LIDOCAINE HYDROCHLORIDE 10 MG/ML
5 INJECTION, SOLUTION INFILTRATION; PERINEURAL
Status: DISCONTINUED | OUTPATIENT
Start: 2022-05-19 | End: 2023-03-06

## 2022-05-19 RX ADMIN — TRIAMCINOLONE ACETONIDE 80 MG: 40 INJECTION, SUSPENSION INTRA-ARTICULAR; INTRAMUSCULAR at 08:42

## 2022-05-19 RX ADMIN — LIDOCAINE HYDROCHLORIDE 5 ML: 10 INJECTION, SOLUTION INFILTRATION; PERINEURAL at 08:42

## 2022-05-19 NOTE — PATIENT INSTRUCTIONS
1. Chronic left shoulder pain    2. Adhesive capsulitis of left shoulder      An ultrasound-guided glenohumeral cortisone injection and capsular dilation was performed in clinic today without complication.    Plan to take it easy for the next 2 weeks while the cortisone medication kicks in. During this time, you should start pendulum circles and wall crawls to gently work on range of motion.    - Recommend starting physical therapy in a couple of weeks, then follow up with Dr. Yeo in about 6 weeks.    You may call our direct clinic number ([phone]) at any time with questions or concerns.    Arianna Nettles MD, [facility]    JOINT INJECTION AFTERCARE    After any kind of joint injection, it is not uncommon to experience:  - Soreness, swelling or bruising around the injection site.  - Mild numbness, tingling or weakness around the injection site caused by the numbing medicine used before or with the injection.     It is also possible to experience the following effects associated with the specific agent after injection:  - Allergic reaction.  - Increased blood sugar levels for 10-14 days following cortisone injection. If you have diabetes and notice that your blood sugar levels have increased, notify your primary care physician.  - Increased blood pressure levels.  - Mood swings.  - Increased fluid accumulation in the injected joint.    These effects all should resolve within a day or two of your procedure.    Please note that it may take up to 14 days for the steroid (cortisone) medication to start working.     HOME CARE INSTRUCTIONS    - Limit yourself to light activity activity on the day of your procedure. Avoid lifting heavy objects, bending, stooping or twisting.   - You may shower, but please avoid swimming, hot tubs or tub baths for 24 hours following the procedure.   - Take over-the-counter pain medication (NSAIDS or Tylenol) for pain control after your  procedure as needed.    - You may use ice packs for 10-15 minutes 3-4 times per day at the injection site to reduce pain and swelling after your procedure.   + Put ice in a plastic bag  + Place a towel between your skin and the ice bag  + Leave the ice on for no longer than 20 minutes each time to avoid injuring your skin or nerves  + This can be repeated 3-4 times per day for a few days after the injection    SEEK IMMEDIATE MEDICAL CARE IF:    - Pain and swelling get worse rather than better or extend beyond the injection site  - Numbness does not go away after a few hours  - Blood or fluid continues to leak from the injection site  - You experience chest pain  - You have swelling of your face or tongue  - You have trouble breathing or become dizzy  - You develop fever, chills or severe tenderness at the injection site that lasts longer than 1 day    MAKE SURE YOU:    - Understand these instructions  - Watch your condition  - Get help right away if you are not doing well or if you get worse

## 2022-05-19 NOTE — PROGRESS NOTES
ASSESSMENT & PLAN    1. Chronic left shoulder pain    2. Adhesive capsulitis of left shoulder      An ultrasound-guided glenohumeral cortisone injection and capsular dilation was performed in clinic today using a total of 8 mL of combined triamcinolone and lidocaine intra-articularly. The procedure was performed without complication. Post-injection instructions were provided.    - Recommend taking it easy for the next 2 weeks while the cortisone medication kicks in. During this time, encouraged to start pendulum circles and wall crawls to gently work on range of motion.    - Plan to start physical therapy in a couple of weeks, then follow up with Dr. Yeo in about 6 weeks.    You may call our direct clinic number ([phone]) at any time with questions or concerns.    Arianna Nettles MD, [facility]    -----    SUBJECTIVE:  Jayne Tyler is a 56 year old female who is seen for US guided left shoulder - glenohumeral joint cortisone injection.    Large Joint Injection/Arthocentesis: L glenohumeral joint    Date/Time: 5/19/2022 8:42 AM  Performed by: Arianna Vaca MD  Authorized by: Arianna Vaca MD     Indications:  Pain and osteoarthritis  Needle Size:  22 G  Guidance: ultrasound    Approach:  Posterior  Location:  Shoulder      Site:  L glenohumeral joint  Medications:  80 mg triamcinolone 40 MG/ML; 5 mL lidocaine 1 %  Medications comment:  8mL lidocaine administered total  Outcome:  Tolerated well, no immediate complications  Procedure discussed: discussed risks, benefits, and alternatives    Consent Given by:  Patient  Timeout: timeout called immediately prior to procedure    Prep: patient was prepped and draped in usual sterile fashion     Ultrasound was used to ensure safe and accurate needle placement and injection. Ultrasound images of the procedure were permanently stored.      Patient rates pain as 5/10 pre-procedure and immediately  improving post-procedure.    Arianna Nettles MD, CAQSM  [facility]

## 2022-05-19 NOTE — LETTER
5/19/2022         RE: Jayne Tyler  [address] [place] [address]        Dear Colleague,    Thank you for referring your patient, Jayne Tyler, to the [facility] [place]. Please see a copy of my visit note below.    ASSESSMENT & PLAN    1. Chronic left shoulder pain    2. Adhesive capsulitis of left shoulder      An ultrasound-guided glenohumeral cortisone injection and capsular dilation was performed in clinic today using a total of 8 mL of combined triamcinolone and lidocaine intra-articularly. The procedure was performed without complication. Post-injection instructions were provided.    - Recommend taking it easy for the next 2 weeks while the cortisone medication kicks in. During this time, encouraged to start pendulum circles and wall crawls to gently work on range of motion.    - Plan to start physical therapy in a couple of weeks, then follow up with Dr. Yeo in about 6 weeks.    You may call our direct clinic number ([phone]) at any time with questions or concerns.    Arianna Nettles MD, [facility]    -----    SUBJECTIVE:  Jayne Tyler is a 56 year old female who is seen for US guided left shoulder - glenohumeral joint cortisone injection.    Large Joint Injection/Arthocentesis: L glenohumeral joint    Date/Time: 5/19/2022 8:42 AM  Performed by: Arianna Vaca MD  Authorized by: Arianna Vaca MD     Indications:  Pain and osteoarthritis  Needle Size:  22 G  Guidance: ultrasound    Approach:  Posterior  Location:  Shoulder      Site:  L glenohumeral joint  Medications:  80 mg triamcinolone 40 MG/ML; 5 mL lidocaine 1 %  Medications comment:  8mL lidocaine administered total  Outcome:  Tolerated well, no immediate complications  Procedure discussed: discussed risks, benefits, and alternatives    Consent Given by:  Patient  Timeout: timeout called immediately prior to procedure     Prep: patient was prepped and draped in usual sterile fashion     Ultrasound was used to ensure safe and accurate needle placement and injection. Ultrasound images of the procedure were permanently stored.      Patient rates pain as 5/10 pre-procedure and immediately improving post-procedure.    Arianna Nettles MD, [facility]          Again, thank you for allowing me to participate in the care of your patient.        Sincerely,        Arianna Nettles MD

## 2022-05-28 ENCOUNTER — MYC MEDICAL ADVICE (OUTPATIENT)
Dept: INTERNAL MEDICINE | Facility: CLINIC | Age: 57
End: 2022-05-28
Payer: COMMERCIAL

## 2022-05-28 DIAGNOSIS — F41.9 ANXIETY: ICD-10-CM

## 2022-05-31 RX ORDER — ESCITALOPRAM OXALATE 10 MG/1
TABLET ORAL
Qty: 30 TABLET | Refills: 0 | Status: SHIPPED | OUTPATIENT
Start: 2022-05-31 | End: 2022-06-01

## 2022-06-01 RX ORDER — ESCITALOPRAM OXALATE 10 MG/1
TABLET ORAL
Qty: 30 TABLET | Refills: 3 | Status: SHIPPED | OUTPATIENT
Start: 2022-06-01 | End: 2022-06-14

## 2022-06-01 NOTE — TELEPHONE ENCOUNTER
Insurance will not fill due to previous script stated 1/2 tablet daily for 1 week, then 1 tab daily.    Patient script updated to reflect current dosage of 1 tablet daily.

## 2022-06-14 ENCOUNTER — OFFICE VISIT (OUTPATIENT)
Dept: INTERNAL MEDICINE | Facility: CLINIC | Age: 57
End: 2022-06-14
Payer: COMMERCIAL

## 2022-06-14 VITALS
BODY MASS INDEX: 39.61 KG/M2 | DIASTOLIC BLOOD PRESSURE: 84 MMHG | WEIGHT: 232 LBS | OXYGEN SATURATION: 100 % | SYSTOLIC BLOOD PRESSURE: 138 MMHG | TEMPERATURE: 98.9 F | HEIGHT: 64 IN | HEART RATE: 71 BPM

## 2022-06-14 DIAGNOSIS — E78.5 HYPERLIPIDEMIA LDL GOAL <100: ICD-10-CM

## 2022-06-14 DIAGNOSIS — F41.9 ANXIETY: ICD-10-CM

## 2022-06-14 DIAGNOSIS — Z23 NEED FOR COVID-19 VACCINE: ICD-10-CM

## 2022-06-14 DIAGNOSIS — R53.83 FATIGUE, UNSPECIFIED TYPE: Primary | ICD-10-CM

## 2022-06-14 PROCEDURE — 91305 COVID-19,PF,PFIZER (12+ YRS): CPT | Performed by: NURSE PRACTITIONER

## 2022-06-14 PROCEDURE — 99214 OFFICE O/P EST MOD 30 MIN: CPT | Mod: 25 | Performed by: NURSE PRACTITIONER

## 2022-06-14 PROCEDURE — 0054A COVID-19,PF,PFIZER (12+ YRS): CPT | Performed by: NURSE PRACTITIONER

## 2022-06-14 RX ORDER — ATORVASTATIN CALCIUM 40 MG/1
20 TABLET, FILM COATED ORAL EVERY EVENING
Qty: 90 TABLET | Refills: 3 | Status: SHIPPED | OUTPATIENT
Start: 2022-06-14 | End: 2024-03-15

## 2022-06-14 NOTE — PROGRESS NOTES
Assessment & Plan     Fatigue, unspecified type  - Ongoing, has sleep clinic appt in August    Anxiety  - Noting purplish-red lesions on RUE after starting Lexapro.  Stop Lexapro.  Was previously on Prozac now that she remembers  - Start Prozac 20 mg daily  - Suspect increase in anxiety is r/t recent health issues re: false pos stress test  - Follow up 5 weeks; please let me know of any side effects/concerns  - FLUoxetine (PROZAC) 20 MG capsule  Dispense: 30 capsule; Refill: 1    Hyperlipidemia LDL goal <100  - Reviewed Cardiology note.  Since her lipids are at goal- even before having statin on board (states she did Keto diet after last lipid in 2020), discussed reducing dose to 20 mg/day.   - Endorses some LE leg cramps- discussed that she can hold her statin for a few weeks if she has recurrent leg cramps- and if this is helpful, can consider stopping medicaion  - atorvastatin (LIPITOR) 40 MG tablet  Dispense: 90 tablet; Refill: 3  - ASPIRIN NOT PRESCRIBED (INTENTIONAL)    Need for COVID-19 vaccine  - 4th booster today  - COVID-19,PF,PFIZER (12+ YRS)        See Patient Instructions    Return in about 5 weeks (around 7/19/2022) for Mood check.    ELVIS Pollard CNP  [facility] Godoy is a 57 year old who presents for the following health issues     History of Present Illness       Reason for visit:  Told to follow up in 4-6 weeks after 4/26 appointments    She eats 4 or more servings of fruits and vegetables daily.She consumes 0 sweetened beverage(s) daily.She exercises with enough effort to increase her heart rate 30 to 60 minutes per day.  She exercises with enough effort to increase her heart rate 4 days per week.   She is taking medications regularly.     Mood:  Patient is seen in clinic today for follow up. At our last visit, Lexapro was started due to anxiety.  Patient reports concern re: side effects- has been noting purplish red skin lesions on RUE;  "not painful or itchy.  Reports in the past she was changed to Prozac, and remembered this after starting Lexapro.  Has noted slight improvement in anxiety, but still gets anxious- panics- but can breathe through it; heart pounds hard.  Some headaches.    Continues to feel fatigued.  Has sleep appt in August.  Breathlessness is better.    Abnormal stress test:  This was a false positive.  Had normal coronary angiogram.  Saw Cardiology- stopped ASA, amlodipine.  Given FH felt reasonable to continue atorvastatin.  Has noted some leg cramps, though this is not unusual- was happening before she started statin, but usually occurred in thighs.        Review of Systems   CONSTITUTIONAL:NEGATIVE for fever, chills, change in weight  RESP: Breathlessness improving  CV: NEGATIVE for chest pain, palpitations or peripheral edema  PSYCHIATRIC: Still has fatigue; some anxiety- slightly improved      Objective    /84   Pulse 71   Temp 98.9  F (37.2  C)   Ht 1.626 m (5' 4\")   Wt 105.2 kg (232 lb)   LMP  (LMP Unknown)   SpO2 100%   BMI 39.82 kg/m    Body mass index is 39.82 kg/m .     Physical Exam   GENERAL APPEARANCE: healthy, alert and no distress  EYES: Eyes grossly normal to inspection, PERRL and conjunctivae and sclerae normal  RESP: lungs clear to auscultation - no rales, rhonchi or wheezes  CV: regular rates and rhythm, normal S1 S2, no S3 or S4 and no murmur, click or rub  SKIN: Small reddish-purplish lesions on RUE, x3, flat, nonpainful  PSYCH: mentation appears normal and affect normal/bright            "

## 2022-06-14 NOTE — PATIENT INSTRUCTIONS
-cut back on the atorvastatin to 20 mg daily  -Stop the Lexapro, start fluoxetine (Prozac) 20 mg daily-no need to do a cross taper  -If you continue to get muscle cramps, please hold the atorvastatin for 2 weeks and see how that goes- if that's the culprit, you can stop taking the atorvastatin

## 2022-07-25 ENCOUNTER — THERAPY VISIT (OUTPATIENT)
Dept: PHYSICAL THERAPY | Facility: CLINIC | Age: 57
End: 2022-07-25
Payer: COMMERCIAL

## 2022-07-25 ENCOUNTER — OFFICE VISIT (OUTPATIENT)
Dept: INTERNAL MEDICINE | Facility: CLINIC | Age: 57
End: 2022-07-25
Payer: COMMERCIAL

## 2022-07-25 VITALS
TEMPERATURE: 97.8 F | WEIGHT: 224.3 LBS | SYSTOLIC BLOOD PRESSURE: 118 MMHG | HEART RATE: 78 BPM | BODY MASS INDEX: 38.5 KG/M2 | DIASTOLIC BLOOD PRESSURE: 80 MMHG | OXYGEN SATURATION: 100 %

## 2022-07-25 DIAGNOSIS — M25.512 CHRONIC LEFT SHOULDER PAIN: ICD-10-CM

## 2022-07-25 DIAGNOSIS — G89.29 CHRONIC LEFT SHOULDER PAIN: ICD-10-CM

## 2022-07-25 DIAGNOSIS — R53.83 FATIGUE, UNSPECIFIED TYPE: Primary | ICD-10-CM

## 2022-07-25 DIAGNOSIS — F41.9 ANXIETY: ICD-10-CM

## 2022-07-25 PROCEDURE — 97161 PT EVAL LOW COMPLEX 20 MIN: CPT | Mod: GP | Performed by: PHYSICAL THERAPIST

## 2022-07-25 PROCEDURE — 97110 THERAPEUTIC EXERCISES: CPT | Mod: GP | Performed by: PHYSICAL THERAPIST

## 2022-07-25 PROCEDURE — 99214 OFFICE O/P EST MOD 30 MIN: CPT | Performed by: NURSE PRACTITIONER

## 2022-07-25 RX ORDER — FLUOXETINE 10 MG/1
10 CAPSULE ORAL DAILY
Qty: 90 CAPSULE | Refills: 1 | Status: SHIPPED | OUTPATIENT
Start: 2022-07-25 | End: 2022-12-05

## 2022-07-25 NOTE — PROGRESS NOTES
Assessment & Plan     Anxiety  - Anxiety improved but room for improvement.  Increase fluoxetine to 30 mg/day  - Follow up in 3 months- sooner if needed  - FLUoxetine (PROZAC) 20 MG capsule  Dispense: 90 capsule; Refill: 1  - FLUoxetine (PROZAC) 10 MG capsule  Dispense: 90 capsule; Refill: 1    Fatigue, unspecified type  - Persists; expresses concern re: long haul COVID as a possibility  - Has Sleep appt scheduled  - ?Long narayan LAMBERTID without prior testing.  Referral to PT for evaluation/treatment as appropriate  - Physical Therapy Referral         See Patient Instructions    Return in about 3 months (around 10/25/2022) for Mood check, follow up with Dr. Aquino.    Leonila Stewart, ELVIS [facility] PENNY Godoy is a 57 year old presenting for the following health issues:  No chief complaint on file.      History of Present Illness       Reason for visit:  Follow up to 6/14 appt    She eats 4 or more servings of fruits and vegetables daily.She consumes 0 sweetened beverage(s) daily.She exercises with enough effort to increase her heart rate 30 to 60 minutes per day.  She exercises with enough effort to increase her heart rate 5 days per week.   She is taking medications regularly.     Anxiety:  Patient is seen today for follow-up anxiety.  At her last visit Lexapro was stopped as patient was getting purple marks in her upper extremities.  She was changed to fluoxetine 20 mg daily.  She reports that the purple marks on her arm went away and she is tolerating the fluoxetine without any difficulties.  She does note that her anxiety is improved on fluoxetine.  She denies any side effects from the medication.    Fatigue  Brain Fog:  Main concern continues to be that of fatigue, brain fog.  Reports that symptoms were improving somewhat, however, when she received her last COVID vaccination/booster, her symptoms worsened.  She is concerned that her symptoms of brain fog, fatigue  may be r/t long narayan COVID.  She was never diagnosed with a COVID infection, but endorses being ill in the Fall of 2019 and March 2020 - before testing was made available.  In 2019, she reports having symptoms including runny nose, watery eyes, cough, feeling very unwell.  In March, 2020, she had high fevers, diarrhea, which improved after a couple of days.    She does have a sleep appointment coming up.    She reports that her SOB is slightly better.  Continues to push herself to do activities.  Her weight is down.  She does note that things do not taste as usual; her appetite is down.  She denies ever losing her sense of taste/smell during those times of illness.        Review of Systems   CONSTITUTIONAL:NEGATIVE for fever, chills, change in weight and POSITIVE  for fatigue  RESP:POSITIVE for dyspnea on exertion  CV: NEGATIVE for chest pain, palpitations or peripheral edema  NEURO: NEGATIVE for weakness, dizziness or paresthesias and POSITIVE for brain fog  PSYCHIATRIC: NEGATIVE for changes in mood or affect and Improved mood/anxiety      Objective    /80   Pulse 78   Temp 97.8  F (36.6  C) (Temporal)   Wt 101.7 kg (224 lb 4.8 oz)   LMP  (LMP Unknown)   SpO2 100%   BMI 38.50 kg/m    Body mass index is 38.5 kg/m .     Physical Exam   GENERAL APPEARANCE: healthy, alert and no distress  EYES: Eyes grossly normal to inspection, PERRL and conjunctivae and sclerae normal  RESP: lungs clear to auscultation - no rales, rhonchi or wheezes  CV: regular rates and rhythm, normal S1 S2, no S3 or S4 and no murmur, click or rub  MS: extremities normal- no gross deformities noted  NEURO: Normal strength and tone, mentation intact and speech normal  PSYCH: mentation appears normal and affect normal/bright                .  ..

## 2022-07-25 NOTE — PROGRESS NOTES
Physical Therapy Initial Evaluation  Subjective:  The history is provided by the patient.   Patient Health History  Jayne Tyler being seen for Frozen Shoulder - Left side.     Problem began: 3/9/2017.   Problem occurred: Rotator Cuff Repair   Pain is reported as 5/10 (ranges 1-7/10) on pain scale.  General health as reported by patient is good.  Pertinent medical history includes: chest pain, numbness/tingling, overweight, osteoarthritis, pain at night/rest and smoking.   Red flags:  None as reported by patient.  Medical allergies: none.   Surgeries include:  Orthopedic surgery.    Current medications:  Anti-depressants.    Current occupation is Workforce Management.   Primary job tasks include:  Computer work.                  Therapist Generated HPI Evaluation  Problem details: Had a rotator cuff repair in 2017, never fully recovered. 2019 had an injection that helped temporarily. Recent injection in May 2022 was moderately helpful. The MD told her the pain could be coming from her neck. She complains of loss of motion and pain with reaching on the left. Most of the pain is in the left upper trap and scapula, it can radiate to front of chest. Has a sitting job.         Type of problem:  Left shoulder.    This is a chronic condition.    Where condition occurred: at home.  Patient reports pain:  Anterior, posterior and scapular area.  Pain is described as aching and is constant.  Pain is worse during the day.  Since onset symptoms are unchanged.  Symptoms are exacerbated by using arm overhead, using arm behind back and lying on extremity  and relieved by ice and heat.      Restrictions due to condition include:  Working in normal job without restrictions.  Barriers include:  None as reported by patient.                        Objective:  Standing Alignment:    Cervical/Thoracic:  Forward head (sharp increased CT angle)  Shoulder/UE:  Rounded shoulders                                  Cervical/Thoracic  Evaluation    AROM:  AROM Cervical:    Flexion:            100%  Extension:       90% (deviates to R)  Rotation:         Left: 90% (L UT pain)     Right: 100%  Side Bend:      Left: 75%     Right:  100%                               Shoulder Evaluation:  ROM:  AROM:    Flexion:  Left:  101    Right:  134    Abduction:  Left: 94   Right:  142      External Rotation:  Left:  49    Right:  69            Extension/Internal Rotation:  Left:  T9    Right:  T8    PROM:    Flexion:  Left:  148          Abduction:  Left:  160        Internal Rotation:  Left:  60      External Rotation:  Left:  90                    Pain: ERP with all L shoulder PROM  Endfeel: soft end feel  Strength:    Flexion: Left:5-/5   Pain:    Right: 5/5     Pain:     Abduction:  Left: 5-/5  Pain:    Right: 5/5     Pain:    Internal Rotation:  Left:5-/5     Pain:    Right: 5/5     Pain:  External Rotation:   Left:5-/5     Pain:   Right:5/5     Pain:                                                     General     ROS    Corrected sitting posture with lumbar roll and had decreased pain.  Repeated cervical retraction: had to angle chin down to accommodate sharp CT angle/FH. Initially painful, better with reps, improved motion after.    Assessment/Plan:    Patient is a 57 year old female with left side shoulder complaints.  She has mild cuff weakness on left, painful and limited AROM of left shoulder, mild limitation of left cervical rotation and side bend with pain, and poor posture with FH, rounded shoulders, and sharp increased angle at CT junction. Trial of repeated cervical retraction and posture correction.  Patient has the following significant findings with corresponding treatment plan.                Diagnosis 1:  L shoulder pain  Pain -  manual therapy, self management, education, directional preference exercise and home program  Decreased ROM/flexibility - manual therapy and therapeutic exercise  Decreased strength - therapeutic exercise and  therapeutic activities  Decreased function - therapeutic activities  Impaired posture - neuro re-education    Therapy Evaluation Codes:   Cumulative Therapy Evaluation is: Low complexity.    Previous and current functional limitations:  (See Goal Flow Sheet for this information)    Short term and Long term goals: (See Goal Flow Sheet for this information)     Communication ability:  Patient appears to be able to clearly communicate and understand verbal and written communication and follow directions correctly.  Treatment Explanation - The following has been discussed with the patient:   RX ordered/plan of care  Anticipated outcomes  Possible risks and side effects  This patient would benefit from PT intervention to resume normal activities.   Rehab potential is good.    Frequency:  1 X week, once daily  Duration:  for 8 weeks  Discharge Plan:  Achieve all LTG.  Independent in home treatment program.  Reach maximal therapeutic benefit.    Please refer to the daily flowsheet for treatment today, total treatment time and time spent performing 1:1 timed codes.

## 2022-07-25 NOTE — PATIENT INSTRUCTIONS
-Increase fluoxetine to 30 mg daily (20 mg + 10 mg)  -Sleep eval as scheduled.  If this is unrevealing then would consider referral to Rehab, ?Long haul COVID

## 2022-07-28 ENCOUNTER — HOSPITAL ENCOUNTER (OUTPATIENT)
Dept: PHYSICAL THERAPY | Facility: CLINIC | Age: 57
Setting detail: THERAPIES SERIES
Discharge: HOME | End: 2022-07-28
Attending: NURSE PRACTITIONER
Payer: COMMERCIAL

## 2022-07-28 DIAGNOSIS — R53.83 FATIGUE, UNSPECIFIED TYPE: ICD-10-CM

## 2022-07-28 PROCEDURE — 97112 NEUROMUSCULAR REEDUCATION: CPT | Mod: GP | Performed by: PHYSICAL THERAPIST

## 2022-07-28 PROCEDURE — 97161 PT EVAL LOW COMPLEX 20 MIN: CPT | Mod: GP | Performed by: PHYSICAL THERAPIST

## 2022-07-28 NOTE — PROGRESS NOTES
"   07/28/22 0805   Quick Adds   Quick Adds Vestibular Eval   Type of Visit Initial OP PT Evaluation   General Information   Start of Care Date 07/28/22   Referring Physician Leonila Stewart, ELVIS CNP   Orders Evaluate and Treat as Indicated   Order Date 07/25/22   Medical Diagnosis Fatigue, unspecified type (R53.83)   Onset of illness/injury or Date of Surgery 01/31/22   Surgical/Medical history reviewed Yes  (fatigue - ?Possibly had COVID in 2019 or 2020- ongoing fatigue with neg work up)   Pertinent history of current vestibular problem (include personal factors and/or comorbidities that impact the POC)  Anxiety;Motion sickness;Prior concussion(s)   Pertinent history of current problem (include personal factors and/or comorbidities that impact the POC) Jayne is a 58 y/o female presenting to PT for evaluation of fatigue and dizziness. She reports she woke one morning in late January with extreme fatigue, feeling foggy. She couldnt think straight or concentrate and it hasnt gotten better. She had a medication enduced cardiac stress test and reports the following day had a heart attack. She had an angiogram which reportedly didnt show any problems, occlusions. She never had any covid symptoms but reports prior to this day in late january, her boyfriend had covid. In retrospect, she wonders if she had it too. She was not tested. She finally started to feel a little better by this summer and then on June 14 had a second covid booster shot. A week later she reports she was \"back to square one.\" She feels fatigued, foggy headed, dizzy (lightheaded) and has trouble with work finding. Dizziness is triggered with fast head movements. She reports using her brain is more tiring than physical activity. When she drives to visit her mother in [place] it takes so much concentration that she has to take a 2-3 hour nap afterward. She denies hearing or vision changes. She feels mildly off balance when she is dizzy. She has been " "having more frequent \"bad\" HA, about 2-3 in the past 6 months. She also states nothing tastes good anymore.   Pertinent Visual History  no vision changes   Prior level of function comment independent in all mobility and self cares   Current Community Support Family/friend caregiver   Patient role/Employment history Employed  (work force analyst. 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 driving is better than be a passenger   Current Assistive Devices   (none)   ADL Devices   (none)   Patient/Family Goals Statement resolve dizziness, increased activity tolerance   Fall Risk Screen   Fall screen completed by PT   Have you fallen 2 or more times in the past year? No   Have you fallen and had an injury in the past year? No   Is patient a fall risk? No   Abuse Screen (yes response referral indicated)   Feels Unsafe at Home or Work/School no   Feels Threatened by Someone no   Does Anyone Try to Keep You From Having Contact with Others or Doing Things Outside Your Home? no   Physical Signs of Abuse Present no   Pain   Patient currently in pain Yes   Pain location L shoulder   Pain rating 1-2/10   Pain comments currently being treated in PT for L neck/shoulder pain   Cognitive Status Examination   Orientation orientation to person, place and time   Level of Consciousness alert   Follows Commands and Answers Questions 100% of the time   Personal Safety and Judgment intact   Posture   Posture Forward head position   Bed Mobility   Bed Mobility Comments independent   Transfer Skills   Transfer Comments independent   Gait Special Tests   Gait Special Tests DYNAMIC GAIT INDEX   Gait Special Tests Dynamic Gait Index   Comments 11/12 on 4 item DGI, mild instability with horiz head turns   Balance Special Tests   Balance Special Tests Modified CTSIB Conditions "   Balance Special Tests Modified CTSIB Conditions   Condition 1, seconds 30 Seconds   Condition 2, seconds 30 Seconds   Condition 4, seconds 30 Seconds   Condition 5, seconds 30 Seconds   Oculomotor Exam   Smooth Pursuit Normal   Saccades Normal   VOR Normal   VOR Comments WNL slow VOR   Rapid Head Thrust Corrective Saccade L head thrust   Infrared Goggle Exam or Frenzel Lense Exam   Vestibular Suppressant in Last 24 Hours? No   Exam completed with Infrared Goggles   Spontaneous Nystagmus Negative   Gaze Evoked Nystagmus Negative   Head Shake Horizontal Nystagmus Negative   [place]-Hallpike (right) Negative   [place]-Hallpike (Left) Negative   HSCC Supine Roll Test (Right) Negative   HSCC Supine Roll Test (Left) Negative   Dynamic Visual Acuity (DVA)   Static Acuity (LogMar) 20/16   Horizontal Head Movement at 2 Hz (LogMar) 20/40   DVA Comments abnormal   Planned Therapy Interventions   Planned Therapy Interventions neuromuscular re-education;other (see comments)   Planned Therapy Interventions Comment vestibular rehab   Clinical Impression   Criteria for Skilled Therapeutic Interventions Met yes, treatment indicated   PT Diagnosis dizziness, reduced activity tolerance   Influenced by the following impairments impaired DVA, dizziness and reduced gait stability with head turns   Functional limitations due to impairments walking, driving, passenger on motorcycle, head movements during daily activities   Clinical Presentation Stable/Uncomplicated   Clinical Decision Making (Complexity) Low complexity   Therapy Frequency 1 time/week   Predicted Duration of Therapy Intervention (days/wks) 4 weeks   Risk & Benefits of therapy have been explained Yes   Patient, Family & other staff in agreement with plan of care Yes   Clinical Impression Comments pt may benefit from OT evaluation and treatment for cognitive complaints, ie brain fog, reduced concentration   Education Assessment   Barriers to Learning No barriers   GOALS   PT Eval  Goals 1;2;3   Goal 1   Goal Identifier DVA   Goal Description Jayne will have normal DVA, no greater than 2 line loss from static, at 2 hz head movement to walk and talk through busy visual environments without dizziness.   Target Date 08/25/22   Goal 2   Goal Identifier head turns   Goal Description Jayne will be able to look around, moving head in all directions, while riding on her SO motorcycle without the onset of dizziness for improved tolerance of travel.   Target Date 08/25/22   Goal 3   Goal Identifier gait stability   Goal Description Jayne will be able to walk while performing head turns and nods without sense of dizziness or signs of instability for safety with community ambulation.   Target Date 08/25/22   Total Evaluation Time   PT Carmen, Low Complexity Minutes ([id number]) 42

## 2022-08-01 ENCOUNTER — THERAPY VISIT (OUTPATIENT)
Dept: PHYSICAL THERAPY | Facility: CLINIC | Age: 57
End: 2022-08-01
Payer: COMMERCIAL

## 2022-08-01 DIAGNOSIS — G89.29 CHRONIC LEFT SHOULDER PAIN: Primary | ICD-10-CM

## 2022-08-01 DIAGNOSIS — M25.512 CHRONIC LEFT SHOULDER PAIN: Primary | ICD-10-CM

## 2022-08-01 PROCEDURE — 97110 THERAPEUTIC EXERCISES: CPT | Mod: GP | Performed by: PHYSICAL THERAPIST

## 2022-08-01 PROCEDURE — 97112 NEUROMUSCULAR REEDUCATION: CPT | Mod: GP | Performed by: PHYSICAL THERAPIST

## 2022-08-03 ENCOUNTER — HOSPITAL ENCOUNTER (OUTPATIENT)
Dept: PHYSICAL THERAPY | Facility: CLINIC | Age: 57
Setting detail: THERAPIES SERIES
Discharge: HOME | End: 2022-08-03
Attending: NURSE PRACTITIONER
Payer: COMMERCIAL

## 2022-08-03 PROCEDURE — 97112 NEUROMUSCULAR REEDUCATION: CPT | Mod: GP | Performed by: PHYSICAL THERAPIST

## 2022-08-08 ENCOUNTER — THERAPY VISIT (OUTPATIENT)
Dept: PHYSICAL THERAPY | Facility: CLINIC | Age: 57
End: 2022-08-08
Payer: COMMERCIAL

## 2022-08-08 DIAGNOSIS — M25.512 CHRONIC LEFT SHOULDER PAIN: Primary | ICD-10-CM

## 2022-08-08 DIAGNOSIS — G89.29 CHRONIC LEFT SHOULDER PAIN: Primary | ICD-10-CM

## 2022-08-08 PROCEDURE — 97110 THERAPEUTIC EXERCISES: CPT | Mod: GP | Performed by: PHYSICAL THERAPIST

## 2022-08-10 ASSESSMENT — SLEEP AND FATIGUE QUESTIONNAIRES
HOW LIKELY ARE YOU TO NOD OFF OR FALL ASLEEP WHILE SITTING INACTIVE IN A PUBLIC PLACE: WOULD NEVER DOZE
HOW LIKELY ARE YOU TO NOD OFF OR FALL ASLEEP WHILE SITTING QUIETLY AFTER LUNCH WITHOUT ALCOHOL: WOULD NEVER DOZE
HOW LIKELY ARE YOU TO NOD OFF OR FALL ASLEEP IN A CAR, WHILE STOPPED FOR A FEW MINUTES IN TRAFFIC: WOULD NEVER DOZE
HOW LIKELY ARE YOU TO NOD OFF OR FALL ASLEEP WHEN YOU ARE A PASSENGER IN A CAR FOR AN HOUR WITHOUT A BREAK: WOULD NEVER DOZE
HOW LIKELY ARE YOU TO NOD OFF OR FALL ASLEEP WHILE SITTING AND TALKING TO SOMEONE: WOULD NEVER DOZE
HOW LIKELY ARE YOU TO NOD OFF OR FALL ASLEEP WHILE WATCHING TV: SLIGHT CHANCE OF DOZING
HOW LIKELY ARE YOU TO NOD OFF OR FALL ASLEEP WHILE LYING DOWN TO REST IN THE AFTERNOON WHEN CIRCUMSTANCES PERMIT: SLIGHT CHANCE OF DOZING
HOW LIKELY ARE YOU TO NOD OFF OR FALL ASLEEP WHILE SITTING AND READING: WOULD NEVER DOZE

## 2022-08-11 ENCOUNTER — HOSPITAL ENCOUNTER (OUTPATIENT)
Dept: PHYSICAL THERAPY | Facility: CLINIC | Age: 57
Setting detail: THERAPIES SERIES
Discharge: HOME | End: 2022-08-11
Attending: NURSE PRACTITIONER
Payer: COMMERCIAL

## 2022-08-11 PROCEDURE — 97112 NEUROMUSCULAR REEDUCATION: CPT | Mod: GP | Performed by: PHYSICAL THERAPIST

## 2022-08-15 ENCOUNTER — OFFICE VISIT (OUTPATIENT)
Dept: SLEEP MEDICINE | Facility: CLINIC | Age: 57
End: 2022-08-15
Attending: NURSE PRACTITIONER
Payer: COMMERCIAL

## 2022-08-15 VITALS
BODY MASS INDEX: 39.44 KG/M2 | OXYGEN SATURATION: 94 % | DIASTOLIC BLOOD PRESSURE: 82 MMHG | RESPIRATION RATE: 16 BRPM | WEIGHT: 231 LBS | SYSTOLIC BLOOD PRESSURE: 121 MMHG | HEIGHT: 64 IN

## 2022-08-15 DIAGNOSIS — R53.83 FATIGUE, UNSPECIFIED TYPE: Primary | ICD-10-CM

## 2022-08-15 DIAGNOSIS — E66.01 MORBID OBESITY (H): ICD-10-CM

## 2022-08-15 DIAGNOSIS — G47.9 SLEEP DISTURBANCE: ICD-10-CM

## 2022-08-15 PROCEDURE — 99204 OFFICE O/P NEW MOD 45 MIN: CPT | Performed by: INTERNAL MEDICINE

## 2022-08-15 NOTE — PATIENT INSTRUCTIONS
"      MY TREATMENT INFORMATION FOR SLEEP APNEA-  Jayne Tyler    DOCTOR : Benjamín Hendrix MD    Am I having a home sleep study?  --->Watch the video for the device you are using:    -/drop off device-   https://www.Tresorit.com/watch?v=yGGFBdELGhk    -Disposable device sent out require phone/computer application-   https://www.D[employer] Security[employer].com/watch?v=BCce_vbiwxE      Frequently asked questions:  1. What is Obstructive Sleep Apnea (ZHANE)? ZHANE is the most common type of sleep apnea. Apnea means, \"without breath.\"  Apnea is most often caused by narrowing or collapse of the upper airway as muscles relax during sleep.   Almost everyone has occasional apneas. Most people with sleep apnea have had brief interruptions at night frequently for many years.  The severity of sleep apnea is related to how frequent and severe the events are.   2. What are the consequences of ZHANE? Symptoms include: feeling sleepy during the day, snoring loudly, gasping or stopping of breathing, trouble sleeping, and occasionally morning headaches or heartburn at night.  Sleepiness can be serious and even increase the risk of falling asleep while driving. Other health consequences may include development of high blood pressure and other cardiovascular disease in persons who are susceptible. Untreated ZHANE  can contribute to heart disease, stroke and diabetes.   3. What are the treatment options? In most situations, sleep apnea is a lifelong disease that must be managed with daily therapy. Medications are not effective for sleep apnea and surgery is generally not considered until other therapies have been tried. Your treatment is your choice . Continuous Positive Airway (CPAP) works right away and is the therapy that is effective in nearly everyone. An oral device to hold your jaw forward is usually the next most reliable option. Other options include postioning devices (to keep you off your back), weight loss, and surgery " including a tongue pacing device. There is more detail about some of these options below.  4. Are my sleep studies covered by insurance? Although we will request verification of coverage, we advise you also check in advance of the study to ensure there is coverage.    Important tips for those choosing CPAP and similar devices   Know your equipment:  CPAP is continuous positive airway pressure that prevents obstructive sleep apnea by keeping the throat from collapsing while you are sleeping. In most cases, the device is  smart  and can slowly self-adjusts if your throat collapses and keeps a record every day of how well you are treated-this information is available to you and your care team.  BPAP is bilevel positive airway pressure that keeps your throat open and also assists each breath with a pressure boost to maintain adequate breathing.  Special kinds of BPAP are used in patients who have inadequate breathing from lung or heart disease. In most cases, the device is  smart  and can slowly self-adjusts to assist breathing. Like CPAP, the device keeps a record of how well you are treated.  Your mask is your connection to the device. You get to choose what feels most comfortable and the staff will help to make sure if fits. Here: are some examples of the different masks that are available:       Key points to remember on your journey with sleep apnea:  Sleep study.  PAP devices often need to be adjusted during a sleep study to show that they are effective and adjusted right.  Good tips to remember: Try wearing just the mask during a quiet time during the day so your body adapts to wearing it. A humidifier is recommended for comfort in most cases to prevent drying of your nose and throat. Allergy medication from your provider may help you if you are having nasal congestion.  Getting settled-in. It takes more than one night for most of us to get used to wearing a mask. Try wearing just the mask during a quiet time  during the day so your body adapts to wearing it. A humidifier is recommended for comfort in most cases. Our team will work with you carefully on the first day and will be in contact within 4 days and again at 2 and 4 weeks for advice and remote device adjustments. Your therapy is evaluated by the device each day.   Use it every night. The more you are able to sleep naturally for 7-8 hours, the more likely you will have good sleep and to prevent health risks or symptoms from sleep apnea. Even if you use it 4 hours it helps. Occasionally all of us are unable to use a medical therapy, in sleep apnea, it is not dangerous to miss one night.   Communicate. Call our skilled team on the number provided on the first day if your visit for problems that make it difficult to wear the device. Over 2 out of 3 patients can learn to wear the device long-term with help from our team. Remember to call our team or your sleep providers if you are unable to wear the device as we may have other solutions for those who cannot adapt to mask CPAP therapy. It is recommended that you sleep your sleep provider within the first 3 months and yearly after that if you are not having problems.   Use it for your health. We encourage use of CPAP masks during daytime quiet periods to allow your face and brain to adapt to the sensation of CPAP so that it will be a more natural sensation to awaken to at night or during naps. This can be very useful during the first few weeks or months of adapting to CPAP though it does not help medically to wear CPAP during wakefulness and  should not be used as a strategy just to meet guidelines.  Take care of your equipment. Make sure you clean your mask and tubing using directions every day and that your filter and mask are replaced as recommended or if they are not working.     BESIDES CPAP, WHAT OTHER THERAPIES ARE THERE?    Positioning Device  Positioning devices are generally used when sleep apnea is mild and  only occurs on your back.This example shows a pillow that straps around the waist. It may be appropriate for those whose sleep study shows milder sleep apnea that occurs primarily when lying flat on one's back. Preliminary studies have shown benefit but effectiveness at home may need to be verified by a home sleep test. These devices are generally not covered by medical insurance.  Examples of devices that maintain sleeping on the back to prevent snoring and mild sleep apnea.    Belt type body positioner  http://Mashed jobs.Sparktrend/    Electronic reminder  http://nightshifttherapy.com/  http://www.[employer].Sparktrend.au/      Oral Appliance  What is oral appliance therapy?  An oral appliance device fits on your teeth at night like a retainer used after having braces. The device is made by a specialized dentist and requires several visits over 1-2 months before a manufactured device is made to fit your teeth and is adjusted to prevent your sleep apnea. Once an oral device is working properly, snoring should be improved. A home sleep test may be recommended at that time if to determine whether the sleep apnea is adequately treated.       Some things to remember:  -Oral devices are often, but not always, covered by your medical insurance. Be sure to check with your insurance provider.   -If you are referred for oral therapy, you will be given a list of specialized dentists to consider or you may choose to visit the Web site of the American Academy of Dental Sleep Medicine  -Oral devices are less likely to work if you have severe sleep apnea or are extremely overweight.     More detailed information  An oral appliance is a small acrylic device that fits over the upper and lower teeth  (similar to a retainer or a mouth guard). This device slightly moves jaw forward, which moves the base of the tongue forward, opens the airway, improves breathing for effective treat snoring and obstructive sleep apnea in perhaps 7 out of 10 people .   The best working devices are custom-made by a dental device  after a mold is made of the teeth 1, 2, 3.  When is an oral appliance indicated?  Oral appliance therapy is recommended as a first-line treatment for patients with primary snoring, mild sleep apnea, and for patients with moderate sleep apnea who prefer appliance therapy to use of CPAP4, 5. Severity of sleep apnea is determined by sleep testing and is based on the number of respiratory events per hour of sleep.   How successful is oral appliance therapy?  The success rate of oral appliance therapy in patients with mild sleep apnea is 75-80% while in patients with moderate sleep apnea it is 50-70%. The chance of success in patients with severe sleep apnea is 40-50%. The research also shows that oral appliances have a beneficial effect on the cardiovascular health of ZHANE patients at the same magnitude as CPAP therapy7.  Oral appliances should be a second-line treatment in cases of severe sleep apnea, but if not completely successful then a combination therapy utilizing CPAP plus oral appliance therapy may be effective. Oral appliances tend to be effective in a broad range of patients although studies show that the patients who have the highest success are females, younger patients, those with milder disease, and less severe obesity. 3, 6.   Finding a dentist that practices dental sleep medicine  Specific training is available through the American Academy of Dental Sleep Medicine for dentists interested in working in the field of sleep. To find a dentist who is educated in the field of sleep and the use of oral appliances, near you, visit the Web site of the American Academy of Dental Sleep Medicine.    References  1. Claudia et al. Objectively measured vs self-reported compliance during oral appliance therapy for sleep-disordered breathing. Chest 2013; 144(5): 9077-8141.  2. Billy et al. Objective measurement of compliance during oral  appliance therapy for sleep-disordered breathing. Thorax 2013; 68(1): 91-96.  3. Delvis, et al. Mandibular advancement devices in 620 men and women with ZHANE and snoring: tolerability and predictors of treatment success. Chest 2004; 125: 9872-8009.  4. Josie et al. Oral appliances for snoring and ZHANE: a review. Sleep 2006; 29: 244-262.  5. Damir et al. Oral appliance treatment for ZHANE: an update. J Clin Sleep Med 2014; 10(2): 215-227.  6. Muriel et al. Predictors of OSAH treatment outcome. J Dent Res 2007; 86: 1322-4470.      Weight Loss:    Weight loss is a long-term strategy that may improve sleep apnea in some patients.    Weight management is a personal decision and the decision should be based on your interest and the potential benefits.  If you are interested in exploring weight loss strategies, the following discussion covers the impact on weight loss on sleep apnea and the approaches that may be successful.    Being overweight does not necessarily mean you will have health consequences.  Those who have BMI over 35 or over 27 with existing medical conditions carries greater risk.   Weight loss decreases severity of sleep apnea in most people with obesity. For those with mild obesity who have developed snoring with weight gain, even 15-30 pound weight loss can improve and occasionally eliminate sleep apnea.  Structured and life-long dietary and health habits are necessary to lose weight and keep healthier weight levels.     Though there may be significant health benefits from weight loss, long-term weight loss is very difficult to achieve- studies show success with dietary management in less than 10% of people. In addition, substantial weight loss may require years of dietary control and may be difficult if patients have severe obesity. In these cases, surgical management may be considered.  Finally, older individuals who have tolerated obesity without health complications may be less likely to  benefit from weight loss strategies.        Surgery:    Surgery for obstructive sleep apnea is considered generally only when other therapies fail to work. Surgery may be discussed with you if you are having a difficult time tolerating CPAP and or when there is an abnormal structure that requires surgical correction.  Nose and throat surgeries often enlarge the airway to prevent collapse.  Most of these surgeries create pain for 1-2 weeks and up to half of the most common surgeries are not effective throughout life.  You should carefully discuss the benefits and drawbacks to surgery with your sleep provider and surgeon to determine if it is the best solution for you.   More information  Surgery for ZHANE is directed at areas that are responsible for narrowing or complete obstruction of the airway during sleep.  There are a wide range of procedures available to enlarge and/or stabilize the airway to prevent blockage of breathing in the three major areas where it can occur: the palate, tongue, and nasal regions.  Successful surgical treatment depends on the accurate identification of the factors responsible for obstructive sleep apnea in each person.  A personalized approach is required because there is no single treatment that works well for everyone.  Because of anatomic variation, consultation with an examination by a sleep surgeon is a critical first step in determining what surgical options are best for each patient.  In some cases, examination during sedation may be recommended in order to guide the selection of procedures.  Patients will be counseled about risks and benefits as well as the typical recovery course after surgery. Surgery is typically not a cure for a person s ZHANE.  However, surgery will often significantly improve one s ZHANE severity (termed  success rate ).  Even in the absence of a cure, surgery will decrease the cardiovascular risk associated with OSA7; improve overall quality of life8  (sleepiness, functionality, sleep quality, etc).      Palate Procedures:  Patients with ZHANE often have narrowing of their airway in the region of their tonsils and uvula.  The goals of palate procedures are to widen the airway in this region as well as to help the tissues resist collapse.  Modern palate procedure techniques focus on tissue conservation and soft tissue rearrangement, rather than tissue removal.  Often the uvula is preserved in this procedure. Residual sleep apnea is common in patient after pharyngoplasty with an average reduction in sleep apnea events of 33%2.      Tongue Procedures:  ExamWhile patients are awake, the muscles that surround the throat are active and keep this region open for breathing. These muscles relax during sleep, allowing the tongue and other structures to collapse and block breathing.  There are several different tongue procedures available.  Selection of a tongue base procedure depends on characteristics seen on physical exam.  Generally, procedures are aimed at removing bulky tissues in this area or preventing the back of the tongue from falling back during sleep.  Success rates for tongue surgery range from 50-62%3.    Hypoglossal Nerve Stimulation:  Hypoglossal nerve stimulation has recently received approval from the [place] Food and Drug Administration for the treatment of obstructive sleep apnea.  This is based on research showing that the system was safe and effective in treating sleep apnea6.  Results showed that the median AHI score decreased 68%, from 29.3 to 9.0. This therapy uses an implant system that senses breathing patterns and delivers mild stimulation to airway muscles, which keeps the airway open during sleep.  The system consists of three fully implanted components: a small generator (similar in size to a pacemaker), a breathing sensor, and a stimulation lead.  Using a small handheld remote, a patient turns the therapy on before bed and off upon  awakening.    Candidates for this device must be greater than 22 years of age, have moderate to severe ZHANE (AHI between 20-65), BMI less than 32, have tried CPAP/oral appliance without success, and have appropriate upper airway anatomy (determined by a sleep endoscopy performed by Dr. Max).    Hypoglossal Nerve Stimulation Pathway:    The sleep surgeon s office will work with the patient through the insurance prior-authorization process (including communications and appeals).    Nasal Procedures:  Nasal obstruction can interfere with nasal breathing during the day and night.  Studies have shown that relief of nasal obstruction can improve the ability of some patients to tolerate positive airway pressure therapy for obstructive sleep apnea1.  Treatment options include medications such as nasal saline, topical corticosteroid and antihistamine sprays, and oral medications such as antihistamines or decongestants. Non-surgical treatments can include external nasal dilators for selected patients. If these are not successful by themselves, surgery can improve the nasal airway either alone or in combination with these other options.      Combination Procedures:  Combination of surgical procedures and other treatments may be recommended, particularly if patients have more than one area of narrowing or persistent positional disease.  The success rate of combination surgery ranges from 66-80%2,3.    References  Pawel MCDONALD. The Role of the Nose in Snoring and Obstructive Sleep Apnoea: An Update.  Eur Arch Otorhinolaryngol. 2011; 268: 1365-73.   Blanca SM; David JA; J Carlos JR; Pallanch JF; Cesar MB; Sergio SG; Megan BAZAN. Surgical modifications of the upper airway for obstructive sleep apnea in adults: a systematic review and meta-analysis. SLEEP 2010;33(10):4840-2334. Jefferson YUSUF. Hypopharyngeal surgery in obstructive sleep apnea: an evidence-based medicine review.  Arch Otolaryngol Head Neck Surg. 2006  Feb;132(2):206-13.  Mike YH1, Lisy Y, Wood JENA. The efficacy of anatomically based multilevel surgery for obstructive sleep apnea. Otolaryngol Head Neck Surg. 2003 Oct;129(4):327-35.  Jefferson YUSUF, Goldberg A. Hypopharyngeal Surgery in Obstructive Sleep Apnea: An Evidence-Based Medicine Review. Arch Otolaryngol Head Neck Surg. 2006 Feb;132(2):206-13.  Ree STEINER et al. Upper-Airway Stimulation for Obstructive Sleep Apnea.  N Engl J Med. 2014 Jan 9;370(2):139-49.  Penakul Y et al. Increased Incidence of Cardiovascular Disease in Middle-aged Men with Obstructive Sleep Apnea. Am J Respir Crit Care Med; 2002 166: 159-165  Askewmckenzie GAMEZ et al. Studying Life Effects and Effectiveness of Palatopharyngoplasty (SLEEP) study: Subjective Outcomes of Isolated Uvulopalatopharyngoplasty. Otolaryngol Head Neck Surg. 2011; 144: 623-631.        WHAT IF I ONLY HAVE SNORING?    Mandibular advancement devices, lateral sleep positioning, long-term weight loss and treatment of nasal allergies have been shown to improve snoring.  Exercising tongue muscles with a game (https://www.ncbi.nlm.nih.gov/pubmed/09068091) or stimulating the tongue during the day with a device (https://doi.org/10.3390/bbc15981489) have improved snoring in some individuals.    Remember to Drive Safe... Drive Alive     Sleep health profoundly affects your health, mood, and your safety.  Thirty three percent of the population (one in three of us) is not getting enough sleep and many have a sleep disorder. Not getting enough sleep or having an untreated / undertreated sleep condition may make us sleepy without even knowing it. In fact, our driving could be dramatically impaired due to our sleep health. As your provider, here are some things I would like you to know about driving:     Here are some warning signs for impairment and dangerous drowsy driving:              -Having been awake more than 16 hours               -Looking tired               -Eyelid drooping               -Head nodding (it could be too late at this point)              -Driving for more than 30 minutes     Some things you could do to make the driving safer if you are experiencing some drowsiness:              -Stop driving and rest              -Call for transportation              -Make sure your sleep disorder is adequately treated     Some things that have been shown NOT to work when experiencing drowsiness while driving:              -Turning on the radio              -Opening windows              -Eating any  distracting  /  entertaining  foods (e.g., sunflower seeds, candy, or any other)              -Talking on the phone      Your decision may not only impact your life, but also the life of others. Please, remember to drive safe for yourself and all of us.                 Your BMI is Body mass index is 39.65 kg/m .    What is BMI?  Body mass index (BMI) is one way to tell whether you are at a healthy weight, overweight, or obese. It measures your weight in relation to your height.  A BMI of 18.5 to 24.9 is in the healthy range. A person with a BMI of 25 to 29.9 is considered overweight, and someone with a BMI of 30 or greater is considered obese.  Another way to find out if you are at risk for health problems caused by overweight and obesity is to measure your waist. If you are a woman and your waist is more than 35 inches, or if you are a man and your waist is more than 40 inches, your risk of disease may be higher.  More than two-thirds of American adults are considered overweight or obese. Being overweight or obese increases the risk for further weight gain.  Excess weight may lead to heart disease and diabetes. Creating and following plans for healthy eating and physical activity may help you improve your health.    Methods for maintaining or losing weight.  Weight control is part of healthy lifestyle and includes exercise, emotional health, and healthy eating habits.  Careful eating habits lifelong is the  mainstay of weight control.  Though there are significant health benefits from weight loss, long-term weight loss with diet alone may be very difficult to achieve- studies show long-term success with dietary management in less than 10% of people. Attaining a healthy weight may be especially difficult to achieve in those with severe obesity. In some cases, medications, devices and surgical management might be considered.    What can you do?  If you are overweight or obese and are interested in methods for weight loss, you should discuss this with your provider. In addition, we recommend that you review healthy life styles and methods for weight loss available through the National Institutes of Health patient information sites:   http://win.niddk.nih.gov/publications/index.htm

## 2022-08-15 NOTE — PROGRESS NOTES
Outpatient Sleep Medicine Consultation:      Name: Jayne Tyler MRN# [id number]   Age: 57 year old YOB: 1965     Date of Consultation: August 15, 2022  Consultation is requested by: ELVIS Pollard CNP  [address]  [place] [address] Leonila Stewart  Primary care provider: Loli Aquino       Reason for Sleep Consult:     Jayne Tyler is sent by Leonila Stewart for a sleep consultation regarding obtaining evaluation for possible sleep apnea  Patient s Reason for visit  Jayne Tyler main reason for visit: Doctor recommended to determine if issues are related to apnea  Patient states problem(s) started: The problem with fatigue, brain fog started January 2022  Jayne Tyler's goals for this visit: Determine if i have apnea.           Assessment and Plan:     Nonrestorative sleep and fatigue in the setting of morbid obesity, hypercholesterolemia, acid reflux disease, history of non-ST elevated MI April 2022, chronic left shoulder pain, postmenopausal status(status post hysterectomy).  Possible obstructive sleep apnea  The diagnosis of ZHANE is suggested based on the symptoms, postmenopausal status and the body habitus.  STOP-BANG score is 3 out of 8.  HST and PSG were reviewed. Recommended obtaining in lab sleep study due to low probability based on the STOP-BANG score to evaluate for ZHANE.   Patient interested in obtaining HST.  Orders generated in epic for HST.  If HST is negative for ZHANE, we will consider obtaining in lab sleep study and patient was agreeable with the plan  Obstructive  sleep apnea and consequences of untreated sleep apnea were reviewed.  Information provided regarding treatment options for ZHANE as after visit summary.    Obesity: We discussed weight management with diet and exercise    Encouraged to follow good sleep hygiene/behavioral techniques including avoiding activities such as using electronics in bed.    Patient was strongly advised to  "avoid driving, operating any heavy machinery or other hazardous situations while drowsy or sleepy.  Patient was counseled on the importance of driving while alert, to pull over if drowsy, or nap before getting into the vehicle if sleepy.     Patient is a  former smoker and has been encouraged to continue to not smoke.    Plan is to communicate results of sleep study in 10-14 days via [employer].     Summary Counseling:    Sleep Testing Reviewed  Obstructive Sleep Apnea Reviewed  Complications of Untreated Sleep Apnea Reviewed    Medical Decision-making:   Educational materials provided in instruction.    CC: Leonila Stewart    The above note was dictated using voice recognition software. Although reviewed after completion, some word and grammatical error may remain . Please contact the author for any clarifications.    \"I spent a total of 45 minutes  face to face with Jayne Tyler during today's office visit.  Most of this time was spent counseling the patient and  coordinating care regarding  ZHANE, HST/PSG, consequences of untreated sleep apnea , sleep hygiene, weight management  and chart review., including documentation and further activities as noted above.\"      Benjamín Hendrix MD  [facility] [address] [address] [phone]  Dept: [phone]         History of Present Illness:     Past Sleep Evaluations: None      SLEEP-WAKE SCHEDULE:   Work/School Days: Patient goes to school/work: Yes(5 days/week. 930am-6pm; switched last Monday noon-830pm)  Usually gets into bed at 10:00/10:30pm  Takes patient about 20 minutes to fall asleep  Has trouble falling asleep 0 nights per week  Wakes up in the middle of the night 3-4 times.  Wakes up due to Pain, External stimuli (bed partner, pets, noise, etc), Use the bathroom  She has trouble falling back asleep 1-2 times a week.   It usually takes 20-30 minutes at worst to get back to " sleep  Patient is usually up at 7:00  Uses alarm: No    Weekends/Non-work Days/All Other Days:  Usually gets into bed at 10:00/10:30   Takes patient about 20 minutes to fall asleep  Patient is usually up at 7:00  Uses alarm: No    Sleep Need  Patient gets  7 hours sleep on average   Patient thinks she needs about 7-8 hours sleep     She reports non restorative sleep, daily fatigue and occasional day time sleepiness     Jayne Tyler prefers to sleep in this position(s): Side   Patient states they do the following activities in bed: Use phone, computer, or tablet    Naps  Patient takes a purposeful nap 0 times a week and naps are usually N/A in duration  She feels better after a nap: No  She dozes off unintentionally Very rare days per week  Patient has had a driving accident or near-miss due to sleepiness/drowsiness: No  She denies drowsiness while driving but finds it hard to concentrate while driving.      SLEEP DISRUPTIONS:    Breathing/Snoring  Patient snores:No  Other people complain about her snoring: No  Patient has been told she stops breathing in her sleep: No  She has issues with the following: Stuffy nose when you wake up, Getting up to urinate more than once    Movement:  Denies symptoms of RLS.       Behaviors in Sleep:  She denies sleepwalking, sleep eating, sleep talking or dream enactment behavior  She has experienced sudden muscle weakness during the day: No      CAFFEINE AND OTHER SUBSTANCES:    Patient consumes caffeinated beverages per day:  2  Last caffeine use is usually: 11:00 am  List of any prescribed or over the counter stimulants that patient takes:No    List of any prescribed or over the counter sleep medication patient takes:  No  List of previous sleep medications that patient has tried: No  Patient drinks alcohol to help them sleep: No  Patient drinks alcohol near bedtime: No    Family History:  Patient has a family member been diagnosed with a sleep disorder: No         SCALES:    EPWORTH SLEEPINESS SCALE      Lawai Sleepiness Scale ( KHANH Salter  [id number]<br>ESS - [place]/English - Final version - 21 Nov 07 - [facility] [employer].) 8/10/2022   Sitting and reading Would never doze   Watching TV Slight chance of dozing   Sitting, inactive in a public place (e.g. a theatre or a meeting) Would never doze   As a passenger in a car for an hour without a break Would never doze   Lying down to rest in the afternoon when circumstances permit Slight chance of dozing   Sitting and talking to someone Would never doze   Sitting quietly after a lunch without alcohol Would never doze   In a car, while stopped for a few minutes in traffic Would never doze   Lawai Score (MC) 2   Lawai Score (Sleep) 2         INSOMNIA SEVERITY INDEX (JEFFREY)      Insomnia Severity Index (JEFFREY) 8/10/2022   Difficulty falling asleep 1   Difficulty staying asleep 1   Problems waking up too early 1   How SATISFIED/DISSATISFIED are you with your CURRENT sleep pattern? 2   How NOTICEABLE to others do you think your sleep problem is in terms of impairing the quality of your life? 1   How WORRIED/DISTRESSED are you about your current sleep problem? 1   To what extent do you consider your sleep problem to INTERFERE with your daily functioning (e.g. daytime fatigue, mood, ability to function at work/daily chores, concentration, memory, mood, etc.) CURRENTLY? 1   JEFFREY Total Score 8       Guidelines for Scoring/Interpretation:  Total score categories:  0-7 = No clinically significant insomnia   8-14 = Subthreshold insomnia   15-21 = Clinical insomnia (moderate severity)  22-28 = Clinical insomnia (severe)  Used via courtesy of www.3DSoCealth.va.gov with permission from Nahid Nagy PhD., [facility]      STOP BANG     STOP BANG Questionnaire (  2008, the American Society of Anesthesiologists, Inc. [employer] 8/15/2022   1. Snoring - Do you snore loudly (louder than talking or loud enough  "to be heard through closed doors)? -   2. Tired - Do you often feel tired, fatigued, or sleepy during daytime? 1   3. Observed - Has anyone observed you stop breathing during your sleep? -   4. Blood pressure - Do you have or are you being treated for high blood pressure? -   5. BMI - BMI more than 35 kg/m2? 1   6. Age - Age over 50 yr old? 1   7. Neck circumference - Neck circumference greater than 40 cm? -   8. Gender - Gender male? -   STOP BANG Score (MC): 3 out of 8   Neck Cir (cm) Clinic: 34   B/P Clinic: 121/82   BMI Clinic: 39.65         GAD7    KELLE-7  4/26/2022   1. Feeling nervous, anxious, or on edge 3   2. Not being able to stop or control worrying 3   3. Worrying too much about different things 2   4. Trouble relaxing 2   5. Being so restless that it is hard to sit still 1   6. Becoming easily annoyed or irritable 2   7. Feeling afraid, as if something awful might happen 3   KELLE-7 Total Score 16   If you checked any problems, how difficult have they made it for you to do your work, take care of things at home, or get along with other people? Somewhat difficult         CAGE-AID    No flowsheet data found.    CAGE-AID reprinted with permission from the [place] Medical Journal, SHANNAN William. and BECK Seinor, \"Conjoint screening questionnaires for alcohol and drug abuse\" [place] Medical Journal 94: 135-140, 1995.      PATIENT HEALTH QUESTIONNAIRE-9 (PHQ - 9)    No flowsheet data found.    Developed by Socorro Paez, Britany Duong, Nato Grier and colleagues, with an educational martha from [employer] No permission required to reproduce, translate, display or distribute.        Allergies:    Allergies   Allergen Reactions     Lexapro [Escitalopram] Rash     Purple marks on arms     Penicillins Hives       Medications:    Current Outpatient Medications   Medication Sig Dispense Refill     acetaminophen (TYLENOL) 325 MG tablet Take 2 tablets (650 mg) by mouth every 4 hours as needed for mild " pain 50 tablet 0     ASPIRIN NOT PRESCRIBED (INTENTIONAL) Please choose reason not prescribed from choices below.       atorvastatin (LIPITOR) 40 MG tablet Take 0.5 tablets (20 mg) by mouth every evening 90 tablet 3     Cholecalciferol (VITAMIN D) 2000 units CAPS Take 2,000 Units by mouth daily 90 capsule 3     cyclobenzaprine (FLEXERIL) 10 MG tablet Take 1 tablet (10 mg) by mouth nightly as needed for muscle spasms 30 tablet 0     FLUoxetine (PROZAC) 10 MG capsule Take 1 capsule (10 mg) by mouth daily 20 mg and 10 mg to equal 30 mg/day 90 capsule 1     FLUoxetine (PROZAC) 20 MG capsule Take 1 capsule (20 mg) by mouth daily 20 mg + 10 mg to equal 30 mg 90 capsule 1     fluticasone (FLONASE) 50 MCG/ACT nasal spray Spray 2 sprays into both nostrils daily (Patient taking differently: Spray 2 sprays into both nostrils daily as needed) 16 g 3     multivitamin w/minerals (THERA-VIT-M) tablet Take 1 tablet by mouth daily       omeprazole (PRILOSEC) 20 MG DR capsule Take 20 mg by mouth daily       vitamin (B COMPLEX-C) tablet Take 1 tablet by mouth daily     She has not used flexeril    Problem List:  Patient Active Problem List    Diagnosis Date Noted     Chronic left shoulder pain 07/25/2022     Priority: Medium     NSTEMI (non-ST elevated myocardial infarction) (H) 04/13/2022     Priority: Medium     Postmenopausal bleeding 01/21/2021     Priority: Medium     Added automatically from request for surgery [id number]       Pure hypercholesterolemia      Priority: Medium     Obesity (BMI 35.0-39.9) with comorbidity (H) 02/25/2019     Priority: Medium     Obesity (BMI 30-39.9)      Priority: Medium     Acid reflux disease      Priority: Medium      Past Medical/Surgical History:  Past Medical History:   Diagnosis Date     Morbid obesity (H)      Pure hypercholesterolemia      Past Surgical History:   Procedure Laterality Date     ARTHROSCOPY SHOULDER Right 2015     ARTHROSCOPY SHOULDER, OPEN ROTATOR CUFF REPAIR, COMBINED Left  03/29/2017    Procedure:  Left shoulder arthroscopy and arthroscopic subacromial decompression (acromioplasty, bursectomy and coracoacromial ligament resection). Arthroscopic distal clavicle resection. Mini-open rotator cuff tear repair. Surgeon:  Kevin George MD  Location: [facility]     BUNIONECTOMY Right 2008     CV CORONARY ANGIOGRAM N/A 4/14/2022    Procedure: Coronary Angiogram;  Surgeon: Olvin Roy MD;  Location:  [facility]     DAVINCI HYSTERECTOMY TOTAL, BILATERAL SALPINGO-OOPHORECTOMY, COMBINED N/A 2/9/2021    Procedure: Robotic assisted total laparoscopic hysterectomy, bilateral salpingectomy, bilateral oophorectomy, cystoscopy;  Surgeon: Jonathan Peters MD;  Location: [facility] OR     DISKECTOMY, LUMBAR, SINGLE SP  1996    L5-S1     DISKECTOMY, LUMBAR, SINGLE SP  1997    L5-S1     FUSION LUMBAR ANTERIOR ONE LEVEL  1999    L5-S1     OSTEOTOMY FOOT Right 06/06/2017    Procedure: OSTEOTOMY FOOT;  1)  WEIL OSTEOTOMY RIGHT SECOND METATARSAL, 2) PLANTAR CAPSULE DEBRIDEMENT/SYNOVECTOMY, RIGHT SECOND METATARSAL PHALANGEAL JOINT, 3) HAMMERTOE REPAIR RIGHT SECOND TOE;  Surgeon: Olvin Betancur DPM;  Location: [facility]     MA SPINE SURGERY PROCEDURE UNLISTED       RECONSTRUCT FOREFOOT WITH METATARSOPHALANGEAL (MTP) FUSION Right 06/06/2017    Procedure: RECONSTRUCT FOREFOOT WITH METATARSOPHALANGEAL (MTP) FUSION;;  Surgeon: Olvin Betancur DPM;  Location: [facility]     REPAIR HAMMER TOE Right 06/06/2017    Procedure: REPAIR HAMMER TOE;;  Surgeon: Olvin Betancur DPM;  Location: [facility]     ROTATOR CUFF REPAIR RT/LT Left 2017       Social History:  Social History     Socioeconomic History     Marital status:      Spouse name: Not on file     Number of children: Not on file     Years of education: Not on file     Highest education level: Not on file   Occupational History     Occupation:    Tobacco Use     Smoking status: Former Smoker      Packs/day: 1.00     Years: 26.00     Pack years: 26.00     Types: Cigarettes     Quit date: 2022     Years since quittin.3     Smokeless tobacco: Never Used     Tobacco comment: 3-4 cigs per week    Vaping Use     Vaping Use: Never used   Substance and Sexual Activity     Alcohol use: Yes     Comment: occ     Drug use: No     Sexual activity: Yes     Partners: Male   Other Topics Concern     Parent/sibling w/ CABG, MI or angioplasty before 65F 55M? No   Social History Narrative    . In a long-term relationship.    3 adult kids.    No grandchildren.    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     Social Determinants of Health     Financial Resource Strain: Not on file   Food Insecurity: Not on file   Transportation Needs: Not on file   Physical Activity: Not on file   Stress: Not on file   Social Connections: Not on file   Intimate Partner Violence: Not on file   Housing Stability: Not on file       Family History:  Family History   Problem Relation Age of Onset     Rheumatoid Arthritis Mother      Coronary Artery Disease Mother         s/p MI and PCIs in 50s     Rheumatoid Arthritis Sister      Myocardial Infarction Maternal Grandmother         in her 60s     Myocardial Infarction Maternal Grandfather         later in life     Diabetes No family hx of      Cerebrovascular Disease No family hx of      Colon Cancer No family hx of      Breast Cancer No family hx of      Ovarian Cancer No family hx of        Review of Systems:  A complete review of systems reviewed by me is negative with the exeption of what has been mentioned in the history of present illness.  In the last TWO WEEKS have you experienced any of the following symptoms?  Fevers: No  Night Sweats: Yes  Weight Gain: No  Pain at Night: Yes  Double Vision: No  Changes in Vision: No  Difficulty Breathing through Nose: No  Sore Throat in Morning: No  Dry Mouth in the Morning: No  Shortness of Breath Lying Flat: No  Shortness of Breath With Activity:  "No  Awakening with Shortness of Breath: No  Increased Cough: No  Heart Racing at Night: No  Swelling in Feet or Legs: No  Diarrhea at Night: No  Heartburn at Night: No  Urinating More than Once at Night: Yes  Losing Control of Urine at Night: No  Joint Pains at Night: Yes  Headaches in Morning: No  Weakness in Arms or Legs: No  Depressed Mood: No  Anxiety: No     Physical Examination:  Vitals: /82   Resp 16   Ht 1.626 m (5' 4\")   Wt 104.8 kg (231 lb)   LMP  (LMP Unknown)   SpO2 94%   BMI 39.65 kg/m    BMI= Body mass index is 39.65 kg/m .    Neck Cir (cm): 34 cm   General: No apparent distress, appropriately groomed  Head: Normocephalic, atraumatic  Nose: Bilateral inferior nasal turbinate hypertrophy slightly reduced airflow through the right nostril. No exudate/erythema.   Mouth: op pink and moist; Wearing off of the enamel of the front upper teeth was noted  Orophraynx:    Mallampati Class: III.   Tonsillar Stage: 2  visible at pillars.  Neck: Supple, Circumference: 13.25 inches  Cardiac: Regular rate and rhythm; no gallops or murmurs  Chest: Symmetric air movement, lungs clear to auscultation bilaterally  Musculoskeletal: No edema noted  Skin: Warm, dry, intact  Psych: Mood pleasant, affect congruent  Neuro:  Mental status: Awake, alert, attentive, oriented.  Gait: Normal  No focal neurological deficit             Data: All pertinent previous laboratory data reviewed     Recent Labs   Lab Test 04/15/22  0549 04/14/22  0654    137   POTASSIUM 4.1 3.8   CHLORIDE 108 107   CO2 25 28   ANIONGAP 5 2*   GLC 85 90   BUN 11 11   CR 0.82 0.85   JEANETH 8.7 9.2       Recent Labs   Lab Test 04/15/22  0549   WBC 10.8   RBC 4.27   HGB 13.1   HCT 41.0   MCV 96   MCH 30.7   MCHC 32.0   RDW 13.5          Recent Labs   Lab Test 04/05/22  1729   PROTTOTAL 7.3   ALBUMIN 3.7   BILITOTAL 0.4   ALKPHOS 76   AST 20   ALT 27       TSH (mU/L)   Date Value   04/05/2022 1.20   01/04/2021 1.91       No results found " for: UAMP, UBARB, BENZODIAZEUR, UCANN, UCOC, OPIT, UPCP    No results found for: IRONSAT, MA94515, JORDY    No results found for: PH, PHARTERIAL, PO2, [id number], SAT, PCO2, HCO3, BASEEXCESS, FROILAN, BEB      NM lexiscan stress test:4/2022  ECG Baseline electrocardiogram demonstrates sinus rhythm.   The stress electrocardiogram is negative for inducible ischemic EKG changes.       Chest x-ray: Chest XR,  PA & LAT 04/13/2022    Narrative  EXAM: XR CHEST 2 VW  LOCATION: [facility]  DATE/TIME: 4/13/2022 9:53 PM    INDICATION: chest pain  COMPARISON: 11/2/2010    Impression  IMPRESSION: Negative chest.      Chest CT: No results found for this or any previous visit from the past 365 days.      PFT: Most Recent [facility] Pulmonary Function Testing    No results found for: 20001  Benjamín Hendrix MD 8/15/2022

## 2022-08-15 NOTE — NURSING NOTE
"Chief Complaint   Patient presents with     Sleep Problem     Fatigue, brain fog       Initial /82   Resp 16   Ht 1.626 m (5' 4\")   Wt 104.8 kg (231 lb)   LMP  (LMP Unknown)   SpO2 94%   BMI 39.65 kg/m   Estimated body mass index is 39.65 kg/m  as calculated from the following:    Height as of this encounter: 1.626 m (5' 4\").    Weight as of this encounter: 104.8 kg (231 lb).    Medication Reconciliation: complete    Neck circumference: 13.25 inches / 34 centimeters.  ESS 2  JEFFREY  8  Kimi Lewis MA      "

## 2022-08-17 ENCOUNTER — THERAPY VISIT (OUTPATIENT)
Dept: PHYSICAL THERAPY | Facility: CLINIC | Age: 57
End: 2022-08-17
Payer: COMMERCIAL

## 2022-08-17 DIAGNOSIS — G89.29 CHRONIC LEFT SHOULDER PAIN: Primary | ICD-10-CM

## 2022-08-17 DIAGNOSIS — M25.512 CHRONIC LEFT SHOULDER PAIN: Primary | ICD-10-CM

## 2022-08-17 PROCEDURE — 97140 MANUAL THERAPY 1/> REGIONS: CPT | Mod: GP | Performed by: PHYSICAL THERAPIST

## 2022-08-17 PROCEDURE — 97110 THERAPEUTIC EXERCISES: CPT | Mod: GP | Performed by: PHYSICAL THERAPIST

## 2022-08-22 ENCOUNTER — THERAPY VISIT (OUTPATIENT)
Dept: PHYSICAL THERAPY | Facility: CLINIC | Age: 57
End: 2022-08-22
Payer: COMMERCIAL

## 2022-08-22 DIAGNOSIS — M25.512 CHRONIC LEFT SHOULDER PAIN: Primary | ICD-10-CM

## 2022-08-22 DIAGNOSIS — G89.29 CHRONIC LEFT SHOULDER PAIN: Primary | ICD-10-CM

## 2022-08-22 PROCEDURE — 97140 MANUAL THERAPY 1/> REGIONS: CPT | Mod: GP | Performed by: PHYSICAL THERAPIST

## 2022-08-22 PROCEDURE — 97112 NEUROMUSCULAR REEDUCATION: CPT | Mod: GP | Performed by: PHYSICAL THERAPIST

## 2022-08-22 PROCEDURE — 97110 THERAPEUTIC EXERCISES: CPT | Mod: GP | Performed by: PHYSICAL THERAPIST

## 2022-08-24 ENCOUNTER — HOSPITAL ENCOUNTER (OUTPATIENT)
Dept: PHYSICAL THERAPY | Facility: CLINIC | Age: 57
Setting detail: THERAPIES SERIES
Discharge: HOME | End: 2022-08-24
Attending: NURSE PRACTITIONER
Payer: COMMERCIAL

## 2022-08-24 DIAGNOSIS — R41.89 COGNITIVE CHANGES: Primary | ICD-10-CM

## 2022-08-24 PROCEDURE — 97112 NEUROMUSCULAR REEDUCATION: CPT | Mod: GP | Performed by: PHYSICAL THERAPIST

## 2022-08-30 ENCOUNTER — HOSPITAL ENCOUNTER (OUTPATIENT)
Dept: OCCUPATIONAL THERAPY | Facility: CLINIC | Age: 57
Setting detail: THERAPIES SERIES
Discharge: HOME | End: 2022-08-30
Attending: INTERNAL MEDICINE
Payer: COMMERCIAL

## 2022-08-30 DIAGNOSIS — R41.89 COGNITIVE CHANGES: ICD-10-CM

## 2022-08-30 PROCEDURE — 97165 OT EVAL LOW COMPLEX 30 MIN: CPT | Mod: GO

## 2022-08-30 NOTE — PROGRESS NOTES
"   08/30/22 1100   Quick Adds   Type of Visit Initial Outpatient Occupational Therapy Evaluation   General Information   Start Of Care Date 08/30/22   Referring Physician Loli Aquino MD   Orders Evaluate and treat as indicated  (\"Cognition Assessment\")   Other Orders already seeing PT at this clinic   Orders Date 08/24/22   Medical Diagnosis Cognitive Changes   Onset of Illness/Injury or Date of Surgery 08/24/22  (date of order used)   Precautions/Limitations No known precautions/limitations   Surgical/Medical History Reviewed Yes   Additional Occupational Profile Info/Pertinent History of Current Problem Jayne Tyler is a pleasant 57 year old, right hand dominant female who presents to outpatient OT for cognitive changes s/p assumed COVID infection. She reports she woke one morning in late January with extreme fatigue, feeling foggy. She couldn't think straight or concentrate and it hasn't gotten better. She had a medication enduced cardiac stress test and reports the following day had a heart attack. She had an angiogram which reportedly didn't show any problems, occlusions. She never had any COVID symptoms but reports prior to this day in late January, her boyfriend had COVID. In retrospect, she believes she had it, too, as she took an at-home test that had a \"faint positive line.\" She was not tested further. On June 14 she received a second covid booster shot, which seems to have exacerbated her symptoms per pt report. A week later she reports she was \"back to square one.\" She feels fatigued, foggy headed, dizzy (lightheaded) and has trouble with word-finding. Dizziness is triggered with fast head movements. She reports using her brain is more tiring than physical activity. When she drives to visit her mother in [place] it takes so much concentration that she has to take a 2-3 hour nap afterward. She denies hearing or vision changes. She feels mildly off balance when she is dizzy. She has been " "having more frequent \"bad\" HA, about 2-3 in the past 6 months. She also states nothing tastes good anymore. She is employed full-time as a work force analyst, which consists of computer work. No dizziness with work but challenging due to cognitive deficits and reports she makes lots of mistakes.   Comments/Observations Pt becomes emotional/tearful at start of session when reporting how family/friends don't seem to understand what she is experiencing.   Role/Living Environment   Current Community Support Family/friend caregiver   Patient role/Employment history Employed  (Workforce management - )   Community/Avocational Activities Reports she works full-time plus additional hours to get the job done (though she is salaried). Reports she is currently working and somedays this goes 'okay, but lots of days it's tough.\" \"I make a lot of dumb mistakes and like typing things wrong\" and giving wrong details even when double checking information. Will hit items like \"cancel\" vs \"copy.\" Catches some mistakes but not others, which she finds stressful. Reports she spends her time outside of work cooking, cleaning, caring for her dog. Used to like gardening, using the treadmill and lifting weights - these have been too challenging for her lately. Takes each day one day at a time and has not been able to be as active as she once was.   Current Living Environment House  (rambler with basement)   Number of Stairs to Enter Home 2   Number of Stairs Within Home 10-12 down to basement  (currently using this bathroom, laundry in basement)   Primary Bathroom Location/Comments Basement  (due to bathroom being remodeled on main floor)   Primary Bathroom Set Up/Equipment   (occasionally will steady herself on the wall from sometimes dizzy with eyes closed)   Prior Level - Transfers Independent   Prior Level - Ambulation Independent   Prior Level - ADLS Independent   Prior Responsibilities - IADL Meal " "Preparation;Housekeeping;Laundry;Shopping;Yardwork;Medication management;Finances;Driving;Work   Prior Level Comments IND with ADLs/IADLs   Role/Living Environment Comments Reports she lives in one level rambler home with basement with her boyfriend, Salo. Reports she is no longer experiencing SOB with physical activity like stairs since her heart attack but continues to feel significant fatigue with daily activities, particularly cognitive activities.   Patient/family Goals Statement To \"lessen 'brain fog,' concentration, focus issues. Improve ability to remember words and lessen errors at work.\"   Pain   Patient currently in pain Yes   Pain location generalized due to arthritis, pain in foot   Pain rating 2-3/10   Pain description comment throbbing pain in foot when sitting, increased to about 7/10 pain with walking. also has pain in L shoulder which she is seeing PT for   Fall Risk Screen   Fall screen completed by OT   Have you fallen 2 or more times in the past year? No   Have you fallen and had an injury in the past year? No   Is patient a fall risk? No   Abuse Screen (yes response referral indicated)   Feels Unsafe at Home or Work/School no   Feels Threatened by Someone no   Does Anyone Try to Keep You From Having Contact with Others or Doing Things Outside Your Home? no   Physical Signs of Abuse Present no   Cognitive Status Examination   Orientation Orientation to person, place and time   Level of Consciousness Alert   Follows Commands and Answers Questions 100% of the time;Able to follow single-step instructions;Able to follow multistep instructions   Personal Safety and Judgment Intact   Memory Impaired   Attention Reports problems attending;Alternating attention impaired, difficulty shifting between tasks;Divided attention impaired, difficulty with simultaneous tasks   Organization/Problem Solving Reports problems with organization;Reports problems with problem solving during evaluation   Executive " "Function Working memory impaired, decreased storage of information for performing tasks;Planning ability impaired   Cognitive Comment Reports she recently resumed taking a medication for stress/anxiety (Prozac) which has helped with anxiety/panic attacks. Overall reports significant difficulty with concentration, attention to details, finding the words she wants to use and tracking what she wanted to say while communicating. Reports fatigue with concentration/cognitive tasks. Reports increased difficulty with STM. Endorses she needs additional time to process information. To further assess cognition, administered the [place] Cognitive Assessment (MoCA), which was designed as a rapid screening instrument for mild cognitive dysfunction with a score of 26/30 considered normal. Administered MoCA 8.1 with pt scoring 27/30 (extra point for high school level edcuation, MIS = 14/15), indicating potential normal cognition. Pt scored 3/5 on visuospatial/executive, 3/3 on naming, 6/6 on attention, 2/3 on language, 2/2 on abstraction, 4/5 on delayed recall, and 6/6 on orientation. Of note, pt requiring extra time to complete trail-making, draw clock, immediate memory, repeat digits (forward and backward), word-finding for fluency, and recall of words (gets 4 of 5 eventually, gets final word with category cue and extra time).   Visual Perception   Visual Perception Wears glasses  (bifocals)   Visual Perception Comments Reports she has been experiencing some \"floaters\" in her L eye that feels like a hair and dots in her vision that has been there since sometime this year but she has a hard time estimating when she first noticed it. Reports she is due for an annual eye exam (didn't go last year).   Sensation   Sensation Comments Reports numbness/tingling in bilateral hands. Reports she passes this off as CTS as she has always done a significant amount of typing for her work. Denies n/t in feet aside from occasional pins and " "needles sensation.   Range of Motion (ROM)   ROM Comments Reports pain in her L shoulder that limits activity.   Strength   Strength Comments Reports generalized decrease in strength in BUE but feels more related to age than anything else.   Hand Strength   Hand Dominance Right   Balance   Balance Comments Reports generally her balance is \"good\" but can sometimes feel unsteady when experiencing dizziness and have to reach a hand out to the wall to steady herself.   Functional Mobility   Ambulation mod I, increased time   Transfer Skill   Level of [place]: Transfers other (see comments)  (mod I, increased time)   Bathing   Level of [place] - Bathing other (see comments)  (mod I, increased time)   Upper Body Dressing   Level of [place]: Dress Upper Body other (see comments)  (mod I, increased time)   Upper Body Dressing Comments Occasional difficulty donning bra due to pain in shoulder.   Lower Body Dressing   Level of [place]: Dress Lower Body other (see comments)  (mod I, increased time)   Toileting   Level of [place]: Toilet independent   Grooming   Level of [place]: Grooming other (see comments)  (mod I, increased time)   Grooming Comments Intermittent dizziness with getting ready for her day often due to turning her head while getting ready.   Eating/Self-Feeding   Level of [place]: Eating independent   Activity Tolerance   Activity Tolerance Reports significant fatigue. Used to go to bed a little after 10pm and now has difficulties staying awake until about 9:30pm. Doesn't feel she has much time/energy to do much outside of work lately when she previously was quite active. Reports she is keeping a log of her fatigue and symptoms.   Instrumental Activities of Daily Living Assessment   IADL Assessment/Observations Medication management: manages IND without pillbox. Denies forgetting to take medications. Reports she has a routine that works well for her.   Meal " "Planning/Preparation Reports she cooks most meals from scratch. Reports she fatigues quickly with meal preparation tasks. Used to enjoy baking frequently but doesn't do this much anymore. Reports that she doesn't follow recipes frequently but sometimes finds herself getting lost when trying to follow them step by step.   Home/Financial Management Reports she fatigues quickly with household management tasks and finds multi-tasking very challenging, as well as concentrating on one item at a time. Reports she completes financial management IND and reports she has  completely forgotten to pay a bill or two in the last couple months.   Communication/Computer Use Uses a computer daily for work. Reports she has had a greater challenge using the computer lately. Reports she has many job tasks that are in \"real time\" such as managing inboxes, phone calls, time off requests, etc. and finds that she has had greater difficulty finding the information she needs. Reports she checks items multiple times and will \"miss\" things. \"I don't catch things like a I used to.\"   Community Mobility Reports driving is challenging likely due to needing to concentration difficulties. Will feel fatigued at the end of a drive. Reports she no longer is driving at night due to not feeling comfortable/confident with this. Reports longer distances are very challenging due to effort required to concentrate/focus. First long-distance drive she returned and slept for 3 hours despite \"not being a antony.\"   Planned Therapy Interventions   Planned Therapy Interventions ADL training;IADL training;Cognitive skills;Cognitive performance testing;Community/work reintegration;ROM;Self care/Home management;Strengthening;Therapeutic activities;Visual perception    OT Goal 1   Goal Identifier Fatigue Management   Goal Description Pt will demonstrate 3 energy conservation strategies (e.g. pacing, planning, prioritizing, sleep hygiene, etc.) to facilitate fatigue " management with ADL/IADL tasks (e.g. laundry, meal preparation, walking/exercise).   Target Date 11/22/22    OT Goal 2   Goal Identifier Memory   Goal Description Patient will demonstrate improved memory skills by completing short-term memory tasks in occupational therapy with 90% accuracy using compensatory strategies for increased safety and independence with ADL/IADLS (keeping up schedule, remembering to take medications, turn off the stove when done, remember to switch laundry).   Target Date 11/22/22    OT Goal 3   Goal Identifier Problem-Solving/Planning   Goal Description Pt will demonstrate the ability to complete moderate to complex problem-solving/planning tasks (WCPA, Errands, [employer], SET, etc.) with 90% accuracy to complete home and work tasks safely and accurately (e.g. meal preparation, financial management, medication management, driving, work).   Target Date 11/22/22   OT Goal 4   Goal Identifier Attention/Multi-tasking   Goal Description Pt will alternate between 2-3 different tasks ( eg bill paying, word puzzle and making phone calls or multi-step meal prep) x 15 minutes with 90% accuracy on all tasks, to stimulate return to higher level work, cooking, ability to grocery shop, work, and driving, among others.   Target Date 11/22/22   Clinical Impression   Criteria for Skilled Therapeutic Interventions Met Yes, treatment indicated   OT Diagnosis Decreased IND with ADLs/IADLs   Influenced by the following impairments strength, activity tolerance, vision, pain, memory, attention, problem-solving, planning/organization, judgment/reasoning   Assessment of Occupational Performance 5 or more Performance Deficits   Identified Performance Deficits household management, meal preparation, financial management, leisure/hobbies, work, driving   Clinical Decision Making (Complexity) Low complexity   Therapy Frequency 1x/week   Predicted Duration of Therapy Intervention (days/wks) 12 weeks (starting with 8  weeks scheduled)   Risks and Benefits of Treatment have been explained. Yes   Patient, Family & other staff in agreement with plan of care Yes   Clinical Impression Comments Pt will benefit from skilled OT services to promote safety and IND with ADLs/IADLs   Education Assessment   Barriers To Learning No Barriers   Preferred Learning Style Listening;Reading;Demonstration;Pictures/video   Total Evaluation Time   OT Eval, Low Complexity Minutes ([id number]) 45     Thank you for the referral of this patient.  If you have any questions regarding the information in this report, please feel free to contact me per the information provided below.      Nicole Guzman MA, OTR/L  Occupational Therapist  [address] [address]  Clinic Fax:  [phone]  Clinic Phone: [phone]

## 2022-09-07 ENCOUNTER — THERAPY VISIT (OUTPATIENT)
Dept: PHYSICAL THERAPY | Facility: CLINIC | Age: 57
End: 2022-09-07
Payer: COMMERCIAL

## 2022-09-07 DIAGNOSIS — G89.29 CHRONIC LEFT SHOULDER PAIN: Primary | ICD-10-CM

## 2022-09-07 DIAGNOSIS — M25.512 CHRONIC LEFT SHOULDER PAIN: Primary | ICD-10-CM

## 2022-09-07 PROCEDURE — 97110 THERAPEUTIC EXERCISES: CPT | Mod: GP | Performed by: PHYSICAL THERAPIST

## 2022-09-07 PROCEDURE — 97112 NEUROMUSCULAR REEDUCATION: CPT | Mod: GP | Performed by: PHYSICAL THERAPIST

## 2022-09-07 PROCEDURE — 97140 MANUAL THERAPY 1/> REGIONS: CPT | Mod: GP | Performed by: PHYSICAL THERAPIST

## 2022-09-21 ENCOUNTER — THERAPY VISIT (OUTPATIENT)
Dept: PHYSICAL THERAPY | Facility: CLINIC | Age: 57
End: 2022-09-21
Payer: COMMERCIAL

## 2022-09-21 DIAGNOSIS — G89.29 CHRONIC LEFT SHOULDER PAIN: Primary | ICD-10-CM

## 2022-09-21 DIAGNOSIS — M25.512 CHRONIC LEFT SHOULDER PAIN: Primary | ICD-10-CM

## 2022-09-21 PROCEDURE — 97110 THERAPEUTIC EXERCISES: CPT | Mod: GP | Performed by: PHYSICAL THERAPIST

## 2022-09-21 PROCEDURE — 97140 MANUAL THERAPY 1/> REGIONS: CPT | Mod: GP | Performed by: PHYSICAL THERAPIST

## 2022-09-21 PROCEDURE — 97112 NEUROMUSCULAR REEDUCATION: CPT | Mod: GP | Performed by: PHYSICAL THERAPIST

## 2022-09-21 NOTE — PROGRESS NOTES
Subjective:  HPI  Physical Exam                    Objective:  System    Physical Exam    General     ROS    Assessment/Plan:    DISCHARGE REPORT    Progress reporting period is from 7-28-22 to 9-21-22.       SUBJECTIVE  Subjective changes noted by patient:  The left shoulder pain is better since the IE. She can reach and lift better, less pain, can lie on her side longer--although she usually avoids. She drove to [place] and back last week which increased her soreness. The exercises go well and she feels she can continue on her own now.    Current Pain level: 1/10.     Initial Pain level:  (1-7/10).   Changes in function:  Yes (See Goal flowsheet attached for changes in current functional level)  Adverse reaction to treatment or activity: None    OBJECTIVE  Changes noted in objective findings:  Yes,   Shoulder AROM: flex=135 (improved from 101), abd=141 (improved from 96), ext/IR=T7 (improved from T9).   PROM: flex=164 with ERP (improved from 148), abd=172 with ERP (improved from 160), ER=90, IR=70.   MMT is 5/5 throughout with slight pain with resisted abd. (Improved from 5-/5 with flex, abd and ER.)   CAROM: flex=100%, ext=100% with slight R deviation, ZV=596%, LR=95%, QBB=624%, AEN=464%.   Thoracic ext=75% (improved from 50%.)     ASSESSMENT/PLAN  Updated problem list and treatment plan: Diagnosis 1:  L shoulder pain  Pain -  self management, education, directional preference exercise and home program  Decreased ROM/flexibility - therapeutic exercise and home program  Decreased function - therapeutic activities and home program  STG/LTGs have been met or progress has been made towards goals:  Yes (See Goal flow sheet completed today.)  Assessment of Progress: The patient's condition is improving.  Patient is meeting short term goals and is progressing towards long term goals.  Self Management Plans:  Patient has been instructed in a home treatment program.  Patient  has been instructed in self management of  symptoms.  I have re-evaluated this patient and find that the nature, scope, duration and intensity of the therapy is appropriate for the medical condition of the patient.  Jayne continues to require the following intervention to meet STG and LTG's:  PT intervention is no longer required to meet STG/LTG.    Recommendations:  This patient is ready to be discharged from therapy and continue their home treatment program.    Please refer to the daily flowsheet for treatment today, total treatment time and time spent performing 1:1 timed codes.

## 2022-09-26 ENCOUNTER — HOSPITAL ENCOUNTER (OUTPATIENT)
Dept: OCCUPATIONAL THERAPY | Facility: CLINIC | Age: 57
Setting detail: THERAPIES SERIES
Discharge: HOME | End: 2022-09-26
Attending: INTERNAL MEDICINE
Payer: COMMERCIAL

## 2022-09-26 PROCEDURE — 97535 SELF CARE MNGMENT TRAINING: CPT | Mod: GO

## 2022-10-03 ENCOUNTER — HOSPITAL ENCOUNTER (OUTPATIENT)
Dept: OCCUPATIONAL THERAPY | Facility: CLINIC | Age: 57
Setting detail: THERAPIES SERIES
Discharge: HOME | End: 2022-10-03
Attending: INTERNAL MEDICINE
Payer: COMMERCIAL

## 2022-10-03 PROCEDURE — 97535 SELF CARE MNGMENT TRAINING: CPT | Mod: GO

## 2022-10-10 ENCOUNTER — IMMUNIZATION (OUTPATIENT)
Dept: NURSING | Facility: CLINIC | Age: 57
End: 2022-10-10
Payer: COMMERCIAL

## 2022-10-10 ENCOUNTER — HEALTH MAINTENANCE LETTER (OUTPATIENT)
Age: 57
End: 2022-10-10

## 2022-10-10 DIAGNOSIS — Z23 ENCOUNTER FOR IMMUNIZATION: Primary | ICD-10-CM

## 2022-10-10 PROCEDURE — 90682 RIV4 VACC RECOMBINANT DNA IM: CPT

## 2022-10-10 PROCEDURE — 90471 IMMUNIZATION ADMIN: CPT

## 2022-10-12 ENCOUNTER — HOSPITAL ENCOUNTER (OUTPATIENT)
Dept: OCCUPATIONAL THERAPY | Facility: CLINIC | Age: 57
Setting detail: THERAPIES SERIES
Discharge: HOME | End: 2022-10-12
Attending: INTERNAL MEDICINE
Payer: COMMERCIAL

## 2022-10-12 PROCEDURE — 97535 SELF CARE MNGMENT TRAINING: CPT | Mod: GO

## 2022-10-14 ENCOUNTER — VIRTUAL VISIT (OUTPATIENT)
Dept: SLEEP MEDICINE | Facility: CLINIC | Age: 57
End: 2022-10-14
Attending: INTERNAL MEDICINE
Payer: COMMERCIAL

## 2022-10-14 DIAGNOSIS — R53.83 FATIGUE, UNSPECIFIED TYPE: ICD-10-CM

## 2022-10-14 NOTE — PROGRESS NOTES
Device has been registered and shipped via [employer] on 10/14/2022. Patient was notified that package was mailed out.

## 2022-10-17 ENCOUNTER — DOCUMENTATION ONLY (OUTPATIENT)
Dept: SLEEP MEDICINE | Facility: CLINIC | Age: 57
End: 2022-10-17

## 2022-10-17 DIAGNOSIS — G47.9 SLEEP DISTURBANCE: Primary | ICD-10-CM

## 2022-10-17 PROCEDURE — 95800 SLP STDY UNATTENDED: CPT | Performed by: INTERNAL MEDICINE

## 2022-10-18 NOTE — PROGRESS NOTES
Watch Pat has been scored using rule 1B, 4%.  Patient to follow up with provider to determine appropriate therapy.    PAT AHI: 4.7    Ordering Provider: Dr. Pacheco

## 2022-10-19 ENCOUNTER — HOSPITAL ENCOUNTER (OUTPATIENT)
Dept: OCCUPATIONAL THERAPY | Facility: CLINIC | Age: 57
Setting detail: THERAPIES SERIES
Discharge: HOME | End: 2022-10-19
Attending: INTERNAL MEDICINE
Payer: COMMERCIAL

## 2022-10-19 PROCEDURE — 97535 SELF CARE MNGMENT TRAINING: CPT | Mod: GO

## 2022-10-23 NOTE — PROCEDURES
"WatchPAT - HOME SLEEP STUDY INTERPRETATION    Patient: Jayne Tyler  MRN: [id number]  YOB: 1965  Study Date: 10/17/2022  Referring Provider: Loli Aquino  Ordering Provider: Benjamín Hendrix MD    Chain of custody patient verification was not enabled.  Chain of custody verification was not present throughout the entire study.     Indications for Home Study: Jayne Tyler is a 57 year old female with reports of non-restorative sleep and fatigue in the setting of morbid obesity, hypercholesterolemia, acid reflux disease, history of non-ST elevated MI April 2022, chronic left shoulder pain, postmenopausal status(status post hysterectomy). Home sleep study was obtained to evaluate for possible obstructive sleep apnea.    Estimated body mass index is 39.65 kg/m  as calculated from the following:    Height as of 8/15/22: 1.626 m (5' 4\").    Weight as of 8/15/22: 104.8 kg (231 lb).  Total score - [place]: 2 (8/10/2022  8:14 AM)  STOP-BANG: 3/8    Data: A full night home sleep study was performed recording the standard physiologic parameters including peripheral arterial tonometry (PAT), sound/snoring, body position,  movement, sound, and oxygen saturation by pulse oximetry. Pulse rate was estimated by oximetry recording. Sleep staging (wake, REM, light, and deep sleep) was derived from PAT signal.  This study was considered adequate based on > 4 hours of quality oximetry and respiratory recording. As specified by the AASM Manual for the Scoring of Sleep and Associated events, version 2.3, Rule VIII.D 1B, 4% oxygen desaturation scoring for hypopneas is used as a standard of care on all home sleep apnea testing.    Total Recording Time: 7 hrs, 53 min  Total Sleep Time: 7 hrs, 5 min  % of Sleep Time REM: 22.4%    Respiratory:  Snoring: Snoring was present.  Respiratory events: The PAT respiratory disturbance index [pRDI] was 7.1 events per hour.  The PAT apnea/hypopnea index " [pAHI] was 4.7 events per hour.  ISABELA was 4.4 events per hour.  During REM sleep the pAHI was 6.3 events per hour.  Sleep Associated Hypoxemia: sustained hypoxemia was not present. Mean oxygen saturation was 94%.  Minimum was 88%.  Time with saturation less than 88% was 0.4 minutes.    Heart Rate: By pulse oximetry, mean pulse rate was normal at 71 bpm.    Position: Percent of time spent: supine -  41.2%, prone - 0%, on right - 58.7%, on left - 0.1%.  pAHI was 8.9 per hour supine, n/a per hour prone, 1.7 per hour on right side, and n/a per hour on left side.     Assessment:     Snoring was reported with some obstructive apneas pronounced during supine sleep position, but there is no evidence of clinically significant obstructive sleep apnea, based on the overall apnea-hypopnea index being less than 5 events per hour.    Sleep associated hypoxemia was not present.    Recommendations:    Consider obtaining polysomnography to re-evaluate for ZHANE . Home sleep apnea testing may lack sensitivity to identify mild disease.    Suggest optimizing sleep hygiene and avoiding sleep deprivation.    Weight management.    Diagnosis Code(s): Snoring R06.83 Sleep disorder unspecified G47.9    Benjamín Hendrix MD, October 22, 2022   Diplomate, American Board of Internal Medicine, Sleep Medicine

## 2022-10-28 ENCOUNTER — IMMUNIZATION (OUTPATIENT)
Dept: NURSING | Facility: CLINIC | Age: 57
End: 2022-10-28
Payer: COMMERCIAL

## 2022-10-28 PROCEDURE — 0124A COVID-19,PF,PFIZER BOOSTER BIVALENT: CPT

## 2022-10-28 PROCEDURE — 91312 COVID-19,PF,PFIZER BOOSTER BIVALENT: CPT

## 2022-10-31 NOTE — PROGRESS NOTES
[facility]    Outpatient Physical Therapy Discharge Note  Patient: Jayne Tyler  : 1965    Beginning/End Dates of Reporting Period:  22 to 22. Jayne was seen 4 visits in this EOC for vestibular rehab.    Referring Provider: Leonila Stewart APRN CNP    Therapy Diagnosis: dizziness, reduced activity tolerance     Client Self Report: Dizziness has been better. Notice hard to look out window in car. motion sickness in past but a little worse now. Got better through the drive. Last couple times trying to drive to [place] to visit mom, had to turn around due to foggiheadedness and fatigue, difficulty with concentration and focus.    Objective Measurements:     DVA- static acuity 20/16, at 2hz 20/30 - 3 line loss. This is improved from 20/40 at eval. Abnormal >2. pt denies dizziness with DVA testing today.        Goals:  Goal Identifier DVA   Goal Description Jayne will have normal DVA, no greater than 2 line loss from static, at 2 hz head movement to walk and talk through busy visual environments without dizziness.   Target Date 22   Date Met      Progress (detail required for progress note): nearly met, 1 error 2 lines up. no errors 3 lines up     Goal Identifier head turns   Goal Description Jayne will be able to look around, moving head in all directions, while riding on her SO motorcycle without the onset of dizziness for improved tolerance of travel.   Target Date 22   Date Met      Progress (detail required for progress note): Went on ride for first time in a year and was ok, only occasional mild dizziness     Goal Identifier gait stability   Goal Description Jayne will be able to walk while performing head turns and nods without sense of dizziness or signs of instability for safety with community ambulation.   Target Date 22   Date Met  22   Progress (detail  required for progress note): is turning head alot as she walks through the community, is going well       Plan:  Discharge from therapy.    Discharge:    Reason for Discharge: Patient canceled her final visit and did not reschedule.     Discharge Plan: Patient to continue home program.

## 2022-11-02 ENCOUNTER — HOSPITAL ENCOUNTER (OUTPATIENT)
Dept: OCCUPATIONAL THERAPY | Facility: CLINIC | Age: 57
Setting detail: THERAPIES SERIES
Discharge: HOME | End: 2022-11-02
Attending: INTERNAL MEDICINE
Payer: COMMERCIAL

## 2022-11-02 PROCEDURE — 97535 SELF CARE MNGMENT TRAINING: CPT | Mod: GO

## 2022-11-07 ENCOUNTER — OFFICE VISIT (OUTPATIENT)
Dept: SLEEP MEDICINE | Facility: CLINIC | Age: 57
End: 2022-11-07
Payer: COMMERCIAL

## 2022-11-07 VITALS
DIASTOLIC BLOOD PRESSURE: 64 MMHG | HEIGHT: 64 IN | SYSTOLIC BLOOD PRESSURE: 123 MMHG | WEIGHT: 235.2 LBS | BODY MASS INDEX: 40.15 KG/M2 | HEART RATE: 76 BPM | OXYGEN SATURATION: 98 %

## 2022-11-07 DIAGNOSIS — G47.9 SLEEP DISTURBANCE: Primary | ICD-10-CM

## 2022-11-07 DIAGNOSIS — R53.82 CHRONIC FATIGUE: ICD-10-CM

## 2022-11-07 DIAGNOSIS — E66.01 MORBID OBESITY (H): ICD-10-CM

## 2022-11-07 PROCEDURE — 99213 OFFICE O/P EST LOW 20 MIN: CPT | Performed by: INTERNAL MEDICINE

## 2022-11-07 NOTE — NURSING NOTE
"Chief Complaint   Patient presents with     Sleep Problem     Follow up sleep test results        Initial /64   Pulse 76   Ht 1.626 m (5' 4\")   Wt 106.7 kg (235 lb 3.2 oz)   LMP  (LMP Unknown)   SpO2 98%   BMI 40.37 kg/m   Estimated body mass index is 40.37 kg/m  as calculated from the following:    Height as of this encounter: 1.626 m (5' 4\").    Weight as of this encounter: 106.7 kg (235 lb 3.2 oz).    Medication Reconciliation: complete  ESS 3  JEFFREY 1  Kimi Lewis MA       "

## 2022-11-07 NOTE — PROGRESS NOTES
[facility]  Sleep clinic follow-up visit note      Date on this visit: 11/7/22     Chief complaint: Review results of recently obtained home sleep  study     Jayne Tyler is a pleasant 57 year old  female who previously presented to sleep clinic with  reports of non-restorative sleep and fatigue in the setting of morbid obesity, hypercholesterolemia, acid reflux disease, history of non-ST elevated MI April 2022, chronic left shoulder pain, postmenopausal status(status post hysterectomy). Home sleep study was obtained on October 17, 2022 to evaluate for ZHANE.  She presents to the sleep clinic today to review the test results and to discuss plan of care.  Patient reports that she did not sleep well during the night of the sleep study.    Home sleep study report:   Study date: October 17, 2022     Total Recording Time: 7 hrs, 53 min  Total Sleep Time: 7 hrs, 5 min  % of Sleep Time REM: 22.4%     Respiratory:  Snoring: Snoring was present.  Respiratory events: The PAT respiratory disturbance index [pRDI] was 7.1 events per hour.  The PAT apnea/hypopnea index [pAHI] was 4.7 events per hour.  ISABELA was 4.4 events per hour.  During REM sleep the pAHI was 6.3 events per hour.  Sleep Associated Hypoxemia: sustained hypoxemia was not present. Mean oxygen saturation was 94%.  Minimum was 88%.  Time with saturation less than 88% was 0.4 minutes.     Heart Rate: By pulse oximetry, mean pulse rate was normal at 71 bpm.     Position: Percent of time spent: supine -  41.2%, prone - 0%, on right - 58.7%, on left - 0.1%.  pAHI was 8.9 per hour supine, n/a per hour prone, 1.7 per hour on right side, and n/a per hour on left side.      Assessment:     Snoring was reported with some obstructive apneas pronounced during supine sleep position, but there is no evidence of clinically significant obstructive sleep apnea, based on the overall apnea-hypopnea index being less than 5 events per hour.    Sleep associated hypoxemia  "was not present.     Test results were discussed with the patient in detail.    Past medical/surgical history, family history, social history, medications and allergies were reviewed.       Problem list:  Patient Active Problem List   Diagnosis     Obesity (BMI 30-39.9)     Acid reflux disease     Obesity (BMI 35.0-39.9) with comorbidity (H)     Pure hypercholesterolemia     Postmenopausal bleeding     NSTEMI (non-ST elevated myocardial infarction) (H)            Physical Examination:    /64   Pulse 76   Ht 1.626 m (5' 4\")   Wt 106.7 kg (235 lb 3.2 oz)   LMP  (LMP Unknown)   SpO2 98%   BMI 40.37 kg/m    General: Pleasant. Cooperative. In no apparent distress.  Pulmonary: Able to speak in full sentences easily. No cough or wheeze.   Neurologic: Alert, oriented x3.  Psychiatric: Mood euthymic. Affect congruent with full range and intensity.             Assessment and Plan:   Snoring, nonrestorative sleep and fatigue.  Based on the recent home sleep study there is no evidence of clinically significant ZHANE based on the overall apnea-hypopnea index being less than 5 events per hour.  We discussed the option of positional device during sleep to avoid supine sleep position because obstructive events were predominant during supine sleep.  Patient reports she does not usually sleep much on her back due to back pain issues and she does not sleep on her left side because of shoulder pain issues.  We discussed that Home sleep apnea testing may lack sensitivity to identify mild disease and recommended obtaining in- lab sleep study to evaluate for sleep disordered breathing.  Orders were generated for in lab sleep study in [facility].  Plan is to communicate with the patient the test results via [employer].    Obesity: We discussed weight management with diet and exercise.    Patient was strongly advised to avoid driving, operating any heavy machinery or other hazardous situations while drowsy or sleepy.  Patient was counseled " "on the importance of driving while alert, to pull over if drowsy, or nap before getting into the vehicle if sleepy.      The above note was dictated using voice recognition software. Although reviewed after completion, some word and grammatical error may remain . Please contact the author for any clarifications.     \"I spent a total of  25 minutes face to face with Jaynehue Tyler during today's office visit. Most of this time was spent counseling the patient and  coordinating care regarding snoring, ZHANE, PSG, weight management , going over results of home sleep study and chart review., including documentation and further activities as noted above.\"       Benjamín Hendrix MD  [facility]  [address] , [place] [address]      "

## 2022-11-09 ENCOUNTER — HOSPITAL ENCOUNTER (OUTPATIENT)
Dept: OCCUPATIONAL THERAPY | Facility: CLINIC | Age: 57
Setting detail: THERAPIES SERIES
Discharge: HOME | End: 2022-11-09
Attending: INTERNAL MEDICINE
Payer: COMMERCIAL

## 2022-11-09 PROCEDURE — 97535 SELF CARE MNGMENT TRAINING: CPT | Mod: GO

## 2022-11-21 ENCOUNTER — DOCUMENTATION ONLY (OUTPATIENT)
Dept: SLEEP MEDICINE | Facility: CLINIC | Age: 57
End: 2022-11-21

## 2022-11-21 ENCOUNTER — THERAPY VISIT (OUTPATIENT)
Dept: SLEEP MEDICINE | Facility: CLINIC | Age: 57
End: 2022-11-21
Attending: INTERNAL MEDICINE
Payer: COMMERCIAL

## 2022-11-21 DIAGNOSIS — G47.9 SLEEP DISTURBANCE: ICD-10-CM

## 2022-11-21 PROCEDURE — 95810 POLYSOM 6/> YRS 4/> PARAM: CPT | Performed by: INTERNAL MEDICINE

## 2022-11-25 ENCOUNTER — HOSPITAL ENCOUNTER (OUTPATIENT)
Dept: OCCUPATIONAL THERAPY | Facility: CLINIC | Age: 57
Setting detail: THERAPIES SERIES
Discharge: HOME | End: 2022-11-25
Attending: INTERNAL MEDICINE
Payer: COMMERCIAL

## 2022-11-25 PROCEDURE — 97535 SELF CARE MNGMENT TRAINING: CPT | Mod: GO

## 2022-11-25 NOTE — PROGRESS NOTES
"[facility]    Outpatient Occupational Therapy Progress Note  Patient: Jayne Tyler  : 1965    Beginning/End Dates of Reporting Period:  22 to 22    Referring Provider: Loli Aquino MD    Therapy Diagnosis: Decreased IND with ADLs/IADLs    Client Self Report: Pt reports feeling quite tired today. Went to her SO's sister's house for Thanksgiving yesterday and was in charge of watching their dog and cleaning up. Notes that SET problems were challenging and she had to complete them in steps with breaks in between. Feels she is getting a little better at pacing herself and notes she left a few tasks at the end of her work day on Wednesday to be completed early next week vs. trying to \"push\" past her limits and complete them on Wednesday.      Goals:     Goal Identifier Fatigue Management   Goal Description Pt will demonstrate 3 energy conservation strategies (e.g. pacing, planning, prioritizing, sleep hygiene, etc.) to facilitate fatigue management with ADL/IADL tasks (e.g. laundry, meal preparation, walking/exercise).   Target Date 23   Date Met      Progress (detail required for progress note):  Goal progressing. Pt educated in fatigue management strategies including stovetop burner analogy, planning, prioritizing, pacing, and posture with application to ADLs/IADLs, including chores at home and kitchen tasks, to name a few. Educated and trained in symptom/activity log for improved self-awareness of fatigue and contributing factors. Will continue to monitor needs and further address, as needed, in future sessions.     Goal Identifier Memory   Goal Description Patient will demonstrate improved memory skills by completing short-term memory tasks in occupational therapy with 90% accuracy using compensatory strategies for increased safety and independence with ADL/IADLS (keeping up " schedule, remembering to take medications, turn off the stove when done, remember to switch laundry).   Target Date 01/20/23   Date Met      Progress (detail required for progress note):  Goal progressing. Pt educated in memory compensatory strategies, including lifestyle factors (physical and cognitive exercise, diet, vision/hearing, sleep, socialization, mental health) as well as specific strategies (attention, association, chunking/clustering, visualization, language-based strategies, repetition/rehearsal, organization, and external aids). Trained in cognitive HEP with 100% accuracy with teach back of speed in session and 100% accuracy with teach back of golf in session. Also recalls 4 of 4 cards with golf game in 3 of 3 trials in session. Will continue to monitor needs and further progress, as appropriate, in future sessions.     Goal Identifier Problem-Solving/Planning   Goal Description Pt will demonstrate the ability to complete moderate to complex problem-solving/planning tasks (WCPA, Errands, Candy Shop, SET, etc.) with 90% accuracy to complete home and work tasks safely and accurately (e.g. meal preparation, financial management, medication management, driving, work).   Target Date 01/20/23   Date Met      Progress (detail required for progress note):  Goal progressing. Administered the Weekly Calendar Planning Activity (WCPA) level II version A as a therapeutic tool. Pt entered 17/17 tasks, made errors in 1/17 entries. Able to follow 5/5 rules. Pt scored in 80th percentile or above average for accuracy with efficiency score in 90-100th percentile range. Facilitated debrief with education in executive function compensatory strategies of highlighting/underlining/circling important information, color-coding, categorizing for improved accuracy. Educated in Goal/Plan/Do/Check metacognitive strategy to promote IND with ADLs/IADLs through planning and debrief/review strategies. Administered Candy Shop part 1  problem-solving activity with minimal distractions with pt 100% accurate with activity. Initiated Candy Shop part 2 with pt completing 2 of 6 order forms with 100% accuracy, min errors on other 4 with attention to detail and calculations. Reduced accuracy noted with increased level of distracting tasks and increased brain fog. Initiated SET numerical reasoning/problem-solving HEP with pt scoring around % accuracy. Will continue to progress as appropriate in future sessions to promote IND with higher level ADLs/IADLs.     Goal Identifier Attention/Multi-tasking   Goal Description Pt will alternate between 2-3 different tasks ( eg bill paying, word puzzle and making phone calls or multi-step meal prep) x 15 minutes with 90% accuracy on all tasks, to stimulate return to higher level work, cooking, ability to grocery shop, work, and driving, among others.   Target Date 01/20/23   Date Met      Progress (detail required for progress note):  Goal progressing. Administered Candy Shop parts 1 and 2 with min distractions in part 1 to moderate distractions in part 2 with pt completing with decreased level of accuracy with increased alternating/divided attention. Reports difficulty with this with IADLs and work-based tasks as well. Will continue to progress to promote IND and accuracy with home- and community-based tasks.       Plan:  Continue therapy per current plan of care. Pt has been seen for a total of 7 outpatient OT sessions with focus on the above goal areas (see progress noted above). Pt demonstrates improved awareness of factors contributing to symptoms, as well as strategies for promoting management. Continues to c/o difficulties with fatigue and brain fog impacting her participation and IND with ADLs/IADLs and work-based tasks. Remains appropriate for skilled OT services. Goal dates updated.    Discharge:  No    Nicole Guzman, OTR/L

## 2022-12-02 ENCOUNTER — HOSPITAL ENCOUNTER (OUTPATIENT)
Dept: OCCUPATIONAL THERAPY | Facility: CLINIC | Age: 57
Setting detail: THERAPIES SERIES
Discharge: HOME | End: 2022-12-02
Attending: INTERNAL MEDICINE
Payer: COMMERCIAL

## 2022-12-02 PROCEDURE — 97535 SELF CARE MNGMENT TRAINING: CPT | Mod: GO

## 2022-12-04 PROBLEM — I21.4 NSTEMI (NON-ST ELEVATED MYOCARDIAL INFARCTION) (H): Status: RESOLVED | Noted: 2022-04-13 | Resolved: 2022-12-04

## 2022-12-04 PROBLEM — N95.0 POSTMENOPAUSAL BLEEDING: Status: RESOLVED | Noted: 2021-01-21 | Resolved: 2022-12-04

## 2022-12-04 PROBLEM — K21.9 ACID REFLUX DISEASE: Status: ACTIVE | Noted: 2022-12-04

## 2022-12-04 PROBLEM — E66.01 MORBID OBESITY (H): Status: ACTIVE | Noted: 2022-12-04

## 2022-12-04 PROBLEM — I10 BENIGN ESSENTIAL HYPERTENSION: Status: ACTIVE | Noted: 2022-12-04

## 2022-12-04 PROBLEM — F41.1 GAD (GENERALIZED ANXIETY DISORDER): Status: ACTIVE | Noted: 2022-12-04

## 2022-12-04 PROBLEM — E66.01 MORBID OBESITY (H): Status: RESOLVED | Noted: 2019-02-25 | Resolved: 2022-12-04

## 2022-12-04 PROBLEM — I10 BENIGN ESSENTIAL HYPERTENSION: Status: RESOLVED | Noted: 2022-12-04 | Resolved: 2022-12-04

## 2022-12-04 NOTE — PROCEDURES
" SLEEP STUDY INTERPRETATION  DIAGNOSTIC POLYSOMNOGRAPHY REPORT      Patient: HILLARY FREGOSO  YOB: 1965  Study Date: 11/21/2022  MRN: [id number]  Referring Provider: ELVIS Stewart CNP, Susan  Ordering Provider: MD Hendrix Snigdha    Indications for Polysomnography: The patient is a 57-year-old Female who is 5' 4\" and weighs 235.0 lbs. Her BMI is 40.6, Millsap sleepiness scale 2 and neck circumference is 34 cm. Patient reports non-restorative sleep and fatigue in the setting of morbid obesity, hypercholesterolemia, acid reflux disease, history of non-ST elevated MI April 2022, chronic left shoulder pain, postmenopausal status(status post hysterectomy). Home sleep study obtained on 10/17/22 showed AHI of 4.7 events per hour. A diagnostic polysomnogram was performed to evaluate for sleep apnea /hypoxemia.    Polysomnogram Data: A full night polysomnogram recorded the standard physiologic parameters including EEG, EOG, EMG, ECG, nasal and oral airflow. Respiratory parameters of chest and abdominal movements were recorded with respiratory inductance plethysmography. Oxygen saturation was recorded by pulse oximetry. Hypopnea scoring rule used: 1B 4%.    Sleep Architecture: Sleep fragmentation and increased wake after sleep onset with reduced sleep efficiency. All sleep stages were seen. REM sleep was reduced.  The total recording time of the polysomnogram was 471.3 minutes. The total sleep time was 382.0 minutes. Sleep latency was normal at 16.8 minutes without the use of a sleep aid. REM latency was 163.0 minutes. Arousal index was increased at 26.9 arousals per hour. Sleep efficiency was decreased at 81.0%. Wake after sleep onset was 72.5 minutes. The patient spent 11.3% of total sleep time in Stage N1, 58.1% in Stage N2, 16.5% in Stage N3, and 14.1% in REM. Time in REM supine was 39.0 minutes.    Respiration: Mild obstructive sleep apnea was present, pronounced during supine sleep and during " REM sleep, particularly pronounced during REM sleep in supine position.  Events ? The polysomnogram revealed a presence of 15 obstructive, 1 central, and - mixed apneas resulting in an apnea index of 2.5 events per hour. There were 40 obstructive hypopneas and - central hypopneas resulting in an obstructive hypopnea index of 6.3 and central hypopnea index of - events per hour. The combined apnea/hypopnea index was 8.8 events per hour (central apnea/hypopnea index was 0.2 events per hour). The REM AHI was 35.6 events per hour. The supine AHI was 31.0 events per hour. The RERA index was 2.0 events per hour.  The RDI was 10.8 events per hour.    Snoring - was reported as mild to moderate.    Respiratory rate and pattern - was notable for normal respiratory rate and pattern.    Sustained Sleep Associated Hypoventilation - Transcutaneous carbon dioxide monitoring was not used, however significant hypoventilation was not suggested by oximetry.    Sleep Associated Hypoxemia - (Greater than 5 minutes O2 sat at or below 88%) was not present. Baseline oxygen saturation was 93.7%. Lowest oxygen saturation was 86.0%. Time spent less than or equal to 88% was 2.3 minutes. Time spent less than or equal to 89% was 5.9 minutes.    Movement Activity: Frequent periodic limb movements were seen, but they were not associated with significant arousals.  Periodic Limb Activity - There were 239 PLMs during the entire study. The PLM index was 37.5 movements per hour. The PLM Arousal Index was 3.0 per hour.    REM EMG Activity - Excessive transient/sustained muscle activity was not present.    Nocturnal Behavior - Abnormal sleep related behaviors were not noted during/arising out of NREM / REM sleep.    Bruxism - Not apparent.    Cardiac Summary: Normal sinus rhythm was noted.  The average pulse rate was 81.4 bpm. The minimum pulse rate was 68.0 bpm while the maximum pulse rate was 105.0 bpm.  Arrhythmias were not noted.    Assessment:      Sleep architecture was remarkable for sleep fragmentation and increased wake after sleep onset with reduced sleep efficiency. All sleep stages were seen. REM sleep was reduced.    Mild obstructive sleep apnea was present, pronounced during supine sleep and during REM sleep, particularly pronounced during REM sleep in supine position.    Frequent periodic limb movements were seen but they were not associated with significant arousals.    Normal sinus rhythm was noted.    Recommendations:     Treatment options for ZHANE include a) Auto titrating CPAP therapy with a range of 5-15 cmH2O and clinical follow up with sleep management team or b) positional therapy during sleep or c) dental appliance through referral to Sleep Dentistry.    Advice regarding the risks of drowsy driving.    Suggest optimizing sleep schedule and avoiding sleep deprivation.    Weight management (if BMI > 30).    Pharmacologic therapy should be used for management of restless legs syndrome only if present and clinically indicated and not based on the presence of periodic limb movements alone.    Diagnostic Codes:   Obstructive Sleep Apnea G47.33  Periodic Limb Movement Disorder G47.61  Repetitive Intrusions Into Sleep F51.8    11/21/2022 [facility] Study (235.0 lbs) - AHI 8.8, RDI 10.8, Supine AHI 31.0, REM AHI 35.6, Low O2 86.0%, Time Spent ?88% 2.3 minutes / Time Spent ?89% 5.9 minutes.     _____________________________________   Electronically Signed By: (Anastacia Hendrix MD), 12/4/22

## 2022-12-05 ENCOUNTER — OFFICE VISIT (OUTPATIENT)
Dept: INTERNAL MEDICINE | Facility: CLINIC | Age: 57
End: 2022-12-05
Payer: COMMERCIAL

## 2022-12-05 VITALS
HEART RATE: 75 BPM | HEIGHT: 64 IN | WEIGHT: 237.4 LBS | OXYGEN SATURATION: 98 % | TEMPERATURE: 97 F | SYSTOLIC BLOOD PRESSURE: 120 MMHG | BODY MASS INDEX: 40.53 KG/M2 | DIASTOLIC BLOOD PRESSURE: 82 MMHG

## 2022-12-05 DIAGNOSIS — Z00.00 ROUTINE HISTORY AND PHYSICAL EXAMINATION OF ADULT: Primary | ICD-10-CM

## 2022-12-05 DIAGNOSIS — Z12.31 ENCOUNTER FOR SCREENING MAMMOGRAM FOR BREAST CANCER: ICD-10-CM

## 2022-12-05 DIAGNOSIS — F41.1 GAD (GENERALIZED ANXIETY DISORDER): ICD-10-CM

## 2022-12-05 LAB — SLPCOMP: NORMAL

## 2022-12-05 PROCEDURE — 99396 PREV VISIT EST AGE 40-64: CPT | Performed by: INTERNAL MEDICINE

## 2022-12-06 NOTE — PROGRESS NOTES
ASSESSMENT/PLAN                                                       (Z00.00) Routine history and physical examination of adult  (primary encounter diagnosis)  Comment: PMH, PSH, FH, SH, medications, allergies, immunizations, and preventative health measures reviewed and updated as appropriate.  Plan: see below for plans.      (Z12.31) Encounter for screening mammogram for breast cancer  Plan: screening mammogram ordered - patient to schedule.     (F41.1) KELLE (generalized anxiety disorder)  Comment: no significant improvement with increasing dose of Prozac from 20 to 30mg daily.    Plan: decrease dose back to 20mg daily; refills provided.     Loli Aquino MD   [address] [address]  T: [phone], F: [phone]    SUBJECTIVE                                                      Jayne Tyler is a very pleasant 57 year old female who presents for a physical.    ROS:  Constitutional: no unintentional weight loss or gain reported; no fevers, chills, or sweats reported  Cardiovascular: no chest pain, palpitations, or edema reported  Respiratory: no cough, wheezing, shortness of breath, or dyspnea on exertion reported  Gastrointestinal: no nausea, vomiting, constipation, diarrhea, or abdominal pain reported  Genitourinary: no urinary frequency, urgency, dysuria, or hematuria reported  Integumentary: no rash or pruritus reported  Musculoskeletal: no back pain, muscle pain, joint pain, or joint swelling reported  Neurologic: no focal weakness, numbness, or tingling reported  Hematologic: no easy bruising or bleeding reported  Endocrine: no heat or cold intolerance reported; no polyuria or polydipsia reported  Psychiatric: see PMH below    Past Medical History:   Diagnosis Date     Acid reflux disease      KELLE (generalized anxiety disorder)      Morbid obesity (H)      Pure hypercholesterolemia      Past Surgical History:   Procedure Laterality Date     ARTHROSCOPY  SHOULDER Right 2015     ARTHROSCOPY SHOULDER, OPEN ROTATOR CUFF REPAIR, COMBINED Left 03/29/2017    Procedure:  Left shoulder arthroscopy and arthroscopic subacromial decompression (acromioplasty, bursectomy and coracoacromial ligament resection). Arthroscopic distal clavicle resection. Mini-open rotator cuff tear repair. Surgeon:  Kevin George MD  Location[facility]     BUNIONECTOMY Right 2008     CV CORONARY ANGIOGRAM N/A 4/14/2022    Procedure: Coronary Angiogram;  Surgeon: Olvin Roy MD;  Location:  [facility]     DAVINCI HYSTERECTOMY TOTAL, BILATERAL SALPINGO-OOPHORECTOMY, COMBINED N/A 2/9/2021    Procedure: Robotic assisted total laparoscopic hysterectomy, bilateral salpingectomy, bilateral oophorectomy, cystoscopy;  Surgeon: Jonathan Peters MD;  Location:  OR     DISKECTOMY, LUMBAR, SINGLE SP  1996    L5-S1     DISKECTOMY, LUMBAR, SINGLE SP  1997    L5-S1     FUSION LUMBAR ANTERIOR ONE LEVEL  1999    L5-S1     OSTEOTOMY FOOT Right 06/06/2017    Procedure: OSTEOTOMY FOOT;  1)  WEIL OSTEOTOMY RIGHT SECOND METATARSAL, 2) PLANTAR CAPSULE DEBRIDEMENT/SYNOVECTOMY, RIGHT SECOND METATARSAL PHALANGEAL JOINT, 3) HAMMERTOE REPAIR RIGHT SECOND TOE;  Surgeon: Olvin Betancur DPM;  Location: [facility]     NH SPINE SURGERY PROCEDURE UNLISTED       RECONSTRUCT FOREFOOT WITH METATARSOPHALANGEAL (MTP) FUSION Right 06/06/2017    Procedure: RECONSTRUCT FOREFOOT WITH METATARSOPHALANGEAL (MTP) FUSION;;  Surgeon: Olvin Betancur DPM;  Location: [facility]     REPAIR HAMMER TOE Right 06/06/2017    Procedure: REPAIR HAMMER TOE;;  Surgeon: Olvin Betancur DPM;  Location: [facility]     ROTATOR CUFF REPAIR RT/LT Left 2017     Family History   Problem Relation Age of Onset     Rheumatoid Arthritis Mother      Coronary Artery Disease Mother         s/p MI and PCIs in 50s     Dementia Mother      Diabetes Type 2  Mother      Rheumatoid Arthritis Sister      Myocardial Infarction  Maternal Grandmother         in her 60s     Myocardial Infarction Maternal Grandfather         later in life     Cerebrovascular Disease No family hx of      Colon Cancer No family hx of      Breast Cancer No family hx of      Ovarian Cancer No family hx of      Social History     Occupational History     Occupation:    Tobacco Use     Smoking status: Former     Packs/day: 0.75     Years: 26.00     Pack years: 19.50     Types: Cigarettes     Quit date: 2022     Years since quittin.6     Smokeless tobacco: Never   Vaping Use     Vaping Use: Never used   Substance and Sexual Activity     Alcohol use: Yes     Comment: rare     Drug use: No     Sexual activity: Yes     Partners: Male   Social History Narrative    . In a long-term relationship.    3 adult kids.    No grandchildren.    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     Allergies   Allergen Reactions     Lexapro [Escitalopram] Rash     Purple marks on arms     Penicillins Hives     Current Outpatient Medications   Medication Sig     atorvastatin (LIPITOR) 40 MG tablet Take 0.5 tablets (20 mg) by mouth every evening     Cholecalciferol (VITAMIN D) 2000 units CAPS Take 2,000 Units by mouth daily     FLUoxetine (PROZAC) 20 MG capsule Take 1 capsule (20 mg) by mouth daily     fluticasone (FLONASE) 50 MCG/ACT nasal spray Spray 2 sprays into both nostrils daily (Patient taking differently: Spray 2 sprays into both nostrils daily as needed)     multivitamin w/minerals (THERA-VIT-M) tablet Take 1 tablet by mouth daily     omeprazole (PRILOSEC) 20 MG DR capsule Take 20 mg by mouth daily     vitamin (B COMPLEX-C) tablet Take 1 tablet by mouth daily     Immunization History   Administered Date(s) Administered     COVID-19 Vaccine 12+ (Pfizer ) 2022     COVID-19 Vaccine 12+ (Pfizer) 2021, 2021, 2021     COVID-19 Vaccine Bivalent Booster 12+ (Pfizer) 10/28/2022     Influenza Vaccine 50-64 or 18-64 w/egg allergy (Flublok)  "10/10/2022     Influenza Vaccine >6 months (Alfuria,Fluzone) 10/10/2018, 10/22/2019, 10/16/2020, 11/03/2021     TDAP Vaccine (Adacel) 12/01/2014     Td (Adult), Adsorbed 07/12/2004     Zoster vaccine recombinant adjuvanted (SHINGRIX) 05/10/2019, 10/22/2019     PREVENTATIVE HEALTH                                                      BMI: morbidly obese  Blood pressure: within normal limits   Breast CA screening: DUE  Cervical CA screening: up to date   Colon CA screening: up to date   Lung CA screening: patient does not meet screening criteria  Dexa: not medically indicated at this time   Screening cholesterol: n/a - already being treated for this condition  Screening diabetes: up to date   STD testing: no risk factors present  Alcohol misuse screening: alcohol use reviewed - no intervention indicated at this time  Immunizations: reviewed; up to date     OBJECTIVE                                                      /82   Pulse 75   Temp 97  F (36.1  C)   Ht 1.626 m (5' 4\")   Wt 107.7 kg (237 lb 6.4 oz)   LMP  (LMP Unknown)   SpO2 98%   BMI 40.75 kg/m    Constitutional: well-appearing  Head, Ears, and Eyes: normocephalic; normal external auditory canal and pinna; tympanic membranes visualized and normal; normal lids and conjunctivae  Neck: supple, symmetric, no thyromegaly or lymphadenopathy  Respiratory: normal respiratory effort; clear to auscultation bilaterally  Cardiovascular: regular rate and rhythm; no edema  Gastrointestinal: soft, non-tender, and non-distended; no organomegaly or masses  Musculoskeletal: normal gait and station  Psych: normal judgment and insight; normal mood and affect; recent and remote memory intact    ---  (Note was completed, in part, with [employer] voice-recognition software. Documentation was reviewed, but some grammatical, spelling, and word errors may remain.)    "

## 2022-12-13 ENCOUNTER — HOSPITAL ENCOUNTER (OUTPATIENT)
Dept: OCCUPATIONAL THERAPY | Facility: CLINIC | Age: 57
Setting detail: THERAPIES SERIES
Discharge: HOME | End: 2022-12-13
Attending: INTERNAL MEDICINE
Payer: COMMERCIAL

## 2022-12-13 DIAGNOSIS — F43.9 STRESS: Primary | ICD-10-CM

## 2022-12-13 PROCEDURE — 97535 SELF CARE MNGMENT TRAINING: CPT | Mod: GO

## 2022-12-14 ENCOUNTER — ANCILLARY PROCEDURE (OUTPATIENT)
Dept: MAMMOGRAPHY | Facility: CLINIC | Age: 57
End: 2022-12-14
Attending: INTERNAL MEDICINE
Payer: COMMERCIAL

## 2022-12-14 DIAGNOSIS — Z12.31 VISIT FOR SCREENING MAMMOGRAM: ICD-10-CM

## 2022-12-14 DIAGNOSIS — Z12.31 ENCOUNTER FOR SCREENING MAMMOGRAM FOR BREAST CANCER: ICD-10-CM

## 2022-12-14 PROCEDURE — 77063 BREAST TOMOSYNTHESIS BI: CPT | Mod: TC | Performed by: RADIOLOGY

## 2022-12-14 PROCEDURE — 77067 SCR MAMMO BI INCL CAD: CPT | Mod: TC | Performed by: RADIOLOGY

## 2022-12-23 ENCOUNTER — MYC MEDICAL ADVICE (OUTPATIENT)
Dept: SLEEP MEDICINE | Facility: CLINIC | Age: 57
End: 2022-12-23

## 2022-12-23 DIAGNOSIS — G47.33 OSA (OBSTRUCTIVE SLEEP APNEA): Primary | ICD-10-CM

## 2022-12-30 ENCOUNTER — MYC MEDICAL ADVICE (OUTPATIENT)
Dept: INTERNAL MEDICINE | Facility: CLINIC | Age: 57
End: 2022-12-30

## 2022-12-30 ENCOUNTER — NURSE TRIAGE (OUTPATIENT)
Dept: INTERNAL MEDICINE | Facility: CLINIC | Age: 57
End: 2022-12-30

## 2022-12-30 DIAGNOSIS — U07.1 INFECTION DUE TO 2019 NOVEL CORONAVIRUS: Primary | ICD-10-CM

## 2022-12-30 NOTE — TELEPHONE ENCOUNTER
Patient tested covid + 12/30/22    Sx started 12/27/22    Current Sx: Slight fever, Slight dry cough, Sinus Congestion, Headaches, Chills, Body aches, Loss of smell.     Denies SOB/difficulty breathing, chest pain or pressure.     Dispo: Discuss with PCP and Callback by Nurse within 1 hour    RN continued with Covid Tx Protocol.       RN COVID TREATMENT VISIT  12/30/22    Jayne Tyler  57 year old  Current weight? 237 lbs    Has the patient been seen by a primary care provider at an [facility] or [facility] within the past two years? Yes.   Have you been in close proximity to/do you have a known exposure to a person with a confirmed case of influenza? No.     Date of positive COVID test (PCR or at home)?  12/30/22    Current COVID symptoms: fever or chills, cough, fatigue, muscle or body aches, headache, new loss of taste or smell and congestion or runny nose    Date COVID symptoms began: 12/27/22    Do you have any of the following conditions that place you at risk of being very sick from COVID-19? mental health conditions, overweight (BMI>25) and smoking (current or former)    Is patient eligible to continue? Yes, established patient, 12 years or older weighing at least 88.2 lbs, who has COVID symptoms that started in the past 5 days and is at risk for being very sick from COVID-19.       Have you received monoclonal antibodies or oral antiviral medications since testing positive to COVID-19? No    Are you currently hospitalized for COVID-19? No    Do you have a history of hepatitis? No    Are you currently pregnant or nursing? No    Do you have a clinically significant hypersensitivity to nirmatrelvir, ritonavir, or molnupiravir? No    Do you have any history of severe renal impairment (eGFR < 30mL/min)? No    Do you have any history of hepatic impairment or abnormalities (e.g. hepatic panel, ALT, AST, ALK Phos, bilirubin)? No    Have you had a coronary stent placed in the previous  6 months? No    Is patient eligible to continue?   Yes, patient meets all eligibility requirements for the RN COVID treatment (as denoted by all no responses above).     Current Outpatient Medications   Medication Sig Dispense Refill     atorvastatin (LIPITOR) 40 MG tablet Take 0.5 tablets (20 mg) by mouth every evening 90 tablet 3     Cholecalciferol (VITAMIN D) 2000 units CAPS Take 2,000 Units by mouth daily 90 capsule 3     FLUoxetine (PROZAC) 20 MG capsule Take 1 capsule (20 mg) by mouth daily 90 capsule 3     fluticasone (FLONASE) 50 MCG/ACT nasal spray Spray 2 sprays into both nostrils daily (Patient taking differently: Spray 2 sprays into both nostrils daily as needed) 16 g 3     multivitamin w/minerals (THERA-VIT-M) tablet Take 1 tablet by mouth daily       omeprazole (PRILOSEC) 20 MG DR capsule Take 20 mg by mouth daily       vitamin (B COMPLEX-C) tablet Take 1 tablet by mouth daily         Medications from List 1 of the standing order (on medications that exclude the use of Paxlovid) that patient is taking: lidocaine (systemic) (Xylocaine, Lidomark)   PATIENT REPORTS SHE IS NOT CURRENTLY RECEIVING TRIAMCINOLONE OR LIDOCAINE INJECTIONS AND HAS NO FUTURE PLANS TO CONTINUE. HUDDLED WITH PCP (DR. HALEY), APPROVAL GIVEN TO CONTINUE RN PROTOCOL FOR PAXLOVID.   Is patient taking Colby's Wort? No  Is patient taking Colby's Wort or any meds from List 1? No.   Medications from List 2 of the standing order (on meds that provider needs to adjust) that patient is taking: NONE. Is patient on any of the meds from List 2? No.   Medications from List 3 of standing order (on meds that a RN needs to adjust) that patient is taking: atorvastatin (Lipitor): Instructed patient to stop atorvastatin while taking Paxlovid and restart atorvastatin 1 day after the completion of Paxlovid.  Is patient on any meds from List 3? Yes. Patient is on meds from list 3. No meds require a provider visit and at least one med required RN to  adjust.   In order of efficacy, Paxlovid has an approximate 90% reduction in hospitalization. Paxlovid can possibly cause altered sense of taste, diarrhea (loose, watery stools), high blood pressure, muscle aches.  The other option is molnupiravir which has an approximate 30% reduction in hospitalization. Molnupirarivr can possibly cause diarrhea (loose, watery stools), nausea (feeling sick to your stomach), dizziness, headaches.    Which treatment option does the patient prefer?   Paxlovid.   Lab Results   Component Value Date    GFRESTIMATED 84 04/15/2022       Was last eGFR reduced? No, eGFR 60 or greater/ No Result on record. Patient can receive the normal renal function dose. Paxlovid Rx sent to [facility].    Temporary change to home medications: HOLD Atorvastatin during course of Paxlovid. Restart Atorvastatin 24 hours after completing Paxlovid treatment.     All medication adjustments (holds, etc) were discussed with the patient and patient was asked to repeat back (teachback) their med adjustment.  Did patient understand med adjustment? Yes, patient repeated back and understood correctly.        Reviewed the following instructions with the patient:    Paxlovid (nimatrelvir and ritonavir)    How it works  Two medicines (nirmatrelvir and ritonavir) are taken together. They stop the virus from growing. Less amount of virus is easier for your body to fight.    How to take    Medicine comes in a daily container with both medicine tablets. Take by mouth twice daily (once in the morning, once at night) for 5 days.    The number of tablets to take varies by patient.    Don't chew or break capsules. Swallow whole.    When to take  Take as soon as possible after positive COVID-19 test result, and within 5 days of your first symptoms.    Possible side effects  Can cause altered sense of taste, diarrhea (loose, watery stools), high blood pressure, muscle aches.    Suzanne Garduno RN                      Reason for Disposition    [1] HIGH RISK for severe COVID complications (e.g., weak immune system, age > 64 years, obesity with BMI of 30 or higher, pregnant, chronic lung disease or other chronic medical condition) AND [2] COVID symptoms (e.g., cough, fever)  (Exceptions: Already seen by PCP and no new or worsening symptoms.)    Additional Information    Negative: SEVERE difficulty breathing (e.g., struggling for each breath, speaks in single words)    Negative: Difficult to awaken or acting confused (e.g., disoriented, slurred speech)    Negative: Bluish (or gray) lips or face now    Negative: Shock suspected (e.g., cold/pale/clammy skin, too weak to stand, low BP, rapid pulse)    Negative: Sounds like a life-threatening emergency to the triager    Negative: [1] Diagnosed or suspected COVID-19 AND [2] symptoms lasting 3 or more weeks    Negative: [1] COVID-19 exposure AND [2] no symptoms    Negative: COVID-19 vaccine reaction suspected (e.g., fever, headache, muscle aches) occurring 1 to 3 days after getting vaccine    Negative: COVID-19 vaccine, questions about    Negative: [1] Lives with someone known to have influenza (flu test positive) AND [2] flu-like symptoms (e.g., cough, runny nose, sore throat, SOB; with or without fever)    Negative: [1] Adult with possible COVID-19 symptoms AND [2] triager concerned about severity of symptoms or other causes    Negative: COVID-19 and breastfeeding, questions about    Negative: SEVERE or constant chest pain or pressure  (Exception: Mild central chest pain, present only when coughing.)    Negative: MODERATE difficulty breathing (e.g., speaks in phrases, SOB even at rest, pulse 100-120)    Negative: Headache and stiff neck (can't touch chin to chest)    Negative: Oxygen level (e.g., pulse oximetry) 90 percent or lower    Negative: Chest pain or pressure  (Exception: MILD central chest pain, present only when coughing)    Negative: Patient sounds very sick  or weak to the triager    Negative: MILD difficulty breathing (e.g., minimal/no SOB at rest, SOB with walking, pulse <100)    Negative: Fever > 103 F (39.4 C)    Negative: [1] Fever > 101 F (38.3 C) AND [2] over 60 years of age    Negative: [1] Fever > 100.0 F (37.8 C) AND [2] bedridden (e.g., nursing home patient, CVA, chronic illness, recovering from surgery)    Protocols used: CORONAVIRUS (COVID-19) DIAGNOSED OR [id number]

## 2022-12-30 NOTE — TELEPHONE ENCOUNTER
Patient reports [facility] is currently out of stock of Paxlovid medication. Script re-sent to [facility] pharmacy per patient preference.       Paxlovid Rx sent to [facility]   [facility]   [phone]  [address] [place] [address]    Hours:  Mon-Fri: 8:30a - 6:00p  Sat-Sun: 8:30a - 12:30p    Curbside Delivery: Patient to call [phone] to set up and  at door 2 of [facility]

## 2023-01-09 ENCOUNTER — TELEPHONE (OUTPATIENT)
Dept: SLEEP MEDICINE | Facility: CLINIC | Age: 58
End: 2023-01-09

## 2023-01-09 NOTE — TELEPHONE ENCOUNTER
Called patient LVM requesting call back to [facility] at [phone] to schedule CPAP SETUP. Offered our [place] or [place] showroom

## 2023-01-23 ENCOUNTER — DOCUMENTATION ONLY (OUTPATIENT)
Dept: SLEEP MEDICINE | Facility: CLINIC | Age: 58
End: 2023-01-23
Payer: COMMERCIAL

## 2023-01-23 DIAGNOSIS — G47.33 OBSTRUCTIVE SLEEP APNEA (ADULT) (PEDIATRIC): Primary | ICD-10-CM

## 2023-01-23 DIAGNOSIS — G47.00 PERSISTENT DISORDER OF INITIATING OR MAINTAINING SLEEP: ICD-10-CM

## 2023-01-23 NOTE — PROGRESS NOTES
Patient was offered choice of vendor and chose [facility].  Patient Jayne Tyler was set up at [place] on January 23, 2023. Patient received a Resmed Airsense 11 Pressures were set at 5-15 cm H2O.   Patient s ramp is 5 cm H2O for Auto and FLEX/EPR is EPR, 2.  Patient received a Resmed Mask name: [id number]  Nasal mask size Standard, heated tubing and heated humidifier.  Patient does not need to meet compliance. Patient has a follow up on TBD with Dr. Hendrix.    Elba Stewart

## 2023-01-26 ENCOUNTER — DOCUMENTATION ONLY (OUTPATIENT)
Dept: SLEEP MEDICINE | Facility: CLINIC | Age: 58
End: 2023-01-26
Payer: COMMERCIAL

## 2023-02-07 ENCOUNTER — DOCUMENTATION ONLY (OUTPATIENT)
Dept: SLEEP MEDICINE | Facility: CLINIC | Age: 58
End: 2023-02-07
Payer: COMMERCIAL

## 2023-02-07 NOTE — PROGRESS NOTES
14  DAY STM VISIT    Diagnostic AHI: 8.8  PSG    Message left for patient to return call     Assessment: Pt meeting objective benchmarks.      Action plan: waiting for patient to return call.  and pt to have 30 day STM visit.      Device type: Auto-CPAP    PAP settings:  CPAP MIN CPAP MAX 95TH % PRESSURE EPR RESMED SOFT RESPONSE SETTING   5.0 cm  H20 15.0 cm  H20 11 cm  H20  TWO OFF     Mask type:  Nasal Mask    Objective measures: 14 day rolling measures   COMPLIANCE LEAK AHI AVERAGE USE IN MINUTES   100 % 10.96 0.4 427   GOAL >70% GOAL < 24 LPM GOAL <5 GOAL >240          Total time spent on accessing and interpreting remote patient PAP therapy data  10 minutes    Total time spent counseling, coaching  and reviewing PAP therapy data with patient  1 minute    [id number]  [id number]  no (3 day STM)

## 2023-02-22 NOTE — PROGRESS NOTES
"[facility]    Outpatient Occupational Therapy Discharge Note  Patient: Jayne Tyler  : 1965    Beginning/End Dates of Reporting Period:  22 to 22 (with most recent reporting period being 22 - 22)    Referring Provider: oLli Aquino MD    Therapy Diagnosis: Decreased IND with ADLs/IADLs    Client Self Report: Pt reports she had an unexpected death in her boyfriend's family. Feels \"defeated\" today as she saw her primary care doctor and said she felt her doctor spent less than 10 minutes with her and \"simply gave me a pat on the hand to say sorry for what I was going through.\" Reports her mom was also diagnosed with dementia last week (mom is 77 years old).    Goals:     Goal Identifier Fatigue Management   Goal Description Pt will demonstrate 3 energy conservation strategies (e.g. pacing, planning, prioritizing, sleep hygiene, etc.) to facilitate fatigue management with ADL/IADL tasks (e.g. laundry, meal preparation, walking/exercise).   Target Date 23   Date Met   22   Progress (detail required for progress note):  Goal met. Pt educated in fatigue management strategies including stovetop burner analogy, planning, prioritizing, pacing, and posture with application to ADLs/IADLs, including chores at home and kitchen tasks, to name a few. Educated and trained in symptom/activity log for improved self-awareness of fatigue and contributing factors. Pt to continue applying strategies beyond discharge for IND with ADLs/IADLs.     Goal Identifier Memory   Goal Description Patient will demonstrate improved memory skills by completing short-term memory tasks in occupational therapy with 90% accuracy using compensatory strategies for increased safety and independence with ADL/IADLS (keeping up schedule, remembering to take medications, turn off the stove when done, " remember to switch laundry).   Target Date 01/20/23   Date Met   12/13/22   Progress (detail required for progress note):  Goal met. Pt educated in memory compensatory strategies, including lifestyle factors (physical and cognitive exercise, diet, vision/hearing, sleep, socialization, mental health) as well as specific strategies (attention, association, chunking/clustering, visualization, language-based strategies, repetition/rehearsal, organization, and external aids). Trained in cognitive HEP with 100% accuracy with teach back of speed in session and 100% accuracy with teach back of golf in session. Also recalls 4 of 4 cards with golf game in 3 of 3 trials in session. Pt to continue with cognitive HEP and use of memory strategies to promote IND with ADLs/IADLs.     Goal Identifier Problem-Solving/Planning   Goal Description Pt will demonstrate the ability to complete moderate to complex problem-solving/planning tasks (WCPA, Errands, Candy Shop, SET, etc.) with 90% accuracy to complete home and work tasks safely and accurately (e.g. meal preparation, financial management, medication management, driving, work).   Target Date 01/20/23   Date Met      Progress (detail required for progress note):  Goal progressing but not fully met. Administered the Weekly Calendar Planning Activity (WCPA) level II version A as a therapeutic tool. Pt entered 17/17 tasks, made errors in 1/17 entries. Able to follow 5/5 rules. Pt scored in 80th percentile or above average for accuracy with efficiency score in 90-100th percentile range. Facilitated debrief with education in executive function compensatory strategies of highlighting/underlining/circling important information, color-coding, categorizing for improved accuracy. Educated in Goal/Plan/Do/Check metacognitive strategy to promote IND with ADLs/IADLs through planning and debrief/review strategies. Administered Candy Shop part 1 problem-solving activity with minimal distractions  with pt 100% accurate with activity. Initiated Candy Shop part 2 with pt completing 2 of 6 order forms with 100% accuracy, min errors on other 4 with attention to detail and calculations. Reduced accuracy noted with increased level of distracting tasks and increased brain fog. Initiated SET numerical reasoning/problem-solving HEP with pt scoring around % accuracy. Unable to reassess prior to discharge. Pt to continue with cognitive HEP.     Goal Identifier Attention/Multi-tasking   Goal Description Pt will alternate between 2-3 different tasks ( eg bill paying, word puzzle and making phone calls or multi-step meal prep) x 15 minutes with 90% accuracy on all tasks, to stimulate return to higher level work, cooking, ability to grocery shop, work, and driving, among others.   Target Date 01/20/23   Date Met      Progress (detail required for progress note):  Goal progressing but not fully met. Administered Candy Shop parts 1 and 2 with min distractions in part 1 to moderate distractions in part 2 with pt completing with decreased level of accuracy with increased alternating/divided attention. Similar difficulty with accuracy noted with completion of multi-tasking/alternating attention activity in session with 3 various executive function tasks x2 minute intervals for total of 18 mins with pt completing activities with average of 83% accuracy. Reports continued difficulty with accuracy with IADLs and work-based tasks in daily life. Pt to continue applying cognitive compensatory strategies and continue with cognitive HEP.     Plan:  Discharge from therapy. Pt was seen for a total of 9 OP OT visits with focus on the above goal areas (see progress noted above). Pt overall demonstrating significant progress in self-awareness and self-management of factors contributing to symptoms but continued to c/o difficulty with completing ADLs/IADLs IND. Had planned to further progress goal areas and finalize HEP, but pt failed  to attend/schedule further appointments.    Discharge:    Reason for Discharge: Patient chooses to discontinue therapy.  Patient has failed to schedule further appointments.    Equipment Issued: n/a    Discharge Plan: Patient to continue home program and is welcome to return to OP OT in the future, as needed, with new referral from her physician. Had recommended counseling services with request to PCP to initiate referral. Pt to follow-up with PCP regarding counseling services or other needs, as appropriate.    Thank you for the referral of this patient.  If you have any questions regarding the information in this report, please feel free to contact me per the information provided below.      Nicole Guzman MA, OTR/L  Occupational Therapist  [address] [address]  Clinic Fax:  [phone]  Clinic Phone: [phone]

## 2023-03-06 ENCOUNTER — OFFICE VISIT (OUTPATIENT)
Dept: FAMILY MEDICINE | Facility: CLINIC | Age: 58
End: 2023-03-06

## 2023-03-06 VITALS
WEIGHT: 245 LBS | BODY MASS INDEX: 40.82 KG/M2 | TEMPERATURE: 98 F | OXYGEN SATURATION: 98 % | DIASTOLIC BLOOD PRESSURE: 82 MMHG | HEART RATE: 91 BPM | HEIGHT: 65 IN | SYSTOLIC BLOOD PRESSURE: 122 MMHG

## 2023-03-06 DIAGNOSIS — F41.1 GAD (GENERALIZED ANXIETY DISORDER): Primary | ICD-10-CM

## 2023-03-06 DIAGNOSIS — Z71.89 ACP (ADVANCE CARE PLANNING): ICD-10-CM

## 2023-03-06 DIAGNOSIS — G47.39 OTHER SLEEP APNEA: ICD-10-CM

## 2023-03-06 DIAGNOSIS — E78.00 PURE HYPERCHOLESTEROLEMIA: ICD-10-CM

## 2023-03-06 DIAGNOSIS — R41.89 COGNITIVE CHANGES: ICD-10-CM

## 2023-03-06 DIAGNOSIS — Z86.16 HISTORY OF COVID-19: ICD-10-CM

## 2023-03-06 DIAGNOSIS — Z76.89 HEALTH CARE HOME: ICD-10-CM

## 2023-03-06 DIAGNOSIS — R41.89 BRAIN FOG: ICD-10-CM

## 2023-03-06 DIAGNOSIS — R53.83 OTHER FATIGUE: ICD-10-CM

## 2023-03-06 PROCEDURE — 99203 OFFICE O/P NEW LOW 30 MIN: CPT | Performed by: FAMILY MEDICINE

## 2023-03-06 NOTE — PROGRESS NOTES
Assessment & Plan   Problem List Items Addressed This Visit        Respiratory    Other sleep apnea    Relevant Orders    Other Specialty Referral    Adult Post Covid Clinic Referral       Endocrine    Pure hypercholesterolemia       Behavioral    KELLE (generalized anxiety disorder) - Primary       Other    Other fatigue    Relevant Orders    Adult Neurology  Referral    Other Specialty Referral    Adult Post Covid Clinic Referral    Brain fog    Relevant Orders    Adult Neurology  Referral    Other Specialty Referral    Adult Post Covid Clinic Referral    History of COVID-19    Relevant Orders    Adult Neurology  Referral    Other Specialty Referral    Adult Post Covid Clinic Referral    Cognitive changes    Relevant Orders    Adult Neurology  Referral    Other Specialty Referral    Adult Post Covid Clinic Referral    ACP (advance care planning)    Health Care Home    Relevant Orders    Adult Post Covid Clinic Referral      1. ACP (advance care planning)      2. KELLE (generalized anxiety disorder)  She said she really doesn't have anxiety. Is currently on medications from her previous doctor. I explained that sometimes chronic illness    3. Pure hypercholesterolemia  She is on medications as recommended by the cardiologist.    4. Other sleep apnea  She is using a cpap.   - Other Specialty Referral; Future    5. Other fatigue  Referral to neurology and also to long covid clinic at .  - Adult Neurology  Referral  - Other Specialty Referral; Future    6. History of COVID-19  Referral to neurology and covid clinic at .  - Adult Neurology  Referral  - Other Specialty Referral; Future    7. Brain fog  See above.  - Adult Neurology  Referral  - Other Specialty Referral; Future    8. Cognitive changes  See above.  - Adult Neurology  Referral  - Other Specialty Referral; Future    9. Health Care Home             BMI:   Estimated body mass index is  "40.77 kg/m  as calculated from the following:    Height as of this encounter: 1.651 m (5' 5\").    Weight as of this encounter: 111.1 kg (245 lb).         FUTURE APPOINTMENTS:       - Follow-up visit as needed.    No follow-ups on file.    Ekaterina Sanders MD  [facility]    Subjective     There are no exam notes on file for this visit.     Jayne Tyler is a 57 year old female who presents to clinic today for the following health issues     HPI     She had covid last year, since then chronic fatigue, brain fog, isn't hersefl.   She did a stress test and has had a heart workup with cardiology and they told her that her heart was ok. Had an angiogram. So then they told her it was anxiety. She doesn't think it's anxiety.  She thought it was long covid.     She is now on anxiety medication, statin from cardiology.  She had a sleep study and has a cpap now.    Then she had covid again on 12.27.22      Ongoing issues with brain fog and fatigue. Last feb 2022 woke up with overwhelming exhaustion. Hard to think and hard to focus. A numb feeling. Took a home covid test and it was a faint positive. She was surprised but her boyfriend had just come back from a trip to [place]. After about 6 weeks called to schedule an apt. It took her until April to get in. bloodwork was done, trace blood In the urine,   Thought it could be her heart. Did a lexiscan test, went on blood thinners because of possibly having a heart attack. Then had chest pain and felt like the world would fall out. Went to the ER. It turned out she had a heart attack. She saw the cardiologist. Had a heart workup and everything was fine.  Then they addressed that she might be anxious. Was on lexapro and this didn't work out so changed to prozac. Had panic attacks 7 years ago when ,  was a narcissitic personality disorder and they told her she had PTSD.   Then she did a sleep study, mild sleep apnea. Has a cpap, this helps her to " "sleep better but she is still having symptoms.    Her nephew told her she might have long covid.  A referral to a covid rehab was done. Was also on cognitive therapy. Has a hard time remembering words.    The prozac was increased.   She said she doesn't have anxiety. And that no one has discussed this with her.    Doesn't think she is a hypochondriac but has so many symptoms and isn't sure where else to turn.           Review of Systems   Constitutional, HEENT, cardiovascular, pulmonary, gi and gu systems are negative, except as otherwise noted.      Objective    /82 (BP Location: Right arm, Patient Position: Sitting, Cuff Size: Adult Large)   Pulse 91   Temp 98  F (36.7  C) (Temporal)   Ht 1.651 m (5' 5\")   Wt 111.1 kg (245 lb)   LMP  (LMP Unknown)   SpO2 98%   BMI 40.77 kg/m    Body mass index is 40.77 kg/m .  Physical Exam   GENERAL: healthy, alert and no distress  RESP: lungs clear to auscultation - no rales, rhonchi or wheezes  CV: regular rate and rhythm, normal S1 S2, no S3 or S4, no murmur, click or rub, no peripheral edema and peripheral pulses strong  MS: no gross musculoskeletal defects noted, no edema  NEURO: Normal strength and tone, mentation intact and speech normal  PSYCH: mentation appears normal, affect normal/bright    No results found for any visits on 03/06/23.      "

## 2023-03-06 NOTE — LETTER
[facility]  [address] [place]  [address]  [facility] [address] [phone]      March 6, 2023      Jayne Tyler  [address]  [facility] [address]      EMERGENCY CARE PLAN  Presenting Problem Treatment Plan   Questions or concerns during clinic hours I will call the clinic directly:    [facility]  [address], [address]  [place] [address] [phone]   Questions or concerns outside clinic hours  I will call the 24 hour line at [phone]   Patient needs to schedule an appointment  I will call the  scheduling line at [phone]   Same day treatment   I will call the clinic first, then  urgent care and/or  express care if needed   Clinic Care Coordinators Saundra Keller RN:  [phone]  [facility] Clinical Support Staff: [phone]    Crisis Services:  Behavioral or Mental Health P (Behavioral Health Providers)   [phone]   Emergency treatment--Immediately CALL 201

## 2023-04-20 DIAGNOSIS — R41.89 CHANGES IN CONSCIOUSNESS: Primary | ICD-10-CM

## 2023-04-20 PROCEDURE — 36415 COLL VENOUS BLD VENIPUNCTURE: CPT | Performed by: FAMILY MEDICINE

## 2023-04-20 PROCEDURE — 82607 VITAMIN B-12: CPT | Mod: 90 | Performed by: FAMILY MEDICINE

## 2023-04-20 NOTE — NURSING NOTE
Orders from [facility] clinic of Neuro  Dr. Tavo Montemayor-Jim  phone [phone]    Fax [phone]

## 2023-04-21 DIAGNOSIS — E78.5 HYPERLIPIDEMIA LDL GOAL <100: ICD-10-CM

## 2023-04-21 LAB — VIT B12 SERPL-MCNC: 547 PG/ML (ref 200–1100)

## 2023-04-21 NOTE — TELEPHONE ENCOUNTER
Pending Prescriptions:                       Disp   Refills    atorvastatin (LIPITOR) 40 MG tablet        90 tab*3        Sig: Take 0.5 tablets (20 mg) by mouth every evening

## 2023-04-24 SDOH — SOCIAL STABILITY: SOCIAL NETWORK: PROMIS ABILITY TO PARTICIPATE IN SOCIAL ROLES & ACTIVITIES T-SCORE: 45

## 2023-04-24 SDOH — SOCIAL STABILITY: SOCIAL NETWORK: I HAVE TROUBLE DOING ALL OF MY REGULAR LEISURE ACTIVITIES WITH OTHERS: SOMETIMES

## 2023-04-24 SDOH — SOCIAL STABILITY: SOCIAL NETWORK: I HAVE TROUBLE DOING ALL OF THE ACTIVITIES WITH FRIENDS THAT I WANT TO DO: SOMETIMES

## 2023-04-24 SDOH — SOCIAL STABILITY: SOCIAL NETWORK: I HAVE TROUBLE DOING ALL OF THE FAMILY ACTIVITIES THAT I WANT TO DO: SOMETIMES

## 2023-04-24 SDOH — SOCIAL STABILITY: SOCIAL NETWORK: I HAVE TROUBLE DOING ALL OF MY USUAL WORK (INCLUDE WORK AT HOME): SOMETIMES

## 2023-04-24 SDOH — SOCIAL STABILITY: SOCIAL NETWORK

## 2023-04-24 ASSESSMENT — ENCOUNTER SYMPTOMS
SINUS CONGESTION: 0
BACK PAIN: 1
MYALGIAS: 0
POLYDIPSIA: 0
NECK MASS: 0
SINUS PAIN: 0
DECREASED APPETITE: 0
RECTAL PAIN: 0
DIARRHEA: 0
HOARSE VOICE: 0
CHILLS: 0
EYE REDNESS: 0
ARTHRALGIAS: 1
SORE THROAT: 0
MUSCLE WEAKNESS: 0
BREAST MASS: 0
FATIGUE: 1
TREMORS: 0
HEARTBURN: 0
BREAST PAIN: 1
BLOATING: 0
INCREASED ENERGY: 1
JAUNDICE: 0
MEMORY LOSS: 1
NIGHT SWEATS: 0
WEIGHT LOSS: 0
HEADACHES: 1
DIZZINESS: 1
TINGLING: 0
BOWEL INCONTINENCE: 0
EYE WATERING: 0
CONSTIPATION: 0
TROUBLE SWALLOWING: 0
JOINT SWELLING: 0
BLOOD IN STOOL: 0
DISTURBANCES IN COORDINATION: 0
ALTERED TEMPERATURE REGULATION: 0
MUSCLE CRAMPS: 1
NECK PAIN: 1
SEIZURES: 0
WEAKNESS: 0
LOSS OF CONSCIOUSNESS: 0
SPEECH CHANGE: 0
ABDOMINAL PAIN: 1
FEVER: 0
EYE PAIN: 1
HALLUCINATIONS: 0
NAUSEA: 0
POLYPHAGIA: 0
TASTE DISTURBANCE: 0
WEIGHT GAIN: 0
STIFFNESS: 1
VOMITING: 0
EYE IRRITATION: 0
PARALYSIS: 0
NUMBNESS: 0
DOUBLE VISION: 0
SMELL DISTURBANCE: 0

## 2023-04-24 ASSESSMENT — ANXIETY QUESTIONNAIRES
1. FEELING NERVOUS, ANXIOUS, OR ON EDGE: NOT AT ALL
GAD7 TOTAL SCORE: 0
4. TROUBLE RELAXING: NOT AT ALL
7. FEELING AFRAID AS IF SOMETHING AWFUL MIGHT HAPPEN: NOT AT ALL
GAD7 TOTAL SCORE: 0
3. WORRYING TOO MUCH ABOUT DIFFERENT THINGS: NOT AT ALL
5. BEING SO RESTLESS THAT IT IS HARD TO SIT STILL: NOT AT ALL
7. FEELING AFRAID AS IF SOMETHING AWFUL MIGHT HAPPEN: NOT AT ALL
GAD7 TOTAL SCORE: 0
2. NOT BEING ABLE TO STOP OR CONTROL WORRYING: NOT AT ALL
6. BECOMING EASILY ANNOYED OR IRRITABLE: NOT AT ALL

## 2023-04-24 ASSESSMENT — PATIENT HEALTH QUESTIONNAIRE - PHQ9
SUM OF ALL RESPONSES TO PHQ QUESTIONS 1-9: 8
SUM OF ALL RESPONSES TO PHQ QUESTIONS 1-9: 8
10. IF YOU CHECKED OFF ANY PROBLEMS, HOW DIFFICULT HAVE THESE PROBLEMS MADE IT FOR YOU TO DO YOUR WORK, TAKE CARE OF THINGS AT HOME, OR GET ALONG WITH OTHER PEOPLE: SOMEWHAT DIFFICULT

## 2023-04-24 NOTE — TELEPHONE ENCOUNTER
She said at the time of visit didn't needs the meds. Also should have enough through June. Or beyond.

## 2023-04-24 NOTE — TELEPHONE ENCOUNTER
Last fill done by Leonila Stewart CNP of [facility]. Was last seen for a physical in [facility] on 12/5/22 by Dr. Berry.    Patient was seen on 3/6/23 by [facility], which is NOT part of [facility].  Patient has never been seen in [facility].    Will forward refill request to [facility] refill pool.

## 2023-04-26 ENCOUNTER — VIRTUAL VISIT (OUTPATIENT)
Dept: PHYSICAL MEDICINE AND REHAB | Facility: CLINIC | Age: 58
End: 2023-04-26
Payer: COMMERCIAL

## 2023-04-26 DIAGNOSIS — U09.9 POST-COVID CHRONIC CONCENTRATION DEFICIT: ICD-10-CM

## 2023-04-26 DIAGNOSIS — R41.89 BRAIN FOG: ICD-10-CM

## 2023-04-26 DIAGNOSIS — R41.840 POST-COVID CHRONIC CONCENTRATION DEFICIT: ICD-10-CM

## 2023-04-26 DIAGNOSIS — R53.83 OTHER FATIGUE: ICD-10-CM

## 2023-04-26 DIAGNOSIS — G93.32 POST-COVID CHRONIC FATIGUE: Primary | ICD-10-CM

## 2023-04-26 DIAGNOSIS — Z76.89 HEALTH CARE HOME: ICD-10-CM

## 2023-04-26 DIAGNOSIS — R41.89 COGNITIVE CHANGES: ICD-10-CM

## 2023-04-26 DIAGNOSIS — G47.39 OTHER SLEEP APNEA: ICD-10-CM

## 2023-04-26 DIAGNOSIS — Z86.16 HISTORY OF COVID-19: ICD-10-CM

## 2023-04-26 DIAGNOSIS — U09.9 POST-COVID CHRONIC FATIGUE: Primary | ICD-10-CM

## 2023-04-26 PROCEDURE — 99215 OFFICE O/P EST HI 40 MIN: CPT | Mod: VID | Performed by: INTERNAL MEDICINE

## 2023-04-26 RX ORDER — ATORVASTATIN CALCIUM 40 MG/1
20 TABLET, FILM COATED ORAL EVERY EVENING
Qty: 90 TABLET | Refills: 3 | COMMUNITY
Start: 2023-04-26

## 2023-04-26 ASSESSMENT — ENCOUNTER SYMPTOMS
HALLUCINATIONS: 0
SEIZURES: 0
POLYPHAGIA: 0
DIZZINESS: 1
SORE THROAT: 0
DIARRHEA: 0
SINUS PAIN: 0
HEARTBURN: 0
ARTHRALGIAS: 1
BACK PAIN: 1
MYALGIAS: 0
EYE PAIN: 1
VOMITING: 0
WEAKNESS: 0
NAUSEA: 0
ABDOMINAL PAIN: 1
CONSTIPATION: 0
FATIGUE: 1
EYE REDNESS: 0
BREAST MASS: 0
HEADACHES: 1
TROUBLE SWALLOWING: 0
JOINT SWELLING: 0
TREMORS: 0
CHILLS: 0
FEVER: 0
NUMBNESS: 0
NECK PAIN: 1
RECTAL PAIN: 0
POLYDIPSIA: 0

## 2023-04-26 NOTE — PROGRESS NOTES
"Jayne is a 57 year old who is being evaluated via a billable video visit.      How would you like to obtain your AVS? MyChart  If the video visit is dropped, the invitation should be resent by: Text to cell phone: [phone]  Will anyone else be joining your video visit? No        Assessment & Plan         Post-COVID chronic fatigue  Reviewed possible causes contributing to fatigue following COVID-19.  Recommend further evaluation to exclude common contributing etiologies.  Patient was also advised approach to fatigue with energy conservation strategies.  Recommend physical therapy and Occupational Therapy.  Referrals were placed today.  - Occupational Therapy Referral; Future  - Physical Therapy Referral; Future  - CBC with platelets; Future  - Comprehensive metabolic panel; Future  - Hemoglobin A1c; Future  - TSH with free T4 reflex; Future  - Vitamin B1 whole blood; Future  - Vitamin B6; Future  - Anti Nuclear Aleah IgG by IFA with Reflex; Future  - Iron and Iron Binding Capacity; Future  - Ferritin; Future  - C-Reactive Protein, Inflammatory; Future  - RBC Sedimentation Rate; Future    Post-COVID chronic concentration deficit  Advised on impact of fatigue on cognitive performance.  Patient is interested in neuropsychological testing.  Referral was placed today.  - Adult Neuropsychology Referral; Future      I spent a total of 40 minutes on the day of the visit.   Time spent by me doing chart review, history and exam, documentation and further activities per the note       BMI:   Estimated body mass index is 40.77 kg/m  as calculated from the following:    Height as of 3/6/23: 1.651 m (5' 5\").    Weight as of 3/6/23: 111.1 kg (245 lb).           Return in about 3 months (around 7/26/2023).    Amy Gerardo MD  [facility] [place]    Subjective   Jayne is a 57 year old, presenting for the following health issues:  Follow Up  Follow Up    HPI " "    Patient scheduled for post COVID issues.     History of COVID-19 infection: Patient reports that she had COVID in February 2022. She woke up in the morning feeling very and \"foggy\" - difficult to concentrate. She had a positive home test at the time. She denies having respiratory symptoms, had no fever or chills. She reports that her fatigue never went away. She states that since that time she has been having fluctuating fatigue. She also had COVID in December of 2022. She describes it as having runny nose, congestion, fever, achy. She reports that she had symptoms for about a month. She was treated with Paxlovid, had rebound symptoms that took another 3 weeks to resolve.   Patient was seen in April of 2022, was evaluated with a stress. She had a troponin leak after the stress test. She was also evaluated for sleep apnea, was started on CPAP. SHe states that this has not helped her fatigue. She reports that her job performance have been affected. She works from home, states that she is making a lot of mistakes, types and says wrong words. She also has noticed issues with recall.   She states that she is walking her dog every day. She is also walking 4-5 times a day, about 20 minutes. She was in [place] recently, was able to hike for 10-12 thousand steps. She states that she can recover from physical activity slower, however, cognitive effort makes fatigue worse. She also reports that it is difficult for her to drive because she has trouble focusing when she is driving.     Current concerns: Health Concerns        4/24/2023     7:52 PM   PHQ Assesment Total Score(s)   PHQ-9 Score 8         4/24/2023     7:53 PM   KELLE-7 Results   KELLE 7 TOTAL SCORE 0 (minimal anxiety)   KELLE-7 Total Score 0         4/24/2023     7:53 PM   PTSD Screen Score   Have you ever experienced this kind of event? Yes   PTSD Screen (Score of 3 or more suggests positive screen) 3         4/24/2023     8:02 PM   PROMIS-29   PROMIS Physical " Function T-Score 42 (mild dysfunction)   PROMIS Anxiety T-Score 40 (within normal limits)   PROMIS Depression T-Score 41 (within normal limits)   PROMIS Fatigue T-Score 65 (moderate)   PROMIS Sleep Disturbance T-Score 46 (within normal limits)   PROMIS Ability to Participate in Social Roles & Activities T-Score 45 (within normal limits)   PROMIS Pain Interference T-Score 65 (moderate)   PROMIS Pain Intensity 6         4/26/2023     8:54 AM   Work Productivity and Activity Impairment Questionnaire: General Health V2.0   Are you currently employed (working for pay)? Yes   During the past seven days, how many hours did you miss from work because of your health problems?  0   During the past seven days, how many hours did you miss from work because of any other reason, such as vacation, holidays, time off to participate in this study? 0   During the past seven days, how many hours did you actually work? 44   During the past seven days, how much did your health problems affect your productivity while you were working? 3   During the past seven days, how much did your health problems affect your ability to do your regular activities, other than work at a job? 5       Past Medical History:   Diagnosis Date     Acid reflux disease      KELLE (generalized anxiety disorder)      Morbid obesity (H)      Other sleep apnea 3/6/2023     Pure hypercholesterolemia        Past Surgical History:   Procedure Laterality Date     ARTHROSCOPY SHOULDER Right 2015     ARTHROSCOPY SHOULDER, OPEN ROTATOR CUFF REPAIR, COMBINED Left 03/29/2017    Procedure:  Left shoulder arthroscopy and arthroscopic subacromial decompression (acromioplasty, bursectomy and coracoacromial ligament resection). Arthroscopic distal clavicle resection. Mini-open rotator cuff tear repair. Surgeon:  Kevin George MD  [facility]     [facility]     CV CORONARY ANGIOGRAM N/A 4/14/2022    Procedure: Coronary Angiogram;  Surgeon:  Olvin Roy MD;  Location:  [facility]     DAVINCI HYSTERECTOMY TOTAL, BILATERAL SALPINGO-OOPHORECTOMY, COMBINED N/A 2021    Procedure: Robotic assisted total laparoscopic hysterectomy, bilateral salpingectomy, bilateral oophorectomy, cystoscopy;  Surgeon: Jonathan Peters MD;  Location: [facility]     DISKECTOMY, LUMBAR, SINGLE SP      L5-S1     DISKECTOMY, LUMBAR, SINGLE SP      L5-S1     FUSION LUMBAR ANTERIOR ONE LEVEL      L5-S1     OSTEOTOMY FOOT Right 2017    Procedure: OSTEOTOMY FOOT;  1)  WEIL OSTEOTOMY RIGHT SECOND METATARSAL, 2) PLANTAR CAPSULE DEBRIDEMENT/SYNOVECTOMY, RIGHT SECOND METATARSAL PHALANGEAL JOINT, 3) HAMMERTOE REPAIR RIGHT SECOND TOE;  Surgeon: Olvin Betancur DPM;  Location: [facility]     ID SPINE SURGERY PROCEDURE UNLISTED       RECONSTRUCT FOREFOOT WITH METATARSOPHALANGEAL (MTP) FUSION Right 2017    Procedure: RECONSTRUCT FOREFOOT WITH METATARSOPHALANGEAL (MTP) FUSION;;  Surgeon: Olvin Betancur DPM;  Location:  [facility]     REPAIR HAMMER TOE Right 2017    Procedure: REPAIR HAMMER TOE;;  Surgeon: Olvin Betancur DPM;  Location:  [facility]     ROTATOR CUFF REPAIR RT/LT Left        Family History   Problem Relation Age of Onset     Rheumatoid Arthritis Mother      Coronary Artery Disease Mother         s/p MI and PCIs in 50s     Dementia Mother      Diabetes Type 2  Mother      Rheumatoid Arthritis Sister      Myocardial Infarction Maternal Grandmother         in her 60s     Myocardial Infarction Maternal Grandfather         later in life       Social History     Tobacco Use     Smoking status: Former     Packs/day: 0.75     Years: 26.00     Pack years: 19.50     Types: Cigarettes     Quit date: 2022     Years since quittin.0     Smokeless tobacco: Never   Vaping Use     Vaping status: Never Used   Substance Use Topics     Alcohol use: Yes     Comment: rare     Drug use: No         Current Outpatient Medications:       atorvastatin (LIPITOR) 40 MG tablet, Take 0.5 tablets (20 mg) by mouth every evening, Disp: 90 tablet, Rfl: 3     Cholecalciferol (VITAMIN D) 2000 units CAPS, Take 2,000 Units by mouth daily, Disp: 90 capsule, Rfl: 3     FLUoxetine (PROZAC) 20 MG capsule, Take 1 capsule (20 mg) by mouth daily, Disp: 90 capsule, Rfl: 3     fluticasone (FLONASE) 50 MCG/ACT nasal spray, Spray 2 sprays into both nostrils daily (Patient taking differently: Spray 2 sprays into both nostrils daily as needed), Disp: 16 g, Rfl: 3     multivitamin w/minerals (THERA-VIT-M) tablet, Take 1 tablet by mouth daily, Disp: , Rfl:      omeprazole (PRILOSEC) 20 MG DR capsule, Take 20 mg by mouth daily, Disp: , Rfl:      vitamin (B COMPLEX-C) tablet, Take 1 tablet by mouth daily, Disp: , Rfl:       Review of Systems   Constitutional: Positive for fatigue. Negative for chills and fever.   HENT: Positive for ear pain and tinnitus. Negative for ear discharge, hearing loss, mouth sores, nosebleeds, sinus pain, sore throat and trouble swallowing.    Eyes: Positive for pain. Negative for redness.   Gastrointestinal: Positive for abdominal pain. Negative for constipation, diarrhea, heartburn, nausea, rectal pain and vomiting.   Endocrine: Negative for polydipsia and polyphagia.   Breasts:  Negative for breast mass and discharge.   Musculoskeletal: Positive for arthralgias, back pain and neck pain. Negative for joint swelling and myalgias.   Neurological: Positive for dizziness and headaches. Negative for tremors, seizures, weakness and numbness.   Psychiatric/Behavioral: Negative for hallucinations.      ROS        Objective    Vitals - Patient Reported  Pain Score: Moderate Pain (4)  Pain Loc:  (Arthritis)        Physical Exam   GENERAL: Healthy, alert and no distress  EYES: Eyes grossly normal to inspection.  No discharge or erythema, or obvious scleral/conjunctival abnormalities.  RESP: No audible wheeze, cough, or visible cyanosis.  No visible retractions  or increased work of breathing.    SKIN: Visible skin clear. No significant rash, abnormal pigmentation or lesions.  NEURO: Cranial nerves grossly intact.  Mentation and speech appropriate for age.  PSYCH: Mentation appears normal, affect normal/bright, judgement and insight intact, normal speech and appearance well-groomed.                Video-Visit Details    Type of service:  Video Visit     Originating Location (pt. Location): Home  Distant Location (provider location):  Off-site  Platform used for Video Visit: Frequent Browser    Answers for HPI/ROS submitted by the patient on 4/24/2023  If you checked off any problems, how difficult have these problems made it for you to do your work, take care of things at home, or get along with other people?: Somewhat difficult  PHQ9 TOTAL SCORE: 8  KELLE 7 TOTAL SCORE: 0  General Symptoms: Yes  Skin Symptoms: No  HENT Symptoms: Yes  EYE SYMPTOMS: Yes  HEART SYMPTOMS: No  LUNG SYMPTOMS: No  INTESTINAL SYMPTOMS: Yes  URINARY SYMPTOMS: No  GYNECOLOGIC SYMPTOMS: No  BREAST SYMPTOMS: Yes  SKELETAL SYMPTOMS: Yes  BLOOD SYMPTOMS: No  NERVOUS SYSTEM SYMPTOMS: Yes  MENTAL HEALTH SYMPTOMS: No  Congestion: No   Voice hoarseness: No  Tooth pain: No  Gum tenderness: No  Bleeding gums: No  Change in taste: No  Change in sense of smell: No  Dry mouth: No  Hearing aid used: No  Neck lump: No  Loss of appetite: No  Weight loss: No  Weight gain: No  Night sweats: No  Increased stress: No  Feeling hot or cold when others believe the temperature is normal: No  Loss of height: No  Post-operative complications: No  Surgical site pain: No  Change in or Loss of Energy: Yes  Hyperactivity: No  Confusion: No  Vision loss: No  Dry eyes: No  Watery eyes: No  Eye bulging: No  Double vision: No  Flashing of lights: No  Spots: Yes  Floaters: Yes  Crossed eyes: No  Tunnel Vision: No  Yellowing of eyes: No  Eye irritation: No  Bloating: No  Blood in stool: No  Black stools: No  Fecal incontinence: No  Yellowing of  skin or eyes: No  Vomit with blood: No  Change in stools: No  Pain: Yes  Nipple retraction: No  Bone pain: No  Muscle cramps: Yes  Muscle weakness: No  Joint stiffness: Yes  Bone fracture: No  Trouble with coordination: No  Fainting or black-out spells: No  Memory loss: Yes  Speech problems: No  Tingling: No  Difficulty walking: No  Paralysis: No

## 2023-04-26 NOTE — NURSING NOTE
Is the patient currently in the state of [place]? YES    Visit mode:VIDEO    If the visit is dropped, the patient can be reconnected by: VIDEO VISIT: Send to e-mail at: [email or web address]    Will anyone else be joining the visit? NO      How would you like to obtain your AVS? MyChart    Are changes needed to the allergy or medication list? NO    Reason for visit: Follow Up    Pt. Reports arthritis pain at a level of 4 today.     Kelley Field on 4/26/2023 at 8:42 AM    
IV diuretics, oxygen, . reduce iv fluids to 60 ml per hour.

## 2023-04-26 NOTE — LETTER
4/26/2023       RE: Jayne Tyler  [address] [facility] [address]       Dear Colleague,    Thank you for referring your patient, Jayne Tyler, to the [facility] [place] at [facility]. Please see a copy of my visit note below.    Jayne is a 57 year old who is being evaluated via a billable video visit.      How would you like to obtain your AVS? MyChart  If the video visit is dropped, the invitation should be resent by: Text to cell phone: [phone]  Will anyone else be joining your video visit? No    Assessment & Plan     Post-COVID chronic fatigue  Reviewed possible causes contributing to fatigue following COVID-19.  Recommend further evaluation to exclude common contributing etiologies.  Patient was also advised approach to fatigue with energy conservation strategies.  Recommend physical therapy and Occupational Therapy.  Referrals were placed today.  - Occupational Therapy Referral; Future  - Physical Therapy Referral; Future  - CBC with platelets; Future  - Comprehensive metabolic panel; Future  - Hemoglobin A1c; Future  - TSH with free T4 reflex; Future  - Vitamin B1 whole blood; Future  - Vitamin B6; Future  - Anti Nuclear Aleah IgG by IFA with Reflex; Future  - Iron and Iron Binding Capacity; Future  - Ferritin; Future  - C-Reactive Protein, Inflammatory; Future  - RBC Sedimentation Rate; Future    Post-COVID chronic concentration deficit  Advised on impact of fatigue on cognitive performance.  Patient is interested in neuropsychological testing.  Referral was placed today.  - Adult Neuropsychology Referral; Future      I spent a total of 40 minutes on the day of the visit.   Time spent by me doing chart review, history and exam, documentation and further activities per the note       BMI:   Estimated body mass index is 40.77 kg/m  as calculated from the following:    Height as of 3/6/23:  "1.651 m (5' 5\").    Weight as of 3/6/23: 111.1 kg (245 lb).           Return in about 3 months (around 7/26/2023).    Amy Gerardo MD  [facility]ICINE [facility] CLIN[facility] ISMAEL Godoy is a 57 year old, presenting for the following health issues:  Follow Up  Follow Up    HPI     Patient scheduled for post COVID issues.     History of COVID-19 infection: Patient reports that she had COVID in February 2022. She woke up in the morning feeling very and \"foggy\" - difficult to concentrate. She had a positive home test at the time. She denies having respiratory symptoms, had no fever or chills. She reports that her fatigue never went away. She states that since that time she has been having fluctuating fatigue. She also had COVID in December of 2022. She describes it as having runny nose, congestion, fever, achy. She reports that she had symptoms for about a month. She was treated with Paxlovid, had rebound symptoms that took another 3 weeks to resolve.   Patient was seen in April of 2022, was evaluated with a stress. She had a troponin leak after the stress test. She was also evaluated for sleep apnea, was started on CPAP. SHe states that this has not helped her fatigue. She reports that her job performance have been affected. She works from home, states that she is making a lot of mistakes, types and says wrong words. She also has noticed issues with recall.   She states that she is walking her dog every day. She is also walking 4-5 times a day, about 20 minutes. She was in [place] recently, was able to hike for 10-12 thousand steps. She states that she can recover from physical activity slower, however, cognitive effort makes fatigue worse. She also reports that it is difficult for her to drive because she has trouble focusing when she is driving.     Current concerns: Health Concerns        4/24/2023     7:52 PM   PHQ Assesment Total Score(s)   PHQ-9 Score 8        "  4/24/2023     7:53 PM   KELLE-7 Results   KELLE 7 TOTAL SCORE 0 (minimal anxiety)   KELLE-7 Total Score 0         4/24/2023     7:53 PM   PTSD Screen Score   Have you ever experienced this kind of event? Yes   PTSD Screen (Score of 3 or more suggests positive screen) 3         4/24/2023     8:02 PM   PROMIS-29   PROMIS Physical Function T-Score 42 (mild dysfunction)   PROMIS Anxiety T-Score 40 (within normal limits)   PROMIS Depression T-Score 41 (within normal limits)   PROMIS Fatigue T-Score 65 (moderate)   PROMIS Sleep Disturbance T-Score 46 (within normal limits)   PROMIS Ability to Participate in Social Roles & Activities T-Score 45 (within normal limits)   PROMIS Pain Interference T-Score 65 (moderate)   PROMIS Pain Intensity 6         4/26/2023     8:54 AM   Work Productivity and Activity Impairment Questionnaire: General Health V2.0   Are you currently employed (working for pay)? Yes   During the past seven days, how many hours did you miss from work because of your health problems?  0   During the past seven days, how many hours did you miss from work because of any other reason, such as vacation, holidays, time off to participate in this study? 0   During the past seven days, how many hours did you actually work? 44   During the past seven days, how much did your health problems affect your productivity while you were working? 3   During the past seven days, how much did your health problems affect your ability to do your regular activities, other than work at a job? 5       Past Medical History:   Diagnosis Date    Acid reflux disease     KELLE (generalized anxiety disorder)     Morbid obesity (H)     Other sleep apnea 3/6/2023    Pure hypercholesterolemia        Past Surgical History:   Procedure Laterality Date    ARTHROSCOPY SHOULDER Right 2015    ARTHROSCOPY SHOULDER, OPEN ROTATOR CUFF REPAIR, COMBINED Left 03/29/2017    Procedure:  Left shoulder arthroscopy and arthroscopic subacromial decompression  (acromioplasty, bursectomy and coracoacromial ligament resection). Arthroscopic distal clavicle resection. Mini-open rotator cuff tear repair. Surgeon:  Kevin George MD  Location: [facility]    BUNIONECTOMY Right 2008    CV CORONARY ANGIOGRAM N/A 4/14/2022    Procedure: Coronary Angiogram;  Surgeon: Olvin Roy MD;  Location:  [facility]    DAVINCI HYSTERECTOMY TOTAL, BILATERAL SALPINGO-OOPHORECTOMY, COMBINED N/A 2/9/2021    Procedure: Robotic assisted total laparoscopic hysterectomy, bilateral salpingectomy, bilateral oophorectomy, cystoscopy;  Surgeon: Jonathan Peters MD;  Location: [facility]    DISKECTOMY, LUMBAR, SINGLE SP  1996    L5-S1    DISKECTOMY, LUMBAR, SINGLE SP  1997    L5-S1    FUSION LUMBAR ANTERIOR ONE LEVEL  1999    L5-S1    OSTEOTOMY FOOT Right 06/06/2017    Procedure: OSTEOTOMY FOOT;  1)  WEIL OSTEOTOMY RIGHT SECOND METATARSAL, 2) PLANTAR CAPSULE DEBRIDEMENT/SYNOVECTOMY, RIGHT SECOND METATARSAL PHALANGEAL JOINT, 3) HAMMERTOE REPAIR RIGHT SECOND TOE;  Surgeon: Olvin Betancur DPM;  Location: [facility]    MA SPINE SURGERY PROCEDURE UNLISTED      RECONSTRUCT FOREFOOT WITH METATARSOPHALANGEAL (MTP) FUSION Right 06/06/2017    Procedure: RECONSTRUCT FOREFOOT WITH METATARSOPHALANGEAL (MTP) FUSION;;  Surgeon: Olvin Betancur DPM;  Location: [facility]    REPAIR HAMMER TOE Right 06/06/2017    Procedure: REPAIR HAMMER TOE;;  Surgeon: Olvin Betancur DPM;  Location: [facility]    ROTATOR CUFF REPAIR RT/LT Left 2017       Family History   Problem Relation Age of Onset    Rheumatoid Arthritis Mother     Coronary Artery Disease Mother         s/p MI and PCIs in 50s    Dementia Mother     Diabetes Type 2  Mother     Rheumatoid Arthritis Sister     Myocardial Infarction Maternal Grandmother         in her 60s    Myocardial Infarction Maternal Grandfather         later in life       Social History     Tobacco Use    Smoking status: Former     Packs/day: 0.75     Years:  26.00     Pack years: 19.50     Types: Cigarettes     Quit date: 2022     Years since quittin.0    Smokeless tobacco: Never   Vaping Use    Vaping status: Never Used   Substance Use Topics    Alcohol use: Yes     Comment: rare    Drug use: No         Current Outpatient Medications:     atorvastatin (LIPITOR) 40 MG tablet, Take 0.5 tablets (20 mg) by mouth every evening, Disp: 90 tablet, Rfl: 3    Cholecalciferol (VITAMIN D) 2000 units CAPS, Take 2,000 Units by mouth daily, Disp: 90 capsule, Rfl: 3    FLUoxetine (PROZAC) 20 MG capsule, Take 1 capsule (20 mg) by mouth daily, Disp: 90 capsule, Rfl: 3    fluticasone (FLONASE) 50 MCG/ACT nasal spray, Spray 2 sprays into both nostrils daily (Patient taking differently: Spray 2 sprays into both nostrils daily as needed), Disp: 16 g, Rfl: 3    multivitamin w/minerals (THERA-VIT-M) tablet, Take 1 tablet by mouth daily, Disp: , Rfl:     omeprazole (PRILOSEC) 20 MG DR capsule, Take 20 mg by mouth daily, Disp: , Rfl:     vitamin (B COMPLEX-C) tablet, Take 1 tablet by mouth daily, Disp: , Rfl:       Review of Systems   Constitutional: Positive for fatigue. Negative for chills and fever.   HENT: Positive for ear pain and tinnitus. Negative for ear discharge, hearing loss, mouth sores, nosebleeds, sinus pain, sore throat and trouble swallowing.    Eyes: Positive for pain. Negative for redness.   Gastrointestinal: Positive for abdominal pain. Negative for constipation, diarrhea, heartburn, nausea, rectal pain and vomiting.   Endocrine: Negative for polydipsia and polyphagia.   Breasts:  Negative for breast mass and discharge.   Musculoskeletal: Positive for arthralgias, back pain and neck pain. Negative for joint swelling and myalgias.   Neurological: Positive for dizziness and headaches. Negative for tremors, seizures, weakness and numbness.   Psychiatric/Behavioral: Negative for hallucinations.      ROS       Objective    Vitals - Patient Reported  Pain Score: Moderate  Pain (4)  Pain Loc:  (Arthritis)        Physical Exam   GENERAL: Healthy, alert and no distress  EYES: Eyes grossly normal to inspection.  No discharge or erythema, or obvious scleral/conjunctival abnormalities.  RESP: No audible wheeze, cough, or visible cyanosis.  No visible retractions or increased work of breathing.    SKIN: Visible skin clear. No significant rash, abnormal pigmentation or lesions.  NEURO: Cranial nerves grossly intact.  Mentation and speech appropriate for age.  PSYCH: Mentation appears normal, affect normal/bright, judgement and insight intact, normal speech and appearance well-groomed.               Video-Visit Details    Type of service:  Video Visit     Originating Location (pt. Location): Home  Distant Location (provider location):  Off-site  Platform used for Video Visit: [email or web address]    Answers for HPI/ROS submitted by the patient on 4/24/2023  If you checked off any problems, how difficult have these problems made it for you to do your work, take care of things at home, or get along with other people?: Somewhat difficult  PHQ9 TOTAL SCORE: 8  KELLE 7 TOTAL SCORE: 0  General Symptoms: Yes  Skin Symptoms: No  HENT Symptoms: Yes  EYE SYMPTOMS: Yes  HEART SYMPTOMS: No  LUNG SYMPTOMS: No  INTESTINAL SYMPTOMS: Yes  URINARY SYMPTOMS: No  GYNECOLOGIC SYMPTOMS: No  BREAST SYMPTOMS: Yes  SKELETAL SYMPTOMS: Yes  BLOOD SYMPTOMS: No  NERVOUS SYSTEM SYMPTOMS: Yes  MENTAL HEALTH SYMPTOMS: No  Congestion: No   Voice hoarseness: No  Tooth pain: No  Gum tenderness: No  Bleeding gums: No  Change in taste: No  Change in sense of smell: No  Dry mouth: No  Hearing aid used: No  Neck lump: No  Loss of appetite: No  Weight loss: No  Weight gain: No  Night sweats: No  Increased stress: No  Feeling hot or cold when others believe the temperature is normal: No  Loss of height: No  Post-operative complications: No  Surgical site pain: No  Change in or Loss of Energy: Yes  Hyperactivity: No  Confusion: No  Vision loss:  No  Dry eyes: No  Watery eyes: No  Eye bulging: No  Double vision: No  Flashing of lights: No  Spots: Yes  Floaters: Yes  Crossed eyes: No  Tunnel Vision: No  Yellowing of eyes: No  Eye irritation: No  Bloating: No  Blood in stool: No  Black stools: No  Fecal incontinence: No  Yellowing of skin or eyes: No  Vomit with blood: No  Change in stools: No  Pain: Yes  Nipple retraction: No  Bone pain: No  Muscle cramps: Yes  Muscle weakness: No  Joint stiffness: Yes  Bone fracture: No  Trouble with coordination: No  Fainting or black-out spells: No  Memory loss: Yes  Speech problems: No  Tingling: No  Difficulty walking: No  Paralysis: No          Again, thank you for allowing me to participate in the care of your patient.      Sincerely,    Amy Gerardo MD

## 2023-04-26 NOTE — TELEPHONE ENCOUNTER
Jayne Tyler is requesting a refill of:    Refused Prescriptions:                       Disp   Refills    atorvastatin (LIPITOR) 40 MG tablet        90 tab*3        Sig: Take 0.5 tablets (20 mg) by mouth every evening  Refused By: IRENE WILKS  Reason for Refusal: Patient needs appointment    Pt due for FASTING OV, based on refills should have enough medication until myles

## 2023-04-26 NOTE — PATIENT INSTRUCTIONS
Patient resources:    Fatigue management: https://www.archives-pmr.org/action/showPdf?xej=-[email or web address]    Self-care guide (fibromyalgia): [email or web address]    Recovery WHO:  https://apps.who.int/iris/bitstream/handle/60044/594963/CAS-ASFB-4955-347-09368-33[address]-eng.pdf

## 2023-04-28 ENCOUNTER — LAB (OUTPATIENT)
Dept: LAB | Facility: CLINIC | Age: 58
End: 2023-04-28
Payer: COMMERCIAL

## 2023-04-28 DIAGNOSIS — U09.9 POST-COVID CHRONIC FATIGUE: ICD-10-CM

## 2023-04-28 DIAGNOSIS — G93.32 POST-COVID CHRONIC FATIGUE: ICD-10-CM

## 2023-04-28 LAB
ALBUMIN SERPL BCG-MCNC: 4.3 G/DL (ref 3.5–5.2)
ALP SERPL-CCNC: 92 U/L (ref 35–104)
ALT SERPL W P-5'-P-CCNC: 23 U/L (ref 10–35)
ANION GAP SERPL CALCULATED.3IONS-SCNC: 12 MMOL/L (ref 7–15)
AST SERPL W P-5'-P-CCNC: 28 U/L (ref 10–35)
BILIRUB SERPL-MCNC: 0.3 MG/DL
BUN SERPL-MCNC: 14.5 MG/DL (ref 6–20)
CALCIUM SERPL-MCNC: 9.5 MG/DL (ref 8.6–10)
CHLORIDE SERPL-SCNC: 101 MMOL/L (ref 98–107)
CREAT SERPL-MCNC: 0.88 MG/DL (ref 0.51–0.95)
CRP SERPL-MCNC: 6.51 MG/L
DEPRECATED HCO3 PLAS-SCNC: 25 MMOL/L (ref 22–29)
ERYTHROCYTE [DISTWIDTH] IN BLOOD BY AUTOMATED COUNT: 13.8 % (ref 10–15)
ERYTHROCYTE [SEDIMENTATION RATE] IN BLOOD BY WESTERGREN METHOD: 13 MM/HR (ref 0–30)
FERRITIN SERPL-MCNC: 77 NG/ML (ref 11–328)
GFR SERPL CREATININE-BSD FRML MDRD: 77 ML/MIN/1.73M2
GLUCOSE SERPL-MCNC: 109 MG/DL (ref 70–99)
HBA1C MFR BLD: 5.5 % (ref 0–5.6)
HCT VFR BLD AUTO: 42.5 % (ref 35–47)
HGB BLD-MCNC: 13.9 G/DL (ref 11.7–15.7)
IRON BINDING CAPACITY (ROCHE): 281 UG/DL (ref 240–430)
IRON SATN MFR SERPL: 31 % (ref 15–46)
IRON SERPL-MCNC: 87 UG/DL (ref 37–145)
MCH RBC QN AUTO: 31.6 PG (ref 26.5–33)
MCHC RBC AUTO-ENTMCNC: 32.7 G/DL (ref 31.5–36.5)
MCV RBC AUTO: 97 FL (ref 78–100)
PLATELET # BLD AUTO: 272 10E3/UL (ref 150–450)
POTASSIUM SERPL-SCNC: 4.3 MMOL/L (ref 3.4–5.3)
PROT SERPL-MCNC: 7.1 G/DL (ref 6.4–8.3)
RBC # BLD AUTO: 4.4 10E6/UL (ref 3.8–5.2)
SODIUM SERPL-SCNC: 138 MMOL/L (ref 136–145)
TSH SERPL DL<=0.005 MIU/L-ACNC: 1.04 UIU/ML (ref 0.3–4.2)
WBC # BLD AUTO: 8.4 10E3/UL (ref 4–11)

## 2023-04-28 PROCEDURE — 99000 SPECIMEN HANDLING OFFICE-LAB: CPT

## 2023-04-28 PROCEDURE — 83550 IRON BINDING TEST: CPT

## 2023-04-28 PROCEDURE — 86038 ANTINUCLEAR ANTIBODIES: CPT

## 2023-04-28 PROCEDURE — 84425 ASSAY OF VITAMIN B-1: CPT | Mod: 90

## 2023-04-28 PROCEDURE — 85652 RBC SED RATE AUTOMATED: CPT

## 2023-04-28 PROCEDURE — 80053 COMPREHEN METABOLIC PANEL: CPT

## 2023-04-28 PROCEDURE — 84443 ASSAY THYROID STIM HORMONE: CPT

## 2023-04-28 PROCEDURE — 82728 ASSAY OF FERRITIN: CPT

## 2023-04-28 PROCEDURE — 85027 COMPLETE CBC AUTOMATED: CPT

## 2023-04-28 PROCEDURE — 83540 ASSAY OF IRON: CPT

## 2023-04-28 PROCEDURE — 86140 C-REACTIVE PROTEIN: CPT

## 2023-04-28 PROCEDURE — 84207 ASSAY OF VITAMIN B-6: CPT | Mod: 90

## 2023-04-28 PROCEDURE — 36415 COLL VENOUS BLD VENIPUNCTURE: CPT

## 2023-04-28 PROCEDURE — 83036 HEMOGLOBIN GLYCOSYLATED A1C: CPT

## 2023-04-29 ENCOUNTER — MYC MEDICAL ADVICE (OUTPATIENT)
Dept: PHYSICAL MEDICINE AND REHAB | Facility: CLINIC | Age: 58
End: 2023-04-29
Payer: COMMERCIAL

## 2023-05-01 ENCOUNTER — HOSPITAL ENCOUNTER (OUTPATIENT)
Dept: OCCUPATIONAL THERAPY | Facility: CLINIC | Age: 58
Setting detail: THERAPIES SERIES
Discharge: HOME | End: 2023-05-01
Attending: INTERNAL MEDICINE
Payer: COMMERCIAL

## 2023-05-01 DIAGNOSIS — G93.32 POST-COVID CHRONIC FATIGUE: ICD-10-CM

## 2023-05-01 DIAGNOSIS — U09.9 POST-COVID CHRONIC FATIGUE: ICD-10-CM

## 2023-05-01 LAB — PYRIDOXAL PHOS SERPL-SCNC: 182.7 NMOL/L

## 2023-05-01 PROCEDURE — 96125 COGNITIVE TEST BY HC PRO: CPT | Mod: GO,59

## 2023-05-01 PROCEDURE — 97165 OT EVAL LOW COMPLEX 30 MIN: CPT | Mod: GO

## 2023-05-01 ASSESSMENT — ACTIVITIES OF DAILY LIVING (ADL): IADL_QUICK_ADDS: MEAL PLANNING/PREPARATION;HOME/FINANCIAL/MANAGEMENT;COMMUNICATION/COMPUTER USE;COMMUNITY MOBILITY

## 2023-05-01 NOTE — PROGRESS NOTES
Cognitive Assessment of [place]    SUMMARY OF TEST:  The Cognitive Assessment of [place] (CAM) is a standardized measure used to assess cognitive abilities and deficits.  The CAM is formatted to assess cognitive skills in the areas of attention, orientation, memory, following directions, temporal awareness, discrimination, sequencing, calculation, planning, verbal safety/judgment, problem solving and abstract reasoning.    DATE OF TESTIN2023    RESULTS OF TESTING:    The patient scores with no impairment in Attention, Remote memory, Recent memory, Visual neglect, Appropriate yes/no, One step verbal directions, Written directions, Immediate auditory memory, Immediate motor memory, Temporal awareness, Matching, Object identification, Auditory recall/recognition, Motor recall/recognition, Beverly recognition, Money skills mental manipulation, Single digit math skills, Multiple digit math skills, Planning, Simple problem solving, Safety/judgment, and Abstract reasoning    The patient scores with mild impairment in Visual memory/sequencing backward, Auditory memory/sequencing forward, and Auditory memory/sequencing backward    The patient scores with moderate impairment in Visual memory/sequencing forward    The patient scores with severe impairment in Moderate problem solving and Mental flexibility    Test results comments: Testing completed in quiet environment. Complex problem-solving to be tested at later date due to time constraints.    INTERPRETATION OF TEST RESULTS:      Pt demonstrates sustained attention throughout testing. Does well with 6/6 orientation and general recent health history.  Recalls 3/3 words and actions immediately and with delayed recall, noting use of repetition as memory strategy. Does well with simple money and math skills and reports numbers have always been one of her strengths. Has greater difficulty with recall of sequences, particularly with longer sequences. Recalls 4 visual  objects forward, 5 visual objects in revers. Recalls 6 letter string forward and 5 in reverse.  Additionally, has difficulty with developing a solution to moderate complex problem-solving (draws a rectangle) and unable to develop alternate solution for mental flexibility (draws different sized rectangle).      Factors affecting performance:    Limited Endurance  New or complex medical issue    Recommendations:  Patient would benefit from skilled occupational therapy services to address building short term, working memory/storage/retrieval, attention, and problem-solving skills to support higher level IADLs, such as schedule management, meal preparation, medication management, and work-based tasks.    TIME ADMINISTERING TEST: 23 min    TIME FOR INTERPRETATION AND PREPARATION OF REPORT: 15 min    TOTAL TIME: 38 min    Nicole Guzman OTR/L

## 2023-05-01 NOTE — PROGRESS NOTES
"   05/01/23 0800   Quick Adds   Type of Visit Initial Outpatient Occupational Therapy Evaluation   General Information   Start Of Care Date 05/01/23   Referring Physician Amy Gerardo MD   Orders Evaluate and treat as indicated   Other Orders PT, Neuropsychology   Orders Date 04/26/23   Medical Diagnosis Post-COVID chronic fatigue   Onset of Illness/Injury or Date of Surgery 04/26/23  (date of order used)   Precautions/Limitations No known precautions/limitations   Surgical/Medical History Reviewed Yes   Additional Occupational Profile Info/Pertinent History of Current Problem Jayne Tyler is a pleasant 57 year old, right hand dominant female who presents to OP OT for further evaluation of post-COVID chronic fatigue. She is known to this clinic and this writer as she was seen for a previous episode of care between August and December 2022. Her initial COVID infection was February 2022. Per chart review, she woke up in the morning feeling very and \"foggy\" - difficult to concentrate. She had a positive home test at the time. She denies having respiratory symptoms, had no fever or chills. She reports that her fatigue never went away. She states that since that time she has been having fluctuating fatigue. Was evaluated in April 2022 for stress test and had troponin leak after stress test. Was also evaluated for sleep apnea and started on CPAP, but this has not helped her fatigue. She also had COVID in December of 2022. She describes it as having runny nose, congestion, fever, achy. She reports that she had symptoms for about a month. She was treated with Paxlovid, had rebound symptoms that took another 3 weeks to resolve. She reports that her job performance have been affected. She works from home, states that she is making a lot of mistakes, types and says wrong words. She also has noticed issues with recall. She states that she is walking her dog every day. She is also walking 4-5 times a day, about 20 " minutes. She was in [place] recently, was able to hike for 10-12 thousand steps. She states that she can recover from physical activity slower, however, cognitive effort makes fatigue worse. She also reports that it is difficult for her to drive because she has trouble focusing when she is driving. Per pt, there is concern for chronic fatigue syndrome. PMH includes arthritis, depression (on anti-depressant), tobacco use, diskectomy/fusion (1996, 1997, 1999), rotator cuff (2015, 2017).   Comments/Observations Pt shares news that she recently  her long-term boyfriend. She presents alone to today's appointment.   Role/Living Environment   Current Community Support Family/friend caregiver   Patient role/Employment history Employed  (Workforce management - )   Community/Avocational Activities Reports continued to work full-time about 8:30am to 5:30pm each day. Still working from home. When not working, spends time walking, housework, caring for her dog, relaxing. Used to like gardening, using the treadmill, and lifting weights, but these are very challenging with her faiguindu.   Current Living Environment House  (rambler with basement)   Number of Stairs to Enter Home 2   Number of Stairs Within Home 10-12 down to basement  (laundry in basement)   Primary Bathroom Location/Comments was using basement bathroom while main floor was being remodeled, occasionally will steady herself on the wall if she gets dizzy with her eyes closed   Prior Level - Transfers Independent   Prior Level - Ambulation Independent   Prior Level - ADLS Independent   Prior Responsibilities - IADL Meal Preparation;Housekeeping;Laundry;Shopping;Yardwork;Medication management;Finances;Driving;Work   Prior Level Comments IND with ADLs/IADLs   Role/Living Environment Comments Lives in one level rambler home with basement with her now , Salo. Reports extreme fatigue with daily activities, including cognitive tasks.    Patient/family Goals Statement To further address fatigue management for improved engagement in daily activities   Pain   Patient currently in pain Yes   Pain location neck and into L shoulder, joint pain   Pain rating 4-5/10   Pain description Ache  (reports can sometime be burning or sharp as well)   Fall Risk Screen   Fall screen completed by OT   Have you fallen 2 or more times in the past year? No   Have you fallen and had an injury in the past year? No   Is patient a fall risk? No   Abuse Screen (yes response referral indicated)   Feels Unsafe at Home or Work/School no   Feels Threatened by Someone no   Does Anyone Try to Keep You From Having Contact with Others or Doing Things Outside Your Home? no   Physical Signs of Abuse Present no   System Outcome Measures   Outcome Measures Cancer Rehab   FACIT Fatigue Subscale (score out of 52). The higher the score, the better the QOL. 15   Cognitive Status Examination   Orientation Orientation to person, place and time   Level of Consciousness Alert   Follows Commands and Answers Questions 100% of the time;Able to follow single-step instructions;Able to follow multistep instructions   Personal Safety and Judgment Intact   Memory Impaired   Attention Alternating attention impaired, difficulty shifting between tasks;Divided attention impaired, difficulty with simultaneous tasks   Organization/Problem Solving Problem solving impaired   Executive Function Working memory impaired, decreased storage of information for performing tasks;Cognitive flexibility impaired   Cognitive Comment Continues to have intermittent difficulty with concentration/thinking. Reports she used to have issues about every 4 months. Felt very good when in [place] a couple weeks ago. Reports she is having some cognitive difficulty since being home (only about 2 months since her last episode). Word-finding and memory are  very challenging, especially in the last couple days. Reports overall fatigue  "with concentration/cognitive tasks. Endorses need for additional time to process information. To further assess cognition, administered the Cognitive Assessment of [place] (CAM) - see separate progress note for details.   Visual Perception   Visual Perception Wears glasses   Visual Perception Comments Denies changes in vision but continues to have \"floaties\" in L eye.   Sensation   Sensation Comments Reports intermittent numbness/tingling in bilateral hands, thinks she has carpal tunnel or tendonitis.   Range of Motion (ROM)   ROM Comments BUEs WFLs per observation of performance and pt report. Reports pain in her L neck/shoulder region that limits activity. Will further assess prn.   Strength   Strength Comments Reports generalized weakness and deconditioning. Reports she used to like to lift weights and is interested in regaining her strength and endurance. Will further assess and address in future session.   Hand Strength   Hand Dominance Right   Hand Strength Comments Will monitor needs and further assess prn.   Balance   Balance Comments Reports she typically feels steady on her feet but has moments of dizziness, including sometimes when her eyes are closed in the shower. Will reach out to the wall to steady herself. Has PT evaluation scheduled for next week, will defer further assessment of balance and gait to PT.   Functional Mobility   Ambulation mod I, increased time/effort   Transfer Skill   Level of [place]: Transfers independent   Bathing   Level of [place] - Bathing other (see comments)  (mod I, increased time/effort)   Bathing Comments Reports doing the stairs to get to her shower can be very fatiguing but does okay in the shower. Sometimes will get a bout of dizziness and set her hand on the wall. Typically feels \"fine\" after taking a shower, depending on what she had done the rest of the day.   Upper Body Dressing   Level of [place]: Dress Upper Body independent   Upper Body Dressing " "Comments Occasional difficulty donning bra due to pain in shoulder   Lower Body Dressing   Level of [place]: Dress Lower Body independent   Toileting   Level of [place]: Toilet independent   Grooming   Level of [place]: Grooming independent   Eating/Self-Feeding   Level of [place]: Eating independent   Activity Tolerance   Activity Tolerance Reports her fatigue has been significantly worse lately. Reports she fell asleep for about an hour and a half yesterday on the couch. Reports significant lack of energy. Will clean the bathroom then need 45 min break. Reports she tries to walk her dog every day \"but it's not really a walk\" as her dog is very old and stops frequently to sniff. Doesn't have much time/energy to do much outside of work. Was previously quite active.   Instrumental Activities of Daily Living Assessment   IADL Assessment/Observations Medication management: manages IND taking directly from pill bottles, keeps them all in one place. Remembers to take medications as scheduled.   IADL Quick Adds Meal Planning/Preparation;Home/Financial/Management;Communication/Computer Use;Community Mobility   Meal Planning/Preparation Reports spouse just put in a  and helps with completing some of the kitchen household tasks. Both pt and spouse assist with meal prep tasks, he is taking on more of the cooking. She likes to cook most meals from scratch. Used to enjoy baking frequently but doesn't do this much anymore. Reports min difficulty with multi-tasking and time management of cooking tasks.   Home/Financial Management Reports she mostly does the household cleaning and laundry. Laundry is in basement level and reports this can be a little tough some days to transport laundry. Will ask for help if she needs it. Difficulty concentrating on one item at a time. Reports she pays her own bills and spouse pays most of the household bills. Occasionally forgets to pay bills but not a consistent " "issue.   Communication/Computer Use Using cell phone and computer is going well, no physical concerns. Has previously reported difficulty with finding information quickly or \"missing\" information, especially with work.   Community Mobility Reports she drives herself. Notes driving is very tough and tires her out. Reports she only drives as needed, such as going to her appointments or taking her dog to the vet. Doesn't enjoy driving like she used to and doesn't go on trips to visit her sister or mom anymore. Drives mostly during the day versus at night.   Planned Therapy Interventions   Planned Therapy Interventions ADL training;IADL training;Cognitive skills;Cognitive performance testing;Community/work reintegration;Coordination training;ROM;Self care/Home management;Strengthening;Stretching;Therapeutic activities   Adult OT Eval Goals   OT Eval Goals (Adult) 1;2;3;4    OT Goal 1   Goal Identifier Fatigue Management   Goal Description Jayne will demonstrate 3 energy conservation strategies (e.g. pacing, planning, prioritizing, sleep hygiene, etc.) to facilitate fatigue management with ADL/IADL tasks (e.g. laundry, meal preparation, walking/exercise).   Target Date 07/24/23    OT Goal 2   Goal Identifier UE HEP   Goal Description Jayne will demonstrate independent completion of 1-3 activity HEP for UE strength/endurance to promote independence with ADLs/IADLs (e.g. retrieving object from cabinet or refrigerator, meal preparation, hanging and transporting laundry, exercise/leisure).   Target Date 07/24/23    OT Goal 3   Goal Identifier Memory   Goal Description Jayne will demonstrate improved memory skills by completing short-term memory tasks in occupational therapy with 90% accuracy using compensatory strategies for increased safety and independence with ADL/IADLS (keeping up schedule, remembering to take medications, turn off the stove when done, remember to switch laundry).   Target Date 07/24/23   OT Goal 4 "   Goal Identifier Problem-Solving/Attention to Details   Goal Description Jayne will demonstrate the ability to complete moderate to complex problem-solving/planning tasks (WCPA, Errands, [place], SET, etc.) with 90% accuracy to complete home and work tasks safely and accurately (e.g. meal preparation, financial management, medication management, driving, work).   Target Date 07/24/23   Clinical Impression   Criteria for Skilled Therapeutic Interventions Met Yes, treatment indicated   OT Diagnosis Decreased IND with ADLs/IADLs   Influenced by the following impairments strength, activity tolerance, vision, pain, memory, attention, problem-solving, planning/organization   Assessment of Occupational Performance 5 or more Performance Deficits   Identified Performance Deficits household management, meal preparation, financial management, leisure/hobbies, work, driving   Clinical Decision Making (Complexity) Low complexity   Therapy Frequency 1x/week   Predicted Duration of Therapy Intervention (days/wks) 12 weeks (starting with 8 weeks scheduled)   Risks and Benefits of Treatment have been explained. Yes   Patient, Family & other staff in agreement with plan of care Yes   Clinical Impression Comments Pt will benefit from skilled OT services to promote safety and IND with ADLs/IADLs   Education Assessment   Barriers To Learning No Barriers   Preferred Learning Style Listening;Reading;Demonstration;Pictures/video   Total Evaluation Time   OT Eval, Low Complexity Minutes (81192) 22     Thank you for the referral of this patient.  If you have any questions regarding the information in this report, please feel free to contact me per the information provided below.      Nicole Guzman MA, OTR/L  Occupational Therapist  [address] [address]  Clinic Fax:  [phone]  Clinic Phone: [phone]

## 2023-05-02 LAB — ANA SER QL IF: NEGATIVE

## 2023-05-03 LAB — VIT B1 PYROPHOSHATE BLD-SCNC: 159 NMOL/L

## 2023-05-08 ENCOUNTER — HOSPITAL ENCOUNTER (OUTPATIENT)
Dept: OCCUPATIONAL THERAPY | Facility: CLINIC | Age: 58
Setting detail: THERAPIES SERIES
Discharge: HOME | End: 2023-05-08
Attending: INTERNAL MEDICINE
Payer: COMMERCIAL

## 2023-05-08 ENCOUNTER — HOSPITAL ENCOUNTER (OUTPATIENT)
Dept: PHYSICAL THERAPY | Facility: CLINIC | Age: 58
Setting detail: THERAPIES SERIES
Discharge: HOME | End: 2023-05-08
Attending: INTERNAL MEDICINE
Payer: COMMERCIAL

## 2023-05-08 DIAGNOSIS — G93.32 POST-COVID CHRONIC FATIGUE: ICD-10-CM

## 2023-05-08 DIAGNOSIS — U09.9 POST-COVID CHRONIC FATIGUE: ICD-10-CM

## 2023-05-08 PROCEDURE — 97161 PT EVAL LOW COMPLEX 20 MIN: CPT | Mod: GP

## 2023-05-08 PROCEDURE — 97110 THERAPEUTIC EXERCISES: CPT | Mod: GP

## 2023-05-08 PROCEDURE — 97535 SELF CARE MNGMENT TRAINING: CPT | Mod: GO

## 2023-05-08 ASSESSMENT — 6 MINUTE WALK TEST (6MWT): TOTAL DISTANCE WALKED (FT): 900

## 2023-05-08 NOTE — PROGRESS NOTES
05/08/23 0846   Quick Adds   Type of Visit Initial OP PT Evaluation   General Information   Start of Care Date 05/08/23   Referring Physician Amy Gerardo MD   Orders Evaluate and Treat as Indicated   Additional Orders OT, neuropsychology   Order Date 04/26/23   Medical Diagnosis Post-COVID chronic fatigue   Onset of illness/injury or Date of Surgery 05/08/23  (Began Feb 2022)   Precautions/Limitations no known precautions/limitations   Surgical/Medical history reviewed Yes   Pertinent history of current problem (include personal factors and/or comorbidities that impact the POC) Pt is a 56 yo F referred to PT for post COVID chronic fatigue. PER PCP NOTE: Patient reports that she had COVID in February 2022. She reports that her fatigue never went away. She states that since that time she has been having fluctuating fatigue. She also had COVID in December of 2022. She describes it as having runny nose, congestion, fever, achy. She reports that she had symptoms for about a month. She was treated with Paxlovid, had rebound symptoms that took another 3 weeks to resolve. Had a stress test in April of 2022, with a troponin leak following test.  PER PT REPORT: Pt has had trouble focusing/making mistakes- feels brain fatigue is worse, calling people wrong name at work. Was able to cut down trees this weekend, walks dog 1x a day, tries to walk with  in afternoons. Able to hike in [place] 10-12 thousand steps/day. Had an MRI last week, has an appointment for follow up next week. PMH: Generalized anxiety disorder, acitd reflux disease, sleep apnea, hypercholesterolemia, L rotator cuff repair, L5-S1 lumbar fusion   Prior level of function comment Used to go for 5 mile walks, garden all day, now takes a lot longer, now waking up tired the next day, used to be able to drive down to [place] to see mother, now not able to due to fatigue   Current Community Support Family/friend caregiver  (Recently )    Patient role/Employment history Employed  (alot on computer, work from home)   Living environment House/townhome  (10-13 stairs to basement with laundry and shower)   Assistive Devices Comments ambulates independently   Patient/Family Goals Statement increase endurance   Fall Risk Screen   Fall screen completed by PT   Have you fallen 2 or more times in the past year? No   Have you fallen and had an injury in the past year? No   Is patient a fall risk? No   Abuse Screen (yes response referral indicated)   Feels Unsafe at Home or Work/School no   Feels Threatened by Someone no   Does Anyone Try to Keep You From Having Contact with Others or Doing Things Outside Your Home? no   Physical Signs of Abuse Present no   System Outcome Measures   FACIT Fatigue Subscale (score out of 52). The higher the score, the better the QOL. 19   Pain   Patient currently in pain Yes   Pain comments Joint pain and neck pain that radiates into shoulder blade- 3-4/10   Vitals Signs   Heart Rate 87   Blood Pressure 145/85   Vital Signs Comments L UE seated, resting   Cognitive Status Examination   Level of Consciousness alert   Follows Commands and Answers Questions 100% of the time   Integumentary   Integumentary No deficits were identified   Posture   Posture Normal   Range of Motion (ROM)   ROM Comment WFL   Strength   Strength Comments WFL Will assess further as needed   Transfer Skills   Transfer Comments 5xSTS: 18.12s   Gait   Gait Comments Ambulates at slightly decreased gait speed   Gait Special Tests   Gait Special Tests 25 FOOT TIMED WALK;SIX MINUTE WALK TEST   Gait Special Tests 25 Foot Timed Walk   Comments Gait speed: 1.096m/s   Gait Special Tests Six Minute Walk Test   Feet 900 Feet   Comments Performed without AD, reports no increase in fatigue   Balance   Balance no deficits were identified   Sensory Examination   Sensory Perception no deficits were identified   Coordination   Coordination no deficits were identified    Muscle Tone   Muscle Tone no deficits were identified   Planned Therapy Interventions   Planned Therapy Interventions neuromuscular re-education;strengthening;stretching;manual therapy   Clinical Impression   Criteria for Skilled Therapeutic Interventions Met yes, treatment indicated   PT Diagnosis Decreased functional mobility   Influenced by the following impairments Decreased endurance, decreased strength, fatigue   Functional limitations due to impairments Decreased distance ambulated, unable to perform as much yardwork/gardening   Clinical Presentation Stable/Uncomplicated   Clinical Presentation Rationale clinical judgement, pt is stable   Clinical Decision Making (Complexity) Low complexity   Therapy Frequency 1 time/week   Predicted Duration of Therapy Intervention (days/wks) 6 weeks   Risk & Benefits of therapy have been explained Yes   Patient, Family & other staff in agreement with plan of care Yes   Clinical Impression Comments Pt is a 58 yo F presenting to PT with chronic fatigue following COVID. Pt demonstrates decreased endurance and strength limiting pt's ability to ambulate desired distances and perform gardening as she had prior. Pt would benefit from skilled PT services to improve strength and endurance along with decreasing fatigue.   Education Assessment   Preferred Learning Style Listening;Demonstration;Pictures/video   GOALS   PT Eval Goals 1;2;3;4   Goal 1   Goal Identifier HEP   Goal Description Pt will be able to demonstrate HEP activities without need for cues from provider to demonstrate understanding and independence with HEP.   Target Date 06/12/23   Goal 2   Goal Identifier Gait speed   Goal Description Pt will demonstrate a gait speed greater than 1.2m/s to demonstrate improved safety with crossing streets.   Goal Progress Eval: 1.096 m/s   Target Date 06/12/23   Goal 3   Goal Identifier Endurance   Goal Description Pt will increase distance ambulated on 6MWT with LRAD by120 feet to  demonstrate improved endurance with ambulation   Goal Progress Eval: 900 feet w/o AD   Target Date 06/12/23   Goal 4   Goal Identifier Fatigue   Goal Description Pt will be able to demonstrate a 10 point improvement on the FACIT to demonstrate decreased fatigue throughout the week.   Target Date 06/12/23   Total Evaluation Time   PT Eval, Low Complexity Minutes (41569) 35

## 2023-05-12 ENCOUNTER — MYC MEDICAL ADVICE (OUTPATIENT)
Dept: PHYSICAL MEDICINE AND REHAB | Facility: CLINIC | Age: 58
End: 2023-05-12
Payer: COMMERCIAL

## 2023-05-12 DIAGNOSIS — R41.840 POST-COVID CHRONIC CONCENTRATION DEFICIT: Primary | ICD-10-CM

## 2023-05-12 DIAGNOSIS — U09.9 POST-COVID CHRONIC CONCENTRATION DEFICIT: Primary | ICD-10-CM

## 2023-05-15 ENCOUNTER — HOSPITAL ENCOUNTER (OUTPATIENT)
Dept: OCCUPATIONAL THERAPY | Facility: CLINIC | Age: 58
Setting detail: THERAPIES SERIES
Discharge: HOME | End: 2023-05-15
Attending: INTERNAL MEDICINE
Payer: COMMERCIAL

## 2023-05-15 PROCEDURE — 97535 SELF CARE MNGMENT TRAINING: CPT | Mod: GO

## 2023-05-17 ENCOUNTER — THERAPY VISIT (OUTPATIENT)
Dept: PHYSICAL THERAPY | Facility: CLINIC | Age: 58
End: 2023-05-17
Attending: INTERNAL MEDICINE
Payer: COMMERCIAL

## 2023-05-17 DIAGNOSIS — G93.32 POST-COVID CHRONIC FATIGUE: Primary | ICD-10-CM

## 2023-05-17 DIAGNOSIS — U09.9 POST-COVID CHRONIC FATIGUE: Primary | ICD-10-CM

## 2023-05-17 PROCEDURE — 97110 THERAPEUTIC EXERCISES: CPT | Mod: GP | Performed by: PHYSICAL THERAPIST

## 2023-05-17 PROCEDURE — 97112 NEUROMUSCULAR REEDUCATION: CPT | Mod: GP | Performed by: PHYSICAL THERAPIST

## 2023-05-30 ENCOUNTER — THERAPY VISIT (OUTPATIENT)
Dept: PHYSICAL THERAPY | Facility: CLINIC | Age: 58
End: 2023-05-30
Attending: INTERNAL MEDICINE
Payer: COMMERCIAL

## 2023-05-30 ENCOUNTER — TELEPHONE (OUTPATIENT)
Dept: NEUROPSYCHOLOGY | Facility: CLINIC | Age: 58
End: 2023-05-30

## 2023-05-30 DIAGNOSIS — U09.9 POST-COVID CHRONIC FATIGUE: Primary | ICD-10-CM

## 2023-05-30 DIAGNOSIS — G93.32 POST-COVID CHRONIC FATIGUE: Primary | ICD-10-CM

## 2023-05-30 DIAGNOSIS — R53.83 FATIGUE, UNSPECIFIED TYPE: ICD-10-CM

## 2023-05-30 PROCEDURE — 97110 THERAPEUTIC EXERCISES: CPT | Mod: GP

## 2023-05-30 PROCEDURE — 97140 MANUAL THERAPY 1/> REGIONS: CPT | Mod: GP

## 2023-05-30 NOTE — TELEPHONE ENCOUNTER
Received message from [facility] team stating patient is appropriate to be seen in our covid clinic w/ Dr. Sorto. Per Kamron Garza, Psychometrist - patient can be scheduled w/ Dr. Sorto using covid protocols.    Left Voicemail (1st Attempt) for the patient to call back and schedule the following:    Appointment type: Covid protocols (2 appts, video visit and in person eval)  Provider: Dr. Sorto at the [facility]  Return date: First available  Specialty phone number: [phone]  Additional appointment(s) needed: See appt type  Additonal Notes: N/A    Heron Rush on 5/30/2023 at 12:16 PM

## 2023-06-02 ENCOUNTER — THERAPY VISIT (OUTPATIENT)
Dept: OCCUPATIONAL THERAPY | Facility: CLINIC | Age: 58
End: 2023-06-02
Attending: FAMILY MEDICINE
Payer: COMMERCIAL

## 2023-06-02 DIAGNOSIS — R41.89 COGNITIVE CHANGES: ICD-10-CM

## 2023-06-02 DIAGNOSIS — U09.9 POST-COVID CHRONIC FATIGUE: Primary | ICD-10-CM

## 2023-06-02 DIAGNOSIS — G93.32 POST-COVID CHRONIC FATIGUE: Primary | ICD-10-CM

## 2023-06-02 PROCEDURE — 97535 SELF CARE MNGMENT TRAINING: CPT | Mod: GO

## 2023-06-06 ENCOUNTER — THERAPY VISIT (OUTPATIENT)
Dept: PHYSICAL THERAPY | Facility: CLINIC | Age: 58
End: 2023-06-06
Attending: INTERNAL MEDICINE
Payer: COMMERCIAL

## 2023-06-06 DIAGNOSIS — G93.32 POST-COVID CHRONIC FATIGUE: Primary | ICD-10-CM

## 2023-06-06 DIAGNOSIS — U09.9 POST-COVID CHRONIC FATIGUE: Primary | ICD-10-CM

## 2023-06-06 PROCEDURE — 97110 THERAPEUTIC EXERCISES: CPT | Mod: GP | Performed by: PHYSICAL THERAPIST

## 2023-06-13 ENCOUNTER — THERAPY VISIT (OUTPATIENT)
Dept: PHYSICAL THERAPY | Facility: CLINIC | Age: 58
End: 2023-06-13
Attending: INTERNAL MEDICINE
Payer: COMMERCIAL

## 2023-06-13 DIAGNOSIS — U09.9 POST-COVID CHRONIC FATIGUE: Primary | ICD-10-CM

## 2023-06-13 DIAGNOSIS — G93.32 POST-COVID CHRONIC FATIGUE: Primary | ICD-10-CM

## 2023-06-13 PROCEDURE — 97110 THERAPEUTIC EXERCISES: CPT | Mod: GP | Performed by: PHYSICAL THERAPIST

## 2023-06-13 NOTE — TELEPHONE ENCOUNTER
Sent Mychart (1st Attempt) and Left Voicemail (2nd Attempt) for the patient to call back and schedule the following:    Appointment type: Covid protocols (2 appts, video visit and in person eval)  Provider: Dr. Sorto at the [facility]  Return date: First available  Specialty phone number: [phone]  Additional appointment(s) needed: See appt type  Additonal Notes: N/A    Heron Rush on 6/13/2023 at 5:18 PM

## 2023-06-14 NOTE — TELEPHONE ENCOUNTER
Spoke with patient, [place] 7/21 [place] 8am [place] [facility] [place]     Heron Rush on 6/14/2023 at 10:25 AM

## 2023-06-16 ENCOUNTER — THERAPY VISIT (OUTPATIENT)
Dept: OCCUPATIONAL THERAPY | Facility: CLINIC | Age: 58
End: 2023-06-16
Attending: FAMILY MEDICINE
Payer: COMMERCIAL

## 2023-06-16 DIAGNOSIS — R41.89 COGNITIVE CHANGES: ICD-10-CM

## 2023-06-16 DIAGNOSIS — G93.32 POST-COVID CHRONIC FATIGUE: Primary | ICD-10-CM

## 2023-06-16 DIAGNOSIS — U09.9 POST-COVID CHRONIC FATIGUE: Primary | ICD-10-CM

## 2023-06-16 PROCEDURE — 97535 SELF CARE MNGMENT TRAINING: CPT | Mod: GO | Performed by: OCCUPATIONAL THERAPIST

## 2023-06-20 ENCOUNTER — THERAPY VISIT (OUTPATIENT)
Dept: PHYSICAL THERAPY | Facility: CLINIC | Age: 58
End: 2023-06-20
Attending: INTERNAL MEDICINE
Payer: COMMERCIAL

## 2023-06-20 DIAGNOSIS — U09.9 POST-COVID CHRONIC FATIGUE: Primary | ICD-10-CM

## 2023-06-20 DIAGNOSIS — G93.32 POST-COVID CHRONIC FATIGUE: Primary | ICD-10-CM

## 2023-06-20 PROCEDURE — 97110 THERAPEUTIC EXERCISES: CPT | Mod: GP | Performed by: PHYSICAL THERAPIST

## 2023-06-22 ENCOUNTER — THERAPY VISIT (OUTPATIENT)
Dept: OCCUPATIONAL THERAPY | Facility: CLINIC | Age: 58
End: 2023-06-22
Attending: FAMILY MEDICINE
Payer: COMMERCIAL

## 2023-06-22 DIAGNOSIS — G93.32 POST-COVID CHRONIC FATIGUE: Primary | ICD-10-CM

## 2023-06-22 DIAGNOSIS — U09.9 POST-COVID CHRONIC FATIGUE: Primary | ICD-10-CM

## 2023-06-22 PROCEDURE — 97535 SELF CARE MNGMENT TRAINING: CPT | Mod: GO

## 2023-06-27 ENCOUNTER — THERAPY VISIT (OUTPATIENT)
Dept: PHYSICAL THERAPY | Facility: CLINIC | Age: 58
End: 2023-06-27
Attending: INTERNAL MEDICINE
Payer: COMMERCIAL

## 2023-06-27 DIAGNOSIS — M25.512 CHRONIC LEFT SHOULDER PAIN: ICD-10-CM

## 2023-06-27 DIAGNOSIS — R53.83 FATIGUE, UNSPECIFIED TYPE: ICD-10-CM

## 2023-06-27 DIAGNOSIS — G93.32 POST-COVID CHRONIC FATIGUE: Primary | ICD-10-CM

## 2023-06-27 DIAGNOSIS — U09.9 POST-COVID CHRONIC FATIGUE: Primary | ICD-10-CM

## 2023-06-27 DIAGNOSIS — G89.29 CHRONIC LEFT SHOULDER PAIN: ICD-10-CM

## 2023-06-27 PROCEDURE — 97750 PHYSICAL PERFORMANCE TEST: CPT | Mod: GP

## 2023-06-27 PROCEDURE — 97110 THERAPEUTIC EXERCISES: CPT | Mod: GP

## 2023-06-27 NOTE — PROGRESS NOTES
06/27/23 0500   Appointment Info   Signing clinician's name / credentials Jerri Hubbard, PT, DPT   Visits Used 7   Medical Diagnosis Post-COVID chronic fatigue   PT Tx Diagnosis Decreased functional mobility   Progress Note/Certification   Start of Care Date 05/08/23   Onset of illness/injury or Date of Surgery 05/08/23   Predicted Duration 12 weeks   Progress Note Due Date 09/12/23   Progress Note Completed Date 06/27/23   GOALS   PT Goals 2;3;4;5   PT Goal 1   Goal Identifier HEP   Goal Description Pt will be able to demonstrate HEP activities without need for cues from provider to demonstrate understanding and independence with HEP.   Rationale to maximize safety and independence with performance of ADLs and functional tasks;to maximize safety and independence with self cares   Goal Progress 6/13/23: Compliance to her HEP, variable exercises secondary to heel pain, 6/27/23: Variable due to fatigue and joint pain   Target Date 09/12/23   PT Goal 2   Goal Identifier Gait speed   Goal Description Pt will demonstrate a gait speed greater than 1.2m/s to demonstrate improved safety with crossing streets.   Rationale to maximize safety and independence with performance of ADLs and functional tasks;to maximize safety and independence within the home;to maximize safety and independence within the community;to maximize safety and independence with self cares   Goal Progress Eval: 1.096 m/s; 6/13/23: progressing; Normal: 1.13 m/s, Fast 1.29 m/s but antalgic gait with right heel pain   Target Date 09/12/23   PT Goal 3   Goal Identifier Endurance   Goal Description Pt will increase distance ambulated on 6MWT with LRAD by120 feet to demonstrate improved endurance with ambulation   Rationale to maximize safety and independence within the community;to maximize safety and independence within the home   Goal Progress Eval: 900 feet w/o AD; 6/13/23: NT due to heel pain and antalgic gait 6/27/23: progressing; 980 feet w/o AD    Target Date 09/12/23   PT Goal 4   Goal Identifier Fatigue   Goal Description Pt will be able to demonstrate a 10 point improvement on the FACIT to demonstrate decreased fatigue throughout the week.   Rationale to maximize safety and independence with performance of ADLs and functional tasks;to maximize safety and independence within the community;to maximize safety and independence with self cares   Goal Progress 6/27/23: 16/52 demonstrating regression in goal. May be related to spike in fatigue and joint pain over weekend.    Target Date 09/12/23   Subjective Report   Subjective Report Reports severe pain friday-sunday thiks it may be from too much house work with rain. Seems like joint pain, wade fog and fatigue has been getting worse. Feeling better today than had been this weekend, did not do HEP over friday-sunday.   Therapeutic Procedure/Exercise   Therapeutic Procedures: strength, endurance, ROM, flexibillity minutes (16530) 25   Ther Proc 1 Standing hip flexor stretch   Ther Proc 1 - Details x30s each side   Ther Proc 2 Supine pigeon pose with overpressure from hand   Ther Proc 2 - Details x1 min on each side   Ther Proc 3 Bridges   Ther Proc 3 - Details x15 with 5s holds   Ther Proc 4 Sidelying abduction   Ther Proc 4 - Details x20 on each side, reporting mild muscle fatigue with activity   Ther Proc 5 Standing heel raise with B LE lifting, and isolated R LE lowering.   Ther Proc 5 - Details to fatigue ~30 reps   PTRx Ther Proc 8 Bridging #2A Weight Shift   PTRx Ther Proc 8 - Details Attempted this session but she notes increased heel pain so continue to hold.   PTRx Ther Proc 9 Hip Abduction Straight Leg Raise   PTRx Ther Proc 9 - Details Side lying hip abduction with cues for upper body relaxation rather than keeping her head lifted off the table notes improved back pain with upper body relaxation. Good carryover of hip abduction with slight backward lift to isolate glute med 15x on each side. Patient  "was performing 3-4 sets of 20-30 per day educated on decreasing to 2 sets every other day and monitoring for change in hip pain.    PTRx Ther Proc 10 Prone Plank   PTRx Ther Proc 10 - Details Planks with her forearms on the mat and feet on the floor for HEP notes relative ease. Progressed to forearms on the table and knees on the table cues for core and glute activation to maintain hips off the table in a straight line 30\" to fatigue.    Skilled Intervention Strengthening, stretching, pain management   Patient Response/Progress Notes improved hip pain with exercises   Eval/Assessments   Assessments Physical Performance Test/Measures   Physical Performance Test/measures   Physical Performance Test/Measurement, Minutes (53158) 15   Physical Performance Test/Measurement Details Administration of 6MWT and FACIT   Skilled Intervention Edu on how to perform tests and edu on results, POC   Patient Response/Progress verbalized understanding, pt in agreement   Education   Learner/Method Patient   Education Comments HEP   Plan   Home program STS, 3 mile walk 3x/week, sidelying SLR, gastroc/soleus stretches, toe flexor stretch, eccentric heel raise lower, R plantar muscular strengthening, diaphragm breathing, plank with forearms and knees   Plan for next session f/u with fatigue, f/u with heel pain/gastroc tightness, floor transfer, seated and standing core and hip strengthening, planks, bosu stance, rotation, dead bugs   Total Session Time   Timed Code Treatment Minutes 40   Total Treatment Time (sum of timed and untimed services) 40   Medicare Claim Information   Medical Diagnosis Post-COVID chronic fatigue       PLAN: Continue therapy per current plan of care.    Beginning/End Dates of Progress Note Reporting Period: 06/27/23 to 06/27/2023    Referring Provider: Amy Gerardo    "

## 2023-06-30 ENCOUNTER — THERAPY VISIT (OUTPATIENT)
Dept: OCCUPATIONAL THERAPY | Facility: CLINIC | Age: 58
End: 2023-06-30
Attending: FAMILY MEDICINE
Payer: COMMERCIAL

## 2023-06-30 DIAGNOSIS — G93.32 POST-COVID CHRONIC FATIGUE: ICD-10-CM

## 2023-06-30 DIAGNOSIS — U09.9 POST-COVID CHRONIC FATIGUE: ICD-10-CM

## 2023-06-30 PROCEDURE — 97110 THERAPEUTIC EXERCISES: CPT | Mod: GO

## 2023-07-07 ENCOUNTER — THERAPY VISIT (OUTPATIENT)
Dept: OCCUPATIONAL THERAPY | Facility: CLINIC | Age: 58
End: 2023-07-07
Attending: FAMILY MEDICINE
Payer: COMMERCIAL

## 2023-07-07 DIAGNOSIS — U09.9 POST-COVID CHRONIC FATIGUE: Primary | ICD-10-CM

## 2023-07-07 DIAGNOSIS — G93.32 POST-COVID CHRONIC FATIGUE: Primary | ICD-10-CM

## 2023-07-07 PROCEDURE — 97110 THERAPEUTIC EXERCISES: CPT | Mod: GO

## 2023-07-07 PROCEDURE — 97535 SELF CARE MNGMENT TRAINING: CPT | Mod: GO

## 2023-07-07 NOTE — PROGRESS NOTES
DISCHARGE  Reason for Discharge: Patient has met all goals. Patient chooses to discontinue therapy. She was seen for a total of 7 OP OT sessions with focus on the below goal areas (see progress noted below). Pt overall demonstrates improved awareness of factors that contribute to feelings of physical and cognitive fatigue, as well as improved awareness and application of strategies to manage fatigue and cognitive tasks. She demonstrates consistent adherence to HEP and should continue with both HEP and use of compensatory strategies beyond time of discharge to further promote IND with ADLs/IADLs.    Equipment Issued: n/a    Discharge Plan: Patient to continue home program.    Referring Provider:  Amy Gerardo        07/07/23 0500   Appointment Info   Treating Provider Nicole Guzman, OTR/L   Visits Used 7/8 - [facility] ([employer] [facility]   Medical Diagnosis Post-COVID chronic fatigue   OT Tx Diagnosis Decreased IND with ADLs/IADLs   Progress Note/Certification   Start Of Care Date 05/01/23   Onset of Illness/Injury or Date of Surgery 04/26/23  (date of order used)   Therapy Frequency 1x/week   Predicted Duration 12 weeks (starting with 8 weeks scheduled)   Progress Note Due Date 07/24/23   Goals   OT Goals 1;2;3;4   OT Goal 1   Goal Identifier Fatigue Management   Goal Description Jayne will demonstrate 3 energy conservation strategies (e.g. pacing, planning, prioritizing, sleep hygiene, etc.) to facilitate fatigue management with ADL/IADL tasks (e.g. laundry, meal preparation, walking/exercise).   Goal Progress Goal met. Pt educated in fatigue management strategies including stovetop burner analogy, planning, prioritizing, pacing, and posture with application to ADLs/IADLs, including grooming/dressing/hygiene, chores at home, kitchen tasks, and running errands, to name a few. Educated and trained in symptom/activity log using [employer] remberto for improved self-awareness of fatigue and contributing factors.  Additionally, educated in sensory compensatory strategies and sleep hygiene strategies to further promote fatigue management for IND with ADLs/IADLs.   Target Date 07/24/23   Date Met 07/07/23   OT Goal 2   Goal Identifier UE HEP   Goal Description Jayne will demonstrate independent completion of 1-3 activity HEP for UE strength/endurance to promote independence with ADLs/IADLs (e.g. retrieving object from cabinet or refrigerator, meal preparation, hanging and transporting laundry, exercise/leisure).   Goal Progress Goal met. Pt trained in UE strengthening HEP to promote functional strength for IND with ADLs/IADLs. Demonstrates understanding of form, pacing, grading of exercises, adherence to HEP, and is able to demonstrate completion in session. Pt to continue with HEP beyond time of discharge to further progress.   Target Date 07/24/23   Date Met 07/07/23   OT Goal 3   Goal Identifier Memory   Goal Description Jayne will demonstrate improved memory skills by completing short-term memory tasks in occupational therapy with 90% accuracy using compensatory strategies for increased safety and independence with ADL/IADLS (keeping up schedule, remembering to take medications, turn off the stove when done, remember to switch laundry).   Goal Progress Goal met. Pt educated in memory compensatory strategies, including lifestyle factors (physical and cognitive exercise, diet, vision/hearing, sleep, socialization, mental health) as well as specific strategies (attention, association, chunking/clustering, visualization, language-based strategies, repetition/rehearsal, organization, and external aids). Initiated cognitive HEP with use of Hive Jive numerical reasoning puzzles as therapeutic tool with pt 100% accurate on 4 of 4 puzzles. Pt to continue with cognitive HEP and applying strategies, as needed.   Target Date 07/24/23   Date Met 07/07/23   OT Goal 4   Goal Identifier Problem-Solving/Attention to Details   Goal  Description Jayne will demonstrate the ability to complete moderate to complex problem-solving/planning tasks (WCPA, Errands, [employer], SET, etc.) with 90% accuracy to complete home and work tasks safely and accurately (e.g. meal preparation, financial management, medication management, driving, work).   Goal Progress Goal partially met. Educated in executive function compensatory strategies of starting a task when one has good energy, talking out loud, slowing down and breaking task into multiple steps as needed, using finger to track information on the page, highlighting/underlining/circling important information, color-coding or categorizing, slow down, and double-checking at end of task for improved accuracy. Educated in Goal/Plan/Do/Check metacognitive strategy to promote IND with ADLs/IADLs through planning and debrief/review strategies. Did not complete further executive function/problem-solving tasks in session beyond CAM assessment as pt overall reporting understanding of strategies and readiness for discharge. Pt is also scheduled for upcoming neuropsychological assessment. Pt is welcome to OP OT in the future, as needed, to further address goal area.   Target Date 07/24/23   Subjective Report   Subjective Report Pt reports new arm exercises are going well. They are more  challenging for her L side, can only get about 5 repetitions on her third set on that side. Reports shoulder shrugs and shoulder blade squeezes both seem to help her shoulders and neck pain that she saw a PT for last summer. She is completing about 2 sets every day and sometimes 3 sets, but she spreads them throughout the day. Overall continues to feel quite fatigued but feels she has a good knowledge of the strategies to help her complete her daily activities and is in agreement with today's discharge.   Objective Measures   Objective Measures Objective Measure 1;Objective Measure 2;Objective Measure 3   Objective Measure 1  "  Objective Measure CAM   Details As of 5/8/23 - With CAM paragraph, pt completed in 5 mins 38 sec with 8 of 9 tasks with 1 out of order (SEVERE impairment)   Objective Measure 2   Objective Measure MMT of BUEs   Details As of 6/30/23 - With MMT of shoulder flexion/extension, abduction/adduction, internal/external rotation, and elbow and wrist flexion/extension, pt with approximately 4/5 strength R shoulder abduction, L shoulder flexion, L shoulder abduction, and L wrist extension. All other areas approximately 5/5 strength but with decreased activity tolerance per pt report (effortful this date).   Objective Measure 3   Objective Measure FACIT   Details As of 7/7/23 - 17/52 (BNLs, improved from 15/52 at [facility] on 5/1/23, see flowsheet for details).   Treatment Interventions (OT)   Interventions Therapeutic Procedure/Exercise   Self Care/Home Management   Self-Care/Home Mgmt/ADL, Compensatory, Meal Prep Minutes (29137) 25 Minutes   Skilled Intervention Skilled education in OT summary for final recommendations and HEP to promote IND with ADLs/IADLs   Patient Response/Progress Pt expresses appreciation, reports understanding and confidence in application of strategies. Has no further questions/concerns.   Treatment Detail With today being patient's final OT session, facilitated skilled review of education and training provided throughout course of OT with education in final recommendations and HEP to promote IND and continue progressing at home. Education today included: fatigue management strategies (\"stovetop burners,\" plan, prioritize, pace, posture, application of strategies to ADLs/IADLs, sleep hygiene, continue taking medications as prescribed, use sunglasses/earplugs as needed, track symptoms using [employer] remberto or other tool), memory compensatory strategies (lifestyle factors, specific memory strategies, cognitive activities/exercises), attention to detail/concentration strategies (starting a task when one has " "good energy, talking out loud, slowing down and breaking task into multiple steps as needed, using finger to track information on the page, highlighting/underlining/circling important information, color-coding or categorizing, slow down, and double-checking at end of task for improved accuracy, Goal/Plan/Do/Check metacognitive strategy), and UE HEP (arm exercises with resistance). Additionally, educated in follow up contact information and referral process for return to OP OT in the future, as needed, with new referral from her provider.   Progress Goals met   Therapeutic Procedure/Exercise   Therapeutic Procedure: strength, endurance, ROM, flexibillity minutes (27636) 15   Ther Proc 1 UE HEP   Ther Proc 1 - Details To promote UE strength and assess carryover of learning, facilitated teach back method of newly initiated UE HEP using [employer] \"Arm Exercises\" handout, with pt completing 10 reps each of: 1) scapular elevation, 2) scapular retraction, 3) SH FL, 4) SH ABD, 5) SH Horizontal ADD, 6) Shoulder external/internal rotation, 7) Biceps flexion, 8) Triceps extension, 9) forearm supination/pronation, and 10) wrist flexion/extension with 2 or 5# for each exercise. Pt only requiring about 2 verbal cues for form throughout teach back. Educated on strategies for grading activities and schedule for completion of exercises. Educated in conservative use of weight to reduce strain on her joints as she progresses exercises. Pt reports understanding.   Skilled Intervention Skilled training in UE strengthening exercises to promote IND with ADLs/IADLs   Patient Response/Progress Reports arms feel very sore/tired today after cleaning and cooking yesterday for her 's birthday. Goal 2 met.   Education   Learner/Method Patient;Listening;Reading   Readiness Acceptance   Education Notes see tx details above   Plan   Home program arm exercises with min resistance   Updates to plan of care discharged   Homework ECWS " (burners/basics, self-cares, household, kitchen, errands), track fatigue/symptoms with Bearable remberto, SSP headphones, H[employer] Jive, trialing changing up break times in work schedule (maybe 15 min morning and 45 min lunch), memory strategies, concentration/attention to detail strategies   Total Session Time   Timed Code Treatment Minutes 40   Total Treatment Time (sum of timed and untimed services) 40   Session Number   Authorization status 60 visits per year combined. Hard max. 0 used. Telehealth - YES.   Clinical Impression   OT Diagnosis Decreased IND with ADLs/IADLs       Thank you for the referral of this patient.  If you have any questions regarding the information in this report, please feel free to contact me per the information provided below.      Nicole Guzman MA, OTR/L  Occupational Therapist  [address] [address]  Clinic Fax:  [phone]  Clinic Phone: [phone]

## 2023-07-21 ENCOUNTER — OFFICE VISIT (OUTPATIENT)
Dept: NEUROPSYCHOLOGY | Facility: CLINIC | Age: 58
End: 2023-07-21
Attending: INTERNAL MEDICINE
Payer: COMMERCIAL

## 2023-07-21 DIAGNOSIS — F06.8 OTHER SPECIFIED MENTAL DISORDERS DUE TO KNOWN PHYSIOLOGICAL CONDITION: Primary | ICD-10-CM

## 2023-07-21 DIAGNOSIS — F41.9 ANXIETY: ICD-10-CM

## 2023-07-21 DIAGNOSIS — U09.9 POST-COVID CHRONIC CONCENTRATION DEFICIT: ICD-10-CM

## 2023-07-21 DIAGNOSIS — R41.840 POST-COVID CHRONIC CONCENTRATION DEFICIT: ICD-10-CM

## 2023-07-21 PROCEDURE — 96133 NRPSYC TST EVAL PHYS/QHP EA: CPT | Performed by: CLINICAL NEUROPSYCHOLOGIST

## 2023-07-21 PROCEDURE — 90791 PSYCH DIAGNOSTIC EVALUATION: CPT | Performed by: CLINICAL NEUROPSYCHOLOGIST

## 2023-07-21 PROCEDURE — 96132 NRPSYC TST EVAL PHYS/QHP 1ST: CPT | Performed by: CLINICAL NEUROPSYCHOLOGIST

## 2023-07-21 PROCEDURE — 96139 PSYCL/NRPSYC TST TECH EA: CPT | Performed by: CLINICAL NEUROPSYCHOLOGIST

## 2023-07-21 PROCEDURE — 96138 PSYCL/NRPSYC TECH 1ST: CPT | Performed by: CLINICAL NEUROPSYCHOLOGIST

## 2023-07-21 NOTE — NURSING NOTE
The patient was seen for neuropsychological evaluation at the request of Dr. Amy Gerardo, for the purposes of diagnostic clarification and treatment planning. 168 minutes of test administration and scoring were provided by this writer, Conner Bailey. Please see Dr. William Mena's report for a full interpretation of the findings.

## 2023-07-22 ASSESSMENT — ENCOUNTER SYMPTOMS
BACK PAIN: 1
DOUBLE VISION: 0
SORE THROAT: 0
JOINT SWELLING: 1
WEIGHT GAIN: 1
SMELL DISTURBANCE: 0
ARTHRALGIAS: 1
EYE REDNESS: 0
WEIGHT LOSS: 0
MUSCLE WEAKNESS: 1
STIFFNESS: 1
CHILLS: 0
POLYDIPSIA: 0
FEVER: 0
HALLUCINATIONS: 0
ALTERED TEMPERATURE REGULATION: 0
INCREASED ENERGY: 1
TASTE DISTURBANCE: 0
EYE WATERING: 0
NIGHT SWEATS: 0
MYALGIAS: 1
FATIGUE: 1
EYE IRRITATION: 0
SINUS CONGESTION: 1
EYE PAIN: 0
POLYPHAGIA: 0
DECREASED APPETITE: 0
MUSCLE CRAMPS: 1
TROUBLE SWALLOWING: 0
SINUS PAIN: 0
NECK MASS: 0
NECK PAIN: 1
HOARSE VOICE: 0

## 2023-07-22 ASSESSMENT — ANXIETY QUESTIONNAIRES
7. FEELING AFRAID AS IF SOMETHING AWFUL MIGHT HAPPEN: NOT AT ALL
1. FEELING NERVOUS, ANXIOUS, OR ON EDGE: NOT AT ALL
IF YOU CHECKED OFF ANY PROBLEMS ON THIS QUESTIONNAIRE, HOW DIFFICULT HAVE THESE PROBLEMS MADE IT FOR YOU TO DO YOUR WORK, TAKE CARE OF THINGS AT HOME, OR GET ALONG WITH OTHER PEOPLE: NOT DIFFICULT AT ALL
3. WORRYING TOO MUCH ABOUT DIFFERENT THINGS: NOT AT ALL
4. TROUBLE RELAXING: NOT AT ALL
5. BEING SO RESTLESS THAT IT IS HARD TO SIT STILL: NOT AT ALL
GAD7 TOTAL SCORE: 1
6. BECOMING EASILY ANNOYED OR IRRITABLE: SEVERAL DAYS
2. NOT BEING ABLE TO STOP OR CONTROL WORRYING: NOT AT ALL
GAD7 TOTAL SCORE: 1

## 2023-07-22 ASSESSMENT — PATIENT HEALTH QUESTIONNAIRE - PHQ9
10. IF YOU CHECKED OFF ANY PROBLEMS, HOW DIFFICULT HAVE THESE PROBLEMS MADE IT FOR YOU TO DO YOUR WORK, TAKE CARE OF THINGS AT HOME, OR GET ALONG WITH OTHER PEOPLE: VERY DIFFICULT
SUM OF ALL RESPONSES TO PHQ QUESTIONS 1-9: 8
SUM OF ALL RESPONSES TO PHQ QUESTIONS 1-9: 8

## 2023-07-24 NOTE — PROGRESS NOTES
NAME  Jayne Leiva    MRN  [id number]      66     AGE  58     SEX  Female     HANDEDNESS Right     EDUCATION 12     PRESSLEY  23     PROVIDER  [facility]  [facility]     STATION  OP     VISION   with glasses           ORIENTATION      Time  0     Place      Personal Info     Presidents             WAIS-IV         Raw ScS RDS   Digit Span  34 14 12   Similarities 29 12    Matrix Reasoning 17 11    Coding  50 8           KIMBERLY-O COMPLEX FIGURE       Raw T %ile   Time to Copy 207  >16   Copy  32  >16   Short Delay Recall 8.5 30 2   Long Delay Recall 6.5 23 <1   Recognition Total 24 73 99                          WRAT READING   5   SS  115     %ile  84     Grade Equivalence >12.9            COWAT FAS       Raw 50      ScS 13      T 59             ANIMAL FLUENCY      Raw 27      ScS 13      T 62              NAB NAMING  1   Raw 31 /31     z 0.33      T 55             TRAIL MAKING TEST       Time Errors ScS T   A 46 0 6 36   B 70 0 10 52          STROOP        Raw T     Word 108 57     Color 81 57     C/W 44 58     Intf. -1 49             JESSICA   H   Raw 26      %ile 56             BDI       Raw  13     Interpretation Minimal            WESLEY       Raw  11     Interpretation Mild            MFIS         Raw     Physical  32     Cognitive  33     Psychosocial 8     Total  73             WMS-IV LOGICAL MEMORY YA     Raw ScS/%ile    LM I  22 9    LM II  20 10    Recognition 28 >75            HVLT   1     Raw T    Trial 1  6     Trial 2  9     Trial 3  11     Learning  5     Total Recall 26 46    Delayed Recall 8 39    Percent Retention 73% 33    True Positives 12     False Positives 2     Disc. Index  10 44           CLOCK DRAWING      Command  Normal     Copy  Normal            TOMM       Trial 1  50     Trial 2  50            MMPI-3              IOWA BRYAN       Raw z     Correct 25 1.28     Errors 0      Time 140             Writing Samples      Copy  Normal     Dictate  Normal     Spontaneous Normal
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
## 2023-07-26 ENCOUNTER — VIRTUAL VISIT (OUTPATIENT)
Dept: PHYSICAL MEDICINE AND REHAB | Facility: CLINIC | Age: 58
End: 2023-07-26
Payer: COMMERCIAL

## 2023-07-26 DIAGNOSIS — M79.10 POST-COVID CHRONIC MUSCLE PAIN: Primary | ICD-10-CM

## 2023-07-26 DIAGNOSIS — G89.29 POST-COVID CHRONIC MUSCLE PAIN: Primary | ICD-10-CM

## 2023-07-26 DIAGNOSIS — U09.9 POST-COVID CHRONIC MUSCLE PAIN: Primary | ICD-10-CM

## 2023-07-26 PROCEDURE — 99215 OFFICE O/P EST HI 40 MIN: CPT | Mod: VID | Performed by: INTERNAL MEDICINE

## 2023-07-26 ASSESSMENT — ENCOUNTER SYMPTOMS
CHILLS: 0
FEVER: 0
EYE PAIN: 0
SORE THROAT: 0
POLYDIPSIA: 0
SINUS PAIN: 0
BACK PAIN: 1
EYE REDNESS: 0
TROUBLE SWALLOWING: 0
POLYPHAGIA: 0
HALLUCINATIONS: 0
NECK PAIN: 1
MYALGIAS: 1
JOINT SWELLING: 1
ARTHRALGIAS: 1
FATIGUE: 1

## 2023-07-26 NOTE — LETTER
"7/26/2023       RE: Jayne Leiva  [address] [facility] [address]     Dear Colleague,    Thank you for referring your patient, Jayne Leiva, to the [facility] [place] [facility]. Please see a copy of my visit note below.    Jayne is a 58 year old who is being evaluated via a billable video visit.      How would you like to obtain your AVS? MyChart  If the video visit is dropped, the invitation should be resent by: Text to cell phone: [phone]  Will anyone else be joining your video visit? No        Assessment & Plan     Post-COVID chronic muscle pain  Discussed possible interventions for COVID-19 related muscle pain.  Recommend trial of naltrexone at low-dose.  Discussed possible medication side effects as well as off label use of the medication for pain management.  Recommend follow-up in 3 months.  Titration schedule was discussed with the patient.  She was encouraged to continue with occupational therapy as well as implementation of lifestyle modifications.  - Naltrexone HCl, Pain, 1.5 MG CAPS; Take 1.5 mg by mouth daily Start with 1 capsule (1.5 mg) daily, increase to 2 capsules (3 mg) after 2 weeks, then to 3 capsules (4.5 mg) daily after a month      I spent a total of 40 minutes on the day of the visit.   Time spent by me doing chart review, history and exam, documentation and further activities per the note       BMI:   Estimated body mass index is 40.77 kg/m  as calculated from the following:    Height as of 3/6/23: 1.651 m (5' 5\").    Weight as of 3/6/23: 111.1 kg (245 lb).           Return in about 3 months (around 10/26/2023).    Amy Gerardo MD  [facility] [place]    Subjective   Jayne is a 58 year old, presenting for the following health issues:  Follow Up  Follow Up    HPI     Patient reports that she continues " to deal with fatigue. She has been working with OT on fatigue management. She has tried going for a longer walk and started to have a lot of pain. She is working from home. She is paid hourly and has difficulties putting breaks into her schedule. She feels completely exhausted by the end of the day. She is sleeping with CPAP, however, does not feel refreshed when she wakes up. She has been doing exercises she has learned with PT.         Current concerns: Job Concerns and Health Concerns        7/22/2023     9:47 AM   PHQ Assesment Total Score(s)   PHQ-9 Score 8         7/22/2023     9:48 AM   KELLE-7 Results   KELLE 7 TOTAL SCORE 1 (minimal anxiety)   KELLE-7 Total Score 1         7/22/2023     9:49 AM   PTSD Screen Score   Have you ever experienced this kind of event? Yes   PTSD Screen (Score of 3 or more suggests positive screen) 4         7/22/2023     9:55 AM   PROMIS-29   PROMIS Physical Function T-Score 39 (moderate dysfunction)   PROMIS Anxiety T-Score 40 (within normal limits)   PROMIS Depression T-Score 41 (within normal limits)   PROMIS Fatigue T-Score 76 (severe)   PROMIS Sleep Disturbance T-Score 52 (within normal limits)   PROMIS Ability to Participate in Social Roles & Activities T-Score 39 (moderate dysfunction)   PROMIS Pain Interference T-Score 68 (moderate)   PROMIS Pain Intensity 7           Past Medical History:   Diagnosis Date    Acid reflux disease     KELLE (generalized anxiety disorder)     Morbid obesity (H)     Other sleep apnea 3/6/2023    Pure hypercholesterolemia        Past Surgical History:   Procedure Laterality Date    ARTHROSCOPY SHOULDER Right 2015    ARTHROSCOPY SHOULDER, OPEN ROTATOR CUFF REPAIR, COMBINED Left 03/29/2017    Procedure:  Left shoulder arthroscopy and arthroscopic subacromial decompression (acromioplasty, bursectomy and coracoacromial ligament resection). Arthroscopic distal clavicle resection. Mini-open rotator cuff tear repair. Surgeon:  Kevin George MD  Location:  [facility]    BUNIONECTOMY Right     CV CORONARY ANGIOGRAM N/A 2022    Procedure: Coronary Angiogram;  Surgeon: Olvin Roy MD;  Location:  [facility]    DAVINCI HYSTERECTOMY TOTAL, BILATERAL SALPINGO-OOPHORECTOMY, COMBINED N/A 2021    Procedure: Robotic assisted total laparoscopic hysterectomy, bilateral salpingectomy, bilateral oophorectomy, cystoscopy;  Surgeon: Jonathan Peters MD;  Location: [facility]    DISKECTOMY, LUMBAR, SINGLE SP      L5-S1    DISKECTOMY, LUMBAR, SINGLE SP      L5-S1    FUSION LUMBAR ANTERIOR ONE LEVEL      L5-S1    OSTEOTOMY FOOT Right 2017    Procedure: OSTEOTOMY FOOT;  1)  WEIL OSTEOTOMY RIGHT SECOND METATARSAL, 2) PLANTAR CAPSULE DEBRIDEMENT/SYNOVECTOMY, RIGHT SECOND METATARSAL PHALANGEAL JOINT, 3) HAMMERTOE REPAIR RIGHT SECOND TOE;  Surgeon: Olvin Betancur DPM;  Location: [facility]    OH SPINE SURGERY PROCEDURE UNLISTED      RECONSTRUCT FOREFOOT WITH METATARSOPHALANGEAL (MTP) FUSION Right 2017    Procedure: RECONSTRUCT FOREFOOT WITH METATARSOPHALANGEAL (MTP) FUSION;;  Surgeon: Olvin Betancur DPM;  Location: [facility]    REPAIR HAMMER TOE Right 2017    Procedure: REPAIR HAMMER TOE;;  Surgeon: Olvin Betancur DPM;  Location: [facility]    ROTATOR CUFF REPAIR RT/LT Left        Family History   Problem Relation Age of Onset    Rheumatoid Arthritis Mother     Coronary Artery Disease Mother         s/p MI and PCIs in 50s    Dementia Mother     Diabetes Type 2  Mother     Rheumatoid Arthritis Sister     Myocardial Infarction Maternal Grandmother         in her 60s    Myocardial Infarction Maternal Grandfather         later in life       Social History     Tobacco Use    Smoking status: Former     Packs/day: 0.75     Years: 26.00     Pack years: 19.50     Types: Cigarettes     Quit date: 2022     Years since quittin.2    Smokeless tobacco: Never   Vaping Use    Vaping Use: Never used   Substance  Use Topics    Alcohol use: Yes     Comment: rare    Drug use: No         Current Outpatient Medications:     atorvastatin (LIPITOR) 40 MG tablet, Take 0.5 tablets (20 mg) by mouth every evening, Disp: 90 tablet, Rfl: 3    Cholecalciferol (VITAMIN D) 2000 units CAPS, Take 2,000 Units by mouth daily, Disp: 90 capsule, Rfl: 3    FLUoxetine (PROZAC) 20 MG capsule, Take 1 capsule (20 mg) by mouth daily, Disp: 90 capsule, Rfl: 3    fluticasone (FLONASE) 50 MCG/ACT nasal spray, Spray 2 sprays into both nostrils daily (Patient taking differently: Spray 2 sprays into both nostrils daily as needed), Disp: 16 g, Rfl: 3    multivitamin w/minerals (THERA-VIT-M) tablet, Take 1 tablet by mouth daily, Disp: , Rfl:     omeprazole (PRILOSEC) 20 MG DR capsule, Take 20 mg by mouth daily, Disp: , Rfl:     vitamin (B COMPLEX-C) tablet, Take 1 tablet by mouth daily, Disp: , Rfl:       Review of Systems   Constitutional: Positive for fatigue. Negative for chills and fever.   HENT: Positive for tinnitus. Negative for ear discharge, ear pain, hearing loss, mouth sores, nosebleeds, sinus pain, sore throat and trouble swallowing.    Eyes: Negative for pain and redness.   Endocrine: Negative for polydipsia and polyphagia.   Musculoskeletal: Positive for arthralgias, back pain, joint swelling, myalgias and neck pain.   Psychiatric/Behavioral: Negative for hallucinations.           Objective    Vitals - Patient Reported  Pain Score: Severe Pain (7)  Pain Loc:  (all over, body aches, shooting pain)        Physical Exam   GENERAL: Healthy, alert and no distress  EYES: Eyes grossly normal to inspection.  No discharge or erythema, or obvious scleral/conjunctival abnormalities.  RESP: No audible wheeze, cough, or visible cyanosis.  No visible retractions or increased work of breathing.    SKIN: Visible skin clear. No significant rash, abnormal pigmentation or lesions.  NEURO: Cranial nerves grossly intact.  Mentation and speech appropriate for  age.  PSYCH: Mentation appears normal, affect normal/bright, judgement and insight intact, normal speech and appearance well-groomed.          Again, thank you for allowing me to participate in the care of your patient.      Sincerely,    Amy Gerardo MD

## 2023-07-26 NOTE — PROGRESS NOTES
"Virtual Visit Details    Type of service:  Video Visit     Originating Location (pt. Location): {video visit patient location:530026::\"Home\"}  {PROVIDER LOCATION On-site should be selected for visits conducted from your clinic location or adjoining [facility] hospital, academic office, or other nearby [facility] building. Off-site should be selected for all other provider locations, including home:[id number]}  Distant Location (provider location):  {virtual location provider:978475}  Platform used for Video Visit: {Virtual Visit Platforms:203253::\"[employer]\"}Answers submitted by the patient for this visit:  Patient Health Questionnaire (Submitted on 7/22/2023)  If you checked off any problems, how difficult have these problems made it for you to do your work, take care of things at home, or get along with other people?: Very difficult  PHQ9 TOTAL SCORE: 8  KELLE-7 (Submitted on 7/22/2023)  KELLE 7 TOTAL SCORE: 1  Symptoms you have experienced in the last 30 days (Submitted on 7/22/2023)  General Symptoms: Yes  Skin Symptoms: No  HENT Symptoms: Yes  EYE SYMPTOMS: Yes  HEART SYMPTOMS: No  LUNG SYMPTOMS: No  INTESTINAL SYMPTOMS: No  URINARY SYMPTOMS: No  GYNECOLOGIC SYMPTOMS: No  BREAST SYMPTOMS: No  SKELETAL SYMPTOMS: Yes  BLOOD SYMPTOMS: No  NERVOUS SYSTEM SYMPTOMS: No  MENTAL HEALTH SYMPTOMS: No  Please answer the questions below to tell us what conditions you are experiencing: (Submitted on 7/22/2023)  Ear pain: No  Ear discharge: No  Hearing loss: No  Tinnitus: Yes  Nosebleeds: No  Congestion: Yes  Sinus pain: No  Trouble swallowing: No   Voice hoarseness: No  Mouth sores: No  Sore throat: No  Tooth pain: No  Gum tenderness: No  Bleeding gums: No  Change in taste: No  Change in sense of smell: No  Dry mouth: No  Hearing aid used: No  Neck lump: No  Please answer the questions below to tell us what conditions you are experiencing: (Submitted on 7/22/2023)  Fever: No  Loss of appetite: No  Weight loss: No  Weight gain: Yes  Fatigue: " Yes  Night sweats: No  Chills: No  Increased stress: No  Excessive hunger: No  Excessive thirst: No  Feeling hot or cold when others believe the temperature is normal: No  Loss of height: No  Post-operative complications: No  Surgical site pain: No  Hallucinations: No  Change in or Loss of Energy: Yes  Hyperactivity: No  Confusion: No  Please answer the questions below to tell us what conditions you are experiencing: (Submitted on 7/22/2023)  Eye pain: No  Vision loss: No  Dry eyes: No  Watery eyes: No  Eye bulging: No  Double vision: No  Flashing of lights: No  Spots: No  Floaters: Yes  Redness: No  Crossed eyes: No  Tunnel Vision: No  Yellowing of eyes: No  Eye irritation: No  Please answer the questions below to tell us what condition you are experiencing: (Submitted on 7/22/2023)  Back pain: Yes  Muscle aches: Yes  Neck pain: Yes  Swollen joints: Yes  Joint pain: Yes  Bone pain: No  Muscle cramps: Yes  Muscle weakness: Yes  Joint stiffness: Yes  Bone fracture: No

## 2023-07-26 NOTE — NURSING NOTE
Is the patient currently in the state of [place]? YES    Visit mode:VIDEO    If the visit is dropped, the patient can be reconnected by: VIDEO VISIT: Send to e-mail at: [email or web address]    Will anyone else be joining the visit? NO      How would you like to obtain your AVS? MyChart    Are changes needed to the allergy or medication list? NO    Reason for visit: Follow Up      Kelley Field on 7/26/2023 at 8:30 AM

## 2023-07-26 NOTE — PROGRESS NOTES
"Jayne is a 58 year old who is being evaluated via a billable video visit.      How would you like to obtain your AVS? MyChart  If the video visit is dropped, the invitation should be resent by: Text to cell phone: [phone]  Will anyone else be joining your video visit? No        Assessment & Plan     Post-COVID chronic muscle pain  Discussed possible interventions for COVID-19 related muscle pain.  Recommend trial of naltrexone at low-dose.  Discussed possible medication side effects as well as off label use of the medication for pain management.  Recommend follow-up in 3 months.  Titration schedule was discussed with the patient.  She was encouraged to continue with occupational therapy as well as implementation of lifestyle modifications.  - Naltrexone HCl, Pain, 1.5 MG CAPS; Take 1.5 mg by mouth daily Start with 1 capsule (1.5 mg) daily, increase to 2 capsules (3 mg) after 2 weeks, then to 3 capsules (4.5 mg) daily after a month      I spent a total of 40 minutes on the day of the visit.   Time spent by me doing chart review, history and exam, documentation and further activities per the note       BMI:   Estimated body mass index is 40.77 kg/m  as calculated from the following:    Height as of 3/6/23: 1.651 m (5' 5\").    Weight as of 3/6/23: 111.1 kg (245 lb).           Return in about 3 months (around 10/26/2023).    Amy Gerardo MD  [facility] [place]    Julio Godoy is a 58 year old, presenting for the following health issues:  Follow Up  Follow Up    HPI     Patient reports that she continues to deal with fatigue. She has been working with OT on fatigue management. She has tried going for a longer walk and started to have a lot of pain. She is working from home. She is paid hourly and has difficulties putting breaks into her schedule. She feels completely exhausted by the end of the day. She is sleeping with CPAP, however, does not " feel refreshed when she wakes up. She has been doing exercises she has learned with PT.         Current concerns: Job Concerns and Health Concerns        7/22/2023     9:47 AM   PHQ Assesment Total Score(s)   PHQ-9 Score 8         7/22/2023     9:48 AM   KELLE-7 Results   KELLE 7 TOTAL SCORE 1 (minimal anxiety)   KELLE-7 Total Score 1         7/22/2023     9:49 AM   PTSD Screen Score   Have you ever experienced this kind of event? Yes   PTSD Screen (Score of 3 or more suggests positive screen) 4         7/22/2023     9:55 AM   PROMIS-29   PROMIS Physical Function T-Score 39 (moderate dysfunction)   PROMIS Anxiety T-Score 40 (within normal limits)   PROMIS Depression T-Score 41 (within normal limits)   PROMIS Fatigue T-Score 76 (severe)   PROMIS Sleep Disturbance T-Score 52 (within normal limits)   PROMIS Ability to Participate in Social Roles & Activities T-Score 39 (moderate dysfunction)   PROMIS Pain Interference T-Score 68 (moderate)   PROMIS Pain Intensity 7           Past Medical History:   Diagnosis Date     Acid reflux disease      KELLE (generalized anxiety disorder)      Morbid obesity (H)      Other sleep apnea 3/6/2023     Pure hypercholesterolemia        Past Surgical History:   Procedure Laterality Date     ARTHROSCOPY SHOULDER Right 2015     ARTHROSCOPY SHOULDER, OPEN ROTATOR CUFF REPAIR, COMBINED Left 03/29/2017    Procedure:  Left shoulder arthroscopy and arthroscopic subacromial decompression (acromioplasty, bursectomy and coracoacromial ligament resection). Arthroscopic distal clavicle resection. Mini-open rotator cuff tear repair. Surgeon:  Kevin George MD  [facility]     [facility]     CV CORONARY ANGIOGRAM N/A 4/14/2022    Procedure: Coronary Angiogram;  Surgeon: Olvin Roy MD;  Location:  [facility] LAB     DAVINCI HYSTERECTOMY TOTAL, BILATERAL SALPINGO-OOPHORECTOMY, COMBINED N/A 2/9/2021    Procedure: Robotic assisted total laparoscopic  hysterectomy, bilateral salpingectomy, bilateral oophorectomy, cystoscopy;  Surgeon: Jonathan Peters MD;  Location: [facility] OR     DISKECTOMY, LUMBAR, SINGLE SP      L5-S1     DISKECTOMY, LUMBAR, SINGLE SP      L5-S1     FUSION LUMBAR ANTERIOR ONE LEVEL      L5-S1     OSTEOTOMY FOOT Right 2017    Procedure: OSTEOTOMY FOOT;  1)  WEIL OSTEOTOMY RIGHT SECOND METATARSAL, 2) PLANTAR CAPSULE DEBRIDEMENT/SYNOVECTOMY, RIGHT SECOND METATARSAL PHALANGEAL JOINT, 3) HAMMERTOE REPAIR RIGHT SECOND TOE;  Surgeon: Olvin Betancur DPM;  Location: [facility]     ME SPINE SURGERY PROCEDURE UNLISTED       RECONSTRUCT FOREFOOT WITH METATARSOPHALANGEAL (MTP) FUSION Right 2017    Procedure: RECONSTRUCT FOREFOOT WITH METATARSOPHALANGEAL (MTP) FUSION;;  Surgeon: Olvin Betancur DPM;  Location: [facility]     REPAIR HAMMER TOE Right 2017    Procedure: REPAIR HAMMER TOE;;  Surgeon: Olvin Betancur DPM;  Location: [facility]     ROTATOR CUFF REPAIR RT/LT Left        Family History   Problem Relation Age of Onset     Rheumatoid Arthritis Mother      Coronary Artery Disease Mother         s/p MI and PCIs in 50s     Dementia Mother      Diabetes Type 2  Mother      Rheumatoid Arthritis Sister      Myocardial Infarction Maternal Grandmother         in her 60s     Myocardial Infarction Maternal Grandfather         later in life       Social History     Tobacco Use     Smoking status: Former     Packs/day: 0.75     Years: 26.00     Pack years: 19.50     Types: Cigarettes     Quit date: 2022     Years since quittin.2     Smokeless tobacco: Never   Vaping Use     Vaping Use: Never used   Substance Use Topics     Alcohol use: Yes     Comment: rare     Drug use: No         Current Outpatient Medications:      atorvastatin (LIPITOR) 40 MG tablet, Take 0.5 tablets (20 mg) by mouth every evening, Disp: 90 tablet, Rfl: 3     Cholecalciferol (VITAMIN D) 2000 units CAPS, Take 2,000 Units by mouth daily, Disp: 90  capsule, Rfl: 3     FLUoxetine (PROZAC) 20 MG capsule, Take 1 capsule (20 mg) by mouth daily, Disp: 90 capsule, Rfl: 3     fluticasone (FLONASE) 50 MCG/ACT nasal spray, Spray 2 sprays into both nostrils daily (Patient taking differently: Spray 2 sprays into both nostrils daily as needed), Disp: 16 g, Rfl: 3     multivitamin w/minerals (THERA-VIT-M) tablet, Take 1 tablet by mouth daily, Disp: , Rfl:      omeprazole (PRILOSEC) 20 MG DR capsule, Take 20 mg by mouth daily, Disp: , Rfl:      vitamin (B COMPLEX-C) tablet, Take 1 tablet by mouth daily, Disp: , Rfl:       Review of Systems   Constitutional: Positive for fatigue. Negative for chills and fever.   HENT: Positive for tinnitus. Negative for ear discharge, ear pain, hearing loss, mouth sores, nosebleeds, sinus pain, sore throat and trouble swallowing.    Eyes: Negative for pain and redness.   Endocrine: Negative for polydipsia and polyphagia.   Musculoskeletal: Positive for arthralgias, back pain, joint swelling, myalgias and neck pain.   Psychiatric/Behavioral: Negative for hallucinations.            Objective    Vitals - Patient Reported  Pain Score: Severe Pain (7)  Pain Loc:  (all over, body aches, shooting pain)        Physical Exam   GENERAL: Healthy, alert and no distress  EYES: Eyes grossly normal to inspection.  No discharge or erythema, or obvious scleral/conjunctival abnormalities.  RESP: No audible wheeze, cough, or visible cyanosis.  No visible retractions or increased work of breathing.    SKIN: Visible skin clear. No significant rash, abnormal pigmentation or lesions.  NEURO: Cranial nerves grossly intact.  Mentation and speech appropriate for age.  PSYCH: Mentation appears normal, affect normal/bright, judgement and insight intact, normal speech and appearance well-groomed.                Video-Visit Details    Type of service:  Video Visit     Originating Location (pt. Location): Home  Distant Location (provider location):  Off-site  Platform  used for Video Visit: Rakan

## 2023-07-31 ENCOUNTER — OFFICE VISIT (OUTPATIENT)
Dept: FAMILY MEDICINE | Facility: CLINIC | Age: 58
End: 2023-07-31

## 2023-07-31 VITALS
SYSTOLIC BLOOD PRESSURE: 116 MMHG | OXYGEN SATURATION: 99 % | WEIGHT: 251 LBS | HEART RATE: 82 BPM | TEMPERATURE: 98.2 F | DIASTOLIC BLOOD PRESSURE: 74 MMHG | HEIGHT: 65 IN | BODY MASS INDEX: 41.82 KG/M2

## 2023-07-31 DIAGNOSIS — L03.211 CELLULITIS OF FACE: Primary | ICD-10-CM

## 2023-07-31 PROCEDURE — 99214 OFFICE O/P EST MOD 30 MIN: CPT | Performed by: FAMILY MEDICINE

## 2023-07-31 RX ORDER — CLINDAMYCIN HCL 300 MG
300 CAPSULE ORAL 3 TIMES DAILY
Qty: 30 CAPSULE | Refills: 0 | Status: SHIPPED | OUTPATIENT
Start: 2023-07-31 | End: 2023-08-10

## 2023-07-31 NOTE — PROGRESS NOTES
"Assessment & Plan   Problem List Items Addressed This Visit    None  Visit Diagnoses       Cellulitis of face    -  Primary    Relevant Medications    clindamycin (CLEOCIN) 300 MG capsule           1. Cellulitis of face  Early cellulitis. Treat with oral antibiotics. I advise against replacing the nose piercing. Recheck as needed.  - clindamycin (CLEOCIN) 300 MG capsule; Take 1 capsule (300 mg) by mouth 3 times daily for 10 days  Dispense: 30 capsule; Refill: 0            BMI:   Estimated body mass index is 42.42 kg/m  as calculated from the following:    Height as of this encounter: 1.638 m (5' 4.5\").    Weight as of this encounter: 113.9 kg (251 lb).         FUTURE APPOINTMENTS:       - Follow-up visit as needed.    No follow-ups on file.    Ekaterina Sanders MD  [facility]    Subjective     Nursing Notes:   Sarahi Flores  7/31/2023  1:49 PM  Signed  Chief Complaint   Patient presents with    Nose Problem     Pt has an infection inside her right nostril- red, swollen, painful to the touch  Wears a CPAP at night         Pre-visit Screening:  Immunizations:  up to date  Colonoscopy:  is up to date  Mammogram: is up to date  Asthma Action Test/Plan:  NA  PHQ9:  NA  GAD7:  NA  Questioned patient about current smoking habits Pt. Quit some time ago  Ok to leave detailed message on voice mail for today's visit only Yes, phone # [phone]           Jayne Leiva is a 58 year old female who presents to clinic today for the following health issues     HPI     Has a nose piercing, took it out, doesn't plan on putting it back in. She was cleaning it. Also has a cpap. Has a nickel allergy and it was red and swollen. Now for a week and a half it has been red, swollen, hard and hot. Not getting better like it should have over the past 10 days.        Review of Systems   Constitutional, HEENT, cardiovascular, pulmonary, gi and gu systems are negative, except as otherwise noted.      Objective    BP " "116/74 (BP Location: Right arm, Patient Position: Sitting, Cuff Size: Adult Large)   Pulse 82   Temp 98.2  F (36.8  C) (Temporal)   Ht 1.638 m (5' 4.5\")   Wt 113.9 kg (251 lb)   LMP  (LMP Unknown)   SpO2 99%   BMI 42.42 kg/m    Body mass index is 42.42 kg/m .  Physical Exam   GENERAL: healthy, alert and no distress  MS: no gross musculoskeletal defects noted, no edema  NEURO: Normal strength and tone, mentation intact and speech normal  PSYCH: mentation appears normal, affect normal/bright  Nose: slight swelling, tenderness and redness right lateral nare          "

## 2023-10-02 NOTE — PROGRESS NOTES
"Assessment & Plan   Problem List Items Addressed This Visit    None  Visit Diagnoses       Tendonitis    -  Primary    Forearm tendonitis        Relevant Orders    Physical Therapy Referral    Encounter for vaccination        Relevant Orders    FLU VAC PRESRV FREE QUAD SPLIT VIR 3+YRS IM           1. Tendonitis      2. Forearm tendonitis  I think she would benefit from a more sturdy wrist splint, she can get this thr[facility]. Referral to physical therapy, they should discuss work conditions that might also be affecting this. Recheck in 2-3 weeks.  - Physical Therapy Referral    3. Encounter for vaccination    - FLU VAC PRESRV FREE QUAD SPLIT VIR 3+YRS IM            BMI:   Estimated body mass index is 41.4 kg/m  as calculated from the following:    Height as of this encounter: 1.638 m (5' 4.5\").    Weight as of this encounter: 111.1 kg (245 lb).         FUTURE APPOINTMENTS:       - Follow-up visit in 2-3 weeks.    No follow-ups on file.    Ekaterina Sanders MD  [facility]    Subjective     Nursing Notes:   Sheyla Miranda, [facility]  10/3/2023  7:49 AM  Signed  Chief Complaint   Patient presents with    Arm Pain     Right arm pain, starts near her shoulder and goes all the way down into her right hand, pain started 1 month ago, twisting and bending causes increased pain      Pre-visit Screening:  Immunizations:  up to date  Colonoscopy:  is up to date  Mammogram: is up to date  Asthma Action Test/Plan:  NA  PHQ9:  NA  GAD7:  NA  Questioned patient about current smoking habits Pt. quit smoking some time ago.  Ok to leave detailed message on voice mail for today's visit only Yes, phone # [phone]       Jayne Leiva is a 58 year old female who presents to clinic today for the following health issues   HPI     Right arm pain for a month. No injury.   Is very painful, especially when she turns the forearm inward. Has been icing it. Using a wrist brace, thought it could be from typing or doing her mouse. " "Doing a lot of yardwork over the summer, wood stacking. It's hard because everything hurts all the time. Had covid and has a lot of health problems related to this. She is right handed.   Better if not moving it. It feels good when icing it down. But this doesn't last. Worse with turning it, if she puts nail polish on her hair was too hard to curl when it was longer due to the pain. Sometimes the right shoulder hurts but this is not involving the shoulder. Is more from the biceps down. The elbow joint doesn't hurt. Has pain with typing at work.        Review of Systems   Constitutional, HEENT, cardiovascular, pulmonary, gi and gu systems are negative, except as otherwise noted.      Objective    /74 (BP Location: Right arm, Patient Position: Sitting, Cuff Size: Adult Large)   Pulse 79   Temp 98.4  F (36.9  C) (Temporal)   Ht 1.638 m (5' 4.5\")   Wt 111.1 kg (245 lb)   LMP  (LMP Unknown)   SpO2 96%   BMI 41.40 kg/m    Body mass index is 41.4 kg/m .  Physical Exam   GENERAL: healthy, alert and no distress  MS: no gross musculoskeletal defects noted, no edema  NEURO: Normal strength and tone, mentation intact and speech normal  PSYCH: mentation appears normal, affect normal/bright  Right shoulder normal rom, no pain. Right elbow normal rom and with no pain. Normal wrist and finger rom, but this does cause pain over the radial tendon, also with internal rotation of forearm with and without resistance. No swelling or deformity, pincher strength normal and without pain          "

## 2023-10-03 ENCOUNTER — OFFICE VISIT (OUTPATIENT)
Dept: FAMILY MEDICINE | Facility: CLINIC | Age: 58
End: 2023-10-03

## 2023-10-03 VITALS
BODY MASS INDEX: 40.82 KG/M2 | WEIGHT: 245 LBS | HEIGHT: 65 IN | HEART RATE: 79 BPM | DIASTOLIC BLOOD PRESSURE: 74 MMHG | OXYGEN SATURATION: 96 % | SYSTOLIC BLOOD PRESSURE: 124 MMHG | TEMPERATURE: 98.4 F

## 2023-10-03 DIAGNOSIS — M77.8 FOREARM TENDONITIS: ICD-10-CM

## 2023-10-03 DIAGNOSIS — Z23 ENCOUNTER FOR VACCINATION: ICD-10-CM

## 2023-10-03 DIAGNOSIS — M77.9 TENDONITIS: Primary | ICD-10-CM

## 2023-10-03 PROCEDURE — 90686 IIV4 VACC NO PRSV 0.5 ML IM: CPT | Performed by: FAMILY MEDICINE

## 2023-10-03 PROCEDURE — 90471 IMMUNIZATION ADMIN: CPT | Performed by: FAMILY MEDICINE

## 2023-10-03 PROCEDURE — 99213 OFFICE O/P EST LOW 20 MIN: CPT | Mod: 25 | Performed by: FAMILY MEDICINE

## 2023-10-03 NOTE — NURSING NOTE
Chief Complaint   Patient presents with    Arm Pain     Right arm pain, starts near her shoulder and goes all the way down into her right hand, pain started 1 month ago, twisting and bending causes increased pain      Pre-visit Screening:  Immunizations:  up to date  Colonoscopy:  is up to date  Mammogram: is up to date  Asthma Action Test/Plan:  NA  PHQ9:  NA  GAD7:  NA  Questioned patient about current smoking habits Pt. quit smoking some time ago.  Ok to leave detailed message on voice mail for today's visit only Yes, phone # [phone]

## 2023-10-23 ASSESSMENT — ANXIETY QUESTIONNAIRES
4. TROUBLE RELAXING: NOT AT ALL
7. FEELING AFRAID AS IF SOMETHING AWFUL MIGHT HAPPEN: NOT AT ALL
GAD7 TOTAL SCORE: 2
5. BEING SO RESTLESS THAT IT IS HARD TO SIT STILL: SEVERAL DAYS
6. BECOMING EASILY ANNOYED OR IRRITABLE: SEVERAL DAYS
3. WORRYING TOO MUCH ABOUT DIFFERENT THINGS: NOT AT ALL
GAD7 TOTAL SCORE: 2
2. NOT BEING ABLE TO STOP OR CONTROL WORRYING: NOT AT ALL
1. FEELING NERVOUS, ANXIOUS, OR ON EDGE: NOT AT ALL
IF YOU CHECKED OFF ANY PROBLEMS ON THIS QUESTIONNAIRE, HOW DIFFICULT HAVE THESE PROBLEMS MADE IT FOR YOU TO DO YOUR WORK, TAKE CARE OF THINGS AT HOME, OR GET ALONG WITH OTHER PEOPLE: SOMEWHAT DIFFICULT

## 2023-10-23 ASSESSMENT — PATIENT HEALTH QUESTIONNAIRE - PHQ9
10. IF YOU CHECKED OFF ANY PROBLEMS, HOW DIFFICULT HAVE THESE PROBLEMS MADE IT FOR YOU TO DO YOUR WORK, TAKE CARE OF THINGS AT HOME, OR GET ALONG WITH OTHER PEOPLE: SOMEWHAT DIFFICULT
SUM OF ALL RESPONSES TO PHQ QUESTIONS 1-9: 6
SUM OF ALL RESPONSES TO PHQ QUESTIONS 1-9: 6

## 2023-10-24 NOTE — PROGRESS NOTES
"Assessment & Plan   Problem List Items Addressed This Visit    None  Visit Diagnoses       Right shoulder pain, unspecified chronicity    -  Primary    Relevant Orders    Physical Therapy Referral    Forearm tendonitis        Relevant Orders    Physical Therapy Referral           1. Right shoulder pain, unspecified chronicity  She would also like a referral for therapy for the shoulder.  - Physical Therapy Referral    2. Forearm tendonitis  Continue pt for forearm  - Physical Therapy Referral            BMI:   Estimated body mass index is 41.42 kg/m  as calculated from the following:    Height as of an earlier encounter on 10/25/23: 1.638 m (5' 4.49\").    Weight as of an earlier encounter on 10/25/23: 111.1 kg (245 lb).         FUTURE APPOINTMENTS:       - Follow-up visit in 3-4 weeks    No follow-ups on file.    Ekaterina Sanders MD  [facility]    Subjective     Nursing Notes:   Sheyla Miranda CMA  10/25/2023  4:22 PM  Signed  Chief Complaint   Patient presents with    Arm Pain     Recheck on right arm pain, ongoing pain, has been doing PT, they advised the pain may be coming from her right shoulder pain, sometimes pain starts at her shoulder and radiates down her arm      Pre-visit Screening:  Immunizations:  up to date  Colonoscopy:  is up to date  Mammogram: is up to date  Asthma Action Test/Plan:  NA  PHQ9:  NA  GAD7:  NA  Questioned patient about current smoking habits Pt. quit smoking some time ago.  Ok to leave detailed message on voice mail for today's visit only Yes, phone # [phone]       Jayne Leiva is a 58 year old female who presents to clinic today for the following health issues     HPI     Here to followup on her right forearm, has done 3 sessions of physical therapy. It helps somewhat, takes the edge off, is still pretty painful. She thinks maybe it's coming from the shoulder area. Wants a referral for the shoulder. It hurts so bad in the forearm. Sometimes it hurts " into the right biceps and into the shoulder. Got an adjustable height desk with a pull out keyboard.   She is right handed.   Sometimes the right shoulder will cramp. No pain over the epicondyles. Has her arm taped. Mostly when turns her right forearm inward. Brushing her teeth. Using the mouse for work and typing.         Review of Systems   Constitutional, HEENT, cardiovascular, pulmonary, gi and gu systems are negative, except as otherwise noted.      Objective    /74 (BP Location: Right arm, Patient Position: Sitting, Cuff Size: Adult Large)   Pulse 68   Temp 97.8  F (36.6  C) (Temporal)   LMP  (LMP Unknown)   SpO2 98%   There is no height or weight on file to calculate BMI.  Physical Exam   GENERAL: healthy, alert and no distress  MS: no gross musculoskeletal defects noted, no edema  NEURO: Normal strength and tone, mentation intact and speech normal  PSYCH: mentation appears normal, affect normal/bright  Right upper extremity normal rom and without pain shoulder, elbow and wrist. Reports pain into the dorsal forearm with internal rotation of the forearm.

## 2023-10-25 ENCOUNTER — OFFICE VISIT (OUTPATIENT)
Dept: FAMILY MEDICINE | Facility: CLINIC | Age: 58
End: 2023-10-25

## 2023-10-25 ENCOUNTER — VIRTUAL VISIT (OUTPATIENT)
Dept: PHYSICAL MEDICINE AND REHAB | Facility: CLINIC | Age: 58
End: 2023-10-25
Payer: COMMERCIAL

## 2023-10-25 VITALS
SYSTOLIC BLOOD PRESSURE: 118 MMHG | DIASTOLIC BLOOD PRESSURE: 74 MMHG | HEART RATE: 68 BPM | OXYGEN SATURATION: 98 % | TEMPERATURE: 97.8 F

## 2023-10-25 VITALS — WEIGHT: 245 LBS | HEIGHT: 64 IN | BODY MASS INDEX: 41.83 KG/M2

## 2023-10-25 DIAGNOSIS — G89.29 POST-COVID CHRONIC MUSCLE PAIN: Primary | ICD-10-CM

## 2023-10-25 DIAGNOSIS — G93.32 POST-COVID CHRONIC FATIGUE: ICD-10-CM

## 2023-10-25 DIAGNOSIS — M25.511 RIGHT SHOULDER PAIN, UNSPECIFIED CHRONICITY: Primary | ICD-10-CM

## 2023-10-25 DIAGNOSIS — M79.10 POST-COVID CHRONIC MUSCLE PAIN: Primary | ICD-10-CM

## 2023-10-25 DIAGNOSIS — M77.8 FOREARM TENDONITIS: ICD-10-CM

## 2023-10-25 DIAGNOSIS — F43.23 ADJUSTMENT DISORDER WITH MIXED ANXIETY AND DEPRESSED MOOD: ICD-10-CM

## 2023-10-25 DIAGNOSIS — U09.9 POST-COVID CHRONIC FATIGUE: ICD-10-CM

## 2023-10-25 DIAGNOSIS — U09.9 POST-COVID CHRONIC MUSCLE PAIN: Primary | ICD-10-CM

## 2023-10-25 PROCEDURE — 99215 OFFICE O/P EST HI 40 MIN: CPT | Mod: VID | Performed by: INTERNAL MEDICINE

## 2023-10-25 PROCEDURE — 99213 OFFICE O/P EST LOW 20 MIN: CPT | Performed by: FAMILY MEDICINE

## 2023-10-25 RX ORDER — ASCORBIC ACID 500 MG
500 TABLET ORAL DAILY
Qty: 30 TABLET | Refills: 4 | Status: SHIPPED | OUTPATIENT
Start: 2023-10-25

## 2023-10-25 ASSESSMENT — PAIN SCALES - GENERAL: PAINLEVEL: MODERATE PAIN (5)

## 2023-10-25 NOTE — LETTER
10/25/2023       RE: Jayne Leiva  [address] [facility] [address]     Dear Colleague,    Thank you for referring your patient, Jayne Leiva, to the [facility] PHYSICAL MEDICINE AND REHABILITATION CLINIC [place] [facility]. Please see a copy of my visit note below.    Jayne is a 58 year old who is being evaluated via a billable video visit.      How would you like to obtain your AVS? MyChart  If the video visit is dropped, the invitation should be resent by: Text to cell phone: [phone]  Will anyone else be joining your video visit? No          Assessment & Plan    Post-COVID chronic muscle pain  Reviewed approach to muscle pain following COVID-19, recommend pain management referral.  - Pain Management  Referral; Future    Adjustment disorder with mixed anxiety and depressed mood  Extensive conversation adjustment disorder management following COVID-19.  Patient was referred for counseling.  She was also advised that if her symptoms are not attributed solely to mental health issues, however counseling would be helpful for development of new coping strategies with limitations.  - Adult Mental Health  Referral; Future    Post-COVID chronic fatigue  Discussed approach to chronic fatigue with patient.  Reviewed literature published data.  Advised on arginine and vitamin C supplementation as a helpful strategy.  Also reviewed energy conservation strategies as well as physical activity with gradual increase in both intensity and duration.  We will follow-up in approximately 3 to 4 months.  - arginine 1000 MG tablet; Take 2 tablets (2,000 mg) by mouth 2 times daily  - vitamin C (ASCORBIC ACID) 500 MG tablet; Take 1 tablet (500 mg) by mouth daily      I spent a total of 40 minutes on the day of the visit.   Time spent by me doing chart review, history and exam, documentation and further activities per the note       BMI:  "  Estimated body mass index is 41.42 kg/m  as calculated from the following:    Height as of this encounter: 1.638 m (5' 4.49\").    Weight as of this encounter: 111.1 kg (245 lb).           Return in about 3 months (around 1/25/2024).    Amy Gerardo MD  [facility] PHYSICAL MEDICINE AND REHABILITATION CLINIC ISMAEL Godoy is a 58 year old, presenting for the following health issues:  Follow Up  Follow Up    HPI     Patient is following up on post-COVID issues. She reports that she has noticed some improvement in her pain and fatigue. She has noticed that she does not tire out as quickly and has fewer episodes of overwhelming fatigue. Pain is still present all the time, however, it is not as severe as it was. She has been working with PT and was trying to increase physical activity. She is still walking about 30 minutes at a slow pace 3-4 times a week with her dog. She reports that she feels extremely tired by the end of her walk.   :[id number]}      10/23/2023     7:29 PM   PHQ Assesment Total Score(s)   PHQ-9 Score 6         10/23/2023     7:30 PM   KELLE-7 Results   KELLE 7 TOTAL SCORE 2 (minimal anxiety)   KELLE-7 Total Score 2         10/23/2023     7:31 PM   PTSD Screen Score   Have you ever experienced this kind of event? Yes   PTSD Screen (Score of 3 or more suggests positive screen) 4         10/23/2023     7:34 PM   PROMIS-29   PROMIS Physical Function T-Score 37 (moderate dysfunction)   PROMIS Anxiety T-Score 40 (within normal limits)   PROMIS Depression T-Score 49 (within normal limits)   PROMIS Fatigue T-Score 65 (moderate)   PROMIS Sleep Disturbance T-Score 48 (within normal limits)   PROMIS Ability to Participate in Social Roles & Activities T-Score 39 (moderate dysfunction)   PROMIS Pain Interference T-Score 67 (moderate)   PROMIS Pain Intensity 6       Past Medical History:   Diagnosis Date    Acid reflux disease     KELLE (generalized anxiety disorder)     Morbid obesity (H)  "    Other sleep apnea 3/6/2023    Pure hypercholesterolemia        Past Surgical History:   Procedure Laterality Date    ARTHROSCOPY SHOULDER Right 2015    ARTHROSCOPY SHOULDER, OPEN ROTATOR CUFF REPAIR, COMBINED Left 03/29/2017    Procedure:  Left shoulder arthroscopy and arthroscopic subacromial decompression (acromioplasty, bursectomy and coracoacromial ligament resection). Arthroscopic distal clavicle resection. Mini-open rotator cuff tear repair. Surgeon:  Kevin George MD  Location: [facility]    BUNIONECTOMY Right 2008    CV CORONARY ANGIOGRAM N/A 4/14/2022    Procedure: Coronary Angiogram;  Surgeon: Olvin Roy MD;  Location:  HEART CARDIAC CATH LAB    DAVINCI HYSTERECTOMY TOTAL, BILATERAL SALPINGO-OOPHORECTOMY, COMBINED N/A 2/9/2021    Procedure: Robotic assisted total laparoscopic hysterectomy, bilateral salpingectomy, bilateral oophorectomy, cystoscopy;  Surgeon: Jonathan Peters MD;  Location:  OR    DISKECTOMY, LUMBAR, SINGLE SP  1996    L5-S1    DISKECTOMY, LUMBAR, SINGLE SP  1997    L5-S1    FUSION LUMBAR ANTERIOR ONE LEVEL  1999    L5-S1    OSTEOTOMY FOOT Right 06/06/2017    Procedure: OSTEOTOMY FOOT;  1)  WEIL OSTEOTOMY RIGHT SECOND METATARSAL, 2) PLANTAR CAPSULE DEBRIDEMENT/SYNOVECTOMY, RIGHT SECOND METATARSAL PHALANGEAL JOINT, 3) HAMMERTOE REPAIR RIGHT SECOND TOE;  Surgeon: Olvin Betancur DPM;  Location: [facility]    [place] SPINE SURGERY PROCEDURE UNLISTED      RECONSTRUCT FOREFOOT WITH METATARSOPHALANGEAL (MTP) FUSION Right 06/06/2017    Procedure: RECONSTRUCT FOREFOOT WITH METATARSOPHALANGEAL (MTP) FUSION;;  Surgeon: Olvin Betancur DPM;  [facility]    REPAIR HAMMER TOE Right 06/06/2017    Procedure: REPAIR HAMMER TOE;;  Surgeon: Olvin Betancur DPM;  [facility]    ROTATOR CUFF REPAIR RT/LT Left 2017       Family History   Problem Relation Age of Onset    Rheumatoid Arthritis Mother     Coronary Artery Disease Mother         s/p MI and PCIs in  50s    Dementia Mother     Diabetes Type 2  Mother     Rheumatoid Arthritis Sister     Myocardial Infarction Maternal Grandmother         in her 60s    Myocardial Infarction Maternal Grandfather         later in life       Social History     Tobacco Use    Smoking status: Former     Packs/day: 0.75     Years: 26.00     Additional pack years: 0.00     Total pack years: 19.50     Types: Cigarettes     Quit date: 2022     Years since quittin.5     Passive exposure: Past    Smokeless tobacco: Never   Vaping Use    Vaping Use: Never used   Substance Use Topics    Alcohol use: Yes     Comment: rare    Drug use: No         Current Outpatient Medications:     atorvastatin (LIPITOR) 40 MG tablet, Take 0.5 tablets (20 mg) by mouth every evening, Disp: 90 tablet, Rfl: 3    Cholecalciferol (VITAMIN D) 2000 units CAPS, Take 2,000 Units by mouth daily, Disp: 90 capsule, Rfl: 3    FLUoxetine (PROZAC) 20 MG capsule, Take 1 capsule (20 mg) by mouth daily, Disp: 90 capsule, Rfl: 3    fluticasone (FLONASE) 50 MCG/ACT nasal spray, Spray 2 sprays into both nostrils daily (Patient taking differently: Spray 2 sprays into both nostrils daily as needed), Disp: 16 g, Rfl: 3    multivitamin w/minerals (THERA-VIT-M) tablet, Take 1 tablet by mouth daily, Disp: , Rfl:     Naltrexone HCl, Pain, 1.5 MG CAPS, Take 1.5 mg by mouth daily Start with 1 capsule (1.5 mg) daily, increase to 2 capsules (3 mg) after 2 weeks, then to 3 capsules (4.5 mg) daily after a month, Disp: 90 capsule, Rfl: 3    omeprazole (PRILOSEC) 20 MG DR capsule, Take 20 mg by mouth daily, Disp: , Rfl:       Review of Systems   Constitutional, HEENT, cardiovascular, pulmonary, GI, , musculoskeletal, neuro, skin, endocrine and psych systems are negative, except as otherwise noted.      Objective   Vitals - Patient Reported  Pain Score: Moderate Pain (5)  Pain Loc: Other - see comment (right forearm)        Physical Exam   GENERAL: Healthy, alert and no distress  EYES:  Eyes grossly normal to inspection.  No discharge or erythema, or obvious scleral/conjunctival abnormalities.  RESP: No audible wheeze, cough, or visible cyanosis.  No visible retractions or increased work of breathing.    SKIN: Visible skin clear. No significant rash, abnormal pigmentation or lesions.  NEURO: Cranial nerves grossly intact.  Mentation and speech appropriate for age.  PSYCH: Mentation appears normal, affect normal/bright, judgement and insight intact, normal speech and appearance well-groomed.    Answers submitted by the patient for this visit:  Patient Health Questionnaire (Submitted on 10/23/2023)  If you checked off any problems, how difficult have these problems made it for you to do your work, take care of things at home, or get along with other people?: Somewhat difficult  PHQ9 TOTAL SCORE: 6  KELLE-7 (Submitted on 10/23/2023)  KELLE 7 TOTAL SCORE: 2          Again, thank you for allowing me to participate in the care of your patient.      Sincerely,    Amy Gerardo MD

## 2023-10-25 NOTE — NURSING NOTE
Chief Complaint   Patient presents with    Arm Pain     Recheck on right arm pain, ongoing pain, has been doing PT, they advised the pain may be coming from her right shoulder pain, sometimes pain starts at her shoulder and radiates down her arm      Pre-visit Screening:  Immunizations:  up to date  Colonoscopy:  is up to date  Mammogram: is up to date  Asthma Action Test/Plan:  NA  PHQ9:  NA  GAD7:  NA  Questioned patient about current smoking habits Pt. quit smoking some time ago.  Ok to leave detailed message on voice mail for today's visit only Yes, phone # [phone]

## 2023-10-25 NOTE — PROGRESS NOTES
"Jayne is a 58 year old who is being evaluated via a billable video visit.      How would you like to obtain your AVS? MyChart  If the video visit is dropped, the invitation should be resent by: Text to cell phone: [phone]  Will anyone else be joining your video visit? No          Assessment & Plan     Post-COVID chronic muscle pain  Reviewed approach to muscle pain following COVID-19, recommend pain management referral.  - Pain Management  Referral; Future    Adjustment disorder with mixed anxiety and depressed mood  Extensive conversation adjustment disorder management following COVID-19.  Patient was referred for counseling.  She was also advised that if her symptoms are not attributed solely to mental health issues, however counseling would be helpful for development of new coping strategies with limitations.  - Adult Mental Health  Referral; Future    Post-COVID chronic fatigue  Discussed approach to chronic fatigue with patient.  Reviewed literature published data.  Advised on arginine and vitamin C supplementation as a helpful strategy.  Also reviewed energy conservation strategies as well as physical activity with gradual increase in both intensity and duration.  We will follow-up in approximately 3 to 4 months.  - arginine 1000 MG tablet; Take 2 tablets (2,000 mg) by mouth 2 times daily  - vitamin C (ASCORBIC ACID) 500 MG tablet; Take 1 tablet (500 mg) by mouth daily      I spent a total of 40 minutes on the day of the visit.   Time spent by me doing chart review, history and exam, documentation and further activities per the note       BMI:   Estimated body mass index is 41.42 kg/m  as calculated from the following:    Height as of this encounter: 1.638 m (5' 4.49\").    Weight as of this encounter: 111.1 kg (245 lb).           Return in about 3 months (around 1/25/2024).    Amy Gerardo MD  [facility] PHYSICAL MEDICINE AND REHABILITATION CLINIC " ISMAEL Godoy is a 58 year old, presenting for the following health issues:  Follow Up  Follow Up    HPI     Patient is following up on post-COVID issues. She reports that she has noticed some improvement in her pain and fatigue. She has noticed that she does not tire out as quickly and has fewer episodes of overwhelming fatigue. Pain is still present all the time, however, it is not as severe as it was. She has been working with PT and was trying to increase physical activity. She is still walking about 30 minutes at a slow pace 3-4 times a week with her dog. She reports that she feels extremely tired by the end of her walk.   :[id number]}      10/23/2023     7:29 PM   PHQ Assesment Total Score(s)   PHQ-9 Score 6         10/23/2023     7:30 PM   KELLE-7 Results   KELLE 7 TOTAL SCORE 2 (minimal anxiety)   KELLE-7 Total Score 2         10/23/2023     7:31 PM   PTSD Screen Score   Have you ever experienced this kind of event? Yes   PTSD Screen (Score of 3 or more suggests positive screen) 4         10/23/2023     7:34 PM   PROMIS-29   PROMIS Physical Function T-Score 37 (moderate dysfunction)   PROMIS Anxiety T-Score 40 (within normal limits)   PROMIS Depression T-Score 49 (within normal limits)   PROMIS Fatigue T-Score 65 (moderate)   PROMIS Sleep Disturbance T-Score 48 (within normal limits)   PROMIS Ability to Participate in Social Roles & Activities T-Score 39 (moderate dysfunction)   PROMIS Pain Interference T-Score 67 (moderate)   PROMIS Pain Intensity 6       Past Medical History:   Diagnosis Date    Acid reflux disease     KELLE (generalized anxiety disorder)     Morbid obesity (H)     Other sleep apnea 3/6/2023    Pure hypercholesterolemia        Past Surgical History:   Procedure Laterality Date    ARTHROSCOPY SHOULDER Right 2015    ARTHROSCOPY SHOULDER, OPEN ROTATOR CUFF REPAIR, COMBINED Left 03/29/2017    Procedure:  Left shoulder arthroscopy and arthroscopic subacromial decompression  (acromioplasty, bursectomy and coracoacromial ligament resection). Arthroscopic distal clavicle resection. Mini-open rotator cuff tear repair. Surgeon:  Kevin George MD  Location: [facility]    BUNIONECTOMY Right 2008    CV CORONARY ANGIOGRAM N/A 4/14/2022    Procedure: Coronary Angiogram;  Surgeon: Olvin Roy MD;  Location:  HEART CARDIAC CATH LAB    DAVINCI HYSTERECTOMY TOTAL, BILATERAL SALPINGO-OOPHORECTOMY, COMBINED N/A 2/9/2021    Procedure: Robotic assisted total laparoscopic hysterectomy, bilateral salpingectomy, bilateral oophorectomy, cystoscopy;  Surgeon: Jonathan Peters MD;  Location: [facility]    DISKECTOMY, LUMBAR, SINGLE SP  1996    L5-S1    DISKECTOMY, LUMBAR, SINGLE SP  1997    L5-S1    FUSION LUMBAR ANTERIOR ONE LEVEL  1999    L5-S1    OSTEOTOMY FOOT Right 06/06/2017    Procedure: OSTEOTOMY FOOT;  1)  WEIL OSTEOTOMY RIGHT SECOND METATARSAL, 2) PLANTAR CAPSULE DEBRIDEMENT/SYNOVECTOMY, RIGHT SECOND METATARSAL PHALANGEAL JOINT, 3) HAMMERTOE REPAIR RIGHT SECOND TOE;  Surgeon: Olvin Betancur DPM;  Location: [facility]    [place] SPINE SURGERY PROCEDURE UNLISTED      RECONSTRUCT FOREFOOT WITH METATARSOPHALANGEAL (MTP) FUSION Right 06/06/2017    Procedure: RECONSTRUCT FOREFOOT WITH METATARSOPHALANGEAL (MTP) FUSION;;  Surgeon: Olvin Betancur DPM;  Location: [facility]    REPAIR HAMMER TOE Right 06/06/2017    Procedure: REPAIR HAMMER TOE;;  Surgeon: Olvin Betancur DPM;  Location: [facility]    ROTATOR CUFF REPAIR RT/LT Left 2017       Family History   Problem Relation Age of Onset    Rheumatoid Arthritis Mother     Coronary Artery Disease Mother         s/p MI and PCIs in 50s    Dementia Mother     Diabetes Type 2  Mother     Rheumatoid Arthritis Sister     Myocardial Infarction Maternal Grandmother         in her 60s    Myocardial Infarction Maternal Grandfather         later in life       Social History     Tobacco Use    Smoking status: Former     Packs/day: 0.75     Years:  26.00     Additional pack years: 0.00     Total pack years: 19.50     Types: Cigarettes     Quit date: 2022     Years since quittin.5     Passive exposure: Past    Smokeless tobacco: Never   Vaping Use    Vaping Use: Never used   Substance Use Topics    Alcohol use: Yes     Comment: rare    Drug use: No         Current Outpatient Medications:     atorvastatin (LIPITOR) 40 MG tablet, Take 0.5 tablets (20 mg) by mouth every evening, Disp: 90 tablet, Rfl: 3    Cholecalciferol (VITAMIN D) 2000 units CAPS, Take 2,000 Units by mouth daily, Disp: 90 capsule, Rfl: 3    FLUoxetine (PROZAC) 20 MG capsule, Take 1 capsule (20 mg) by mouth daily, Disp: 90 capsule, Rfl: 3    fluticasone (FLONASE) 50 MCG/ACT nasal spray, Spray 2 sprays into both nostrils daily (Patient taking differently: Spray 2 sprays into both nostrils daily as needed), Disp: 16 g, Rfl: 3    multivitamin w/minerals (THERA-VIT-M) tablet, Take 1 tablet by mouth daily, Disp: , Rfl:     Naltrexone HCl, Pain, 1.5 MG CAPS, Take 1.5 mg by mouth daily Start with 1 capsule (1.5 mg) daily, increase to 2 capsules (3 mg) after 2 weeks, then to 3 capsules (4.5 mg) daily after a month, Disp: 90 capsule, Rfl: 3    omeprazole (PRILOSEC) 20 MG DR capsule, Take 20 mg by mouth daily, Disp: , Rfl:       Review of Systems   Constitutional, HEENT, cardiovascular, pulmonary, GI, , musculoskeletal, neuro, skin, endocrine and psych systems are negative, except as otherwise noted.      Objective    Vitals - Patient Reported  Pain Score: Moderate Pain (5)  Pain Loc: Other - see comment (right forearm)        Physical Exam   GENERAL: Healthy, alert and no distress  EYES: Eyes grossly normal to inspection.  No discharge or erythema, or obvious scleral/conjunctival abnormalities.  RESP: No audible wheeze, cough, or visible cyanosis.  No visible retractions or increased work of breathing.    SKIN: Visible skin clear. No significant rash, abnormal pigmentation or lesions.  NEURO:  Cranial nerves grossly intact.  Mentation and speech appropriate for age.  PSYCH: Mentation appears normal, affect normal/bright, judgement and insight intact, normal speech and appearance well-groomed.                Video-Visit Details    Type of service:  Video Visit     Originating Location (pt. Location): Home    Distant Location (provider location):  Off-site  Platform used for Video Visit: [employer]    Answers submitted by the patient for this visit:  Patient Health Questionnaire (Submitted on 10/23/2023)  If you checked off any problems, how difficult have these problems made it for you to do your work, take care of things at home, or get along with other people?: Somewhat difficult  PHQ9 TOTAL SCORE: 6  KELLE-7 (Submitted on 10/23/2023)  KELLE 7 TOTAL SCORE: 2

## 2023-10-31 ASSESSMENT — PAIN SCALES - PAIN ENJOYMENT GENERAL ACTIVITY SCALE (PEG)
PEG_TOTALSCORE: 7.33
INTERFERED_GENERAL_ACTIVITY: 8
AVG_PAIN_PASTWEEK: 6
INTERFERED_ENJOYMENT_LIFE: 8

## 2023-11-01 ENCOUNTER — OFFICE VISIT (OUTPATIENT)
Dept: PALLIATIVE MEDICINE | Facility: CLINIC | Age: 58
End: 2023-11-01
Attending: INTERNAL MEDICINE
Payer: COMMERCIAL

## 2023-11-01 VITALS — HEART RATE: 78 BPM | SYSTOLIC BLOOD PRESSURE: 122 MMHG | DIASTOLIC BLOOD PRESSURE: 76 MMHG | OXYGEN SATURATION: 98 %

## 2023-11-01 DIAGNOSIS — G89.29 POST-COVID CHRONIC MUSCLE PAIN: ICD-10-CM

## 2023-11-01 DIAGNOSIS — R52 PAIN: Primary | ICD-10-CM

## 2023-11-01 DIAGNOSIS — U09.9 POST-COVID CHRONIC MUSCLE PAIN: ICD-10-CM

## 2023-11-01 DIAGNOSIS — M79.18 MYALGIA, MULTIPLE SITES: ICD-10-CM

## 2023-11-01 DIAGNOSIS — M79.10 POST-COVID CHRONIC MUSCLE PAIN: ICD-10-CM

## 2023-11-01 PROCEDURE — 99205 OFFICE O/P NEW HI 60 MIN: CPT | Performed by: NURSE PRACTITIONER

## 2023-11-01 RX ORDER — TOPIRAMATE 25 MG/1
TABLET, FILM COATED ORAL
Qty: 60 TABLET | Refills: 0 | Status: SHIPPED | OUTPATIENT
Start: 2023-11-01 | End: 2023-11-28 | Stop reason: DRUGHIGH

## 2023-11-01 ASSESSMENT — PAIN SCALES - GENERAL: PAINLEVEL: MODERATE PAIN (5)

## 2023-11-01 NOTE — LETTER
11/1/2023         RE: Jayne Leiva  [address] [facility] [address]        Dear Colleague,    Thank you for referring your patient, Jayne Leiva, to the [facility]IN [facility]NAGEMENT [place]. Please see a copy of my visit note below. Thank you for allowing me to participate in the care of your patient.        Sincerely,        Melita Sullivan NP    [facility] Management     Date of visit: Nov 1, 2023      Consultation: Jayne Leiva is a 58 year old female who has a past medical history of Acid reflux disease, KELLE (generalized anxiety disorder), Morbid obesity (H), Other sleep apnea (3/6/2023), and Pure hypercholesterolemia. Jayne is being seen today at the request of Amy Gerardo for evaluation of her pain issues and recommendations for management.     Referral information:   Jayne has not been seen at a pain clinic in the past.   Amy Gerardo MD   [address] [facility] [address]   Phone: [phone]   Fax: [phone]    Diagnoses: Post-COVID chronic muscle pain   Order: Pain Management  Referral      Referring provider comments (per last note): Reviewed approach to muscle pain following COVID-19, recommend pain management referral. Extensive conversation adjustment disorder management following COVID-19.  Patient was referred for counseling. Discussed approach to chronic fatigue with patient.  Reviewed literature published data.  Advised on arginine and vitamin C supplementation as a helpful strategy.  Also reviewed energy conservation strategies as well as physical activity with gradual increase in both intensity and duration.    Relevant records/History:  I have reviewed available medical information in the patient's electronic medical record including relevant provider notes, laboratory work, and imaging.   ____________________________________________________________    Jayne reports:  - February of 2022 was her first  "episode of COVID, after infection resolved, she had fatigue that never went away. Then in December of 2022, she had COVID again and this is when her muscle pain and brain fog began. Muscle pain seems to come and go to all areas of her body. Can be aching, shooting, \"just sucks.\" Treating with Dr. Gerardo in [facility]. Has been doing therapy to build endurance, started on regimen of LDN, recently was also prescribed arginine and vitamin C supplements. She thinks that LDN has been helpful for fatigue and brain fog, less so for the pain.  - She is working from home full time, but this has been difficult. Can have some ability to pace her work and take breaks with the support of her manager. She notes that she has less patience due to persistent pain and feels like she can feel irritated more easily.   - She participated in PT and OT; this was helpful. PT focused on lower body and OT was focusing on upper body. Now she is doing PT for her arm and wrist at [facility]. Kinesio tape has been helpful for reducing her pain in her arm. Has a history of right RCR, so therapist is trying to evaluate for shoulder pathology that may be impacting her arm.   -  For fun, she [place] She also used to enjoy cleaning, gardening. Now she feels like she is so tired after work that she cannot do these things. Her mother lives in [place], but she has not been able to visit as the drive would be too much.     SUBJECTIVE PAIN HISTORY  Subjective   Pain description:  Location: wide spread, location seems to vary without recognizable pattern    Quality: cramping, sharp, cutting, dull, aching, throbbing   Duration: PM   Severity/Intensity (0 = No pain to 10 = Worst pain imaginable)   Now: 6, Average: 6, Best: 3, Worst: 7  Aggravating factors include: standing, walking, exercise  Relieving factors include: lying down, medication    PAIN MANAGEMENT TREATMENT HISTORY  1. MEDICATIONS:  Opioids: " Codeine, Oxycodone  NSAIDs: Ibuprofen, Naproxen  Muscle relaxants: Cyclobenzaprine  Pain Antidepressants/ Anxiolytics: Bupropion, Citalopram   Neuroleptics: not tried  Topical: Other gels, creams, patches or ointments   Adjuvant medications: Acetaminophen, vitamin C, arginine   2. PHYSICAL THERAPY:   Completed 7 OT visits with Nicole Guzman and 7 PT visits with Jerri Hubbard for Post Covid treatment  3. PAIN PSYCHOLOGY:   Had done extensive therapy after divorce  4. SURGERY:  Spine fusion in 1999, Dr. Morrissey at the [facility]  5. INJECTIONS:   Not tried for this issue  6. COMPLEMENTARY THERAPY:  Chiropractic: Has not tried  Not interested in trying  Acupuncture/acupressure: Has not tried  TENS unit: Has not tried  Walks with her dog  for exercise      Current pain medications:  LDN 4.5 mg per day  Tylenol as needed    Review of [place] Prescription Monitoring Program ():   No concern for abuse or misuse of controlled medications based on this report. Last viewed on 11/1/2023. No chronic controlled medications are being prescribed.    Past Medical History  She  has a past medical history of Acid reflux disease, KELLE (generalized anxiety disorder), Morbid obesity (H), Other sleep apnea (3/6/2023), and Pure hypercholesterolemia.   Past Surgical History  She  has a past surgical history that includes Bunionectomy (Right, 2008); Arthroscopy shoulder (Right, 2015); rotator cuff repair rt/lt (Left, 2017); diskectomy, lumbar, single sp (1996); diskectomy, lumbar, single sp (1997); Fusion lumbar anterior one level (1999); Osteotomy foot (Right, 06/06/2017); Repair hammer toe (Right, 06/06/2017); Reconstruct forefoot with metatarsophalangeal (MTP) fusion (Right, 06/06/2017); Arthroscopy shoulder, open rotator cuff repair, combined (Left, 03/29/2017); SACROPLASTY; DaVINCI hysterectomy total, bilateral salpingo-oophorectomy, combined (N/A, 2/9/2021); and Coronary Angiogram (N/A, 4/14/2022).   Social History  Social History      Tobacco Use     Smoking status: Former     Packs/day: 0.75     Years: 26.00     Additional pack years: 0.00     Total pack years: 19.50     Types: Cigarettes     Quit date: 2022     Years since quittin.5     Passive exposure: Past     Smokeless tobacco: Never   Vaping Use     Vaping Use: Never used   Substance Use Topics     Alcohol use: Yes     Comment: rare     Drug use: No      Social History     Social History Narrative     in , he and her  have been together since . They dated in high school as well.      She was previously  once.     3 adult kids(2 daughters and 1 son)    No grandchildren.    Walks her dog for exercise.     Works full time as a  at a call center      Medications and Allergies reviewed.    Objective  /76   Pulse 78   LMP  (LMP Unknown)   SpO2 98%   Constitutional: Well developed, well nourished, appears stated age. No acute distress.  Gait is normal and steady  HEENT: Head atraumatic, normocephalic. Eyes without conjunctival injection or jaundice. Neck supple.   Skin: No obvious rash, lesions, or petechiae of exposed skin.   Extremities: Peripheral pulses intact. No clubbing, cyanosis, or edema. Moves all extremities.  Psychiatric/mental status: Alert, without lethargy or stupor. Speech fluent. Appropriate affect. Mood normal. Able to follow commands without difficulty.   Musculoskeletal exam: Normal bulk and tone. Unremarkable spinal curvature.     Labs:  Creatinine   Date Value Ref Range Status   2023 0.88 0.51 - 0.95 mg/dL Final     AST   Date Value Ref Range Status   2023 28 10 - 35 U/L Final     ALT   Date Value Ref Range Status   2023 23 10 - 35 U/L Final         Assessment & Plan  Pain  Post-COVID chronic muscle pain  Myalgia, multiple sites  Discussed adding neuroleptic to help with widespread pain. As she is concerned with weight gain, will start topiramate 25 mg a tbedtime, increase to 50 mg  in one week. Also discussed pain psychology approach, and referral was placed.   - PAIN PHD EVAL/TREAT/FOLLOW UP  - topiramate (TOPAMAX) 25 MG tablet  Dispense: 60 tablet; Refill: 0      Melita Sullivan CNP-BC, PMGT-BC, AP-PMN  [facility]          BILLING TIME DOCUMENTATION: The total TIME spent on this patient on the date of the encounter/appointment was 62 minutes.   TOTAL TIME INCLUDED  Time spent preparing to see the patient by reviewing available medical information in the patient's medical record, including relevant provider notes, laboratory work, and imaging 15 minutes  Time spent face to face (or over the phone) with the patient 47 minutes

## 2023-11-01 NOTE — PATIENT INSTRUCTIONS
Start Topiramate 25 mg at bedtime for one week, then increase to 50 mg at bedtime.  What to expect from pain psychology...  Focus on chronic pain management  Review of various coping skills  Review of relationship between pain, anxiety, depression, insomnia, and stress  Discussion of relaxation therapy, mindfulness  _____________________________________________________  Scheduling/Clinic telephone number for ALL locations:  [phone]    After Hours On-Call Service for Emergencies:  [phone]  Call with any questions about your care and for scheduling assistance.   Calls are returned Monday through Friday between 8 AM and 4:00 PM. We usually get back to you within 2-3 business days depending on the issue/request. I am not in the clinic on Fridays.   If we are prescribing your medications:  For medication refills, call the clinic or send a [employer] message 7 days in advance.  Please include the name of the requested medication and your preferred pharmacy. Please allow 3-4 days to be processed.   Per [place] State Law, all controlled substance prescriptions must be filled within 30 days of being written. For those controlled substances allowing refills, pickup must occur within 30 days of last fill.    We believe regular attendance is key to your success in our program!    If you are unable to keep your appointment we ask that you call us at least 24 hours in advance to cancel.This will allow us to offer the appointment time to another patient. Multiple missed appointments may lead to dismissal from the clinic.

## 2023-11-01 NOTE — PROGRESS NOTES
"[facility] Pain Management     Date of visit: Nov 1, 2023      Consultation: Jayne Leiva is a 58 year old female who has a past medical history of Acid reflux disease, KELLE (generalized anxiety disorder), Morbid obesity (H), Other sleep apnea (3/6/2023), and Pure hypercholesterolemia. Jayne is being seen today at the request of Amy Gerardo for evaluation of her pain issues and recommendations for management.     Referral information:   Jayne has not been seen at a pain clinic in the past.   Amy Gerardo MD   [address] [place] [address]   Phone: [phone]   Fax: [phone]    Diagnoses: Post-COVID chronic muscle pain   Order: Pain Management  Referral      Referring provider comments (per last note): Reviewed approach to muscle pain following COVID-19, recommend pain management referral. Extensive conversation adjustment disorder management following COVID-19.  Patient was referred for counseling. Discussed approach to chronic fatigue with patient.  Reviewed literature published data.  Advised on arginine and vitamin C supplementation as a helpful strategy.  Also reviewed energy conservation strategies as well as physical activity with gradual increase in both intensity and duration.    Relevant records/History:  I have reviewed available medical information in the patient's electronic medical record including relevant provider notes, laboratory work, and imaging.   ____________________________________________________________    Jayne reports:  - February of 2022 was her first episode of COVID, after infection resolved, she had fatigue that never went away. Then in December of 2022, she had COVID again and this is when her muscle pain and brain fog began. Muscle pain seems to come and go to all areas of her body. Can be aching, shooting, \"just sucks.\" Treating with Dr. Gerardo in [facility]. Has been doing therapy to build endurance, started on " regimen of LDN, recently was also prescribed arginine and vitamin C supplements. She thinks that LDN has been helpful for fatigue and brain fog, less so for the pain.  - She is working from home full time, but this has been difficult. Can have some ability to pace her work and take breaks with the support of her manager. She notes that she has less patience due to persistent pain and feels like she can feel irritated more easily.   - She participated in PT and OT; this was helpful. PT focused on lower body and OT was focusing on upper body. Now she is doing PT for her arm and wrist at [facility]. Kinesio tape has been helpful for reducing her pain in her arm. Has a history of right RCR, so therapist is trying to evaluate for shoulder pathology that may be impacting her arm.   -  For fun, [place] She also used to enjoy cleaning, gardening. Now she feels like she is so tired after work that she cannot do these things. Her mother lives in [place], but she has not been able to visit as the drive would be too much.     SUBJECTIVE PAIN HISTORY  Subjective    Pain description:  Location: wide spread, location seems to vary without recognizable pattern    Quality: cramping, sharp, cutting, dull, aching, throbbing   Duration: PM   Severity/Intensity (0 = No pain to 10 = Worst pain imaginable)   Now: 6, Average: 6, Best: 3, Worst: 7  Aggravating factors include: standing, walking, exercise  Relieving factors include: lying down, medication    PAIN MANAGEMENT TREATMENT HISTORY  1. MEDICATIONS:  Opioids: Codeine, Oxycodone  NSAIDs: Ibuprofen, Naproxen  Muscle relaxants: Cyclobenzaprine  Pain Antidepressants/ Anxiolytics: Bupropion, Citalopram   Neuroleptics: not tried  Topical: Other gels, creams, patches or ointments   Adjuvant medications: Acetaminophen, vitamin C, arginine   2. PHYSICAL THERAPY:   Completed 7 OT visits with Nicole Guzman and 7 PT visits with Jerri Hubbard for Post  Covid treatment  3. PAIN PSYCHOLOGY:   Had done extensive therapy after divorce  4. SURGERY:  Spine fusion in , Dr. Morrissey at the [facility]  5. INJECTIONS:   Not tried for this issue  6. COMPLEMENTARY THERAPY:  Chiropractic: Has not tried  Not interested in trying  Acupuncture/acupressure: Has not tried  TENS unit: Has not tried  Walks with her dog  for exercise      Current pain medications:  LDN 4.5 mg per day  Tylenol as needed    Review of [place] Prescription Monitoring Program ():   No concern for abuse or misuse of controlled medications based on this report. Last viewed on 2023. No chronic controlled medications are being prescribed.    Past Medical History   She  has a past medical history of Acid reflux disease, KELLE (generalized anxiety disorder), Morbid obesity (H), Other sleep apnea (3/6/2023), and Pure hypercholesterolemia.   Past Surgical History   She  has a past surgical history that includes Bunionectomy (Right, ); Arthroscopy shoulder (Right, ); rotator cuff repair rt/lt (Left, ); diskectomy, lumbar, single sp (); diskectomy, lumbar, single sp (); Fusion lumbar anterior one level (); Osteotomy foot (Right, 2017); Repair hammer toe (Right, 2017); Reconstruct forefoot with metatarsophalangeal (MTP) fusion (Right, 2017); Arthroscopy shoulder, open rotator cuff repair, combined (Left, 2017); SACROPLASTY; DaVINCI hysterectomy total, bilateral salpingo-oophorectomy, combined (N/A, 2021); and Coronary Angiogram (N/A, 2022).   Social History   Social History     Tobacco Use    Smoking status: Former     Packs/day: 0.75     Years: 26.00     Additional pack years: 0.00     Total pack years: 19.50     Types: Cigarettes     Quit date: 2022     Years since quittin.5     Passive exposure: Past    Smokeless tobacco: Never   Vaping Use    Vaping Use: Never used   Substance Use Topics    Alcohol use: Yes     Comment: rare    Drug use: No       Social History     Social History Narrative     in April, 2023, he and her  have been together since 2015. They dated in high school as well.      She was previously  once.     3 adult kids(2 daughters and 1 son)    No grandchildren.    Walks her dog for exercise.     Works full time as a  at a call center      Medications and Allergies reviewed.    Objective   /76   Pulse 78   LMP  (LMP Unknown)   SpO2 98%   Constitutional: Well developed, well nourished, appears stated age. No acute distress.  Gait is normal and steady  HEENT: Head atraumatic, normocephalic. Eyes without conjunctival injection or jaundice. Neck supple.   Skin: No obvious rash, lesions, or petechiae of exposed skin.   Extremities: Peripheral pulses intact. No clubbing, cyanosis, or edema. Moves all extremities.  Psychiatric/mental status: Alert, without lethargy or stupor. Speech fluent. Appropriate affect. Mood normal. Able to follow commands without difficulty.   Musculoskeletal exam: Normal bulk and tone. Unremarkable spinal curvature.     Labs:  Creatinine   Date Value Ref Range Status   04/28/2023 0.88 0.51 - 0.95 mg/dL Final     AST   Date Value Ref Range Status   04/28/2023 28 10 - 35 U/L Final     ALT   Date Value Ref Range Status   04/28/2023 23 10 - 35 U/L Final         Assessment & Plan   Pain  Post-COVID chronic muscle pain  Myalgia, multiple sites  Discussed adding neuroleptic to help with widespread pain. As she is concerned with weight gain, will start topiramate 25 mg a tbedtime, increase to 50 mg in one week. Also discussed pain psychology approach, and referral was placed.   - PAIN PHD EVAL/TREAT/FOLLOW UP  - topiramate (TOPAMAX) 25 MG tablet  Dispense: 60 tablet; Refill: 0      Melita Sullivan, CNP-BC, PMGT-BC, AP-PMN  [facility]          BILLING TIME DOCUMENTATION: The total TIME spent on this patient on the date of the  encounter/appointment was 62 minutes.   TOTAL TIME INCLUDED  Time spent preparing to see the patient by reviewing available medical information in the patient's medical record, including relevant provider notes, laboratory work, and imaging 15 minutes  Time spent face to face (or over the phone) with the patient 47 minutes
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
## 2023-11-13 ENCOUNTER — VIRTUAL VISIT (OUTPATIENT)
Dept: PALLIATIVE MEDICINE | Facility: CLINIC | Age: 58
End: 2023-11-13
Attending: NURSE PRACTITIONER
Payer: COMMERCIAL

## 2023-11-13 DIAGNOSIS — M79.10 POST-COVID CHRONIC MUSCLE PAIN: ICD-10-CM

## 2023-11-13 DIAGNOSIS — U09.9 POST-COVID CHRONIC MUSCLE PAIN: ICD-10-CM

## 2023-11-13 DIAGNOSIS — M79.18 MYALGIA, MULTIPLE SITES: ICD-10-CM

## 2023-11-13 DIAGNOSIS — G89.29 POST-COVID CHRONIC MUSCLE PAIN: ICD-10-CM

## 2023-11-13 DIAGNOSIS — R52 PAIN: Primary | ICD-10-CM

## 2023-11-13 PROCEDURE — 96156 HLTH BHV ASSMT/REASSESSMENT: CPT | Mod: 95 | Performed by: PSYCHOLOGIST

## 2023-11-27 ASSESSMENT — PAIN SCALES - PAIN ENJOYMENT GENERAL ACTIVITY SCALE (PEG)
AVG_PAIN_PASTWEEK: 5
INTERFERED_ENJOYMENT_LIFE: 7
PEG_TOTALSCORE: 6.33
INTERFERED_GENERAL_ACTIVITY: 7

## 2023-11-28 ENCOUNTER — OFFICE VISIT (OUTPATIENT)
Dept: PALLIATIVE MEDICINE | Facility: CLINIC | Age: 58
End: 2023-11-28
Payer: COMMERCIAL

## 2023-11-28 VITALS — OXYGEN SATURATION: 98 % | SYSTOLIC BLOOD PRESSURE: 128 MMHG | HEART RATE: 78 BPM | DIASTOLIC BLOOD PRESSURE: 84 MMHG

## 2023-11-28 DIAGNOSIS — G89.29 POST-COVID CHRONIC MUSCLE PAIN: ICD-10-CM

## 2023-11-28 DIAGNOSIS — M79.10 POST-COVID CHRONIC MUSCLE PAIN: ICD-10-CM

## 2023-11-28 DIAGNOSIS — M79.18 MYALGIA, MULTIPLE SITES: ICD-10-CM

## 2023-11-28 DIAGNOSIS — U09.9 POST-COVID CHRONIC MUSCLE PAIN: ICD-10-CM

## 2023-11-28 DIAGNOSIS — R52 PAIN: Primary | ICD-10-CM

## 2023-11-28 PROCEDURE — 99214 OFFICE O/P EST MOD 30 MIN: CPT | Performed by: NURSE PRACTITIONER

## 2023-11-28 RX ORDER — TOPIRAMATE 50 MG/1
50 TABLET, FILM COATED ORAL 2 TIMES DAILY
Qty: 60 TABLET | Refills: 2 | Status: SHIPPED | OUTPATIENT
Start: 2023-11-28 | End: 2024-01-29

## 2023-11-28 ASSESSMENT — PAIN SCALES - GENERAL: PAINLEVEL: SEVERE PAIN (6)

## 2023-11-28 NOTE — PATIENT INSTRUCTIONS
Ask you physical therapist about iontophoresis (patch to help deliver steroid to the tendon) or seeing a provider in the [facility] walk in clinic for a steroid injection.  Continue Topiramate at 50 mg at bedtime, you can increase to twice a day or 100 mg at bedtime if tolerated.   Continue to work with Britany.  _____________________________________________________  Scheduling/Clinic telephone number for ALL locations:  [phone]    After Hours On-Call Service for Emergencies:  [phone]  Call with any questions about your care and for scheduling assistance.   Calls are returned Monday through Friday between 8 AM and 4:00 PM. We usually get back to you within 2-3 business days depending on the issue/request. I am not in the clinic on Fridays.   If we are prescribing your medications:  For medication refills, call the clinic or send a [place] message 7 days in advance.  Please include the name of the requested medication and your preferred pharmacy. Please allow 3-4 days to be processed.   Per [place] State Law, all controlled substance prescriptions must be filled within 30 days of being written. For those controlled substances allowing refills, pickup must occur within 30 days of last fill.    We believe regular attendance is key to your success in our program!    If you are unable to keep your appointment we ask that you call us at least 24 hours in advance to cancel.This will allow us to offer the appointment time to another patient. Multiple missed appointments may lead to dismissal from the clinic.

## 2023-11-28 NOTE — PROGRESS NOTES
"[facility]   Date of Visit: Nov 28, 2023  Last visit: 11/1/23  Original Consult: 11/1/23    Jayne Leiva is a 58 year old female who has a past medical history of Acid reflux disease, KELLE (generalized anxiety disorder), Morbid obesity (H), Other sleep apnea (3/6/2023), and Pure hypercholesterolemia. Jayne is being seen today for ongoing management of chronic pain.    History:  February of 2022 was her first episode of COVID, after infection resolved, she had fatigue that never went away. Then in December of 2022, she had COVID again and this is when her muscle pain and brain fog began. Muscle pain seems to come and go to all areas of her body. Can be aching, shooting, \"just sucks.\" Treating with Dr. Gerardo in the [facility]. Has been doing therapy to build endurance, started on regimen of LDN, recently was also prescribed arginine and vitamin C supplements. She thinks that LDN has been helpful for fatigue and brain fog, less so for the pain. She participated in PT and OT; this was helpful. PT focused on lower body and OT was focusing on upper body. Now she is doing PT for her arm and wrist at [facility]. Kinesio tape has been helpful for reducing her pain in her arm. Has a history of right RCR, so therapist is trying to evaluate for shoulder pathology that may be impacting her arm.     Recommendations from last visit:  Discussed adding neuroleptic to help with widespread pain. As she is concerned with weight gain, will start topiramate 25 mg a tbedtime, increase to 50 mg in one week. Also discussed pain psychology approach, and referral was placed.   ____________________________________________________________    Jayne reports:  - Started topiramate, no significant side effects. Has noticed some intermittent twitching in the left side of her face. Has notices that she is having less intense twitching, aching pain in her extremities. Takes the edge off of her pain and this has been " helpful. Had her first pain psychology visit.   - Sleep is tough lately, but mostly due to right elbow and forearm pain. Has been participating in PT at [facility] without perceived change in pain. Has not seen orthopedic provider or had other interventions.   - She is working from home full time, but this has been difficult. Can have some ability to pace her work and take breaks with the support of her manager. She notes that she has less patience due to persistent pain and feels like she can feel irritated more easily.   -  For fun, she used to [place]. She also used to enjoy cleaning, gardening. Now she feels like she is so tired after work that she cannot do these things. Her mother lives in [place], but she has not been able to visit as the drive would be too much.  Her dog has been sick with chronic illness and this has created some financial strain.     Current pain medications:  LDN 4.5 mg per day  Tylenol as needed  Topiramate 50 mg at bedtime    Review of [place] Prescription Monitoring Program ():   No concern for abuse or misuse of controlled medications based on this report. Last viewed on 11/1/2023. No chronic controlled medications are being prescribed.    SUBJECTIVE PAIN HISTORY  Subjective    Pain description:  Location: wide spread, location seems to vary without recognizable pattern    Quality: cramping, sharp, cutting, dull, aching, throbbing   Duration: PM   Severity/Intensity (0 = No pain to 10 = Worst pain imaginable)   Now: 6 (same as intake), Average: 6, Best: 3, Worst: 7  Aggravating factors include: standing, walking, exercise  Relieving factors include: lying down, medication    PAIN MANAGEMENT TREATMENT HISTORY  1. MEDICATIONS:  Opioids: Codeine, Oxycodone  NSAIDs: Ibuprofen, Naproxen  Muscle relaxants: Cyclobenzaprine  Pain Antidepressants/ Anxiolytics: Bupropion, Citalopram   Neuroleptics: not tried  Topical: Other gels, creams, patches or ointments    Adjuvant medications: Acetaminophen, vitamin C, arginine   2. PHYSICAL THERAPY:   Completed 7 OT visits with Nicole Guzman and 7 PT visits with Jerri Hubbard for Post Covid treatment  3. PAIN PSYCHOLOGY:   Had done extensive therapy after divorce  4. SURGERY:  Spine fusion in 1999, Dr. Morrissey at the [facility]  5. INJECTIONS:   Not tried for this issue  6. COMPLEMENTARY THERAPY:  Chiropractic: Has not tried  Not interested in trying  Acupuncture/acupressure: Has not tried  TENS unit: Has not tried  Walks with her dog  for exercise      Past Medical History   She  has a past medical history of Acid reflux disease, KELLE (generalized anxiety disorder), Morbid obesity (H), Other sleep apnea (3/6/2023), and Pure hypercholesterolemia.   Past Surgical History   She  has a past surgical history that includes Bunionectomy (Right, 2008); Arthroscopy shoulder (Right, 2015); rotator cuff repair rt/lt (Left, 2017); diskectomy, lumbar, single sp (1996); diskectomy, lumbar, single sp (1997); Fusion lumbar anterior one level (1999); Osteotomy foot (Right, 06/06/2017); Repair hammer toe (Right, 06/06/2017); Reconstruct forefoot with metatarsophalangeal (MTP) fusion (Right, 06/06/2017); Arthroscopy shoulder, open rotator cuff repair, combined (Left, 03/29/2017); SACROPLASTY; DaVINCI hysterectomy total, bilateral salpingo-oophorectomy, combined (N/A, 2/9/2021); and Coronary Angiogram (N/A, 4/14/2022).   Social History   She reports that she quit smoking about 19 months ago. Her smoking use included cigarettes. She has a 19.50 pack-year smoking history. She has been exposed to tobacco smoke. She has never used smokeless tobacco. She reports current alcohol use. She reports that she does not use drugs.    Social History     Social History Narrative     in April, 2023, he and her  have been together since 2015. They dated in high school as well.      She was previously  once.     3 adult kids(2 daughters and 1 son)     No grandchildren.    Walks her dog for exercise.     Works full time as a  at a call center      Medications and Allergies reviewed.    Objective   /84   Pulse 78   LMP  (LMP Unknown)   SpO2 98%   Constitutional: Well developed, well nourished, appears stated age. No acute distress.  Gait is normal and steady  HEENT: Head atraumatic, normocephalic. Eyes without conjunctival injection or jaundice. Neck supple.   Skin: No obvious rash, lesions, or petechiae of exposed skin.   Extremities: Peripheral pulses intact. No clubbing, cyanosis, or edema. Moves all extremities.  Psychiatric/mental status: Alert, without lethargy or stupor. Speech fluent. Appropriate affect. Mood normal. Able to follow commands without difficulty.   Musculoskeletal exam: Normal bulk and tone. Unremarkable spinal curvature.      Assessment & Plan   Pain  Post-COVID chronic muscle pain  Myalgia, multiple sites  Pain  Refilled topiramate, can increase to 100 mg per day if tolerated. Recommend continued pain psychology visits. Advised her to discuss iontophoresis steroid or injection with her [facility] provider. Follow-up in 2 months, sooner if needed.     - topiramate (TOPAMAX) 50 MG tablet  Dispense: 60 tablet; Refill: 2         Melita Sullivan, CNP-BC, PMGT-BC, AP-PMN  [facility]

## 2023-12-14 ENCOUNTER — VIRTUAL VISIT (OUTPATIENT)
Dept: PALLIATIVE MEDICINE | Facility: CLINIC | Age: 58
End: 2023-12-14
Payer: COMMERCIAL

## 2023-12-14 DIAGNOSIS — M79.10 POST-COVID CHRONIC MUSCLE PAIN: ICD-10-CM

## 2023-12-14 DIAGNOSIS — M79.18 MYALGIA, MULTIPLE SITES: ICD-10-CM

## 2023-12-14 DIAGNOSIS — R52 PAIN: Primary | ICD-10-CM

## 2023-12-14 DIAGNOSIS — U09.9 POST-COVID CHRONIC MUSCLE PAIN: ICD-10-CM

## 2023-12-14 DIAGNOSIS — G89.29 POST-COVID CHRONIC MUSCLE PAIN: ICD-10-CM

## 2023-12-14 PROCEDURE — 96158 HLTH BHV IVNTJ INDIV 1ST 30: CPT | Mod: 95 | Performed by: PSYCHOLOGIST

## 2023-12-14 PROCEDURE — 96159 HLTH BHV IVNTJ INDIV EA ADDL: CPT | Mod: 95 | Performed by: PSYCHOLOGIST

## 2023-12-14 NOTE — PROGRESS NOTES
Jayne Leiva is a 58 year old female who is being evaluated via a billable video visit.      Patient is currently in the [facility]? yes    Patient would like the video invitation sent by: Text to cell phone: [phone]}    Video Start Time: 8:00 AM  Video Stop Time: 8:55 AM    Additional provider notes:     Pain Diagnoses per pain provider:   Pain    Post-COVID chronic muscle pain    Myalgia, multiple sites        DATA: During today's visit you reported the following: Your muscle and myalgia pain is overall the same - increased dose of topiramate takes the 'edge' off. Your mood is about the same - recognize crankiness is related to pain and fatigue. Your activity level is the same - walking 5 minutes on treadmill daily which tends to lead to fatigue. Your stress level is manageable. Your sleep is overall stable. You reported engaging in self-care for your pain 2-3 times daily.    You identified that you would like to focus on the following or had questions regarding the following issues or concerns, and we discussed the following:   - oriented to follow up  - increased topiramate dose takes the edge off, does reduce appetite  - window of tolerance  - Spoon Theory  - PT for shoulder and arm  - body scan  - recognize you have made progress since last year - journaling to reflect    ASSESSMENT: Oriented to follow up. Jayne is easily engaged and seems to have several positive coping strategies she is utilizing; discussed challenging negative self-talk, and working on having compassion for herself in terms of where she is at with her recovery and pain.    PLAN:   Your next appointment is scheduled for 1/3 at 8:00 AM.  Assignment/Objectives /interventions for next session:   - download Spoonie Day remberto and start to explore your 'spoons'  - break exercise into smaller increments - e.g., 2.5 min twice daily vs 5 min all at once  - engage in body scan to increase awareness of sensations in body    We  believe regular attendance is key to your success in our program!    Any time you are unable to keep your appointment we ask that you call us at [phone] at least 24 hours in advance to cancel.This will allow us to offer the appointment time to another patient.   Multiple missed appointments may lead to dismissal from the clinic.    Telemedicine Visit: The patient's condition can be safely assessed and treated via synchronous audio and visual telemedicine encounter.      Reason for Telemedicine Visit: Services only offered telehealth    Originating Site (Patient Location): Patient's home    Distant Site (Provider Location): Provider Remote Setting- Home Office    Consent:  The patient/guardian has verbally consented to: the potential risks and benefits of telemedicine (video visit) versus in person care; bill my insurance or make self-payment for services provided; and responsibility for payment of non-covered services.     Mode of Communication:  Video Conference via [employer]    As the provider I attest to compliance with applicable laws and regulations related to telemedicine.      Oneyda Faye PsyD LP  Licensed Psychologist  Outpatient Clinic Therapist  [facility]

## 2023-12-28 DIAGNOSIS — U09.9 POST-COVID CHRONIC MUSCLE PAIN: ICD-10-CM

## 2023-12-28 DIAGNOSIS — G89.29 POST-COVID CHRONIC MUSCLE PAIN: ICD-10-CM

## 2023-12-28 DIAGNOSIS — M79.10 POST-COVID CHRONIC MUSCLE PAIN: ICD-10-CM

## 2024-01-03 ENCOUNTER — VIRTUAL VISIT (OUTPATIENT)
Dept: PALLIATIVE MEDICINE | Facility: CLINIC | Age: 59
End: 2024-01-03
Payer: COMMERCIAL

## 2024-01-03 DIAGNOSIS — R52 PAIN: Primary | ICD-10-CM

## 2024-01-03 DIAGNOSIS — U09.9 POST-COVID CHRONIC MUSCLE PAIN: ICD-10-CM

## 2024-01-03 DIAGNOSIS — M79.18 MYALGIA, MULTIPLE SITES: ICD-10-CM

## 2024-01-03 DIAGNOSIS — G89.29 POST-COVID CHRONIC MUSCLE PAIN: ICD-10-CM

## 2024-01-03 DIAGNOSIS — M79.10 POST-COVID CHRONIC MUSCLE PAIN: ICD-10-CM

## 2024-01-03 PROCEDURE — 96158 HLTH BHV IVNTJ INDIV 1ST 30: CPT | Mod: 95 | Performed by: PSYCHOLOGIST

## 2024-01-03 NOTE — PROGRESS NOTES
Jayne Leiva is a 58 year old female who is being evaluated via a billable video visit.      Patient is currently in the [facility]? yes    Patient would like the video invitation sent by: Text to cell phone: [phone]}    Video Start Time: 8:00 AM  Video Stop Time: 8:43 AM    Additional provider notes:     Pain Diagnoses per pain provider:   Pain    Post-COVID chronic muscle pain    Myalgia, multiple sites        DATA: During today's visit you reported the following: Your post-covid muscle pain and myalgia pain is worse since decreasing topiramate and running out of naltrexone - didn't realize there were no refills, awaiting prior auth. Your mood is improved since reducing dose of topiramate - increased dose caused significant irritability. Your activity level is the same but feeling more optimistic and hopeful. Your stress level is still manageable despite some recent incidental stressors. Your sleep is more variable - some night have been more of a 'struggle'. You reported engaging in self-care for your pain 2-3 times daily.    You identified that you would like to focus on the following or had questions regarding the following issues or concerns, and we discussed the following:   - did not tolerate increased topiramate - irritability, bloating and other GI issues  - self-tapering on naltrexone due to running out of refills and awaiting prior auth  - former father-in-law diagnosed with cancer, very close with them before divorce  - having more periods of 'feeling normal'  - using Proformative Day remberto - has been helpful to realize how much you were expending  - using radical acceptance  - explored calming and soothing strategies for sleep, limiting water intake after certain time in the evening    ASSESSMENT: Jayne reports pacing her activity seems to be helping, however it is difficult to say given the transition back down on Topiramate and self-tapering of Naltrexone due to waiting on refill. She  has found the Spoonie Day remberto helpful, and was surprised by how much she was expending daily in terms of energy.     PLAN:   Your next appointment is scheduled for 1/17 at 8:00 AM.  Assignment/Objectives /interventions for next session:   - continue to pace activity using Spoonie Day remberto  - focus on self-care    We believe regular attendance is key to your success in our program!    Any time you are unable to keep your appointment we ask that you call us at [phone] at least 24 hours in advance to cancel.This will allow us to offer the appointment time to another patient.   Multiple missed appointments may lead to dismissal from the clinic.    Telemedicine Visit: The patient's condition can be safely assessed and treated via synchronous audio and visual telemedicine encounter.      Reason for Telemedicine Visit: Services only offered telehealth    Originating Site (Patient Location): Patient's home    Distant Site (Provider Location): Provider Remote Setting- Home Office    Consent:  The patient/guardian has verbally consented to: the potential risks and benefits of telemedicine (video visit) versus in person care; bill my insurance or make self-payment for services provided; and responsibility for payment of non-covered services.     Mode of Communication:  Video Conference via [employer]    As the provider I attest to compliance with applicable laws and regulations related to telemedicine.      Oneyda Faye PsyD LP  Licensed Psychologist  Outpatient Clinic Therapist  [facility]

## 2024-01-04 ENCOUNTER — MYC REFILL (OUTPATIENT)
Dept: PHYSICAL MEDICINE AND REHAB | Facility: CLINIC | Age: 59
End: 2024-01-04
Payer: COMMERCIAL

## 2024-01-04 DIAGNOSIS — M79.10 POST-COVID CHRONIC MUSCLE PAIN: ICD-10-CM

## 2024-01-04 DIAGNOSIS — G89.29 POST-COVID CHRONIC MUSCLE PAIN: ICD-10-CM

## 2024-01-04 DIAGNOSIS — U09.9 POST-COVID CHRONIC MUSCLE PAIN: ICD-10-CM

## 2024-01-04 NOTE — TELEPHONE ENCOUNTER
Naltrexone HCl, Pain, 1.5 MG CAPS     Last Written Prescription Date:  7/26/23  Last Fill Quantity: 90,   # refills: 3  Last Office Visit : 10/25/23  Future Office visit:  1/31/24    Routing refill request to provider for review/approval because:  Drug not on the [facility], [facility] or [facility] refill protocol

## 2024-01-06 ENCOUNTER — LAB (OUTPATIENT)
Dept: LAB | Facility: CLINIC | Age: 59
End: 2024-01-06
Payer: COMMERCIAL

## 2024-01-06 DIAGNOSIS — G93.32 POST-COVID CHRONIC FATIGUE: Primary | ICD-10-CM

## 2024-01-06 DIAGNOSIS — U09.9 POST-COVID CHRONIC FATIGUE: Primary | ICD-10-CM

## 2024-01-06 DIAGNOSIS — Z86.16 HISTORY OF COVID-19: ICD-10-CM

## 2024-01-06 LAB
CORTIS SERPL-MCNC: 9.1 UG/DL
ESTRADIOL SERPL-MCNC: 43 PG/ML
FSH SERPL IRP2-ACNC: 29.4 MIU/ML
LH SERPL-ACNC: 24.5 MIU/ML
Lab: NORMAL
PERFORMING LABORATORY: NORMAL
SHBG SERPL-SCNC: 48 NMOL/L (ref 30–135)
T3FREE SERPL-MCNC: 2.8 PG/ML (ref 2–4.4)
T4 FREE SERPL-MCNC: 1.03 NG/DL (ref 0.9–1.7)
TEST NAME: NORMAL
TSH SERPL DL<=0.005 MIU/L-ACNC: 0.61 UIU/ML (ref 0.3–4.2)

## 2024-01-06 PROCEDURE — 82024 ASSAY OF ACTH: CPT

## 2024-01-06 PROCEDURE — 84270 ASSAY OF SEX HORMONE GLOBUL: CPT

## 2024-01-06 PROCEDURE — 99000 SPECIMEN HANDLING OFFICE-LAB: CPT

## 2024-01-06 PROCEDURE — 83002 ASSAY OF GONADOTROPIN (LH): CPT

## 2024-01-06 PROCEDURE — 82533 TOTAL CORTISOL: CPT

## 2024-01-06 PROCEDURE — 84305 ASSAY OF SOMATOMEDIN: CPT

## 2024-01-06 PROCEDURE — 84403 ASSAY OF TOTAL TESTOSTERONE: CPT

## 2024-01-06 PROCEDURE — 83519 RIA NONANTIBODY: CPT

## 2024-01-06 PROCEDURE — 36415 COLL VENOUS BLD VENIPUNCTURE: CPT

## 2024-01-06 PROCEDURE — 84443 ASSAY THYROID STIM HORMONE: CPT

## 2024-01-06 PROCEDURE — 86376 MICROSOMAL ANTIBODY EACH: CPT

## 2024-01-06 PROCEDURE — 83001 ASSAY OF GONADOTROPIN (FSH): CPT

## 2024-01-06 PROCEDURE — 84146 ASSAY OF PROLACTIN: CPT

## 2024-01-06 PROCEDURE — 84481 FREE ASSAY (FT-3): CPT

## 2024-01-06 PROCEDURE — 82670 ASSAY OF TOTAL ESTRADIOL: CPT

## 2024-01-06 PROCEDURE — 84439 ASSAY OF FREE THYROXINE: CPT

## 2024-01-07 ENCOUNTER — HEALTH MAINTENANCE LETTER (OUTPATIENT)
Age: 59
End: 2024-01-07

## 2024-01-08 LAB
ACTH PLAS-MCNC: 19 PG/ML
Lab: 4465
Lab: NORMAL
Lab: NORMAL
PERFORMING LABORATORY: NORMAL
SPECIMEN STATUS: NORMAL
TEST NAME: NORMAL
THYROPEROXIDASE AB SERPL-ACNC: <10 IU/ML

## 2024-01-09 LAB
LABCORP INTERFACED MISCELLANEOUS TEST RESULT: NORMAL
TESTOST FREE SERPL-MCNC: 0.76 NG/DL
TESTOST SERPL-MCNC: 53 NG/DL (ref 8–60)

## 2024-01-10 ENCOUNTER — DOCUMENTATION ONLY (OUTPATIENT)
Dept: PEDIATRICS | Facility: CLINIC | Age: 59
End: 2024-01-10
Payer: COMMERCIAL

## 2024-01-10 DIAGNOSIS — U09.9 POST-COVID CHRONIC FATIGUE: Primary | ICD-10-CM

## 2024-01-10 DIAGNOSIS — G93.32 POST-COVID CHRONIC FATIGUE: Primary | ICD-10-CM

## 2024-01-10 LAB — MISCELLANEOUS TEST 1 (ARUP): NORMAL

## 2024-01-10 NOTE — PROGRESS NOTES
This is a notification that the following lab test has been cancelled.    Patient: Jayne Leiva,  67, MRN [id number]    Cancelled Test: Corticosteroid Binding Globulin    Test Collection Date: 24    Cancel Reason: Arrived at reference lab at the wrong temperature to provide accurate results.     We have cancelled/credited the test and will reach out to the patient to request that they return for a recollect. This is for notification only, no additional actions are requested for you at this time.

## 2024-01-13 ENCOUNTER — LAB (OUTPATIENT)
Dept: LAB | Facility: CLINIC | Age: 59
End: 2024-01-13
Payer: COMMERCIAL

## 2024-01-13 DIAGNOSIS — G93.32 POST-COVID CHRONIC FATIGUE: ICD-10-CM

## 2024-01-13 DIAGNOSIS — U09.9 POST-COVID CHRONIC FATIGUE: ICD-10-CM

## 2024-01-13 LAB
Lab: NORMAL
PERFORMING LABORATORY: NORMAL
SPECIMEN STATUS: NORMAL
TEST NAME: NORMAL

## 2024-01-13 PROCEDURE — 36415 COLL VENOUS BLD VENIPUNCTURE: CPT

## 2024-01-13 PROCEDURE — 84449 ASSAY OF TRANSCORTIN: CPT

## 2024-01-14 LAB — LABCORP INTERFACED MISCELLANEOUS TEST RESULT: NORMAL

## 2024-01-17 ENCOUNTER — VIRTUAL VISIT (OUTPATIENT)
Dept: PALLIATIVE MEDICINE | Facility: CLINIC | Age: 59
End: 2024-01-17
Payer: COMMERCIAL

## 2024-01-17 DIAGNOSIS — R52 PAIN: Primary | ICD-10-CM

## 2024-01-17 DIAGNOSIS — U09.9 POST-COVID CHRONIC MUSCLE PAIN: ICD-10-CM

## 2024-01-17 DIAGNOSIS — M79.10 POST-COVID CHRONIC MUSCLE PAIN: ICD-10-CM

## 2024-01-17 DIAGNOSIS — M79.18 MYALGIA, MULTIPLE SITES: ICD-10-CM

## 2024-01-17 DIAGNOSIS — G89.29 POST-COVID CHRONIC MUSCLE PAIN: ICD-10-CM

## 2024-01-17 PROCEDURE — 96158 HLTH BHV IVNTJ INDIV 1ST 30: CPT | Mod: 95 | Performed by: PSYCHOLOGIST

## 2024-01-17 PROCEDURE — 96159 HLTH BHV IVNTJ INDIV EA ADDL: CPT | Mod: 95 | Performed by: PSYCHOLOGIST

## 2024-01-17 NOTE — PROGRESS NOTES
Jayne Leiva is a 58 year old female who is being evaluated via a billable video visit.      Patient is currently in the [facility]? yes    Patient would like the video invitation sent by: Text to cell phone: [phone]}    Video Start Time: 8:00 AM  Video Stop Time: 8:45 AM    Additional provider notes:     Pain Diagnoses per pain provider:   Pain    Post-COVID chronic muscle pain    Myalgia, multiple sites        DATA: During today's visit you reported the following: Your  post-covid muscle pain and myalgia pain has not great due to being without Naltrexone. Your mood has been more variable again due to increased pain. Your activity level is stable, trying to take it easier since not having medications. Your stress level remains manageable. Your sleep is the same - stable, still don't wake up feeling well rested. You reported engaging in self-care for your pain 2-3 times daily.    You identified that you would like to focus on the following or had questions regarding the following issues or concerns, and we discussed the following:   - finally got Naltrexone refilled, went two weeks without - pharmacy processed through insurance accidentally  - only up to 2/3 dose for Naltrexone since you went without  - used heat, ice, tylenol when without medications  - when off Natrexone had increased shooting pains bilaterally arms and legs, has reduced since resuming   - would love to take longer walks this summer  - averaging 7 minutes on treadmill, slowly increasing over time      ASSESSMENT: Jayne's pain has been more variable, however this is very likely due to issues related to refill of Naltrexone. She seems to continue to work on her post-covid recovery, and is working on challenging negative self-talk specifically related to capacity for physical activity and ongoing mental fatigue.    PLAN:   Your next appointment is scheduled for 2/8 at 8:00 AM.  Assignment/Objectives /interventions for next  session:   - continue to slowly build in more physical activity, pay attention to cues regarding fatigue from your body as you do so  - continue to use Spoon Theory    We believe regular attendance is key to your success in our program!    Any time you are unable to keep your appointment we ask that you call us at [phone] at least 24 hours in advance to cancel.This will allow us to offer the appointment time to another patient.   Multiple missed appointments may lead to dismissal from the clinic.    Telemedicine Visit: The patient's condition can be safely assessed and treated via synchronous audio and visual telemedicine encounter.      Reason for Telemedicine Visit: Services only offered telehealth    Originating Site (Patient Location): Patient's home    Distant Site (Provider Location): Provider Remote Setting- Home Office    Consent:  The patient/guardian has verbally consented to: the potential risks and benefits of telemedicine (video visit) versus in person care; bill my insurance or make self-payment for services provided; and responsibility for payment of non-covered services.     Mode of Communication:  Video Conference via [employer]    As the provider I attest to compliance with applicable laws and regulations related to telemedicine.      Oneyda Faye PsyD LP  Licensed Psychologist  Outpatient Clinic Therapist  [facility]

## 2024-01-24 LAB — MISCELLANEOUS TEST 1 (ARUP): NORMAL

## 2024-01-26 ASSESSMENT — PAIN SCALES - PAIN ENJOYMENT GENERAL ACTIVITY SCALE (PEG)
INTERFERED_GENERAL_ACTIVITY: 8
INTERFERED_ENJOYMENT_LIFE: 8
PEG_TOTALSCORE: 7.67
AVG_PAIN_PASTWEEK: 7

## 2024-01-26 ASSESSMENT — ANXIETY QUESTIONNAIRES
3. WORRYING TOO MUCH ABOUT DIFFERENT THINGS: NOT AT ALL
GAD7 TOTAL SCORE: 1
4. TROUBLE RELAXING: NOT AT ALL
2. NOT BEING ABLE TO STOP OR CONTROL WORRYING: NOT AT ALL
IF YOU CHECKED OFF ANY PROBLEMS ON THIS QUESTIONNAIRE, HOW DIFFICULT HAVE THESE PROBLEMS MADE IT FOR YOU TO DO YOUR WORK, TAKE CARE OF THINGS AT HOME, OR GET ALONG WITH OTHER PEOPLE: SOMEWHAT DIFFICULT
7. FEELING AFRAID AS IF SOMETHING AWFUL MIGHT HAPPEN: NOT AT ALL
5. BEING SO RESTLESS THAT IT IS HARD TO SIT STILL: NOT AT ALL
1. FEELING NERVOUS, ANXIOUS, OR ON EDGE: NOT AT ALL
6. BECOMING EASILY ANNOYED OR IRRITABLE: SEVERAL DAYS

## 2024-01-26 ASSESSMENT — PATIENT HEALTH QUESTIONNAIRE - PHQ9: SUM OF ALL RESPONSES TO PHQ QUESTIONS 1-9: 7

## 2024-01-29 ENCOUNTER — OFFICE VISIT (OUTPATIENT)
Dept: PALLIATIVE MEDICINE | Facility: CLINIC | Age: 59
End: 2024-01-29
Payer: COMMERCIAL

## 2024-01-29 VITALS — DIASTOLIC BLOOD PRESSURE: 90 MMHG | SYSTOLIC BLOOD PRESSURE: 119 MMHG | HEART RATE: 77 BPM | OXYGEN SATURATION: 98 %

## 2024-01-29 DIAGNOSIS — U09.9 POST-COVID CHRONIC FATIGUE: ICD-10-CM

## 2024-01-29 DIAGNOSIS — M79.18 MYALGIA, MULTIPLE SITES: ICD-10-CM

## 2024-01-29 DIAGNOSIS — G93.32 POST-COVID CHRONIC FATIGUE: ICD-10-CM

## 2024-01-29 DIAGNOSIS — U09.9 POST-COVID CHRONIC MUSCLE PAIN: Primary | ICD-10-CM

## 2024-01-29 DIAGNOSIS — G89.29 POST-COVID CHRONIC MUSCLE PAIN: Primary | ICD-10-CM

## 2024-01-29 DIAGNOSIS — G89.29 CHRONIC RIGHT SHOULDER PAIN: ICD-10-CM

## 2024-01-29 DIAGNOSIS — M25.511 CHRONIC RIGHT SHOULDER PAIN: ICD-10-CM

## 2024-01-29 DIAGNOSIS — M79.10 POST-COVID CHRONIC MUSCLE PAIN: Primary | ICD-10-CM

## 2024-01-29 PROCEDURE — 99214 OFFICE O/P EST MOD 30 MIN: CPT | Performed by: NURSE PRACTITIONER

## 2024-01-29 RX ORDER — GABAPENTIN 100 MG/1
100-300 CAPSULE ORAL 2 TIMES DAILY
Qty: 180 CAPSULE | Refills: 1 | Status: SHIPPED | OUTPATIENT
Start: 2024-01-29 | End: 2024-03-25 | Stop reason: SINTOL

## 2024-01-29 ASSESSMENT — PAIN SCALES - GENERAL: PAINLEVEL: MODERATE PAIN (4)

## 2024-01-29 NOTE — PROGRESS NOTES
[facility]   Date of Visit: Jan 29, 2024  Last visit: 11/28/23  Original Consult: 11/1/23    Jayne Leiva is a 58 year old female who has a past medical history of Acid reflux disease, KELLE (generalized anxiety disorder), Morbid obesity (H), Other sleep apnea (3/6/2023), and Pure hypercholesterolemia. Jayne is being seen today for ongoing management of chronic pain.    History:  Post covid myalgia:  After first episode of COVID in February of 2022, she had fatigue that never went away. Suqsequent COVID infection in December of 2022, persistent muscle pain and brain fog began. Treating with Dr. Gerardo in the [place] [facility]. Has been treated with therapy(PT and OT) to build endurance, LDN, arginine and vitamin C supplements.   Chronic shoulder and right arm pain:  PT for her arm and wrist at [facility]. Kinesio tape has been helpful for reducing her pain in her arm. Has a history of right RCR.     Recommendations from last visit:  Refilled topiramate, can increase to 100 mg per day if tolerated. Recommend continued pain psychology visits. Advised her to discuss iontophoresis steroid or injection with her [facility] provider. Follow-up in 2 months, sooner if needed.   ____________________________________________________________    Jayne reports:  - Increased dose of topirimate to 100 mg at bedtime was not tolerated -felt irritable and had significant Gi/ symptoms.  Has not really noticed more than a small improvement of symptoms.   - Pain was increased when she could not get LDN refill and she went without for 2 weeks. Slowly increasing back to 4.5 mg per day and pain is slowly decreasing.   - Walking increases pain, slowly building on the treadmill. Up to 8-9 minutes. Goal to walk for pleasure again.  - shoulders are still bad. Right elbow is still bothering her too.   - Right elbow and forearm pain. Has been participating in PT at [facility] without perceived change in pain. Has not seen orthopedic  provider or had other interventions.   - She is working from home full time, but this has been difficult. Can have some ability to pace her work and take breaks with the support of her manager. She notes that she has less patience due to persistent pain and feels like she can feel irritated more easily.   -  For [place], [place] She also used to enjoy cleaning, gardening. Now she feels like she is so tired after work that she cannot do these things. Her mother lives in [place], but she has not been able to visit as the drive would be too much.     Current pain medications:  LDN 4.5 mg per day  Tylenol as needed  Topiramate 50 mg at bedtime    SUBJECTIVE PAIN HISTORY  Subjective    Pain description:  Location: wide spread, location seems to vary without recognizable pattern    Quality: cramping, sharp, cutting, dull, aching, throbbing   Duration: PM   Severity/Intensity (0 = No pain to 10 = Worst pain imaginable)   Now: 4/10 (6 at intake)  Aggravating factors include: standing, walking, exercise  Relieving factors include: lying down, medication    PAIN MANAGEMENT TREATMENT HISTORY  1. MEDICATIONS:  Opioids: Codeine, Oxycodone  NSAIDs: Ibuprofen, Naproxen  Muscle relaxants: Cyclobenzaprine  Pain Antidepressants/ Anxiolytics: Bupropion, Citalopram   Neuroleptics: Topiramate  Topical: Other gels, creams, patches or ointments   Adjuvant medications: Acetaminophen, vitamin C, arginine. LDN helpful for fatigue and brain fog, less so for the pain.  2. PHYSICAL THERAPY:   PT at [facility] for right arm and shoulder pain [id number]  Completed 7 OT visits with Nicole Guzman and 7 PT visits with Jerri Hubbard for Post Covid treatment  3. PAIN PSYCHOLOGY:   Currently working with Oneyda Faye, PhD  Had done extensive therapy after divorce  4. SURGERY:  Spine fusion in 1999, Dr. Morrissey at the [facility]  5. INJECTIONS:   Not tried for this issue  6. COMPLEMENTARY THERAPY:  Chiropractic: Has  not tried  Not interested in trying  Acupuncture/acupressure: Has not tried  TENS unit: Has not tried  Walks with her dog  for exercise      Social History   She reports that she quit smoking about 21 months ago. Her smoking use included cigarettes. She has a 19.5 pack-year smoking history. She has been exposed to tobacco smoke. She has never used smokeless tobacco. She reports current alcohol use. She reports that she does not use drugs.    Social History     Social History Narrative     in April, 2023, he and her  have been together since 2015. They dated in high school as well.      She was previously  once.     3 adult kids(2 daughters and 1 son)    No grandchildren.    Walks her dog for exercise.     Works full time as a  at a [employer]      Medications and Allergies reviewed.    Objective   BP (!) 119/90   Pulse 77   LMP  (LMP Unknown)   SpO2 98%   Constitutional: Well developed, well nourished, appears stated age. No acute distress.  Gait is normal and steady  HEENT: Head atraumatic, normocephalic. Eyes without conjunctival injection or jaundice. Neck supple.   Skin: No obvious rash, lesions, or petechiae of exposed skin.   Extremities: Peripheral pulses intact. No clubbing, cyanosis, or edema. Moves all extremities.  Psychiatric/mental status: Alert, without lethargy or stupor. Speech fluent. Appropriate affect. Mood normal. Able to follow commands without difficulty.   Musculoskeletal exam: Normal bulk and tone. Unremarkable spinal curvature.      Assessment & Plan   Post-COVID chronic muscle pain  Post-COVID chronic fatigue  Myalgia, multiple sites  Poor response to gabapentin. Not making gains with PT at [facility]. Recommend shifting focus to supportive of chronic persistent pain as she is feeling widespread pain is most disruptive. Will stop Topiramate and start gabapentin. AVS with titration directions. Follow up with me in 2 months to reassess symptoms and response  to treatment.    - gabapentin (NEURONTIN) 100 MG capsule  Dispense: 180 capsule; Refill: 1  - PAIN PT EVAL AND TREAT      Chronic right shoulder pain  Continue cares with [facility].       Melita Sullivan, CNP-BC, PMGT-BC, AP-PMN  [facility]        BILLING TIME DOCUMENTATION: The total TIME spent on this patient on the date of the encounter/appointment was 36 minutes.   TOTAL TIME INCLUDED  Time spent preparing to see the patient by reviewing available medical information in the patient's medical record, including relevant provider notes, laboratory work, and imaging 4 minutes  Time spent face to face (or over the phone) with the patient 26 minutes   Time spent documenting clinical information in Epic 6 minutes

## 2024-01-29 NOTE — PATIENT INSTRUCTIONS
Alternative compounding pharmacies to consider:  - [facility] in [place] is ~$100 for 90 day supply.  - [facility] in [facility], $55 for a 90 day supply of 4.5 mg tablets.    What to expect from pain PT...  Focuses on chronic pain (greater than 6 months)  Focuses on managing chronic pain  Focuses on respect for the body, healing, injury prevention, and recovery  Internal focus  Mind-body consciousness  Calming, focusing, relaxing  Often slow and methodical   Builds coordination, balance, precision of movement  Broad focus on sensation and awareness of body space  Meditative, goal-less practice    Decrease topiramate to 25 mg at bedtime for 3 days, then stop.    Start Gabapentin as follows, one capsule = 100 mg.  Increase every 3-5 days. Watch for sedation or sleepiness.    You can go slower if you need to or stop when you have adequate relief.     Time of Day  AM   Bedtime  Step 1:  none   1 capsule    Step 2:  1 capsule  1 capsule   Step 3:  1 capsule  2 capsule  Step 4:  2 capsule  2 capsules   Step 5:  2 capsules  3 capsules.  Step 6:  3 capsules  3 capsules

## 2024-01-30 ENCOUNTER — TELEPHONE (OUTPATIENT)
Dept: TRANSPLANT | Facility: CLINIC | Age: 59
End: 2024-01-30
Payer: COMMERCIAL

## 2024-01-30 NOTE — TELEPHONE ENCOUNTER
Left Voicemail (1st Attempt) and Sent Mychart (1st Attempt) for the patient to call back and schedule the following:    Appointment type: Post covid Video visit  Provider: Akin  Return date: FIRST AVAIL  Specialty phone number: [phone]  Additional appointment(s) needed: NA  Additonal Notes: Provider called out - please add to waitlist for sooner appmir Lima on 1/30/2024 at 5:32 PM

## 2024-01-31 ENCOUNTER — TELEPHONE (OUTPATIENT)
Dept: TRANSPLANT | Facility: CLINIC | Age: 59
End: 2024-01-31

## 2024-01-31 ENCOUNTER — TELEPHONE (OUTPATIENT)
Dept: PHYSICAL MEDICINE AND REHAB | Facility: CLINIC | Age: 59
End: 2024-01-31

## 2024-02-01 ENCOUNTER — OFFICE VISIT (OUTPATIENT)
Dept: FAMILY MEDICINE | Facility: CLINIC | Age: 59
End: 2024-02-01

## 2024-02-01 VITALS
DIASTOLIC BLOOD PRESSURE: 82 MMHG | WEIGHT: 254 LBS | BODY MASS INDEX: 42.94 KG/M2 | TEMPERATURE: 98.1 F | SYSTOLIC BLOOD PRESSURE: 126 MMHG | OXYGEN SATURATION: 96 % | HEART RATE: 90 BPM

## 2024-02-01 DIAGNOSIS — R39.15 URINARY URGENCY: ICD-10-CM

## 2024-02-01 DIAGNOSIS — N30.00 ACUTE CYSTITIS WITHOUT HEMATURIA: Primary | ICD-10-CM

## 2024-02-01 LAB
APPEARANCE UR: ABNORMAL
BACTERIA, UR: ABNORMAL
BILIRUB UR QL: ABNORMAL
CASTS/LPF: ABNORMAL
COLOR UR: YELLOW
EP/HPF: ABNORMAL
GLUCOSE URINE: ABNORMAL MG/DL
HGB UR QL: ABNORMAL
KETONES UR QL: ABNORMAL MG/DL
MISC.: ABNORMAL
NITRITE UR QL STRIP: ABNORMAL
PH UR STRIP: 5.5 PH (ref 5–7)
PROT UR QL: ABNORMAL MG/DL
RBC, UR MICRO: ABNORMAL (ref ?–2)
SP GR UR STRIP: ABNORMAL
UROBILINOGEN UR QL STRIP: 0.2 EU/DL (ref 0.2–1)
WBC #/AREA URNS HPF: ABNORMAL /[HPF]
WBC, UR MICRO: ABNORMAL (ref ?–2)

## 2024-02-01 PROCEDURE — 87086 URINE CULTURE/COLONY COUNT: CPT | Mod: 90

## 2024-02-01 PROCEDURE — 99213 OFFICE O/P EST LOW 20 MIN: CPT

## 2024-02-01 PROCEDURE — 81001 URINALYSIS AUTO W/SCOPE: CPT

## 2024-02-01 PROCEDURE — 87088 URINE BACTERIA CULTURE: CPT | Mod: 90

## 2024-02-01 RX ORDER — NITROFURANTOIN 25; 75 MG/1; MG/1
100 CAPSULE ORAL 2 TIMES DAILY
Qty: 10 CAPSULE | Refills: 0 | Status: SHIPPED | OUTPATIENT
Start: 2024-02-01 | End: 2024-02-06

## 2024-02-01 ASSESSMENT — SLEEP AND FATIGUE QUESTIONNAIRES
HOW LIKELY ARE YOU TO NOD OFF OR FALL ASLEEP WHILE SITTING AND TALKING TO SOMEONE: WOULD NEVER DOZE
HOW LIKELY ARE YOU TO NOD OFF OR FALL ASLEEP WHILE WATCHING TV: SLIGHT CHANCE OF DOZING
HOW LIKELY ARE YOU TO NOD OFF OR FALL ASLEEP WHILE SITTING QUIETLY AFTER LUNCH WITHOUT ALCOHOL: WOULD NEVER DOZE
HOW LIKELY ARE YOU TO NOD OFF OR FALL ASLEEP IN A CAR, WHILE STOPPED FOR A FEW MINUTES IN TRAFFIC: WOULD NEVER DOZE
HOW LIKELY ARE YOU TO NOD OFF OR FALL ASLEEP WHEN YOU ARE A PASSENGER IN A CAR FOR AN HOUR WITHOUT A BREAK: SLIGHT CHANCE OF DOZING
HOW LIKELY ARE YOU TO NOD OFF OR FALL ASLEEP WHILE SITTING INACTIVE IN A PUBLIC PLACE: WOULD NEVER DOZE
HOW LIKELY ARE YOU TO NOD OFF OR FALL ASLEEP WHILE SITTING AND READING: WOULD NEVER DOZE
HOW LIKELY ARE YOU TO NOD OFF OR FALL ASLEEP WHILE LYING DOWN TO REST IN THE AFTERNOON WHEN CIRCUMSTANCES PERMIT: SLIGHT CHANCE OF DOZING

## 2024-02-01 NOTE — PATIENT INSTRUCTIONS
UTI:    -Start antibiotics, one tablet twice a day for 5 days, take with food and a probiotic.     -Drink plenty of fluids, Tylenol, Ibuprofen, and Azo (will turn urine orange).     -Will send you a message on my chart with urine culture results.

## 2024-02-01 NOTE — NURSING NOTE
Chief Complaint   Patient presents with    UTI     Symptoms started late last week, urinary urgency and frequency, bladder discomfort, she took an at home test that came positive for UTI     Pre-visit Screening:  Immunizations:  up to date  Colonoscopy:  is up to date  Mammogram: is up to date  Asthma Action Test/Plan:  NA  PHQ9:  NA  GAD7:  NA  Questioned patient about current smoking habits Pt. quit smoking some time ago.  Ok to leave detailed message on voice mail for today's visit only Yes, phone # [phone]

## 2024-02-01 NOTE — PROGRESS NOTES
Assessment & Plan     1. Acute cystitis without hematuria  - Discussed with Jayne her urinalysis is not overly positive for a UTI (nitite and leukocyte negative). There is a large amount of bacteria present but also a moderate amount of epithelial cells. Discussed with patient on waiting for urine culture to return vs initiating antibiotics right away and she prefers to start antibiotics. RX for Macrobid 100 mg BID x 5 days sent to pharmacy on file. Encouraged Jayne to take antibiotic with food and a probiotic. Urine culture is pending, patient will be notified of results. Encouraged patient to drink plenty of fluids, alternate Tylenol and Ibuprofen, Azo, heating pad for bladder discomfort. Return to clinic and ER precautions discussed.   - nitroFURantoin macrocrystal-monohydrate (MACROBID) 100 MG capsule; Take 1 capsule (100 mg) by mouth 2 times daily for 5 days  Dispense: 10 capsule; Refill: 0    2. Urinary urgency  - See above.   - URINALYSIS, ROUTINE ([facility])  - URINE CULTURE AEROBIC BACTERIAL ([facility])    Will follow up pending urine culture results. Reasons to follow-up sooner or seek emergent care reviewed.     Laura Hunt PA-C  [facility]       Subjective     Jayne Leiva is a 58 year old female who presents to clinic today for the following health issues:    HPI   Chief Complaint   Patient presents with     UTI     Symptoms started late last week, urinary urgency and frequency, bladder discomfort, she took an at home test that came positive for UTI      Jayne presents with concerns of a possible UTI. She states her symptoms of bladder discomfort, urinary urgency, and frequency started late last week and over the weekend. She denies any fevers/chills, abdominal pain, nausea, vomiting, lower back pain/flank pain, vaginal discharge, blood in urine, etc. She states she is eating and drinking normally. She has not been taking any medications for her symptoms. She notes she did an at home  UTI test and it was positive for leukocytes. She does not have a history of chronic UTI's but did have a kidney infection years ago.     Past medical history and daily medications reviewed by me.       Objective    /82 (BP Location: Right arm, Patient Position: Sitting, Cuff Size: Adult Large)   Pulse 90   Temp 98.1  F (36.7  C) (Temporal)   Wt 115.2 kg (254 lb)   LMP  (LMP Unknown)   SpO2 96%   BMI 42.94 kg/m    Body mass index is 42.94 kg/m .    Physical Examination:  GENERAL: healthy, alert and no distress  EYES: Eyes grossly normal to inspection, PERRL and conjunctivae and sclerae normal  HENT: ear canals and TM's normal, nose and mouth without ulcers or lesions  NECK: no adenopathy, no asymmetry, masses, or scars and thyroid normal to palpation  RESP: lungs clear to auscultation - no rales, rhonchi or wheezes  CV: regular rate and rhythm, normal S1 S2, no S3 or S4, no murmur, click or rub, no peripheral edema   ABDOMEN: soft, nontender, no masses, no costovertebral tenderness bilaterally  MS: no gross musculoskeletal defects noted, no edema  SKIN: no suspicious lesions or rashes  NEURO: Normal strength and tone, mentation intact and speech normal  PSYCH: mentation appears normal, affect normal/bright    Labs reviewed.  Results for orders placed or performed in visit on 02/01/24 (from the past 24 hour(s))   URINALYSIS, ROUTINE ([facility])   Result Value Ref Range    Color Urine Yellow     Appearance Urine Cloudy (A)     Glucose Urine Neg mg/dL    Bilirubin Urine Neg     Ketones Urine Neg mg/dL    Specific Gravity Urine Other (A)     Blood Urine Neg     pH Urine 5.5 pH    Protein Urine neg neg - neg mg/dL    Urobilinogen Urine 0.2 EU/dL    Nitrite Urine Neg     Leukocytes neg     Wbc, Urine Micro 3-5 (A) neg - 2    RBC Micro Urine 2-3 (A) neg - 2    EP/HPF moderate     Bacteria Urine large (A) neg - neg    Casts/LPF neg     Miscellaneous neg

## 2024-02-03 LAB — URINE VOIDED CULTURE: NORMAL

## 2024-02-09 NOTE — PROGRESS NOTES
"Assessment & Plan   Problem List Items Addressed This Visit    None  Visit Diagnoses       Pain of right forearm    -  Primary    Relevant Orders    Orthopedic  Referral           1. Pain of right forearm  She has now done physical therapy on her forearm and shoulder but with continued and even worsening pain. I would like to have her now see the orthopedic specialist.  - Orthopedic  Referral            BMI  Estimated body mass index is 42.94 kg/m  as calculated from the following:    Height as of 10/25/23: 1.638 m (5' 4.49\").    Weight as of 2/1/24: 115.2 kg (254 lb).         FUTURE APPOINTMENTS:       - Follow-up visit with ortho    No follow-ups on file.    Ekaterina Sanders MD  [facility]    Subjective     Nursing Notes:   Sheyla Miranda, [facility]  2/13/2024  2:04 PM  Signed  Chief Complaint   Patient presents with    RECHECK     Recheck lower right arm pain, she has been doing PT for both her arm and her right shoulder, pain can become unbearable     Pre-visit Screening:  Immunizations:  up to date  Colonoscopy:  is up to date  Mammogram: is up to date  Asthma Action Test/Plan:  NA  PHQ9:  NA  GAD7:  NA  Questioned patient about current smoking habits Pt. quit smoking some time ago.  Ok to leave detailed message on voice mail for today's visit only Yes, phone # [phone]       Jayne Leiva is a 58 year old female who presents to clinic today for the following health issues   HPI     Here to followup on her right forearm. This is not getting better.   She initially came in to see me beginning of October for this. Was going to PT, then came back about 3 weeks later because PT thought maybe it was her shoulder and wanted to have orders for this instead.  She said the pain is only getting worse.  She doesn't think it's the shoulder that is the problem.    Sees a pain clinic for overall pain, due to covid. She discussed with them also. She suggested a patch. The shoulder indigo " put it on the shoulder.  She is right handed. Does a lot of typing and mouse for her work.the last week especially feels very non productive because she has to stop frequently. Anything like peeling an apple, cutting an apple, picking up small things. Pincher strength is also difficult. An example is can't  a kenney pin. The arm overall feels weak sometimes.    Pain management moved her over to a more holistic PT for her chronic pain.          Review of Systems   Constitutional, HEENT, cardiovascular, pulmonary, gi and gu systems are negative, except as otherwise noted.      Objective    /84 (BP Location: Right arm, Patient Position: Sitting, Cuff Size: Adult Large)   Pulse 75   Temp 98.2  F (36.8  C) (Temporal)   LMP  (LMP Unknown)   SpO2 99%   There is no height or weight on file to calculate BMI.  Physical Exam   GENERAL: alert and no distress  MS: no gross musculoskeletal defects noted, no edema  NEURO: Normal strength and tone, mentation intact and speech normal  PSYCH: mentation appears normal, affect normal/bright  Right upper ext: normal rom shoulder, wrist, elbow. Tender along radial aspect to palpation of forearm, up to lateral epicondyle, decreased pincher strength

## 2024-02-12 ENCOUNTER — THERAPY VISIT (OUTPATIENT)
Dept: PHYSICAL THERAPY | Facility: CLINIC | Age: 59
End: 2024-02-12
Attending: NURSE PRACTITIONER
Payer: COMMERCIAL

## 2024-02-12 DIAGNOSIS — G93.32 POST-COVID CHRONIC FATIGUE: Primary | ICD-10-CM

## 2024-02-12 DIAGNOSIS — U09.9 POST-COVID CHRONIC FATIGUE: Primary | ICD-10-CM

## 2024-02-12 PROCEDURE — 97161 PT EVAL LOW COMPLEX 20 MIN: CPT | Mod: GP | Performed by: PHYSICAL THERAPIST

## 2024-02-12 PROCEDURE — 97112 NEUROMUSCULAR REEDUCATION: CPT | Mod: GP | Performed by: PHYSICAL THERAPIST

## 2024-02-12 NOTE — PROGRESS NOTES
PHYSICAL THERAPY EVALUATION  Type of Visit: Evaluation    See electronic medical record for Abuse and Falls Screening details.    Subjective     Pt reports the first time she had COVID in Feb 202222 the fatigue and fogginess,  then in more recent Dec 2022 when she had more respirator symptoms with COVID  has random pain from her thumb, to her head and any random time of day with no real reason.  Presenting condition or subjective complaint: Post Covid Chronic Pain  Date of onset: 02/01/22    Relevant medical history: Concussions; Dizziness; Pain at night or rest; Sleep disorder like apnea   Dates & types of surgery: Discecktomies in 1986 and 1987, spinal fusion L5/S1 1999, bunion surgery 2008, correction of bunion surgery 2017, right rotator cuff clean up 2015, left rotator cuff repair 2017, hysterectomy 2021    Prior diagnostic imaging/testing results:       Prior therapy history for the same diagnosis, illness or injury: No        Living Environment  Social support: With a significant other or spouse   Type of home: House; Basement   Stairs to enter the home: Yes 2 Is there a railing: Yes   Ramp: No   Stairs inside the home: Yes 12 Is there a railing: Yes   Help at home: None  Equipment owned:       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       Objective   Cervical ROM- WFL- pain free  Shoulder ROM- Left WFL pain free, right limited at end range and noted painful arc, note  shoulder impingement, likely linked to posture.  Lumbar ROM- WFL pain free- LE myotomes 5/5    Posture- forward head rounded shoulders, right shoulder more anterior than left.    Right elbow pain- pain has been taping herself, and is  seeing [facility] for, however pain is not better.  Is pending an order for right shoulder at [facility].     Sit to stand: able to complete with minimal use of arms.      Assessment & Plan   CLINICAL IMPRESSIONS  Medical Diagnosis: Post Covid Chronic muscle pain myalgia    Treatment Diagnosis: chronic pain, fatigue, decreased activity tolerance   Impression/Assessment: Patient is a 58 year old female with Chronic pain/fPost COVID fatigue complaints over entire body complaints.  The following significant findings have been identified: Pain, Decreased ROM/flexibility, Decreased strength, Impaired balance, Impaired muscle performance, Decreased activity tolerance, and Impaired posture. These impairments interfere with their ability to perform self care tasks, work tasks, recreational activities, household chores, driving , household mobility, and community mobility as compared to previous level of function.     Clinical Decision Making (Complexity):  Clinical Presentation: Stable/Uncomplicated  Clinical Presentation Rationale: based on medical and personal factors listed in PT evaluation  Clinical Decision Making (Complexity): Low complexity    PLAN OF CARE  Treatment Interventions:  Interventions: Gait Training, Neuromuscular Re-education, Therapeutic Activity, Therapeutic Exercise, Self-Care/Home Management    Long Term Goals     PT Goal 1  Goal Identifier: walking  Goal Description: Pt will be able to walk for 30 minutes with minimal increase in pain  Rationale: to maximize safety and independence with performance of ADLs and functional tasks;to maximize safety and independence within the home;to maximize safety and independence within the community;to maximize safety and independence with transportation;to maximize safety and independence with self cares  Target Date: 04/29/24      Frequency of Treatment: 1x/week for 4 weeks,  2x/mo for 2 months  Duration of Treatment: 3 months    Recommended Referrals to Other Professionals:      Education Assessment:   Learner/Method: No Barriers to Learning  Education Comments: no concerns    Risks and benefits of evaluation/treatment have been explained.   Patient/Family/caregiver agrees with Plan of Care.     Evaluation Time:     PT Eval, Low Complexity Minutes (80703): 30       Signing Clinician: Vidhya Morgan PT

## 2024-02-13 ENCOUNTER — OFFICE VISIT (OUTPATIENT)
Dept: FAMILY MEDICINE | Facility: CLINIC | Age: 59
End: 2024-02-13

## 2024-02-13 VITALS
TEMPERATURE: 98.2 F | SYSTOLIC BLOOD PRESSURE: 128 MMHG | DIASTOLIC BLOOD PRESSURE: 84 MMHG | HEART RATE: 75 BPM | OXYGEN SATURATION: 99 %

## 2024-02-13 DIAGNOSIS — M79.631 PAIN OF RIGHT FOREARM: Primary | ICD-10-CM

## 2024-02-13 PROCEDURE — 99213 OFFICE O/P EST LOW 20 MIN: CPT | Performed by: FAMILY MEDICINE

## 2024-02-13 NOTE — NURSING NOTE
Chief Complaint   Patient presents with    RECHECK     Recheck lower right arm pain, she has been doing PT for both her arm and her right shoulder, pain can become unbearable     Pre-visit Screening:  Immunizations:  up to date  Colonoscopy:  is up to date  Mammogram: is up to date  Asthma Action Test/Plan:  NA  PHQ9:  NA  GAD7:  NA  Questioned patient about current smoking habits Pt. quit smoking some time ago.  Ok to leave detailed message on voice mail for today's visit only Yes, phone # [phone]

## 2024-02-15 ENCOUNTER — VIRTUAL VISIT (OUTPATIENT)
Dept: PALLIATIVE MEDICINE | Facility: CLINIC | Age: 59
End: 2024-02-15
Payer: COMMERCIAL

## 2024-02-15 DIAGNOSIS — U09.9 POST-COVID CHRONIC MUSCLE PAIN: Primary | ICD-10-CM

## 2024-02-15 DIAGNOSIS — G89.29 CHRONIC RIGHT SHOULDER PAIN: ICD-10-CM

## 2024-02-15 DIAGNOSIS — M25.511 CHRONIC RIGHT SHOULDER PAIN: ICD-10-CM

## 2024-02-15 DIAGNOSIS — G93.32 POST-COVID CHRONIC FATIGUE: ICD-10-CM

## 2024-02-15 DIAGNOSIS — M79.10 POST-COVID CHRONIC MUSCLE PAIN: Primary | ICD-10-CM

## 2024-02-15 DIAGNOSIS — U09.9 POST-COVID CHRONIC FATIGUE: ICD-10-CM

## 2024-02-15 DIAGNOSIS — M79.18 MYALGIA, MULTIPLE SITES: ICD-10-CM

## 2024-02-15 DIAGNOSIS — G89.29 POST-COVID CHRONIC MUSCLE PAIN: Primary | ICD-10-CM

## 2024-02-15 PROCEDURE — 96158 HLTH BHV IVNTJ INDIV 1ST 30: CPT | Mod: 95 | Performed by: PSYCHOLOGIST

## 2024-02-15 NOTE — PROGRESS NOTES
Jayne Leiva is a 58 year old female who is being evaluated via a billable video visit.      Patient is currently in the [facility]? yes    Patient would like the video invitation sent by: Text to cell phone: [phone]}    Video Start Time: 8:00 AM  Video Stop Time: 8:38 AM     Additional provider notes:     Pain Diagnoses per pain provider:   Post-COVID chronic muscle pain    Post-COVID chronic fatigue    Myalgia, multiple sites    Chronic right shoulder pain        DATA: During today's visit you reported the following: Your chronic pain is mildly improved since being back up to therapeutic dose for Naltrexone. Your mood is 'better' - report occasional irritability. Your activity level is stable - slowly increasing time. New pain PT provided further guidance on time table. 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 Your sleep is less restful - arm pain is waking you up, not sleeping as well as a result. You reported engaging in self-care for your pain 2-3 times daily.    You identified that you would like to focus on the following or had questions regarding the following issues or concerns, and we discussed the following:   - switched to Gabapentin from Topiramate - currently at 100 mg in am and 200 mg at night  - back up to full dose of Naltrexone  - tendonitis in right forearm is painful - PCP referred to orthopedic to address, awaiting call back  - using ice and heat on arm  - reading journal helps remind you of progress    ASSESSMENT: Jayne reports good awareness of triggers for pain, and has been able to reflect on progress made when she is feeling discouraged about pain flares or feeling like she has not made progress in her recovery. She seems to be very actively using skills to manage her pain and mood.    PLAN:   Your next appointment is scheduled for 3/29 at 2:30 PM.  Assignment/Objectives /interventions for next session:   -     We  believe regular attendance is key to your success in our program!    Any time you are unable to keep your appointment we ask that you call us at [phone] at least 24 hours in advance to cancel.This will allow us to offer the appointment time to another patient.   Multiple missed appointments may lead to dismissal from the clinic.    Telemedicine Visit: The patient's condition can be safely assessed and treated via synchronous audio and visual telemedicine encounter.      Reason for Telemedicine Visit: Services only offered telehealth    Originating Site (Patient Location): Patient's home    Distant Site (Provider Location): Provider Remote Setting- Home Office    Consent:  The patient/guardian has verbally consented to: the potential risks and benefits of telemedicine (video visit) versus in person care; bill my insurance or make self-payment for services provided; and responsibility for payment of non-covered services.     Mode of Communication:  Video Conference via [employer]    As the provider I attest to compliance with applicable laws and regulations related to telemedicine.      Oneyda Faye PsyD LP  Licensed Psychologist  Outpatient Clinic Therapist  [facility]

## 2024-02-19 ENCOUNTER — THERAPY VISIT (OUTPATIENT)
Dept: PHYSICAL THERAPY | Facility: CLINIC | Age: 59
End: 2024-02-19
Attending: NURSE PRACTITIONER
Payer: COMMERCIAL

## 2024-02-19 DIAGNOSIS — G93.32 POST-COVID CHRONIC FATIGUE: Primary | ICD-10-CM

## 2024-02-19 DIAGNOSIS — U09.9 POST-COVID CHRONIC FATIGUE: Primary | ICD-10-CM

## 2024-02-19 PROCEDURE — 97112 NEUROMUSCULAR REEDUCATION: CPT | Mod: GP | Performed by: PHYSICAL THERAPIST

## 2024-02-19 PROCEDURE — 97110 THERAPEUTIC EXERCISES: CPT | Mod: GP | Performed by: PHYSICAL THERAPIST

## 2024-02-28 ENCOUNTER — TRANSFERRED RECORDS (OUTPATIENT)
Dept: FAMILY MEDICINE | Facility: CLINIC | Age: 59
End: 2024-02-28

## 2024-02-29 SDOH — SOCIAL STABILITY: SOCIAL NETWORK: I HAVE TROUBLE DOING ALL OF MY REGULAR LEISURE ACTIVITIES WITH OTHERS: USUALLY

## 2024-02-29 SDOH — SOCIAL STABILITY: SOCIAL NETWORK: I HAVE TROUBLE DOING ALL OF THE ACTIVITIES WITH FRIENDS THAT I WANT TO DO: USUALLY

## 2024-02-29 SDOH — SOCIAL STABILITY: SOCIAL NETWORK: I HAVE TROUBLE DOING ALL OF THE FAMILY ACTIVITIES THAT I WANT TO DO: USUALLY

## 2024-02-29 SDOH — SOCIAL STABILITY: SOCIAL NETWORK: I HAVE TROUBLE DOING ALL OF MY USUAL WORK (INCLUDE WORK AT HOME): USUALLY

## 2024-02-29 SDOH — SOCIAL STABILITY: SOCIAL NETWORK: PROMIS ABILITY TO PARTICIPATE IN SOCIAL ROLES & ACTIVITIES T-SCORE: 39

## 2024-02-29 SDOH — SOCIAL STABILITY: SOCIAL NETWORK

## 2024-02-29 ASSESSMENT — ANXIETY QUESTIONNAIRES
2. NOT BEING ABLE TO STOP OR CONTROL WORRYING: NOT AT ALL
5. BEING SO RESTLESS THAT IT IS HARD TO SIT STILL: NOT AT ALL
1. FEELING NERVOUS, ANXIOUS, OR ON EDGE: NOT AT ALL
4. TROUBLE RELAXING: NOT AT ALL
8. IF YOU CHECKED OFF ANY PROBLEMS, HOW DIFFICULT HAVE THESE MADE IT FOR YOU TO DO YOUR WORK, TAKE CARE OF THINGS AT HOME, OR GET ALONG WITH OTHER PEOPLE?: SOMEWHAT DIFFICULT
GAD7 TOTAL SCORE: 1
6. BECOMING EASILY ANNOYED OR IRRITABLE: SEVERAL DAYS
GAD7 TOTAL SCORE: 1
GAD7 TOTAL SCORE: 1
7. FEELING AFRAID AS IF SOMETHING AWFUL MIGHT HAPPEN: NOT AT ALL
7. FEELING AFRAID AS IF SOMETHING AWFUL MIGHT HAPPEN: NOT AT ALL
3. WORRYING TOO MUCH ABOUT DIFFERENT THINGS: NOT AT ALL
IF YOU CHECKED OFF ANY PROBLEMS ON THIS QUESTIONNAIRE, HOW DIFFICULT HAVE THESE PROBLEMS MADE IT FOR YOU TO DO YOUR WORK, TAKE CARE OF THINGS AT HOME, OR GET ALONG WITH OTHER PEOPLE: SOMEWHAT DIFFICULT

## 2024-02-29 ASSESSMENT — PATIENT HEALTH QUESTIONNAIRE - PHQ9
10. IF YOU CHECKED OFF ANY PROBLEMS, HOW DIFFICULT HAVE THESE PROBLEMS MADE IT FOR YOU TO DO YOUR WORK, TAKE CARE OF THINGS AT HOME, OR GET ALONG WITH OTHER PEOPLE: VERY DIFFICULT
SUM OF ALL RESPONSES TO PHQ QUESTIONS 1-9: 7
SUM OF ALL RESPONSES TO PHQ QUESTIONS 1-9: 7

## 2024-03-04 ENCOUNTER — THERAPY VISIT (OUTPATIENT)
Dept: PHYSICAL THERAPY | Facility: CLINIC | Age: 59
End: 2024-03-04
Payer: COMMERCIAL

## 2024-03-04 DIAGNOSIS — G93.32 POST-COVID CHRONIC FATIGUE: Primary | ICD-10-CM

## 2024-03-04 DIAGNOSIS — U09.9 POST-COVID CHRONIC FATIGUE: Primary | ICD-10-CM

## 2024-03-04 PROCEDURE — 97112 NEUROMUSCULAR REEDUCATION: CPT | Mod: GP | Performed by: PHYSICAL THERAPIST

## 2024-03-06 ENCOUNTER — TRANSFERRED RECORDS (OUTPATIENT)
Dept: FAMILY MEDICINE | Facility: CLINIC | Age: 59
End: 2024-03-06

## 2024-03-06 ENCOUNTER — VIRTUAL VISIT (OUTPATIENT)
Dept: PHYSICAL MEDICINE AND REHAB | Facility: CLINIC | Age: 59
End: 2024-03-06
Payer: COMMERCIAL

## 2024-03-06 VITALS — HEIGHT: 64 IN | BODY MASS INDEX: 43.6 KG/M2

## 2024-03-06 DIAGNOSIS — G89.29 POST-COVID CHRONIC MUSCLE PAIN: Primary | ICD-10-CM

## 2024-03-06 DIAGNOSIS — U09.9 POST-COVID CHRONIC MUSCLE PAIN: Primary | ICD-10-CM

## 2024-03-06 DIAGNOSIS — M79.10 POST-COVID CHRONIC MUSCLE PAIN: Primary | ICD-10-CM

## 2024-03-06 PROCEDURE — 99214 OFFICE O/P EST MOD 30 MIN: CPT | Mod: 95 | Performed by: INTERNAL MEDICINE

## 2024-03-06 ASSESSMENT — PAIN SCALES - GENERAL: PAINLEVEL: MODERATE PAIN (5)

## 2024-03-06 NOTE — LETTER
"3/6/2024       RE: Jayne Leiva  [address] [place] [address]     Dear Colleague,    Thank you for referring your patient, Jayne Leiva, to the [facility] [place] [facility]. Please see a copy of my visit note below.    Jayne is a 58 year old who is being evaluated via a billable video visit.      How would you like to obtain your AVS? MyChart  If the video visit is dropped, the invitation should be resent by: Text to cell phone: [phone]  Will anyone else be joining your video visit? No          Assessment & Plan    Post-COVID chronic muscle pain  Discussed management of fatigue as well as muscle pain in the setting of chronic fatigue syndrome.  Patient was advised to to continue with advancing her physical activity as tolerated.  Discussed supplements, patient may choose to try D ribose 5 g 3 times daily as it was found to be beneficial for postexertional myalgia for patients with chronic fatigue syndrome and fibromyalgia.  Will follow-up in approximately 3 months.      I spent a total of 30 minutes on the day of the visit.   Time spent by me doing chart review, history and exam, documentation and further activities per the note      BMI  Estimated body mass index is 43.6 kg/m  as calculated from the following:    Height as of this encounter: 1.626 m (5' 4\").    Weight as of 2/1/24: 115.2 kg (254 lb).             Return in about 3 months (around 6/6/2024).    Subjective  Jayne is a 58 year old, presenting for the following health issues:  RECHECK  RECHECK    HPI     Patient is following up on post-COVID condition. She has been working with PT on increasing her exercise duration. She is currently at 7 minutes on a treadmill in the morning and 6 min In the afternoon. She states that she feels a little sore after the walk, but she can recover fairly quickly.   :[id number]}      2/29/2024    " 11:21 AM   PHQ Assesment Total Score(s)   PHQ-9 Score 7           2/29/2024    11:21 AM   KELLE-7 Results   KELLE 7 TOTAL SCORE 1 (minimal anxiety)   KELLE-7 Total Score 1         2/29/2024    11:21 AM   PTSD Screen Score   Have you ever experienced this kind of event? Yes   PTSD Screen (Score of 3 or more suggests positive screen) 2         2/29/2024    11:22 AM   PROMIS-29   PROMIS Physical Function T-Score 37 (moderate dysfunction)   PROMIS Anxiety T-Score 48 (within normal limits)   PROMIS Depression T-Score 52 (within normal limits)   PROMIS Fatigue T-Score 76 (severe)   PROMIS Sleep Disturbance T-Score 48 (within normal limits)   PROMIS Ability to Participate in Social Roles & Activities T-Score 39 (moderate dysfunction)   PROMIS Pain Interference T-Score 68 (moderate)   PROMIS Pain Intensity 7       Past Medical History:   Diagnosis Date    Acid reflux disease     KELLE (generalized anxiety disorder)     Morbid obesity (H)     Other sleep apnea 3/6/2023    Pure hypercholesterolemia        Past Surgical History:   Procedure Laterality Date    ARTHROSCOPY SHOULDER Right 2015    ARTHROSCOPY SHOULDER, OPEN ROTATOR CUFF REPAIR, COMBINED Left 03/29/2017    Procedure:  Left shoulder arthroscopy and arthroscopic subacromial decompression (acromioplasty, bursectomy and coracoacromial ligament resection). Arthroscopic distal clavicle resection. Mini-open rotator cuff tear repair. Surgeon:  Kevin George MD  [facility]    CV CORONARY ANGIOGRAM N/A 4/14/2022    Procedure: Coronary Angiogram;  Surgeon: Olvin Roy MD;  Location:  [facility] CATH LAB    DAVINCI HYSTERECTOMY TOTAL, BILATERAL SALPINGO-OOPHORECTOMY, COMBINED N/A 2/9/2021    Procedure: Robotic assisted total laparoscopic hysterectomy, bilateral salpingectomy, bilateral oophorectomy, cystoscopy;  Surgeon: Jonathan Peters MD;  Location: [facility] OR    DISKECTOMY, LUMBAR, SINGLE SP  1996    L5-S1     DISKECTOMY, LUMBAR, SINGLE SP      L5-S1    FUSION LUMBAR ANTERIOR ONE LEVEL      L5-S1    OSTEOTOMY FOOT Right 2017    Procedure: OSTEOTOMY FOOT;  1)  WEIL OSTEOTOMY RIGHT SECOND METATARSAL, 2) PLANTAR CAPSULE DEBRIDEMENT/SYNOVECTOMY, RIGHT SECOND METATARSAL PHALANGEAL JOINT, 3) HAMMERTOE REPAIR RIGHT SECOND TOE;  Surgeon: Olvin Betancur DPM;  Location: [facility]    MO SPINE SURGERY PROCEDURE UNLISTED      RECONSTRUCT FOREFOOT WITH METATARSOPHALANGEAL (MTP) FUSION Right 2017    Procedure: RECONSTRUCT FOREFOOT WITH METATARSOPHALANGEAL (MTP) FUSION;;  Surgeon: Olvin Betancur DPM;  Location: [facility]    REPAIR HAMMER TOE Right 2017    Procedure: REPAIR HAMMER TOE;;  Surgeon: Olvin Betancur DPM;  Location: [facility]    ROTATOR CUFF REPAIR RT/LT Left        Family History   Problem Relation Age of Onset    Rheumatoid Arthritis Mother     Coronary Artery Disease Mother         s/p MI and PCIs in 50s    Dementia Mother     Diabetes Type 2  Mother     Rheumatoid Arthritis Sister     Myocardial Infarction Maternal Grandmother         in her 60s    Myocardial Infarction Maternal Grandfather         later in life       Social History     Tobacco Use    Smoking status: Former     Packs/day: 0.75     Years: 26.00     Additional pack years: 0.00     Total pack years: 19.50     Types: Cigarettes     Quit date: 2022     Years since quittin.9     Passive exposure: Past    Smokeless tobacco: Never   Vaping Use    Vaping Use: Never used   Substance Use Topics    Alcohol use: Yes     Comment: rare    Drug use: No         Current Outpatient Medications:     arginine 1000 MG tablet, Take 2 tablets (2,000 mg) by mouth 2 times daily, Disp: 120 tablet, Rfl: 4    FLUoxetine (PROZAC) 20 MG capsule, Take 1 capsule (20 mg) by mouth daily, Disp: 90 capsule, Rfl: 3    gabapentin (NEURONTIN) 100 MG capsule, Take 1-3 capsules (100-300 mg) by mouth 2 times daily, Disp: 180 capsule, Rfl: 1    multivitamin  w/minerals (THERA-VIT-M) tablet, Take 1 tablet by mouth daily, Disp: , Rfl:     Naltrexone HCl, Pain, 1.5 MG CAPS, Take 1.5 mg by mouth daily Start with 1 capsule (1.5 mg) daily, increase to 2 capsules (3 mg) after 2 weeks, then to 3 capsules (4.5 mg) daily after a month, Disp: 90 capsule, Rfl: 4    omeprazole (PRILOSEC) 20 MG DR capsule, Take 20 mg by mouth daily, Disp: , Rfl:     vitamin C (ASCORBIC ACID) 500 MG tablet, Take 1 tablet (500 mg) by mouth daily, Disp: 30 tablet, Rfl: 4    atorvastatin (LIPITOR) 40 MG tablet, Take 0.5 tablets (20 mg) by mouth every evening, Disp: 90 tablet, Rfl: 3          Objective   Vitals - Patient Reported  Pain Score: Moderate Pain (5)  Pain Loc: Arm        Physical Exam   GENERAL: alert and no distress  EYES: Eyes grossly normal to inspection.  No discharge or erythema, or obvious scleral/conjunctival abnormalities.  RESP: No audible wheeze, cough, or visible cyanosis.    SKIN: Visible skin clear. No significant rash, abnormal pigmentation or lesions.  NEURO: Cranial nerves grossly intact.  Mentation and speech appropriate for age.  PSYCH: Appropriate affect, tone, and pace of words        Again, thank you for allowing me to participate in the care of your patient.      Sincerely,    Amy Gerardo MD

## 2024-03-06 NOTE — PROGRESS NOTES
"Jayne is a 58 year old who is being evaluated via a billable video visit.      How would you like to obtain your AVS? MyChart  If the video visit is dropped, the invitation should be resent by: Text to cell phone: [phone]  Will anyone else be joining your video visit? No          Assessment & Plan     Post-COVID chronic muscle pain  Discussed management of fatigue as well as muscle pain in the setting of chronic fatigue syndrome.  Patient was advised to to continue with advancing her physical activity as tolerated.  Discussed supplements, patient may choose to try D ribose 5 g 3 times daily as it was found to be beneficial for postexertional myalgia for patients with chronic fatigue syndrome and fibromyalgia.  Will follow-up in approximately 3 months.      I spent a total of 30 minutes on the day of the visit.   Time spent by me doing chart review, history and exam, documentation and further activities per the note      BMI  Estimated body mass index is 43.6 kg/m  as calculated from the following:    Height as of this encounter: 1.626 m (5' 4\").    Weight as of 2/1/24: 115.2 kg (254 lb).             Return in about 3 months (around 6/6/2024).    Subjective   Jayne is a 58 year old, presenting for the following health issues:  RECHECK  RECHECK    HPI     Patient is following up on post-COVID condition. She has been working with PT on increasing her exercise duration. She is currently at 7 minutes on a treadmill in the morning and 6 min In the afternoon. She states that she feels a little sore after the walk, but she can recover fairly quickly.   :[id number]}      2/29/2024    11:21 AM   PHQ Assesment Total Score(s)   PHQ-9 Score 7           2/29/2024    11:21 AM   KELLE-7 Results   KELLE 7 TOTAL SCORE 1 (minimal anxiety)   KELLE-7 Total Score 1         2/29/2024    11:21 AM   PTSD Screen Score   Have you ever experienced this kind of event? Yes   PTSD Screen (Score of 3 or more suggests positive screen) 2         " 2/29/2024    11:22 AM   PROMIS-29   PROMIS Physical Function T-Score 37 (moderate dysfunction)   PROMIS Anxiety T-Score 48 (within normal limits)   PROMIS Depression T-Score 52 (within normal limits)   PROMIS Fatigue T-Score 76 (severe)   PROMIS Sleep Disturbance T-Score 48 (within normal limits)   PROMIS Ability to Participate in Social Roles & Activities T-Score 39 (moderate dysfunction)   PROMIS Pain Interference T-Score 68 (moderate)   PROMIS Pain Intensity 7       Past Medical History:   Diagnosis Date    Acid reflux disease     KELLE (generalized anxiety disorder)     Morbid obesity (H)     Other sleep apnea 3/6/2023    Pure hypercholesterolemia        Past Surgical History:   Procedure Laterality Date    ARTHROSCOPY SHOULDER Right 2015    ARTHROSCOPY SHOULDER, OPEN ROTATOR CUFF REPAIR, COMBINED Left 03/29/2017    Procedure:  Left shoulder arthroscopy and arthroscopic subacromial decompression (acromioplasty, bursectomy and coracoacromial ligament resection). Arthroscopic distal clavicle resection. Mini-open rotator cuff tear repair. Surgeon:  Kevin George MD  Location[facility] Right 2008    CV CORONARY ANGIOGRAM N/A 4/14/2022    Procedure: Coronary Angiogram;  Surgeon: Olvin Roy MD;  Location:  [facility]    DAVINCI HYSTERECTOMY TOTAL, BILATERAL SALPINGO-OOPHORECTOMY, COMBINED N/A 2/9/2021    Procedure: Robotic assisted total laparoscopic hysterectomy, bilateral salpingectomy, bilateral oophorectomy, cystoscopy;  Surgeon: Jonathan Peters MD;  Location:  [facility]    DISKECTOMY, LUMBAR, SINGLE SP  1996    L5-S1    DISKECTOMY, LUMBAR, SINGLE SP  1997    L5-S1    FUSION LUMBAR ANTERIOR ONE LEVEL  1999    L5-S1    OSTEOTOMY FOOT Right 06/06/2017    Procedure: OSTEOTOMY FOOT;  1)  WEIL OSTEOTOMY RIGHT SECOND METATARSAL, 2) PLANTAR CAPSULE DEBRIDEMENT/SYNOVECTOMY, RIGHT SECOND METATARSAL PHALANGEAL JOINT, 3) HAMMERTOE REPAIR RIGHT SECOND TOE;   Surgeon: Olvin Betancur DPM;  Location: [facility]    LA SPINE SURGERY PROCEDURE UNLISTED      RECONSTRUCT FOREFOOT WITH METATARSOPHALANGEAL (MTP) FUSION Right 2017    Procedure: RECONSTRUCT FOREFOOT WITH METATARSOPHALANGEAL (MTP) FUSION;;  Surgeon: Olvin Betancur DPM;  [facility]    REPAIR HAMMER TOE Right 2017    Procedure: REPAIR HAMMER TOE;;  Surgeon: Olvin Betancur DPM;  Location: [facility]    ROTATOR CUFF REPAIR RT/LT Left 2017       Family History   Problem Relation Age of Onset    Rheumatoid Arthritis Mother     Coronary Artery Disease Mother         s/p MI and PCIs in 50s    Dementia Mother     Diabetes Type 2  Mother     Rheumatoid Arthritis Sister     Myocardial Infarction Maternal Grandmother         in her 60s    Myocardial Infarction Maternal Grandfather         later in life       Social History     Tobacco Use    Smoking status: Former     Packs/day: 0.75     Years: 26.00     Additional pack years: 0.00     Total pack years: 19.50     Types: Cigarettes     Quit date: 2022     Years since quittin.9     Passive exposure: Past    Smokeless tobacco: Never   Vaping Use    Vaping Use: Never used   Substance Use Topics    Alcohol use: Yes     Comment: rare    Drug use: No         Current Outpatient Medications:     arginine 1000 MG tablet, Take 2 tablets (2,000 mg) by mouth 2 times daily, Disp: 120 tablet, Rfl: 4    FLUoxetine (PROZAC) 20 MG capsule, Take 1 capsule (20 mg) by mouth daily, Disp: 90 capsule, Rfl: 3    gabapentin (NEURONTIN) 100 MG capsule, Take 1-3 capsules (100-300 mg) by mouth 2 times daily, Disp: 180 capsule, Rfl: 1    multivitamin w/minerals (THERA-VIT-M) tablet, Take 1 tablet by mouth daily, Disp: , Rfl:     Naltrexone HCl, Pain, 1.5 MG CAPS, Take 1.5 mg by mouth daily Start with 1 capsule (1.5 mg) daily, increase to 2 capsules (3 mg) after 2 weeks, then to 3 capsules (4.5 mg) daily after a month, Disp: 90 capsule, Rfl: 4    omeprazole (PRILOSEC) 20 MG DR  capsule, Take 20 mg by mouth daily, Disp: , Rfl:     vitamin C (ASCORBIC ACID) 500 MG tablet, Take 1 tablet (500 mg) by mouth daily, Disp: 30 tablet, Rfl: 4    atorvastatin (LIPITOR) 40 MG tablet, Take 0.5 tablets (20 mg) by mouth every evening, Disp: 90 tablet, Rfl: 3          Objective    Vitals - Patient Reported  Pain Score: Moderate Pain (5)  Pain Loc: Arm        Physical Exam   GENERAL: alert and no distress  EYES: Eyes grossly normal to inspection.  No discharge or erythema, or obvious scleral/conjunctival abnormalities.  RESP: No audible wheeze, cough, or visible cyanosis.    SKIN: Visible skin clear. No significant rash, abnormal pigmentation or lesions.  NEURO: Cranial nerves grossly intact.  Mentation and speech appropriate for age.  PSYCH: Appropriate affect, tone, and pace of words          Video-Visit Details    Type of service:  Video Visit     Originating Location (pt. Location): Home    Distant Location (provider location):  Off-site  Platform used for Video Visit: Rakan  Signed Electronically by: Amy Gerardo MD

## 2024-03-06 NOTE — NURSING NOTE
Is the patient currently in the state of [place]? YES    Visit mode:VIDEO    If the visit is dropped, the patient can be reconnected by: VIDEO VISIT: Text to cell phone:   Telephone Information:   Mobile [phone]       Will anyone else be joining the visit? NO  (If patient encounters technical issues they should call [phone])    How would you like to obtain your AVS? MyChart    Are changes needed to the allergy or medication list? No    Reason for visit: DESTINY ANDRES

## 2024-03-11 ENCOUNTER — THERAPY VISIT (OUTPATIENT)
Dept: PHYSICAL THERAPY | Facility: CLINIC | Age: 59
End: 2024-03-11
Payer: COMMERCIAL

## 2024-03-11 DIAGNOSIS — G93.32 POST-COVID CHRONIC FATIGUE: Primary | ICD-10-CM

## 2024-03-11 DIAGNOSIS — U09.9 POST-COVID CHRONIC FATIGUE: Primary | ICD-10-CM

## 2024-03-11 PROCEDURE — 97110 THERAPEUTIC EXERCISES: CPT | Mod: GP | Performed by: PHYSICAL THERAPIST

## 2024-03-11 PROCEDURE — 97112 NEUROMUSCULAR REEDUCATION: CPT | Mod: GP | Performed by: PHYSICAL THERAPIST

## 2024-03-11 NOTE — PROGRESS NOTES
Preventive Care Visit  [facility], [facility].  Ekaterina Sanders MD, Family Medicine  Mar 15, 2024       SUBJECTIVE:   Jayne is a 58 year old, presenting for the following:  Physical (Pt here for her CPX. Is fasting)      HPI      Here for a physical.    She is due for lipid check. She had a lot of excess medication, but is still taking this. Her cholesterol was high when she was doing keto but she stopped this diet and is eating a more regular diet now.  She had a lexiscan and this showed a heart attack. Not followed by cardiology. This was 2022. Saw cardiology after this and she said she doesn't need to continue to see them.    She was prescribed fluoxetine after the heart attack because she was anxious. She had excess medication and the dose was cut back. Took her last pill for this 3 days ago. She wants to stop this.    Followed [facility] for her long covid and taking naltrexone and gabapentin.              Social History     Tobacco Use    Smoking status: Former     Packs/day: 0.75     Years: 26.00     Additional pack years: 0.00     Total pack years: 19.50     Types: Cigarettes     Quit date: 2022     Years since quittin.9     Passive exposure: Past    Smokeless tobacco: Never   Substance Use Topics    Alcohol use: Yes     Comment: rare              No data to display            No concerns  Reviewed orders with patient.  Reviewed health maintenance and updated orders accordingly - Yes  BP Readings from Last 3 Encounters:   03/15/24 122/78   24 128/84   24 126/82    Wt Readings from Last 3 Encounters:   03/15/24 116.1 kg (256 lb)   24 115.2 kg (254 lb)   10/25/23 111.1 kg (245 lb)                  Patient Active Problem List   Diagnosis    Pure hypercholesterolemia    Old myocardial infarction    Morbid obesity (H)    KELLE (generalized anxiety disorder)    Acid reflux disease    ACP (advance care planning)    Other sleep apnea    Other fatigue    Brain fog     History of COVID-19    Cognitive changes    Health Care Home    Post-COVID chronic fatigue     Past Surgical History:   Procedure Laterality Date    ARTHROSCOPY SHOULDER Right 2015    ARTHROSCOPY SHOULDER, OPEN ROTATOR CUFF REPAIR, COMBINED Left 03/29/2017    Procedure:  Left shoulder arthroscopy and arthroscopic subacromial decompression (acromioplasty, bursectomy and coracoacromial ligament resection). Arthroscopic distal clavicle resection. Mini-open rotator cuff tear repair. Surgeon:  Kevin George MD  Location: [facility]    BUNIONECTOMY Right 2008    CV CORONARY ANGIOGRAM N/A 4/14/2022    Procedure: Coronary Angiogram;  Surgeon: Olvin Roy MD;  Location:  [facility]ARD[facility]ATH LAB    DAVINCI HYSTERECTOMY TOTAL, BILATERAL SALPINGO-OOPHORECTOMY, COMBINED N/A 2/9/2021    Procedure: Robotic assisted total laparoscopic hysterectomy, bilateral salpingectomy, bilateral oophorectomy, cystoscopy;  Surgeon: Jonathan Peters MD;  Location:  [facility]    DISKECTOMY, LUMBAR, SINGLE SP  1996    L5-S1    DISKECTOMY, LUMBAR, SINGLE SP  1997    L5-S1    FUSION LUMBAR ANTERIOR ONE LEVEL  1999    L5-S1    OSTEOTOMY FOOT Right 06/06/2017    Procedure: OSTEOTOMY FOOT;  1)  WEIL OSTEOTOMY RIGHT SECOND METATARSAL, 2) PLANTAR CAPSULE DEBRIDEMENT/SYNOVECTOMY, RIGHT SECOND METATARSAL PHALANGEAL JOINT, 3) HAMMERTOE REPAIR RIGHT SECOND TOE;  Surgeon: Olvin Betancur DPM;  Location: [facility]    MA SPINE SURGERY PROCEDURE UNLISTED      RECONSTRUCT FOREFOOT WITH METATARSOPHALANGEAL (MTP) FUSION Right 06/06/2017    Procedure: RECONSTRUCT FOREFOOT WITH METATARSOPHALANGEAL (MTP) FUSION;;  Surgeon: Olvin Betancur DPM;  Location: [facility]    REPAIR HAMMER TOE Right 06/06/2017    Procedure: REPAIR HAMMER TOE;;  Surgeon: Olvin Betancur DPM;  Location: [facility]    ROTATOR CUFF REPAIR RT/LT Left 2017       Social History     Tobacco Use    Smoking status: Former     Packs/day: 0.75     Years: 26.00     Additional  pack years: 0.00     Total pack years: 19.50     Types: Cigarettes     Quit date: 2022     Years since quittin.9     Passive exposure: Past    Smokeless tobacco: Never   Substance Use Topics    Alcohol use: Yes     Comment: rare     Family History   Problem Relation Age of Onset    Rheumatoid Arthritis Mother     Coronary Artery Disease Mother         s/p MI and PCIs in 50s    Dementia Mother     Diabetes Type 2  Mother     Rheumatoid Arthritis Sister     Myocardial Infarction Maternal Grandmother         in her 60s    Myocardial Infarction Maternal Grandfather         later in life         Current Outpatient Medications   Medication Sig Dispense Refill    arginine 1000 MG tablet Take 2 tablets (2,000 mg) by mouth 2 times daily 120 tablet 4    atorvastatin (LIPITOR) 40 MG tablet Take 0.5 tablets (20 mg) by mouth every evening 90 tablet 1    gabapentin (NEURONTIN) 100 MG capsule Take 1-3 capsules (100-300 mg) by mouth 2 times daily 180 capsule 1    multivitamin w/minerals (THERA-VIT-M) tablet Take 1 tablet by mouth daily      Naltrexone HCl, Pain, 1.5 MG CAPS Take 1.5 mg by mouth daily Start with 1 capsule (1.5 mg) daily, increase to 2 capsules (3 mg) after 2 weeks, then to 3 capsules (4.5 mg) daily after a month 90 capsule 4    omeprazole (PRILOSEC) 20 MG DR capsule Take 20 mg by mouth daily      vitamin C (ASCORBIC ACID) 500 MG tablet Take 1 tablet (500 mg) by mouth daily 30 tablet 4       Breast Cancer Screening:  Any new diagnosis of family breast, ovarian, or bowel cancer? No        2022     5:49 PM 2022     7:50 AM   Breast CA Risk Assessment (FHS-7)   Do you have a family history of breast, colon, or ovarian cancer? No / Unknown No / Unknown       Mammogram referral done.    Pertinent mammograms are reviewed under the imaging tab.    History of abnormal Pap smear: due for pap.      Latest Ref Rng & Units 5/10/2019     9:31 AM 5/10/2019     9:21 AM   PAP / HPV   PAP (Historical)   NIL    HPV  16 DNA NEG^Negative Negative     HPV 18 DNA NEG^Negative Negative     Other HR HPV NEG^Negative Negative       Reviewed and updated as needed this visit by clinical staff   Tobacco  Allergies  Meds  Problems             Reviewed and updated as needed this visit by Provider                  Past Medical History:   Diagnosis Date    Acid reflux disease     KELLE (generalized anxiety disorder)     Morbid obesity (H)     Other sleep apnea 3/6/2023    Pure hypercholesterolemia       Past Surgical History:   Procedure Laterality Date    ARTHROSCOPY SHOULDER Right 2015    ARTHROSCOPY SHOULDER, OPEN ROTATOR CUFF REPAIR, COMBINED Left 03/29/2017    Procedure:  Left shoulder arthroscopy and arthroscopic subacromial decompression (acromioplasty, bursectomy and coracoacromial ligament resection). Arthroscopic distal clavicle resection. Mini-open rotator cuff tear repair. Surgeon:  Kevin George MD  Location: [facility]    BUNIONECTOMY Right 2008    CV CORONARY ANGIOGRAM N/A 4/14/2022    Procedure: Coronary Angiogram;  Surgeon: Olvin Roy MD;  Location:  HE[facility]ARDI[facility] CATH LAB    DAVINCI HYSTERECTOMY TOTAL, BILATERAL SALPINGO-OOPHORECTOMY, COMBINED N/A 2/9/2021    Procedure: Robotic assisted total laparoscopic hysterectomy, bilateral salpingectomy, bilateral oophorectomy, cystoscopy;  Surgeon: Jonathan Peters MD;  Location:  [facility]    DISKECTOMY, LUMBAR, SINGLE SP  1996    L5-S1    DISKECTOMY, LUMBAR, SINGLE SP  1997    L5-S1    FUSION LUMBAR ANTERIOR ONE LEVEL  1999    L5-S1    OSTEOTOMY FOOT Right 06/06/2017    Procedure: OSTEOTOMY FOOT;  1)  WEIL OSTEOTOMY RIGHT SECOND METATARSAL, 2) PLANTAR CAPSULE DEBRIDEMENT/SYNOVECTOMY, RIGHT SECOND METATARSAL PHALANGEAL JOINT, 3) HAMMERTOE REPAIR RIGHT SECOND TOE;  Surgeon: Olvin Betancur DPM;  Location: [facility] SPINE SURGERY PROCEDURE UNLISTED      RECONSTRUCT FOREFOOT WITH METATARSOPHALANGEAL (MTP) FUSION Right 06/06/2017    Procedure:  "RECONSTRUCT FOREFOOT WITH METATARSOPHALANGEAL (MTP) FUSION;;  Surgeon: Olvin Betancur DPM;  [facility]    REPAIR HAMMER TOE Right 06/06/2017    Procedure: REPAIR HAMMER TOE;;  Surgeon: Olvin Betancur DPM;  [facility]    ROTATOR CUFF REPAIR RT/LT Left 2017     Review of Systems    Review of Systems  Constitutional, HEENT, cardiovascular, pulmonary, gi and gu systems are negative, except as otherwise noted.    OBJECTIVE:   /78 (BP Location: Right arm, Patient Position: Sitting, Cuff Size: Adult Large)   Pulse 75   Temp 98.3  F (36.8  C) (Temporal)   Ht 1.626 m (5' 4\")   Wt 116.1 kg (256 lb)   LMP  (LMP Unknown)   SpO2 97%   BMI 43.94 kg/m     Estimated body mass index is 43.94 kg/m  as calculated from the following:    Height as of this encounter: 1.626 m (5' 4\").    Weight as of this encounter: 116.1 kg (256 lb).  Physical Exam  GENERAL: alert and no distress  EYES: Eyes grossly normal to inspection, PERRL and conjunctivae and sclerae normal  HENT: ear canals and TM's normal, nose and mouth without ulcers or lesions  NECK: no adenopathy, no asymmetry, masses, or scars  RESP: lungs clear to auscultation - no rales, rhonchi or wheezes  BREAST: normal without masses, tenderness or nipple discharge and no palpable axillary masses or adenopathy  CV: regular rate and rhythm, normal S1 S2, no S3 or S4, no murmur, click or rub, no peripheral edema  ABDOMEN: soft, nontender, no hepatosplenomegaly, no masses and bowel sounds normal   (female) w/bimanual: normal female external genitalia, normal urethral meatus, normal vaginal mucosa, and normal cervix/adnexa/uterus without masses or discharge  MS: no gross musculoskeletal defects noted, no edema  SKIN: no suspicious lesions or rashes  NEURO: Normal strength and tone, mentation intact and speech normal  PSYCH: mentation appears normal, affect normal/bright    Diagnostic Test Results:  Labs reviewed in Epic  No results found for any visits on " "03/15/24.    ASSESSMENT/PLAN:   1. Encounter for routine history and physical exam in female patient    - VENOUS COLLECTION  - Comprehensive Metobolic Panel (BFP)  - [place] PELVIC EXAMINATION  - Lipid Panel ([facility])    2. Encounter for screening mammogram for breast cancer    - MA Screening Bilateral w/ Jori  - Radiology Referral    3. Need for vaccination    - TDAP VACCINE (Adacel, Boostrix)  [[id number]]    4. Old myocardial infarction  She said she has followed up with cardiology, no longer needs to continue to see them.    5. Hyperlipidemia LDL goal <100  Newly prescribed by me, refilled, check labs.  - atorvastatin (LIPITOR) 40 MG tablet; Take 0.5 tablets (20 mg) by mouth every evening  Dispense: 90 tablet; Refill: 1    6. Post-COVID chronic fatigue  Followed by post covid clinic.       7. Obesity due to excess calories, unspecified classification, unspecified whether serious comorbidity present  Referral to endocrinology to discuss options with her. Encouraged healthy diet and regular exercise  - Adult Endocrinology  Referral     Patient has been advised of split billing requirements and indicates understanding: Yes      Counseling  Reviewed preventive health counseling, as reflected in patient instructions       Regular exercise       Healthy diet/nutrition      BMI  Estimated body mass index is 43.94 kg/m  as calculated from the following:    Height as of this encounter: 1.626 m (5' 4\").    Weight as of this encounter: 116.1 kg (256 lb).         She reports that she quit smoking about 23 months ago. Her smoking use included cigarettes. She has a 19.5 pack-year smoking history. She has been exposed to tobacco smoke. She has never used smokeless tobacco.          Signed Electronically by: Ekaterina Sanders MD  "

## 2024-03-15 ENCOUNTER — OFFICE VISIT (OUTPATIENT)
Dept: FAMILY MEDICINE | Facility: CLINIC | Age: 59
End: 2024-03-15

## 2024-03-15 VITALS
DIASTOLIC BLOOD PRESSURE: 78 MMHG | TEMPERATURE: 98.3 F | WEIGHT: 256 LBS | SYSTOLIC BLOOD PRESSURE: 122 MMHG | BODY MASS INDEX: 43.71 KG/M2 | OXYGEN SATURATION: 97 % | HEIGHT: 64 IN | HEART RATE: 75 BPM

## 2024-03-15 DIAGNOSIS — E78.5 HYPERLIPIDEMIA LDL GOAL <100: ICD-10-CM

## 2024-03-15 DIAGNOSIS — G93.32 POST-COVID CHRONIC FATIGUE: ICD-10-CM

## 2024-03-15 DIAGNOSIS — I25.2 OLD MYOCARDIAL INFARCTION: ICD-10-CM

## 2024-03-15 DIAGNOSIS — Z00.00 ENCOUNTER FOR ROUTINE HISTORY AND PHYSICAL EXAM IN FEMALE PATIENT: ICD-10-CM

## 2024-03-15 DIAGNOSIS — U09.9 POST-COVID CHRONIC FATIGUE: ICD-10-CM

## 2024-03-15 DIAGNOSIS — Z23 NEED FOR VACCINATION: Primary | ICD-10-CM

## 2024-03-15 DIAGNOSIS — Z12.31 ENCOUNTER FOR SCREENING MAMMOGRAM FOR BREAST CANCER: ICD-10-CM

## 2024-03-15 DIAGNOSIS — E66.09 OBESITY DUE TO EXCESS CALORIES, UNSPECIFIED CLASSIFICATION, UNSPECIFIED WHETHER SERIOUS COMORBIDITY PRESENT: ICD-10-CM

## 2024-03-15 LAB
ALBUMIN SERPL-MCNC: 4.1 G/DL (ref 3.6–5.1)
ALBUMIN/GLOB SERPL: 1.4 {RATIO} (ref 1–2.5)
ALP SERPL-CCNC: 104 U/L (ref 33–130)
ALT 1742-6: 13 U/L (ref 0–32)
AST 1920-8: 17 U/L (ref 0–35)
BILIRUB SERPL-MCNC: 0.8 MG/DL (ref 0.2–1.2)
BUN SERPL-MCNC: 13 MG/DL (ref 7–25)
BUN/CREATININE RATIO: 14 (ref 6–32)
CALCIUM SERPL-MCNC: 9.6 MG/DL (ref 8.6–10.3)
CHLORIDE SERPLBLD-SCNC: 104.2 MMOL/L (ref 98–110)
CHOLEST SERPL-MCNC: 180 MG/DL (ref 0–199)
CHOLEST/HDLC SERPL: 3 {RATIO} (ref 0–5)
CO2 SERPL-SCNC: 28.9 MMOL/L (ref 20–32)
CREAT SERPL-MCNC: 0.93 MG/DL (ref 0.6–1.3)
GLOBULIN, CALCULATED - QUEST: 2.9 (ref 1.9–3.7)
GLUCOSE SERPL-MCNC: 91 MG/DL (ref 60–99)
HDLC SERPL-MCNC: 71 MG/DL (ref 40–150)
LDLC SERPL CALC-MCNC: 93 MG/DL (ref 0–130)
POTASSIUM SERPL-SCNC: 4.33 MMOL/L (ref 3.5–5.3)
PROT SERPL-MCNC: 7 G/DL (ref 6.1–8.1)
SODIUM SERPL-SCNC: 141.3 MMOL/L (ref 135–146)
TRIGL SERPL-MCNC: 81 MG/DL (ref 0–149)

## 2024-03-15 PROCEDURE — 99459 PELVIC EXAMINATION: CPT | Performed by: FAMILY MEDICINE

## 2024-03-15 PROCEDURE — 80061 LIPID PANEL: CPT | Performed by: FAMILY MEDICINE

## 2024-03-15 PROCEDURE — 99396 PREV VISIT EST AGE 40-64: CPT | Mod: 25 | Performed by: FAMILY MEDICINE

## 2024-03-15 PROCEDURE — 36415 COLL VENOUS BLD VENIPUNCTURE: CPT | Performed by: FAMILY MEDICINE

## 2024-03-15 PROCEDURE — 90715 TDAP VACCINE 7 YRS/> IM: CPT | Performed by: FAMILY MEDICINE

## 2024-03-15 PROCEDURE — 99213 OFFICE O/P EST LOW 20 MIN: CPT | Mod: 25 | Performed by: FAMILY MEDICINE

## 2024-03-15 PROCEDURE — 80053 COMPREHEN METABOLIC PANEL: CPT | Performed by: FAMILY MEDICINE

## 2024-03-15 PROCEDURE — 90471 IMMUNIZATION ADMIN: CPT | Performed by: FAMILY MEDICINE

## 2024-03-15 RX ORDER — ATORVASTATIN CALCIUM 40 MG/1
20 TABLET, FILM COATED ORAL EVERY EVENING
Qty: 90 TABLET | Refills: 1 | Status: SHIPPED | OUTPATIENT
Start: 2024-03-15

## 2024-03-15 NOTE — NURSING NOTE
Chief Complaint   Patient presents with    Physical     Pt here for her CPX. Is fasting    Pre-visit Screening:  Immunizations:  up to date-Tdap given today  Colonoscopy:  is up to date  Mammogram: is due and ordered today  Asthma Action Test/Plan:  na  PHQ9:  updated today  GAD7:  updated today  Questioned patient about current smoking habits Pt. quit smoking some time ago.  Ok to leave detailed message on voice mail for today's visit only yes, phone # [phone]

## 2024-03-19 ASSESSMENT — PAIN SCALES - PAIN ENJOYMENT GENERAL ACTIVITY SCALE (PEG)
INTERFERED_GENERAL_ACTIVITY: 8
INTERFERED_ENJOYMENT_LIFE: 9
PEG_TOTALSCORE: 8
AVG_PAIN_PASTWEEK: 7

## 2024-03-25 ENCOUNTER — VIRTUAL VISIT (OUTPATIENT)
Dept: PALLIATIVE MEDICINE | Facility: CLINIC | Age: 59
End: 2024-03-25
Payer: COMMERCIAL

## 2024-03-25 DIAGNOSIS — M25.511 CHRONIC RIGHT SHOULDER PAIN: ICD-10-CM

## 2024-03-25 DIAGNOSIS — G93.32 POST-COVID CHRONIC FATIGUE: ICD-10-CM

## 2024-03-25 DIAGNOSIS — U09.9 POST-COVID CHRONIC MUSCLE PAIN: Primary | ICD-10-CM

## 2024-03-25 DIAGNOSIS — M77.8 RIGHT ELBOW TENDONITIS: ICD-10-CM

## 2024-03-25 DIAGNOSIS — M79.18 MYALGIA, MULTIPLE SITES: ICD-10-CM

## 2024-03-25 DIAGNOSIS — U09.9 POST-COVID CHRONIC FATIGUE: ICD-10-CM

## 2024-03-25 DIAGNOSIS — G89.29 POST-COVID CHRONIC MUSCLE PAIN: Primary | ICD-10-CM

## 2024-03-25 DIAGNOSIS — G89.29 CHRONIC RIGHT SHOULDER PAIN: ICD-10-CM

## 2024-03-25 DIAGNOSIS — M79.10 POST-COVID CHRONIC MUSCLE PAIN: Primary | ICD-10-CM

## 2024-03-25 PROCEDURE — 99214 OFFICE O/P EST MOD 30 MIN: CPT | Mod: 95 | Performed by: NURSE PRACTITIONER

## 2024-03-25 NOTE — PROGRESS NOTES
Jayne is a 58 year old who is being evaluated via a billable video visit.    AVS sent via [employer].    Video-Visit Details    Type of service:  Video Visit   Originating Location (pt. Location): Home  Joined the call at 3/25/2024, 2:08:54 pm.  Left the call at 3/25/2024, 2:43:06 pm.  You were on the call for 34 minutes 11 seconds  Distant Location (provider location):  On-site  Platform used for Video Visit: Rakan    Is Pt currently in [place]? Yes  ____________________________________________________________    [facility]   Date of Visit: Mar 25, 2024  Last visit: 1/29/2024  Original Consult: 11/1/23    Jayne Leiva is a 58 year old female who has a past medical history of Acid reflux disease, KELLE (generalized anxiety disorder), Morbid obesity (H), Other sleep apnea (3/6/2023), and Pure hypercholesterolemia. Jayne is being seen today for ongoing management of chronic pain.    History:  Post covid myalgia:  After first episode of COVID in February of 2022, she had fatigue that never went away. Suqsequent COVID infection in December of 2022, persistent muscle pain and brain fog began. Treating with Dr. Gerardo [facility]. Has been treated with therapy(PT and OT) to build endurance, LDN, arginine and vitamin C supplements.   Chronic shoulder and right arm pain:  PT for her arm and wrist at [facility]. Kinesio tape has been helpful for reducing her pain in her arm. Has a history of right RCR.     Recommendations from last visit:  Poor response to topiramate. Not making gains with PT at [facility]. Recommend shifting focus to supportive of chronic persistent pain as she is feeling widespread pain is most disruptive. Will stop Topiramate and start gabapentin. AVS with titration directions. Follow up with me in 2 months to reassess symptoms and response to treatment.    ____________________________________________________________    Jayne reports:  -  Overall her pain is worse. She has been on  "gabapentin 300 mg BID. Has not noticed much change in her pain. She has noticed that she has felt more light headed and her left eye is twitching more. Has noticed more LE edema. These symptoms seemed to increase with dose.   - Started Pain PT, has been journaling and notices that her pain is not any better. Does feel like working with Gerri Morgan and Britany Yunier have been helpful for symptom management, adjusting to new \"normal.\"  - Her mom is going to hospice and needed to make a lot of decisions about her care. This has increased some of her stress.  - LDN is back to 4.5 mg per day , managed with Dr. Armstrong. Last saw Dr. Gerardo last week, she recommended D-ribose 5 grams three times a day.   - Walking increases pain, slowly building on the treadmill. Up to 9 minutes twice a day, slowed due to illness. Goal to walk for pleasure again.  - Right elbow and forearm pain. Had been participating in PT at [facility] without perceived change in pain. Finally saw an orthopedic provider and had a steroid injection into her right elbow about a month ago for tendinosis. He also restricted her work to just her left arm for one month.  Pain is no better in her right elbow. Seeing Dr. Stewart for follow up soon to discuss next steps.   - Endocrinology appointment is next week (4/2/24), scheduled at [facility].   - She is working from home full time, but this has been difficult. Can have some ability to pace her work and take breaks with the support of her manager. She notes that she has less patience due to persistent pain and feels like she can feel irritated more easily.   -  For fun, [place]. She also used to enjoy cleaning, gardening. Now she feels like she is so tired after work that she cannot do these things. Her mother lives in [place], but she has not been able to visit as the drive would be too much.     Current pain medications:  LDN 4.5 mg per day  Tylenol as needed  Gabapentin " 300 BID    SUBJECTIVE PAIN HISTORY  Subjective    Pain description:  Location: wide spread, location seems to vary without recognizable pattern    Quality: cramping, sharp, cutting, dull, aching, throbbing   Duration: PM   Severity/Intensity(6 at intake) 4/10 at last visit, 7/10 average in the past week  Aggravating factors include: standing, walking, exercise  Relieving factors include: lying down, medication    PAIN MANAGEMENT TREATMENT HISTORY  1. MEDICATIONS:  Opioids: Codeine, Oxycodone  NSAIDs: Ibuprofen, Naproxen  Muscle relaxants: Cyclobenzaprine  Pain Antidepressants/ Anxiolytics: Bupropion, Citalopram   Neuroleptics: Topiramate (GI and CNS symptoms), gabapentin SE (lightheaded, LE edema)  Topical: Other gels, creams, patches or ointments   Adjuvant medications: Acetaminophen, vitamin C, arginine. LDN helpful for fatigue and brain fog, less so for the pain.  2. PHYSICAL THERAPY:   Pain PT started 2024 with Gerri Morgan  PT at [facility] for right arm and shoulder pain [id number]  Completed 7 OT visits with Nicole Guzman and 7 PT visits with Jerri Hubbard for Post Covid treatment  3. PAIN PSYCHOLOGY:   Currently working with Oneyda Faye, PhD  Had done extensive therapy after divorce  4. SURGERY:  Spine fusion in 1999, Dr. Morrissey at the [facility]  5. INJECTIONS:   2/2023 Right elbow CSI at [facility]  6. COMPLEMENTARY THERAPY:  Chiropractic: Has not tried  Not interested in trying  Acupuncture/acupressure: Has not tried  TENS unit: Has not tried  Walks with her dog  for exercise      Social History   She reports that she quit smoking about 1 years ago. Her smoking use included cigarettes. She has a 19.5 pack-year smoking history. She has been exposed to tobacco smoke. She has never used smokeless tobacco. She reports current alcohol use. She reports that she does not use drugs.    Social History     Social History Narrative     in April, 2023, he and her  have been together since 2015. They dated in high  school as well.      She was previously  once.     3 adult kids(2 daughters and 1 son)    No grandchildren.    Walks her dog for exercise.     Works full time as a  at a call center      Medications and Allergies reviewed.    Objective   LMP  (LMP Unknown)   Constitutional: Well developed, well nourished, appears stated age. No acute distress.  Gait is normal and steady  HEENT: Head atraumatic, normocephalic. Eyes without conjunctival injection or jaundice. Neck supple.   Skin: No obvious rash, lesions, or petechiae of exposed skin.   Extremities: Peripheral pulses intact. No clubbing, cyanosis, or edema. Moves all extremities.  Psychiatric/mental status: Alert, without lethargy or stupor. Speech fluent. Appropriate affect. Mood normal. Able to follow commands without difficulty.   Musculoskeletal exam: Normal bulk and tone. Unremarkable spinal curvature.      Assessment & Plan   Post-COVID chronic muscle pain  Post-COVID chronic fatigue  Myalgia, multiple sites  Poor response to gabapentin with side effects. Instructed to taper off. Will continue pain PT and pain psychology. Will try flexeril at bedtime, has an old RX. Discussed trying alternate muscle relaxer or elavil or lyrica in the future, will wait and see what her endocrinologist at [facility] opines next week and send me a message with an update.       Chronic right shoulder pain  Right elbow tendinosis  Continue cares with [facility].       Melita Sullivan, CNP-BC, PMGT-BC, AP-PMN  [facility]

## 2024-03-25 NOTE — PATIENT INSTRUCTIONS
"Could ask for LDN to be compounded into one capsule to decrease \"pill burden.\"  Decrease gabapentin to 2 capsules twice a for 2 days, then 1 capsule twice a day for 2 days, the stop.  Could consider trying a different muscle relaxant to help with muscle aches/pain. Start with flexeril that you already have try just at bedtime. Can try 5 mg (1/2 tab) if too sleepy. (Robaxin or tizanadine are alternatives)    Elavil or Lyrica are other medications we could consider for pain, but could potentially cause weight gain.   5.   Update me via [employer] with [place] COME recommendations and response to flexeril.     _____________________________________________________  Scheduling/Clinic telephone number for ALL locations:  [phone]    After Hours On-Call Service for Emergencies:  [phone]  - Calls are returned by our nurse care coordinators Monday - Friday between 8 AM and 4:00 PM. We usually return messages within 2-3 business days depending on the issue or request. I am not in the clinic on Fridays.   - For medication refills, call the clinic or send a [employer] message 7 days in advance.  Please include the name of the requested medication and your preferred pharmacy. Please allow 3-4 days to be processed.   We are not able to refill medications over the weekend or after clinic hours.   - If you are unable to keep your appointment, call us at least 24 hours in advance to cancel to allow us to offer the appointment time to another patient. Multiple missed appointments will lead to dismissal from the clinic.   "

## 2024-03-27 LAB — MAMMOGRAM: NORMAL

## 2024-03-28 ENCOUNTER — TRANSFERRED RECORDS (OUTPATIENT)
Dept: FAMILY MEDICINE | Facility: CLINIC | Age: 59
End: 2024-03-28

## 2024-03-29 ENCOUNTER — VIRTUAL VISIT (OUTPATIENT)
Dept: PALLIATIVE MEDICINE | Facility: CLINIC | Age: 59
End: 2024-03-29
Payer: COMMERCIAL

## 2024-03-29 DIAGNOSIS — M77.8 RIGHT ELBOW TENDONITIS: ICD-10-CM

## 2024-03-29 DIAGNOSIS — U09.9 POST-COVID CHRONIC FATIGUE: ICD-10-CM

## 2024-03-29 DIAGNOSIS — G89.29 CHRONIC RIGHT SHOULDER PAIN: ICD-10-CM

## 2024-03-29 DIAGNOSIS — M79.18 MYALGIA, MULTIPLE SITES: ICD-10-CM

## 2024-03-29 DIAGNOSIS — G89.29 POST-COVID CHRONIC MUSCLE PAIN: Primary | ICD-10-CM

## 2024-03-29 DIAGNOSIS — G93.32 POST-COVID CHRONIC FATIGUE: ICD-10-CM

## 2024-03-29 DIAGNOSIS — M79.10 POST-COVID CHRONIC MUSCLE PAIN: Primary | ICD-10-CM

## 2024-03-29 DIAGNOSIS — U09.9 POST-COVID CHRONIC MUSCLE PAIN: Primary | ICD-10-CM

## 2024-03-29 DIAGNOSIS — M25.511 CHRONIC RIGHT SHOULDER PAIN: ICD-10-CM

## 2024-03-29 PROCEDURE — 96159 HLTH BHV IVNTJ INDIV EA ADDL: CPT | Mod: 95 | Performed by: PSYCHOLOGIST

## 2024-03-29 PROCEDURE — 96158 HLTH BHV IVNTJ INDIV 1ST 30: CPT | Mod: 95 | Performed by: PSYCHOLOGIST

## 2024-03-29 NOTE — PROGRESS NOTES
Jayne Leiva is a 58 year old female who is being evaluated via a billable video visit.      Patient is currently in the [facility]? yes    Patient would like the video invitation sent by: Text to cell phone: [phone]}    Video Start Time: 2:30 PM  Video Stop Time: 3:27 PM    Additional provider notes:     Pain Diagnoses per pain provider:   Post-COVID chronic muscle pain    Post-COVID chronic fatigue    Myalgia, multiple sites    Chronic right shoulder pain    Right elbow tendonitis        DATA: During today's visit you reported the following: Your chronic pain is mildly worse - discussed this could be related to multiple factors including recent influenza B infection, tapering off Gabapentin . Your mood is a little more tearful due to increased pain. Your activity level is mildly reduced due to being ill. Your stress level is unchanged. Your sleep remains poor - elbow pain, also discussed tapering off Gabapentin could be contributing. You reported engaging in self-care for your pain 2-3 or more times daily.    You identified that you would like to focus on the following or had questions regarding the following issues or concerns, and we discussed the following:   - injection in elbow didn't help, need MRI for further assessment  - tapering off Gabapentin  - feel caught in cycle with pain and increased weight and knowing how not being able to be as active as you'd like and you are back to where you were with previous back issues in 1993 until fusion in 1999  - ended up with influenza B last week after attending grand-daughter's skating event  - noting decreased ability to tolerate treadmill due to illness  - discussed pacing return to activity    ASSESSMENT: Jayne notes increased pain, which we discussed is likely somewhat attributatble to a few factors such as recent influenza infection, reduced physical activity and tapering off of Gabapentin. She has been noting some worry thoughts about  losing progress and being back in a similar situation she was in while awaiting spinal fusion. We focused on progress, steady activity without pushing through and patience.    PLAN:   Your next appointment is scheduled for 5/10 at 8:00 AM.  Assignment/Objectives /interventions for next session:   - continue to engage in physical activity as tolerated - stay in your limits and slowly build in more as tolerated to allow for stamina to build    We believe regular attendance is key to your success in our program!    Any time you are unable to keep your appointment we ask that you call us at [phone] at least 24 hours in advance to cancel.This will allow us to offer the appointment time to another patient.   Multiple missed appointments may lead to dismissal from the clinic.    Telemedicine Visit: The patient's condition can be safely assessed and treated via synchronous audio and visual telemedicine encounter.      Reason for Telemedicine Visit: Services only offered telehealth    Originating Site (Patient Location): Patient's home    Distant Site (Provider Location): Provider Remote Setting- Home Office    Consent:  The patient/guardian has verbally consented to: the potential risks and benefits of telemedicine (video visit) versus in person care; bill my insurance or make self-payment for services provided; and responsibility for payment of non-covered services.     Mode of Communication:  Video Conference via [email or web address]    As the provider I attest to compliance with applicable laws and regulations related to telemedicine.      Oneyda Faye PsyD LP  Licensed Psychologist  Outpatient Clinic Therapist  [facility]

## 2024-04-02 ENCOUNTER — MYC MEDICAL ADVICE (OUTPATIENT)
Dept: PALLIATIVE MEDICINE | Facility: CLINIC | Age: 59
End: 2024-04-02
Payer: COMMERCIAL

## 2024-04-02 DIAGNOSIS — R52 PAIN: ICD-10-CM

## 2024-04-02 DIAGNOSIS — G89.29 POST-COVID CHRONIC MUSCLE PAIN: Primary | ICD-10-CM

## 2024-04-02 DIAGNOSIS — U09.9 POST-COVID CHRONIC MUSCLE PAIN: Primary | ICD-10-CM

## 2024-04-02 DIAGNOSIS — M79.10 POST-COVID CHRONIC MUSCLE PAIN: Primary | ICD-10-CM

## 2024-04-02 RX ORDER — PREGABALIN 50 MG/1
50 CAPSULE ORAL 3 TIMES DAILY
Qty: 90 CAPSULE | Refills: 2 | Status: SHIPPED | OUTPATIENT
Start: 2024-04-02 | End: 2024-06-03

## 2024-04-02 NOTE — TELEPHONE ENCOUNTER
Sent to patient:    Jayne: Thank you for the update. I would recommend starting Lyrica, I sent in a prescription for 50 mg capsules. The bottle will say to take three times per day. Start with once a day at bedtime for 3-5 days, then increase to twice a day for 3-5 days, then to three times per day. I hope you will tolerate this better, have good pain relief and minimal side effects. Please let me know if you have any side effects, the typical side effects are the same as gabapentin. We should check in again in about 4-6 weeks to see how you are responding to Lyrica.     Sincerely,   Melita Sullivan, CNP

## 2024-04-02 NOTE — TELEPHONE ENCOUNTER
Routing to provider.  Per 03/25/24:Update me via [employer] with [place] MARY recommendations and response to flexeril.   Below is pt update:      Dominic Hua,     I have to go back to [facility] in two weeks for rest results and plan.  I asked him several times his thoughts regarding Lyrica or elevil, but he did not respond either way.  He only wanted history today.     If it is helpful, my daughter has Chronic Fatigue Syndrome with a lot of pain and is on Lyrica now after trying several meds, including Gabapentin which she said did nothing for her.  She said the Lyrica has not caused weight gain and has helped tremendously with pain.     The Flexeril does knock me out at night, just makes it difficult to wake up in the morning...takes a long time which is why I never took them in '22.     I also have an MRI scheduled for my arm tomorrow so hopefully some answers there soon.      Please let me know if there is any additional follow-up you need at this point, or what next steps may be.     Thanks!!!  Jayne

## 2024-04-03 ENCOUNTER — TRANSFERRED RECORDS (OUTPATIENT)
Dept: HEALTH INFORMATION MANAGEMENT | Facility: CLINIC | Age: 59
End: 2024-04-03

## 2024-04-09 ENCOUNTER — THERAPY VISIT (OUTPATIENT)
Dept: PHYSICAL THERAPY | Facility: CLINIC | Age: 59
End: 2024-04-09
Payer: COMMERCIAL

## 2024-04-09 DIAGNOSIS — U09.9 POST-COVID CHRONIC FATIGUE: Primary | ICD-10-CM

## 2024-04-09 DIAGNOSIS — G93.32 POST-COVID CHRONIC FATIGUE: Primary | ICD-10-CM

## 2024-04-09 PROCEDURE — 97110 THERAPEUTIC EXERCISES: CPT | Mod: GP | Performed by: PHYSICAL THERAPIST

## 2024-04-09 PROCEDURE — 97112 NEUROMUSCULAR REEDUCATION: CPT | Mod: GP | Performed by: PHYSICAL THERAPIST

## 2024-04-09 NOTE — TELEPHONE ENCOUNTER
Pt has follow-up in May to discuss    Kristin LONG RN Care Coordinator  [facility]

## 2024-04-10 DIAGNOSIS — F41.1 GAD (GENERALIZED ANXIETY DISORDER): ICD-10-CM

## 2024-04-10 NOTE — TELEPHONE ENCOUNTER
Jayne Leiva is requesting a refill of:    Refused Prescriptions:                       Disp   Refills    FLUoxetine (PROZAC) 20 MG capsule [Pharmac*                Sig: TAKE 1 CAPSULE(20 MG) BY MOUTH DAILY  Refused By: IRENE WILKS  Reason for Refusal: Medication has been discontinued

## 2024-04-16 ENCOUNTER — LAB REQUISITION (OUTPATIENT)
Dept: LAB | Facility: CLINIC | Age: 59
End: 2024-04-16

## 2024-04-17 ENCOUNTER — APPOINTMENT (OUTPATIENT)
Dept: LAB | Facility: CLINIC | Age: 59
End: 2024-04-17

## 2024-04-17 LAB
Lab: 4465
Lab: NORMAL
PERFORMING LABORATORY: NORMAL
SPECIMEN STATUS: NORMAL
TEST NAME: NORMAL

## 2024-04-17 PROCEDURE — 84480 ASSAY TRIIODOTHYRONINE (T3): CPT | Performed by: INTERNAL MEDICINE

## 2024-04-17 PROCEDURE — 82533 TOTAL CORTISOL: CPT | Performed by: INTERNAL MEDICINE

## 2024-04-17 PROCEDURE — 84439 ASSAY OF FREE THYROXINE: CPT | Performed by: INTERNAL MEDICINE

## 2024-04-17 PROCEDURE — 84403 ASSAY OF TOTAL TESTOSTERONE: CPT | Performed by: INTERNAL MEDICINE

## 2024-04-17 PROCEDURE — 82024 ASSAY OF ACTH: CPT | Performed by: INTERNAL MEDICINE

## 2024-04-17 PROCEDURE — 83001 ASSAY OF GONADOTROPIN (FSH): CPT | Performed by: INTERNAL MEDICINE

## 2024-04-17 PROCEDURE — 86376 MICROSOMAL ANTIBODY EACH: CPT | Performed by: INTERNAL MEDICINE

## 2024-04-17 PROCEDURE — 82670 ASSAY OF TOTAL ESTRADIOL: CPT | Performed by: INTERNAL MEDICINE

## 2024-04-17 PROCEDURE — 83002 ASSAY OF GONADOTROPIN (LH): CPT | Performed by: INTERNAL MEDICINE

## 2024-04-17 PROCEDURE — 84270 ASSAY OF SEX HORMONE GLOBUL: CPT | Performed by: INTERNAL MEDICINE

## 2024-04-17 PROCEDURE — 84443 ASSAY THYROID STIM HORMONE: CPT | Performed by: INTERNAL MEDICINE

## 2024-04-18 LAB
ACTH PLAS-MCNC: 15 PG/ML
CORTIS SERPL-MCNC: 11.8 UG/DL
ESTRADIOL SERPL-MCNC: 47 PG/ML
FSH SERPL IRP2-ACNC: 25.1 MIU/ML
LH SERPL-ACNC: 19.4 MIU/ML
Lab: NORMAL
PERFORMING LABORATORY: NORMAL
SHBG SERPL-SCNC: 47 NMOL/L (ref 30–135)
T3 SERPL-MCNC: 122 NG/DL (ref 85–202)
T4 FREE SERPL-MCNC: 1.01 NG/DL (ref 0.9–1.7)
TEST NAME: NORMAL
THYROPEROXIDASE AB SERPL-ACNC: <10 IU/ML
TSH SERPL DL<=0.005 MIU/L-ACNC: 1.51 UIU/ML (ref 0.3–4.2)

## 2024-04-18 ASSESSMENT — SLEEP AND FATIGUE QUESTIONNAIRES
HOW LIKELY ARE YOU TO NOD OFF OR FALL ASLEEP WHILE SITTING AND READING: WOULD NEVER DOZE
HOW LIKELY ARE YOU TO NOD OFF OR FALL ASLEEP IN A CAR, WHILE STOPPED FOR A FEW MINUTES IN TRAFFIC: WOULD NEVER DOZE
HOW LIKELY ARE YOU TO NOD OFF OR FALL ASLEEP WHEN YOU ARE A PASSENGER IN A CAR FOR AN HOUR WITHOUT A BREAK: SLIGHT CHANCE OF DOZING
HOW LIKELY ARE YOU TO NOD OFF OR FALL ASLEEP WHILE WATCHING TV: SLIGHT CHANCE OF DOZING
HOW LIKELY ARE YOU TO NOD OFF OR FALL ASLEEP WHILE SITTING INACTIVE IN A PUBLIC PLACE: WOULD NEVER DOZE
HOW LIKELY ARE YOU TO NOD OFF OR FALL ASLEEP WHILE LYING DOWN TO REST IN THE AFTERNOON WHEN CIRCUMSTANCES PERMIT: SLIGHT CHANCE OF DOZING
HOW LIKELY ARE YOU TO NOD OFF OR FALL ASLEEP WHILE SITTING QUIETLY AFTER LUNCH WITHOUT ALCOHOL: WOULD NEVER DOZE
HOW LIKELY ARE YOU TO NOD OFF OR FALL ASLEEP WHILE SITTING AND TALKING TO SOMEONE: WOULD NEVER DOZE

## 2024-04-19 LAB — LABCORP INTERFACED MISCELLANEOUS TEST RESULT: NORMAL

## 2024-04-20 LAB — TESTOST SERPL-MCNC: 51 NG/DL (ref 8–60)

## 2024-04-22 ENCOUNTER — OFFICE VISIT (OUTPATIENT)
Dept: SLEEP MEDICINE | Facility: CLINIC | Age: 59
End: 2024-04-22
Payer: COMMERCIAL

## 2024-04-22 VITALS
SYSTOLIC BLOOD PRESSURE: 127 MMHG | BODY MASS INDEX: 43.71 KG/M2 | HEART RATE: 83 BPM | DIASTOLIC BLOOD PRESSURE: 79 MMHG | WEIGHT: 256 LBS | OXYGEN SATURATION: 96 % | HEIGHT: 64 IN

## 2024-04-22 DIAGNOSIS — U09.9 POST-COVID CHRONIC MUSCLE PAIN: ICD-10-CM

## 2024-04-22 DIAGNOSIS — M79.10 POST-COVID CHRONIC MUSCLE PAIN: ICD-10-CM

## 2024-04-22 DIAGNOSIS — G89.29 POST-COVID CHRONIC MUSCLE PAIN: ICD-10-CM

## 2024-04-22 DIAGNOSIS — E66.01 MORBID OBESITY (H): ICD-10-CM

## 2024-04-22 DIAGNOSIS — G47.33 OSA ON CPAP: Primary | ICD-10-CM

## 2024-04-22 PROCEDURE — 99213 OFFICE O/P EST LOW 20 MIN: CPT | Performed by: INTERNAL MEDICINE

## 2024-04-22 RX ORDER — PHENTERMINE HYDROCHLORIDE 37.5 MG/1
1 TABLET ORAL
COMMUNITY
Start: 2024-04-16 | End: 2024-08-08

## 2024-04-22 RX ORDER — METFORMIN HCL 500 MG
4 TABLET, EXTENDED RELEASE 24 HR ORAL
COMMUNITY
Start: 2024-04-16

## 2024-04-22 RX ORDER — ERGOCALCIFEROL 1.25 MG/1
50000 CAPSULE, LIQUID FILLED ORAL WEEKLY
COMMUNITY
Start: 2024-04-16

## 2024-04-22 NOTE — PROGRESS NOTES
[facility]  Sleep clinic follow-up visit note    Date: April 22, 2024     Chief complaint:  Follow-up of ZHANE, review CPAP compliance measures     Jayne Leiva is a 58 year old  female with medical history significant for morbid obesity, hypercholesterolemia, acid reflux disease, history of non-ST elevated MI April 2022, chronic left shoulder pain, postmenopausal status(status post hysterectomy).  A diagnostic polysomnogram performed on November 21, 2022 showed mild obstructive sleep apnea, pronounced during supine sleep and during REM sleep particularly pronounced during REM sleep in supine position.    She was set up at [place] on January 23, 2023. Patient received a Resmed Airsense 11 Pressures were set at 5-15 cm H2O.   Patient s ramp is 5 cm H2O for Auto and FLEX/EPR is EPR, 2.  Patient received a Resmed Mask name: [id number]  Nasal mask size Standard, heated tubing and heated humidifier.  She presents to sleep clinic today for follow-up of ZHANE and to review the CPAP compliance measures.    Previous sleep study report:   Study Date: 11/21/2022  The combined apnea/hypopnea index was 8.8 events per hour (central apnea/hypopnea index was 0.2 events per hour). The REM AHI was 35.6 events per hour. The supine AHI was 31.0 events per hour. The RERA index was 2.0 events per hour.  The RDI was 10.8 events per hour.  Sleep Associated Hypoxemia -  Baseline oxygen saturation was 93.7%. Lowest oxygen saturation was 86.0%. Time spent less than or equal to 88% was 2.3 minutes. Time spent less than or equal to 89% was 5.9 minutes.     Pressure settings on CPAP ranged between 5 to15 cmH2O  Patient reports using the CPAP regularly during sleep. She uses nasal mask.   She reports chronic pain and sometimes when she wakes up in the middle of the night with pain, she ends up pulling the mask off.  There are no reports of snoring, apneas or awakenings due to gasping  with the device use.   She reports air hunger  "otherwise pressure settings feel comfortable.  Patient reports good sleep quality with the device use.   Bedtime time: 9-930pm  Wake time: 630-7am. Avg sleep :8-8.5 hours  Patient reports nonrestorative sleep and fatigue which she attributes to post-COVID  She denies excessive daytime sleepiness. ESS: 3 /24.  There are no reports of  drowsiness while driving.       DOWNLOADABLE COMPLIANCE DATA: (ResMed) 5-15 cmH2O  From 3/23/2024 through 4/21/2024 reveals that patient to use the device for 29 out of 30 days with an averag use of 6 hours and 5 minutes on the days used. 95th percentile on leak was 11 L/min. Residual AHI was 0.3 per hour. Median pressure settings: 8.8; 95th percentile : 11.2 and max pressure: 11.9 cm water      Past medical/surgical history, family history, social history, medications and allergies were reviewed.       Problem list:  Patient Active Problem List   Diagnosis    Pure hypercholesterolemia    Old myocardial infarction    Morbid obesity (H)    KELLE (generalized anxiety disorder)    Acid reflux disease    ACP (advance care planning)    Other sleep apnea    Other fatigue    Brain fog    History of COVID-19    Cognitive changes    Health Care Home    Post-COVID chronic fatigue            Physical Examination:   /79   Pulse 83   Ht 1.626 m (5' 4\")   Wt 116.1 kg (256 lb)   LMP  (LMP Unknown)   SpO2 96%   BMI 43.94 kg/m    General: Pleasant. Cooperative. In no apparent distress.  Pulmonary: Able to speak in full sentences easily. No cough or wheeze.   Neurologic: Alert, oriented x3.  Psychiatric: Mood euthymic. Affect congruent with full range and intensity.     ASSESSMENT/PLAN:  Obstructive sleep apnea: Pt reports adequate compliance with CPAP and reports positive benefits with CPAP treatment. Based on compliance measures, ZHANE is adequately controlled with CPAP at the current settings.    DME orders were generated for revision of the pressure settings on the auto CPAP to range 8 to 15 " "cm water based on the report of air hunger and compliance download and for renewal of CPAP supplies.  Recommended to continue using the CPAP regularly during sleep, to use the device for the entirety of the sleep duration to derive maximum benefit and getting the supplies for the CPAP replaced regularly.   Patient instructed to remember to bring PAP with her if hospitalized and if anticipating procedure that requires sedation/surgery to be sure to discuss with the provider/surgeon that she has sleep apnea and uses PAP therapy.    Post COVID pain: Recommend continuing follow-up follow-up with Pain Management clinic for management of the chronic post-COVID pain.     Obesity: We discussed weight management with healthy diet, and exercise.     Patient was strongly advised to avoid driving, operating any heavy machinery or other hazardous situations while drowsy or sleepy.  Patient was counseled on the importance of driving while alert, to pull over if drowsy, or nap before getting into the vehicle if sleepy.      Follow-up with sleep clinic in 1 year or sooner if any concerns.     The above note was dictated using voice recognition software. Although reviewed after completion, some word and grammatical error may remain . Please contact the author for any clarifications.      \" Total time spent was 28 minutes  for this appointment on this date of service which include time spent before, during and after the visit for chart review, patient care, counseling and coordination of care. Including documentation\"      Benjamín Hendrix MD  [facility]  [address] , [place] [address]       "

## 2024-04-22 NOTE — NURSING NOTE
"Chief Complaint   Patient presents with    Sleep Problem     ZHANE, cpap follow up       Initial /79   Pulse 83   Ht 1.626 m (5' 4\")   Wt 116.1 kg (256 lb)   LMP  (LMP Unknown)   SpO2 96%   BMI 43.94 kg/m   Estimated body mass index is 43.94 kg/m  as calculated from the following:    Height as of this encounter: 1.626 m (5' 4\").    Weight as of this encounter: 116.1 kg (256 lb).    Medication Reconciliation: complete  ESS 3  JEFFREY 3  DME: julian Lewis MA      "

## 2024-04-26 LAB
LABCORP INTERFACED MISCELLANEOUS TEST RESULT: NORMAL
MISCELLANEOUS TEST 1 (ARUP): NORMAL

## 2024-04-27 LAB — MISCELLANEOUS TEST 1 (ARUP): NORMAL

## 2024-05-01 ASSESSMENT — PAIN SCALES - PAIN ENJOYMENT GENERAL ACTIVITY SCALE (PEG)
INTERFERED_ENJOYMENT_LIFE: 6
PEG_TOTALSCORE: 5.67
AVG_PAIN_PASTWEEK: 5
INTERFERED_GENERAL_ACTIVITY: 6

## 2024-05-06 ENCOUNTER — THERAPY VISIT (OUTPATIENT)
Dept: PHYSICAL THERAPY | Facility: CLINIC | Age: 59
End: 2024-05-06
Payer: COMMERCIAL

## 2024-05-06 DIAGNOSIS — G93.32 POST-COVID CHRONIC FATIGUE: Primary | ICD-10-CM

## 2024-05-06 DIAGNOSIS — U09.9 POST-COVID CHRONIC FATIGUE: Primary | ICD-10-CM

## 2024-05-06 PROCEDURE — 97112 NEUROMUSCULAR REEDUCATION: CPT | Mod: GP | Performed by: PHYSICAL THERAPIST

## 2024-05-06 PROCEDURE — 97110 THERAPEUTIC EXERCISES: CPT | Mod: GP | Performed by: PHYSICAL THERAPIST

## 2024-05-08 ENCOUNTER — OFFICE VISIT (OUTPATIENT)
Dept: PALLIATIVE MEDICINE | Facility: CLINIC | Age: 59
End: 2024-05-08
Attending: NURSE PRACTITIONER
Payer: COMMERCIAL

## 2024-05-08 VITALS — DIASTOLIC BLOOD PRESSURE: 75 MMHG | HEART RATE: 78 BPM | OXYGEN SATURATION: 97 % | SYSTOLIC BLOOD PRESSURE: 122 MMHG

## 2024-05-08 DIAGNOSIS — U09.9 POST-COVID CHRONIC MUSCLE PAIN: Primary | ICD-10-CM

## 2024-05-08 DIAGNOSIS — G93.32 POST-COVID CHRONIC FATIGUE: ICD-10-CM

## 2024-05-08 DIAGNOSIS — M79.18 MYALGIA, MULTIPLE SITES: ICD-10-CM

## 2024-05-08 DIAGNOSIS — M79.10 POST-COVID CHRONIC MUSCLE PAIN: Primary | ICD-10-CM

## 2024-05-08 DIAGNOSIS — U09.9 POST-COVID CHRONIC FATIGUE: ICD-10-CM

## 2024-05-08 DIAGNOSIS — G89.29 POST-COVID CHRONIC MUSCLE PAIN: Primary | ICD-10-CM

## 2024-05-08 DIAGNOSIS — M75.21 BICEPS TENDONITIS, RIGHT: ICD-10-CM

## 2024-05-08 PROCEDURE — 99215 OFFICE O/P EST HI 40 MIN: CPT | Performed by: NURSE PRACTITIONER

## 2024-05-08 PROCEDURE — G2211 COMPLEX E/M VISIT ADD ON: HCPCS | Performed by: NURSE PRACTITIONER

## 2024-05-08 ASSESSMENT — PAIN SCALES - GENERAL: PAINLEVEL: MILD PAIN (3)

## 2024-05-08 NOTE — PROGRESS NOTES
[facility]   Date of Visit: 5/8/2024  Last visit: 3/25/2024  Original Consult: 11/1/23    Jayne Leiva is a 58 year old female who has a past medical history of Acid reflux disease, KELLE (generalized anxiety disorder), Morbid obesity (H), Other sleep apnea (3/6/2023), and Pure hypercholesterolemia. Jayne is being seen today for ongoing management of chronic pain.    History:  Post covid myalgia:  After first episode of COVID in February of 2022, she had fatigue that never went away. Suqsequent COVID infection in December of 2022, persistent muscle pain and brain fog began. Treating with Dr. Gerardo in the [facility]. Has been treated with therapy(PT and OT) to build endurance, LDN, arginine and vitamin C supplements. Did not tolerate topiramate or gabapentin.   Chronic shoulder and right arm pain:  PT for her arm and wrist at [facility]. Kinesio tape has been helpful for reducing her pain in her arm. Has a history of right RCR.     Recommendations from last visit:  Poor response to topiramate. Not making gains with PT at [facility]. Recommend shifting focus to supportive of chronic persistent pain as she is feeling widespread pain is most disruptive. Will stop Topiramate and start gabapentin. AVS with titration directions. Follow up with me in 2 months to reassess symptoms and response to treatment.    ____________________________________________________________    Jayne reports:  -At her last visit, her pain was worse.she tapered off gabapentin, had the flu, and traveled to [place]. Now s she reports feeling much better since starting Lyrica.  She noticed that she has less pain, still has some random jolts of pain, but they occur less often and are less severe. Taking 50 mg TID, denies any side effects.    -She has completed 6 Pain PT visits, continues to make progress.  She does feel like working with Gerri Morgan and Britany Faye have been helpful for symptom management and control  -  Walking increases pain and mental fogginess, slowly building on the treadmill. Up to 9 minutes a day, slowed due to recent illness. Goal to walk for pleasure again.  She was able to walk her dog this week and it was nice, did not notice worsening symptoms.  - Right elbow and forearm pain.  She had an MRI and her orthopedic specialist diagnosed bicep tendonitis and radial tunnel syndrome.  She subsequently had a steroid injection in the elbow and now feels alot better.   -[place] [facility]  She was started on Metformin and phentermine.  She notices some side effects, but has lost 14 pounds which has felt good.  - She is working from home full time. Can have some ability to pace her work and take breaks with the support of her manager.   -  For [place], [place] She also used to enjoy cleaning, gardening. Now she feels like she is so tired after work that she cannot do these things. Her mother lives in [place], now in hospice care.     Current pain medications:  LDN 4.5 mg per day  Lyrica 50 mg TID  Tylenol as needed    Pain description:  Location: wide spread, location seems to vary without recognizable pattern  Quality: cramping, sharp, cutting, dull, aching, throbbing   Duration: PM   Severity/Intensity(6 at intake) 7/10 at last visit, Mild Pain (3) today  Aggravating factors include: standing, walking, exercise  Relieving factors include: lying down, medication  Pain Intensity and Interference in the past week  Average Pain 5  Pain interference with your ENJOYMENT OF LIFE? 6  Pain interference with your GENERAL ACTIVITY? 6  PEG Total Score: 5.67    Date PEG   11/27/23 6.33   1/26/24 7.67   3/19/24 8   5/1/2024 5.67           PAIN MANAGEMENT TREATMENT HISTORY  1. MEDICATIONS:  Opioids: Codeine, Oxycodone  NSAIDs: Ibuprofen, Naproxen  Muscle relaxants: Cyclobenzaprine  Pain Antidepressants/ Anxiolytics: Bupropion, Citalopram   Neuroleptics:  Topiramate (GI and CNS symptoms), gabapentin SE (lightheaded, LE edema), Lyrica has been helpful  Topical: Other gels, creams, patches or ointments   Adjuvant medications: Acetaminophen, vitamin C, arginine. LDN helpful for fatigue and brain fog, less so for the pain.  2. PHYSICAL THERAPY:   Pain PT started 2024 with Gerri Morgan, 6 visits completed  PT at [facility] for right arm and shoulder pain [id number]  Completed 7 OT visits with Nicole Guzman and 7 PT visits with Jerri Hubbard for Post Covid treatment  3. PAIN PSYCHOLOGY:   Currently working with Oneyda Faye, PhD  Had done extensive therapy after divorce  4. SURGERY:  Spine fusion in 1999, Dr. Morrissey at the [facility]  5. INJECTIONS:   2/2023 Right elbow CSI at [facility]  4/2024 right bicep tendon and radial tunnel steroid injection at [facility], helpful  6. COMPLEMENTARY THERAPY:  Chiropractic: Has not tried  Not interested in trying  Acupuncture/acupressure: Has not tried  TENS unit: Has not tried  Walks with her dog  for exercise      Social History   She reports that she quit smoking about 2 years ago. Her smoking use included cigarettes. She started smoking about 28 years ago. She has a 19.5 pack-year smoking history. She has been exposed to tobacco smoke. She has never used smokeless tobacco. She reports current alcohol use. She reports that she does not use drugs.    Social History     Social History Narrative     in April, 2023, he and her  have been together since 2015. They dated in high school as well.      She was previously  once.     3 adult kids(2 daughters and 1 son)    No grandchildren.    Walks her dog for exercise.     Works full time as a  at a [employer] center      Medications and Allergies reviewed.    Objective   /75 (BP Location: Right arm, Patient Position: Chair, Cuff Size: Adult Large)   Pulse 78   LMP  (LMP Unknown)   SpO2 97%   Constitutional: Well developed, well nourished, appears stated age. No acute  distress.  Gait is normal and steady  HEENT: Head atraumatic, normocephalic. Eyes without conjunctival injection or jaundice. Neck supple.   Skin: No obvious rash, lesions, or petechiae of exposed skin.   Extremities: Peripheral pulses intact. No clubbing, cyanosis, or edema. Moves all extremities.  Psychiatric/mental status: Alert, without lethargy or stupor. Speech fluent. Appropriate affect. Mood normal. Able to follow commands without difficulty.   Musculoskeletal exam: Normal bulk and tone. Unremarkable spinal curvature.      Assessment & Plan   Post-COVID chronic muscle pain  Post-COVID chronic fatigue  Myalgia, multiple sites  Having good response to pregabalin 50 mg 3 times a day.  Advised increasing up to 100 mg twice a day, instructions entered on AVS.  If she is tolerating this dose, she will call me to get new prescription for 100 mg capsules twice a day.  Alternately if she does not tolerate an increase, will convert to 75 mg twice a day.  Continue with pain PT and pain psychology.  She was interested in reducing some of her medications as she is feeling better, advised changing or eliminating 1 medication at a time to accurately evaluate response.  Will plan to follow-up next in 3 months, sooner if needed    Bicep tendinitis, right  Continue cares with [facility].       Melita Sullivan, CNP-BC, PMGT-BC, AP-PMN  [facility]      BILLING TIME DOCUMENTATION: The total TIME spent on this patient on the date of the encounter/appointment was 44 minutes.   TOTAL TIME INCLUDED  Time spent preparing to see the patient by reviewing available medical information in the patient's medical record, including relevant provider notes, laboratory work, and imaging 8 minutes  Time spent face to face (or over the phone) with the patient 28 minutes   Time spent documenting clinical information in Epic 8 minutes

## 2024-05-08 NOTE — PATIENT INSTRUCTIONS
"1.  Pain Physical Therapy: continue  2.  Pain Psychologist: continue  3.  Medications: Increase Lyrica to 100 mg twice a day, start with 50 mg in the AM and then 100 mg and then in a few days, go to 100 mg twice a day. Let me know how its going and I will order a refill of either 75 mg twice a day or 100 mg twice a day.   You can try \"de-prescribing\" medications that have not seemed to improve your symptoms. Change only one medication at a time to evaluate how you feel!  4. Follow up with me in 3 months to reassess symptoms and response to treatment.   _____________________________________________________  Scheduling/Clinic telephone number for ALL locations:  [phone]    After Hours On-Call Service for Emergencies:  [phone]  - Calls are returned by our nurse care coordinators Monday - Friday between 8 AM and 4:00 PM. We usually return messages within 2-3 business days depending on the issue or request. I am not in the clinic on Fridays.   - For medication refills, call the clinic or send a [employer] message 7 days in advance.  Please include the name of the requested medication and your preferred pharmacy. Please allow 3-4 days to be processed.   We are not able to refill medications over the weekend or after clinic hours.   - If you are unable to keep your appointment, call us at least 24 hours in advance to cancel to allow us to offer the appointment time to another patient. Multiple missed appointments will lead to dismissal from the clinic.   "

## 2024-05-10 ENCOUNTER — VIRTUAL VISIT (OUTPATIENT)
Dept: PALLIATIVE MEDICINE | Facility: CLINIC | Age: 59
End: 2024-05-10
Payer: COMMERCIAL

## 2024-05-10 DIAGNOSIS — U09.9 POST-COVID CHRONIC FATIGUE: ICD-10-CM

## 2024-05-10 DIAGNOSIS — M79.18 MYALGIA, MULTIPLE SITES: ICD-10-CM

## 2024-05-10 DIAGNOSIS — G93.32 POST-COVID CHRONIC FATIGUE: ICD-10-CM

## 2024-05-10 DIAGNOSIS — M75.21 BICEPS TENDONITIS, RIGHT: ICD-10-CM

## 2024-05-10 DIAGNOSIS — U09.9 POST-COVID CHRONIC MUSCLE PAIN: Primary | ICD-10-CM

## 2024-05-10 DIAGNOSIS — G89.29 POST-COVID CHRONIC MUSCLE PAIN: Primary | ICD-10-CM

## 2024-05-10 DIAGNOSIS — M79.10 POST-COVID CHRONIC MUSCLE PAIN: Primary | ICD-10-CM

## 2024-05-10 PROCEDURE — 96158 HLTH BHV IVNTJ INDIV 1ST 30: CPT | Mod: 95 | Performed by: PSYCHOLOGIST

## 2024-05-10 NOTE — PROGRESS NOTES
Jayne Leiva is a 58 year old female who is being evaluated via a billable video visit.      Patient is currently in the [facility]? yes    Patient would like the video invitation sent by: Text to cell phone: [phone]}    Video Start Time: 8:00 AM  Video Stop Time: 8:19 AM    Additional provider notes:     Pain Diagnoses per pain provider:     Post-COVID chronic muscle pain    Post-COVID chronic fatigue    Myalgia, multiple sites    Biceps tendonitis, right        DATA: During today's visit you reported the following: Your chronic pain is mildly improved with injection and transition to Lyrica. Your mood is stable. Your activity level is variable - visited daughter in [place], engaged in a lot of walking and hiking and took breaks as needed. Also gardening more than in the past few years. Your stress level is mildly increased but manageable - daughter in [facility] just prior to your visit. Your sleep is a little better. You reported engaging in self-care for your pain throughout the day - just part of the routine.    You identified that you would like to focus on the following or had questions regarding the following issues or concerns, and we discussed the following:   - injection into bicep tendon and it is much better  - endocrinologist started you on Phentermine, don't like it despite losing 16 pounds since starting it, going up to full dose causes you to feel like your heart is beating very fast  - Lyrica has been very helpful, fewer side effects and better pain management  - new CPAP mask    ASSESSMENT: Jayne reports that pain has been much more manageable, she attributes this to switch to Lyrica, as well as pacing activity and utilizing skills to engage in self-care as often as needed. She seems to be doing well overall; we discussed if this trend continues through to her next visit that we may drop to as needed visits.     PLAN:   Your next appointment is scheduled for 7/25 at 8:00  AM.  Assignment/Objectives /interventions for next session:   - continue to pace activity to build stamina  - continue to engage in self-care as often as needed    We believe regular attendance is key to your success in our program!    Any time you are unable to keep your appointment we ask that you call us at [phone] at least 24 hours in advance to cancel.This will allow us to offer the appointment time to another patient.   Multiple missed appointments may lead to dismissal from the clinic.    Telemedicine Visit: The patient's condition can be safely assessed and treated via synchronous audio and visual telemedicine encounter.      Reason for Telemedicine Visit: Services only offered telehealth    Originating Site (Patient Location): Patient's home    Distant Site (Provider Location): Provider Remote Setting- Home Office    Consent:  The patient/guardian has verbally consented to: the potential risks and benefits of telemedicine (video visit) versus in person care; bill my insurance or make self-payment for services provided; and responsibility for payment of non-covered services.     Mode of Communication:  Video Conference via [employer]    As the provider I attest to compliance with applicable laws and regulations related to telemedicine.      Oneyda Faye PsyD LP  Licensed Psychologist  Outpatient Clinic Therapist  [facility]

## 2024-05-25 ENCOUNTER — MYC MEDICAL ADVICE (OUTPATIENT)
Dept: PALLIATIVE MEDICINE | Facility: CLINIC | Age: 59
End: 2024-05-25
Payer: COMMERCIAL

## 2024-05-25 ENCOUNTER — MYC REFILL (OUTPATIENT)
Dept: PHYSICAL MEDICINE AND REHAB | Facility: CLINIC | Age: 59
End: 2024-05-25
Payer: COMMERCIAL

## 2024-05-25 DIAGNOSIS — G89.29 POST-COVID CHRONIC MUSCLE PAIN: ICD-10-CM

## 2024-05-25 DIAGNOSIS — U09.9 POST-COVID CHRONIC MUSCLE PAIN: ICD-10-CM

## 2024-05-25 DIAGNOSIS — M79.10 POST-COVID CHRONIC MUSCLE PAIN: ICD-10-CM

## 2024-05-25 DIAGNOSIS — R52 PAIN: ICD-10-CM

## 2024-06-03 ENCOUNTER — THERAPY VISIT (OUTPATIENT)
Dept: PHYSICAL THERAPY | Facility: CLINIC | Age: 59
End: 2024-06-03
Payer: COMMERCIAL

## 2024-06-03 DIAGNOSIS — U09.9 POST-COVID CHRONIC FATIGUE: Primary | ICD-10-CM

## 2024-06-03 DIAGNOSIS — G93.32 POST-COVID CHRONIC FATIGUE: Primary | ICD-10-CM

## 2024-06-03 PROCEDURE — 97112 NEUROMUSCULAR REEDUCATION: CPT | Mod: GP | Performed by: PHYSICAL THERAPIST

## 2024-06-03 PROCEDURE — 97110 THERAPEUTIC EXERCISES: CPT | Mod: GP | Performed by: PHYSICAL THERAPIST

## 2024-06-03 RX ORDER — PREGABALIN 100 MG/1
100 CAPSULE ORAL 2 TIMES DAILY
Qty: 60 CAPSULE | Refills: 3 | Status: SHIPPED | OUTPATIENT
Start: 2024-06-03

## 2024-06-03 NOTE — TELEPHONE ENCOUNTER
Routing for sig on new rx    Kristin LONG RN Care Coordinator  [facility]      Jayne BRAUN Pain Nurse (supporting Melita Sullivan, NP)3 days ago       Thank you!      Melita also said if I tolerated the  200 mgs of Pregabalin, she would call in a new prescription so I didn't have to take 4 pills a day.  Also, the script I have now was for 3x a day so I do not think I will have enough to get through before my next refill.     Jayne

## 2024-06-03 NOTE — PROGRESS NOTES
Assessment:  Patient has been consistent with appointments and complaint with HEP and learning's from Pain Science. She has been incorporating strategies into daily living and will continue to benefit from skilled PT to further progress her strength and IND with managing her Pain.    PLAN  Continue to progress with HEP and strength and IND with managing pain symptoms, decreased frequency to 1/month    Beginning/End Dates of Progress Note Reporting Period:  02/12/24 to 06/03/2024    Referring Provider:  Melita Sullivan               06/03/24 0500   Appointment Info   Signing clinician's name / credentials Gerri Morgan PT, DPT   Total/Authorized Visits eval and treat   Visits Used 7   Medical Diagnosis Post Covid Chronic muscle pain myalgia   PT Tx Diagnosis chronic pain, fatigue, decreased activity tolerance   Other pertinent information pt mentally fatigues with alot of talking   Progress Note/Certification   Onset of illness/injury or Date of Surgery 02/01/22   Therapy Frequency 1/month   Predicted Duration 3-4 months   Progress Note Due Date 09/03/24   Progress Note Completed Date 06/03/24   GOALS   PT Goals 2;3   PT Goal 1   Goal Identifier walking   Goal Description Pt will be able to walk for 30 minutes with minimal increase in pain   Rationale to maximize safety and independence with performance of ADLs and functional tasks;to maximize safety and independence within the home;to maximize safety and independence within the community;to maximize safety and independence with transportation;to maximize safety and independence with self cares   Goal Progress pt has been walking 30 min on her treadmill daily   Target Date 04/29/24   Date Met 06/03/24   PT Goal 2   Goal Identifier HEP   Goal Description Patient will demo IND with HEP and progression to improve strength and mobility   Rationale to maximize safety and independence with performance of ADLs and functional tasks;to maximize safety and independence  within the home;to maximize safety and independence within the community;to maximize safety and independence with transportation;to maximize safety and independence with self cares   Target Date 09/03/24   PT Goal 3   Goal Identifier Pain   Goal Description Patient will be IND with pain management strategies to be able to self manage flare ups in pain to be   Rationale to maximize safety and independence with performance of ADLs and functional tasks;to maximize safety and independence within the home;to maximize safety and independence within the community;to maximize safety and independence with transportation;to maximize safety and independence with self cares   Target Date 09/03/24   Subjective Report   Subjective Report Patient reporting she is tolerating more activity still fatigued, however not completely exhausted after.  Has been able to walk on the treadmill up to 30 min daily.Was able to mow the law with push mower and has not done this in over a couple years and was able to do this.   Therapeutic Procedure/Exercise   Therapeutic Procedures: strength, endurance, ROM, flexibility minutes (16444) 30   Ther Proc 1 bridge   Ther Proc 1 - Details X10 good technique   Ther Proc 2 walking   Ther Proc 2 - Details pt is walking 30 min a day now   PTRx Ther Proc 1 Cervical Retraction   PTRx Ther Proc 1 - Details X10 for posture   PTRx Ther Proc 2 Scapular Retraction/Depression   PTRx Ther Proc 2 - Details X10 for posture   PTRx Ther Proc 3 Shoulder Theraband Extension   PTRx Ther Proc 3 - Details HEP- X10- progressed to red   PTRx Ther Proc 4 Toe Raises   PTRx Ther Proc 4 - Details nervous to complete due to cramps- however weak- encourged to complete due to weakness   PTRx Ther Proc 5 Standing Gastroc Stretch   PTRx Ther Proc 5 - Details X30 sec each leg X2   PTRx Ther Proc 6 Sit to Stand   PTRx Ther Proc 6 - Details X10   PTRx Ther Proc 7 Hip Abduction   PTRx Ther Proc 7 - Details HEP- X10- progressed to YTB at  home   PTRx Ther Proc 8 Hip Extension In Neutral With Theraband   PTRx Ther Proc 8 - Details HEP- X10   PTRx Ther Proc 9 Hip Abduction Straight Leg Raise   PTRx Ther Proc 9 - Details - X15 fatigued challenging   PTRx Ther Proc 10 Prone On Elbows   PTRx Ther Proc 10 - Details X10   PTRx Ther Proc 11 Side Plank Modified Knees   PTRx Ther Proc 11 - Details X3 times up to 10 seconds   PTRx Ther Proc 12 Prone Scapula I   PTRx Ther Proc 12 - Details HEP   Skilled Intervention cues for form and control to progress   Patient Response/Progress pt tolerated well, progressing all exercises well, pt to trial her rower a bit and see how she feels   Neuromuscular Re-education   Neuromuscular re-ed of mvmt, balance, coord, kinesthetic sense, posture, proprioception minutes (05195) 30   Neuromuscular Re-education Neuro Re-ed 8;Neuro Re-ed 9   Neuro Re-ed 1 sleep hygiene   Neuro Re-ed 1 - Details still sleeps ok, however does not always wake up refreshed still- may wake up because legs or foot is cramping- encouraged to stretch before bed   Neuro Re-ed 2 Mental Fatigue   Neuro Re-ed 2 - Details this has decreased- is not getting slammed with this as much   Neuro Re-ed 3 Walking program   Neuro Re-ed 3 - Details pt is now walking 30 min daily   Neuro Re-ed 4 Happy chemicals   Neuro Re-ed 4 - Details pt has been trying to garden more as this brings manasa and she has improved tolerance   Neuro Re-ed 5 diaphragmatic breathing   Neuro Re-ed 5 - Details is doing this before bed, and intermittently thorughout the day   Neuro Re-ed 6 Posture cues   Neuro Re-ed 6 - Details pt understands all posture cues is aware and trying to be sure she is in good posture   Neuro Re-ed 7 body mechanics and posture   Neuro Re-ed 7 - Details reviewed overall posture and body mechanics   Neuro Re-ed 8 self care-   Neuro Re-ed 8 - Details reviewed self care- check in's and energy conservation techniques   PTRx Neuro Re-ed 1 graded exposure and new activty   PTRx  Neuro Re-ed 1 - Details pt is tolerating more activity- and feeling better reviewed graded exposure and gradually increasing her activities so she does not flarie up   Skilled Intervention PNE- graded exposure with new activity and journaling to assess progress   Patient Response/Progress pt tolerating well and trying to implement all strategies as she is moving more   Neuro Re-ed 9 Journal   Neuro Re-ed 9 - Details education journal-pt has started and has been able to see trends of when she is feeling better and other times when pain flares up, she will continue to do so as she is progressing her movement   Education   Learner/Method No Barriers to Learning   Education Comments no concerns   Plan   Home program on her phone- general strength- and calming/breathing strategies   Updates to plan of care 1x/month for 3-4 months   Plan for next session progress core/overall strength,   Total Session Time   Timed Code Treatment Minutes 60   Total Treatment Time (sum of timed and untimed services) 60

## 2024-06-08 SDOH — SOCIAL STABILITY: SOCIAL NETWORK

## 2024-06-08 SDOH — SOCIAL STABILITY: SOCIAL NETWORK: PROMIS ABILITY TO PARTICIPATE IN SOCIAL ROLES & ACTIVITIES T-SCORE: 40

## 2024-06-08 SDOH — SOCIAL STABILITY: SOCIAL NETWORK: I HAVE TROUBLE DOING ALL OF THE FAMILY ACTIVITIES THAT I WANT TO DO: USUALLY

## 2024-06-08 SDOH — SOCIAL STABILITY: SOCIAL NETWORK: I HAVE TROUBLE DOING ALL OF THE ACTIVITIES WITH FRIENDS THAT I WANT TO DO: USUALLY

## 2024-06-08 SDOH — SOCIAL STABILITY: SOCIAL NETWORK: I HAVE TROUBLE DOING ALL OF MY REGULAR LEISURE ACTIVITIES WITH OTHERS: USUALLY

## 2024-06-08 SDOH — SOCIAL STABILITY: SOCIAL NETWORK: I HAVE TROUBLE DOING ALL OF MY USUAL WORK (INCLUDE WORK AT HOME): SOMETIMES

## 2024-06-08 ASSESSMENT — ANXIETY QUESTIONNAIRES
3. WORRYING TOO MUCH ABOUT DIFFERENT THINGS: NOT AT ALL
7. FEELING AFRAID AS IF SOMETHING AWFUL MIGHT HAPPEN: NOT AT ALL
GAD7 TOTAL SCORE: 1
GAD7 TOTAL SCORE: 1
IF YOU CHECKED OFF ANY PROBLEMS ON THIS QUESTIONNAIRE, HOW DIFFICULT HAVE THESE PROBLEMS MADE IT FOR YOU TO DO YOUR WORK, TAKE CARE OF THINGS AT HOME, OR GET ALONG WITH OTHER PEOPLE: SOMEWHAT DIFFICULT
6. BECOMING EASILY ANNOYED OR IRRITABLE: SEVERAL DAYS
7. FEELING AFRAID AS IF SOMETHING AWFUL MIGHT HAPPEN: NOT AT ALL
8. IF YOU CHECKED OFF ANY PROBLEMS, HOW DIFFICULT HAVE THESE MADE IT FOR YOU TO DO YOUR WORK, TAKE CARE OF THINGS AT HOME, OR GET ALONG WITH OTHER PEOPLE?: SOMEWHAT DIFFICULT
4. TROUBLE RELAXING: NOT AT ALL
1. FEELING NERVOUS, ANXIOUS, OR ON EDGE: NOT AT ALL
5. BEING SO RESTLESS THAT IT IS HARD TO SIT STILL: NOT AT ALL
GAD7 TOTAL SCORE: 1
2. NOT BEING ABLE TO STOP OR CONTROL WORRYING: NOT AT ALL

## 2024-06-08 ASSESSMENT — PATIENT HEALTH QUESTIONNAIRE - PHQ9
SUM OF ALL RESPONSES TO PHQ QUESTIONS 1-9: 5
SUM OF ALL RESPONSES TO PHQ QUESTIONS 1-9: 5
10. IF YOU CHECKED OFF ANY PROBLEMS, HOW DIFFICULT HAVE THESE PROBLEMS MADE IT FOR YOU TO DO YOUR WORK, TAKE CARE OF THINGS AT HOME, OR GET ALONG WITH OTHER PEOPLE: SOMEWHAT DIFFICULT

## 2024-06-12 ENCOUNTER — VIRTUAL VISIT (OUTPATIENT)
Dept: PHYSICAL MEDICINE AND REHAB | Facility: CLINIC | Age: 59
End: 2024-06-12
Payer: COMMERCIAL

## 2024-06-12 VITALS — WEIGHT: 256 LBS | HEIGHT: 64 IN | BODY MASS INDEX: 43.71 KG/M2

## 2024-06-12 DIAGNOSIS — U09.9 POST-COVID CHRONIC FATIGUE: Primary | ICD-10-CM

## 2024-06-12 DIAGNOSIS — G93.32 POST-COVID CHRONIC FATIGUE: Primary | ICD-10-CM

## 2024-06-12 PROCEDURE — 99214 OFFICE O/P EST MOD 30 MIN: CPT | Mod: 95 | Performed by: INTERNAL MEDICINE

## 2024-06-12 ASSESSMENT — PAIN SCALES - GENERAL: PAINLEVEL: MILD PAIN (3)

## 2024-06-12 NOTE — LETTER
6/12/2024       RE: Jayne Leiva  [address] [address]       Dear Colleague,    Thank you for referring your patient, Jayne Leiva, to the [facility] [place] at [facility]. Please see a copy of my visit note below.    Jayne is a 58 year old who is being evaluated via a billable video visit.    How would you like to obtain your AVS? MyChart  If the video visit is dropped, the invitation should be resent by: Text to cell phone: [phone]  Will anyone else be joining your video visit? No      Assessment & Plan    Post-COVID chronic fatigue  Reviewed possible interventions with patient.  Given that she continues to have issues managing complex auditory and visual environments, recommend occupational therapy.  Safe and sound protocol was recommended.  - Occupational Therapy  Referral; Future      I spent a total of 30 minutes on the day of the visit.   Time spent by me doing chart review, history and exam, documentation and further activities per the note    No follow-ups on file.    Subjective  Jayne is a 58 year old, presenting for the following health issues:  Follow Up  Follow Up    HPI     Patient is following up on post-COVID issues. She reports that she has noticed improvement in sympotms. SHe is still having nerve pain, however, it has improved significantly. She is able to walk on a treadmill for about 30 minutes. She is also gardening.   [facility]. She was started on low-dose phentermine. Unfortunately, she was having palpitations at full dose. She is taking a lower dose and has noticed that her energy level has improved. She is having muscle aches following physical activity, but it is more manageable and does not last as long. She has noticed that she is still having significant issues with loud and  crowded places.  She often gets very tired after going to a restaurant.  She is wondering if something can be done for intervention.      Current concerns: Health Concerns        6/8/2024     7:21 AM   PHQ Assesment Total Score(s)   PHQ-9 Score 5           6/8/2024     7:22 AM   KELLE-7 Results   KELLE 7 TOTAL SCORE 1 (minimal anxiety)   KELLE-7 Total Score 1         6/8/2024     7:24 AM   PTSD Screen Score   Have you ever experienced this kind of event? Yes   PTSD Screen (Score of 3 or more suggests positive screen) 3         6/8/2024     7:31 AM   PROMIS-29   PROMIS Physical Function T-Score 38 (moderate dysfunction)   PROMIS Anxiety T-Score 40 (within normal limits)   PROMIS Depression T-Score 54 (within normal limits)   PROMIS Fatigue T-Score 61 (moderate)   PROMIS Sleep Disturbance T-Score 51 (within normal limits)   PROMIS Ability to Participate in Social Roles & Activities T-Score 40 (mild dysfunction)   PROMIS Pain Interference T-Score 61 (moderate)   PROMIS Pain Intensity 5           Past Medical History:   Diagnosis Date    Acid reflux disease     KELLE (generalized anxiety disorder)     Morbid obesity (H)     Other sleep apnea 3/6/2023    Pure hypercholesterolemia        Past Surgical History:   Procedure Laterality Date    ARTHROSCOPY SHOULDER Right 2015    ARTHROSCOPY SHOULDER, OPEN ROTATOR CUFF REPAIR, COMBINED Left 03/29/2017    Procedure:  Left shoulder arthroscopy and arthroscopic subacromial decompression (acromioplasty, bursectomy and coracoacromial ligament resection). Arthroscopic distal clavicle resection. Mini-open rotator cuff tear repair. Surgeon:  Kevin George MD  Location: [facility] Right 2008    CV CORONARY ANGIOGRAM N/A 4/14/2022    Procedure: Coronary Angiogram;  Surgeon: Olvin Roy MD;  Location:  HEART CARDIAC CATH LAB    DAVINCI HYSTERECTOMY TOTAL, BILATERAL SALPINGO-OOPHORECTOMY, COMBINED N/A 2/9/2021    Procedure: Robotic assisted total  laparoscopic hysterectomy, bilateral salpingectomy, bilateral oophorectomy, cystoscopy;  Surgeon: Jonathan Peters MD;  Location: [facility] OR    DISKECTOMY, LUMBAR, SINGLE SP      L5-S1    DISKECTOMY, LUMBAR, SINGLE SP      L5-S1    FUSION LUMBAR ANTERIOR ONE LEVEL      L5-S1    OSTEOTOMY FOOT Right 2017    Procedure: OSTEOTOMY FOOT;  1)  WEIL OSTEOTOMY RIGHT SECOND METATARSAL, 2) PLANTAR CAPSULE DEBRIDEMENT/SYNOVECTOMY, RIGHT SECOND METATARSAL PHALANGEAL JOINT, 3) HAMMERTOE REPAIR RIGHT SECOND TOE;  Surgeon: Olvin Betancur DPM;  Location: [facility]    [place] SPINE SURGERY PROCEDURE UNLISTED      RECONSTRUCT FOREFOOT WITH METATARSOPHALANGEAL (MTP) FUSION Right 2017    Procedure: RECONSTRUCT FOREFOOT WITH METATARSOPHALANGEAL (MTP) FUSION;;  Surgeon: Olvin Betancur DPM;  Location: [facility]    REPAIR HAMMER TOE Right 2017    Procedure: REPAIR HAMMER TOE;;  Surgeon: Olvin Betancur DPM;  Location: [facility]    ROTATOR CUFF REPAIR RT/LT Left        Family History   Problem Relation Age of Onset    Rheumatoid Arthritis Mother     Coronary Artery Disease Mother         s/p MI and PCIs in 50s    Dementia Mother     Diabetes Type 2  Mother     Rheumatoid Arthritis Sister     Myocardial Infarction Maternal Grandmother         in her 60s    Myocardial Infarction Maternal Grandfather         later in life       Social History     Tobacco Use    Smoking status: Former     Current packs/day: 0.00     Average packs/day: 0.8 packs/day for 26.0 years (19.5 ttl pk-yrs)     Types: Cigarettes     Start date: 1996     Quit date: 2022     Years since quittin.1     Passive exposure: Past    Smokeless tobacco: Never   Vaping Use    Vaping status: Never Used   Substance Use Topics    Alcohol use: Yes     Comment: rare    Drug use: No         Current Outpatient Medications:     arginine 1000 MG tablet, Take 2 tablets (2,000 mg) by mouth 2 times daily, Disp: 120 tablet, Rfl: 4    atorvastatin  (LIPITOR) 40 MG tablet, Take 0.5 tablets (20 mg) by mouth every evening, Disp: 90 tablet, Rfl: 1    D-RIBOSE PO, Take 5 g by mouth 3 times daily, Disp: , Rfl:     metFORMIN (GLUCOPHAGE XR) 500 MG 24 hr tablet, Take 4 tablets by mouth daily at 2 pm, Disp: , Rfl:     multivitamin w/minerals (THERA-VIT-M) tablet, Take 1 tablet by mouth daily, Disp: , Rfl:     Naltrexone HCl, Pain, 1.5 MG CAPS, Take 1.5 mg by mouth daily Start with 1 capsule (1.5 mg) daily, increase to 2 capsules (3 mg) after 2 weeks, then to 3 capsules (4.5 mg) daily after a month, Disp: 90 capsule, Rfl: 4    omeprazole (PRILOSEC) 20 MG DR capsule, Take 20 mg by mouth daily, Disp: , Rfl:     phentermine (ADIPEX-P) 37.5 MG tablet, Take 1 tablet by mouth daily at 2 pm, Disp: , Rfl:     pregabalin (LYRICA) 100 MG capsule, Take 1 capsule (100 mg) by mouth 2 times daily, Disp: 60 capsule, Rfl: 3    vitamin C (ASCORBIC ACID) 500 MG tablet, Take 1 tablet (500 mg) by mouth daily, Disp: 30 tablet, Rfl: 4    vitamin D2 (ERGOCALCIFEROL) 43827 units (1250 mcg) capsule, Take 50,000 Units by mouth once a week, Disp: , Rfl:           Objective   Vitals - Patient Reported  Pain Score: Mild Pain (3)  Pain Loc: Other - see comment (all over body pain)        Physical Exam   GENERAL: alert and no distress  EYES: Eyes grossly normal to inspection.  No discharge or erythema, or obvious scleral/conjunctival abnormalities.  RESP: No audible wheeze, cough, or visible cyanosis.    SKIN: Visible skin clear. No significant rash, abnormal pigmentation or lesions.  NEURO: Cranial nerves grossly intact.  Mentation and speech appropriate for age.  PSYCH: Appropriate affect, tone, and pace of words         Answers submitted by the patient for this visit:  Patient Health Questionnaire (Submitted on 6/8/2024)  If you checked off any problems, how difficult have these problems made it for you to do your work, take care of things at home, or get along with other people?: Somewhat  difficult  PHQ9 TOTAL SCORE: 5  KELLE-7 (Submitted on 6/8/2024)  KELLE 7 TOTAL SCORE: 1      Again, thank you for allowing me to participate in the care of your patient.      Sincerely,    Amy Gerardo MD

## 2024-06-12 NOTE — NURSING NOTE
Is the patient currently in the state of [place]? YES    Visit mode:VIDEO    If the visit is dropped, the patient can be reconnected by: VIDEO VISIT: Text to cell phone:   Telephone Information:   Mobile [phone]       Will anyone else be joining the visit? NO  (If patient encounters technical issues they should call [phone]    How would you like to obtain your AVS? MyChart    Are changes needed to the allergy or medication list? No    Are refills needed on medications prescribed by this physician? NO    Reason for visit: Follow Up    Malika ANDRES

## 2024-06-12 NOTE — PROGRESS NOTES
Jayne is a 58 year old who is being evaluated via a billable video visit.    How would you like to obtain your AVS? MyChart  If the video visit is dropped, the invitation should be resent by: Text to cell phone: [phone]  Will anyone else be joining your video visit? No      Assessment & Plan     Post-COVID chronic fatigue  Reviewed possible interventions with patient.  Given that she continues to have issues managing complex auditory and visual environments, recommend occupational therapy.  Safe and sound protocol was recommended.  - Occupational Therapy  Referral; Future      I spent a total of 30 minutes on the day of the visit.   Time spent by me doing chart review, history and exam, documentation and further activities per the note    No follow-ups on file.    Subjective   Jayne is a 58 year old, presenting for the following health issues:  Follow Up  Follow Up    HPI     Patient is following up on post-COVID issues. She reports that she has noticed improvement in sympotms. SHe is still having nerve pain, however, it has improved significantly. She is able to walk on a treadmill for about 30 minutes. She is also gardening.   [facility]. She was started on low-dose phentermine. Unfortunately, she was having palpitations at full dose. She is taking a lower dose and has noticed that her energy level has improved. She is having muscle aches following physical activity, but it is more manageable and does not last as long. She has noticed that she is still having significant issues with loud and crowded places.  She often gets very tired after going to a restaurant.  She is wondering if something can be done for intervention.      Current concerns: Health Concerns        6/8/2024     7:21 AM   PHQ Assesment Total Score(s)   PHQ-9 Score 5           6/8/2024     7:22 AM   KELLE-7 Results   KELLE 7 TOTAL SCORE 1 (minimal anxiety)   KELLE-7  Total Score 1         6/8/2024     7:24 AM   PTSD Screen Score   Have you ever experienced this kind of event? Yes   PTSD Screen (Score of 3 or more suggests positive screen) 3         6/8/2024     7:31 AM   PROMIS-29   PROMIS Physical Function T-Score 38 (moderate dysfunction)   PROMIS Anxiety T-Score 40 (within normal limits)   PROMIS Depression T-Score 54 (within normal limits)   PROMIS Fatigue T-Score 61 (moderate)   PROMIS Sleep Disturbance T-Score 51 (within normal limits)   PROMIS Ability to Participate in Social Roles & Activities T-Score 40 (mild dysfunction)   PROMIS Pain Interference T-Score 61 (moderate)   PROMIS Pain Intensity 5           Past Medical History:   Diagnosis Date    Acid reflux disease     KELLE (generalized anxiety disorder)     Morbid obesity (H)     Other sleep apnea 3/6/2023    Pure hypercholesterolemia        Past Surgical History:   Procedure Laterality Date    ARTHROSCOPY SHOULDER Right 2015    ARTHROSCOPY SHOULDER, OPEN ROTATOR CUFF REPAIR, COMBINED Left 03/29/2017    Procedure:  Left shoulder arthroscopy and arthroscopic subacromial decompression (acromioplasty, bursectomy and coracoacromial ligament resection). Arthroscopic distal clavicle resection. Mini-open rotator cuff tear repair. Surgeon:  Kevin George MD  Location: [facility]    BUNIONECTOMY Right 2008    CV CORONARY ANGIOGRAM N/A 4/14/2022    Procedure: Coronary Angiogram;  Surgeon: Olvin Roy MD;  Location:  HEART CARDIAC CATH LAB    DAVINCI HYSTERECTOMY TOTAL, BILATERAL SALPINGO-OOPHORECTOMY, COMBINED N/A 2/9/2021    Procedure: Robotic assisted total laparoscopic hysterectomy, bilateral salpingectomy, bilateral oophorectomy, cystoscopy;  Surgeon: Jonathan Peters MD;  Location:  OR    DISKECTOMY, LUMBAR, SINGLE SP  1996    L5-S1    DISKECTOMY, LUMBAR, SINGLE SP  1997    L5-S1    FUSION LUMBAR ANTERIOR ONE LEVEL  1999    L5-S1    OSTEOTOMY FOOT Right 06/06/2017    Procedure:  OSTEOTOMY FOOT;  1)  WEIL OSTEOTOMY RIGHT SECOND METATARSAL, 2) PLANTAR CAPSULE DEBRIDEMENT/SYNOVECTOMY, RIGHT SECOND METATARSAL PHALANGEAL JOINT, 3) HAMMERTOE REPAIR RIGHT SECOND TOE;  Surgeon: Olvin Betancur DPM;  Location: [facility]    [place] SPINE SURGERY PROCEDURE UNLISTED      RECONSTRUCT FOREFOOT WITH METATARSOPHALANGEAL (MTP) FUSION Right 2017    Procedure: RECONSTRUCT FOREFOOT WITH METATARSOPHALANGEAL (MTP) FUSION;;  Surgeon: Olvin Betancur DPM;  Location:  [facility]    REPAIR HAMMER TOE Right 2017    Procedure: REPAIR HAMMER TOE;;  Surgeon: Olvin Betancur DPM;  [facility]    ROTATOR CUFF REPAIR RT/LT Left 2017       Family History   Problem Relation Age of Onset    Rheumatoid Arthritis Mother     Coronary Artery Disease Mother         s/p MI and PCIs in 50s    Dementia Mother     Diabetes Type 2  Mother     Rheumatoid Arthritis Sister     Myocardial Infarction Maternal Grandmother         in her 60s    Myocardial Infarction Maternal Grandfather         later in life       Social History     Tobacco Use    Smoking status: Former     Current packs/day: 0.00     Average packs/day: 0.8 packs/day for 26.0 years (19.5 ttl pk-yrs)     Types: Cigarettes     Start date: 1996     Quit date: 2022     Years since quittin.1     Passive exposure: Past    Smokeless tobacco: Never   Vaping Use    Vaping status: Never Used   Substance Use Topics    Alcohol use: Yes     Comment: rare    Drug use: No         Current Outpatient Medications:     arginine 1000 MG tablet, Take 2 tablets (2,000 mg) by mouth 2 times daily, Disp: 120 tablet, Rfl: 4    atorvastatin (LIPITOR) 40 MG tablet, Take 0.5 tablets (20 mg) by mouth every evening, Disp: 90 tablet, Rfl: 1    D-RIBOSE PO, Take 5 g by mouth 3 times daily, Disp: , Rfl:     metFORMIN (GLUCOPHAGE XR) 500 MG 24 hr tablet, Take 4 tablets by mouth daily at 2 pm, Disp: , Rfl:     multivitamin w/minerals (THERA-VIT-M) tablet, Take 1 tablet by mouth daily,  Disp: , Rfl:     Naltrexone HCl, Pain, 1.5 MG CAPS, Take 1.5 mg by mouth daily Start with 1 capsule (1.5 mg) daily, increase to 2 capsules (3 mg) after 2 weeks, then to 3 capsules (4.5 mg) daily after a month, Disp: 90 capsule, Rfl: 4    omeprazole (PRILOSEC) 20 MG DR capsule, Take 20 mg by mouth daily, Disp: , Rfl:     phentermine (ADIPEX-P) 37.5 MG tablet, Take 1 tablet by mouth daily at 2 pm, Disp: , Rfl:     pregabalin (LYRICA) 100 MG capsule, Take 1 capsule (100 mg) by mouth 2 times daily, Disp: 60 capsule, Rfl: 3    vitamin C (ASCORBIC ACID) 500 MG tablet, Take 1 tablet (500 mg) by mouth daily, Disp: 30 tablet, Rfl: 4    vitamin D2 (ERGOCALCIFEROL) 70952 units (1250 mcg) capsule, Take 50,000 Units by mouth once a week, Disp: , Rfl:           Objective    Vitals - Patient Reported  Pain Score: Mild Pain (3)  Pain Loc: Other - see comment (all over body pain)        Physical Exam   GENERAL: alert and no distress  EYES: Eyes grossly normal to inspection.  No discharge or erythema, or obvious scleral/conjunctival abnormalities.  RESP: No audible wheeze, cough, or visible cyanosis.    SKIN: Visible skin clear. No significant rash, abnormal pigmentation or lesions.  NEURO: Cranial nerves grossly intact.  Mentation and speech appropriate for age.  PSYCH: Appropriate affect, tone, and pace of words          Video-Visit Details    Type of service:  Video Visit   Originating Location (pt. Location): Home    Distant Location (provider location):  Off-site  Platform used for Video Visit: Rakan  Signed Electronically by: Amy Gerardo MD    Answers submitted by the patient for this visit:  Patient Health Questionnaire (Submitted on 6/8/2024)  If you checked off any problems, how difficult have these problems made it for you to do your work, take care of things at home, or get along with other people?: Somewhat difficult  PHQ9 TOTAL SCORE: 5  KELLE-7 (Submitted on 6/8/2024)  KELLE 7 TOTAL SCORE: 1

## 2024-06-17 PROBLEM — Z76.89 HEALTH CARE HOME: Status: RESOLVED | Noted: 2023-03-06 | Resolved: 2024-06-17

## 2024-06-20 ENCOUNTER — THERAPY VISIT (OUTPATIENT)
Dept: OCCUPATIONAL THERAPY | Facility: CLINIC | Age: 59
End: 2024-06-20
Attending: INTERNAL MEDICINE
Payer: COMMERCIAL

## 2024-06-20 DIAGNOSIS — G93.32 POST-COVID CHRONIC FATIGUE: ICD-10-CM

## 2024-06-20 DIAGNOSIS — Z78.9 ALTERATION IN INSTRUMENTAL ACTIVITIES OF DAILY LIVING (IADL): Primary | ICD-10-CM

## 2024-06-20 DIAGNOSIS — U09.9 POST-COVID CHRONIC FATIGUE: ICD-10-CM

## 2024-06-20 PROCEDURE — 97166 OT EVAL MOD COMPLEX 45 MIN: CPT | Mod: GO | Performed by: OCCUPATIONAL THERAPIST

## 2024-06-20 PROCEDURE — 97535 SELF CARE MNGMENT TRAINING: CPT | Mod: GO | Performed by: OCCUPATIONAL THERAPIST

## 2024-06-20 NOTE — PROGRESS NOTES
"OCCUPATIONAL THERAPY EVALUATION  Type of Visit: Evaluation       Fall Risk Screen:  Have you fallen 2 or more times in the past year?: No  Have you fallen and had an injury in the past year?: No  Is patient a fall risk?: No  Fall screen comments: has PT    Subjective        Jayne Leiva is a 59 y.o. female with referral for OP occupational therapy from Amy Raya MD.    Per chart:   Reviewed possible interventions with patient. Given that she continues to have issues managing complex auditory and visual environments, recommend occupational therapy. Safe and sound protocol was recommended.     Pt arrives on time, alone. Pt reports first COVID case 1/2022, then again in 12/2022. Pt reports having \"wonky nerve pain\", now is manageable. Pt reports continued difficulty with going out to a restaurant with too much noise, cloudy thinking, riding in a car. Pt reports driving to [employer], but that is about it. Pt reports changes in irritability, causing frustration. Pt reports having difficulty with tying fishing line on lure. Evaluation completed as ordered.     Presenting condition or subjective complaint: Issues with too much noise and visual stumulation over the last 2+ years, post covid issues.  Date of onset: 06/12/24    Relevant medical history: Dizziness; Overweight; Sleep disorder like apnea; Smoking   Dates & types of surgery: Should all be on file, have entered many times.    Prior diagnostic imaging/testing results: MRI     Prior therapy history for the same diagnosis, illness or injury: No      Prior Level of Function  Transfers: Independent  Ambulation: Independent  ADL: Independent  IADL: Driving, Finances, Housekeeping, Laundry, Meal preparation, Medication management, Work    Living Environment  Social support: With a significant other or spouse   Type of home: House; 1 level; Basement   Stairs to enter the home: Yes 3 Is there a railing: Yes     Ramp: No   Stairs inside the home: Yes 14 Is " "there a railing: Yes     Help at home: None  Equipment owned:       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   Hobbies/Interests: Gardening, walking, hiking, camping    Patient goals for therapy: Participate in social activities again without becoming overly fatigued.  Go for a long ride without becoming overly fatigued.    Pain assessment: Pain present  Location: headache, right ankle/Rating: 3/10     Objective   Vision Interview    Technology Use/Symptoms    Glasses Progressive   Light Sensitivity/Glare no   Impaired Vision no   Reading \"I will see things wrong, miss things\"        Reading Endurance/Fatigue    Visual Fatigue Comments    Convergence To be assessed   Pursuits To be assessed   Pupillary Function To be assessed        Difficulty with IADL Performance: Symptom Increase    Difficulty Concentrating at School, Work or Home yes   Difficulty Multitasking/Planning yes   Busy/Dynamic Environments yes   Path Finding in Busy Environments yes   Sensory Tolerance no   Startles Easily no        Mood Changes    Is Patient Experiencing Changes in Mood? Feeling irritable   Is Patient Currently Receiving Treatment for Mental Health? Yes        Vestibular Symptoms    Is Patient Experiencing Vestibular Symptoms? Yes   Triggers for Vestibular Symptoms N/a        Fatigue    General Fatigue Work        Cognitive Status Examination  Orientation: Oriented to person, place and time   Level of Consciousness: Alert  Follows Commands and Answers Questions: 100% of the time  Personal Safety and Judgement: Intact  Memory: Intact  Attention: No deficits identified  Organization/Problem Solving: No deficits identified  Executive Function: No deficits identified    VISUAL SKILLS  Visual Acuity: Wears glasses, progressives  Visual Field:  to be assessed  Visual Attention: Appears normal  Oculomotor: to be assessed    SENSATION: UE " Sensation WNL    POSTURE: WNL  RANGE OF MOTION: UE AROM WNL  STRENGTH: UE Strength WNL  MUSCLE TONE: WNL  COORDINATION: WFL  BALANCE: WFL    FUNCTIONAL MOBILITY  Assistive Device(s): None  Ambulation: independent  Wheelchair: n/a    BED MOBILITY: Independent    TRANSFERS: Independent    BATHING: Independent    UPPER BODY DRESSING: Independent    LOWER BODY DRESSING: Independent    TOILETING: Independent    GROOMING: Independent    EATING/SELF FEEDING: Independent     ACTIVITY TOLERANCE: Pt reports constant fatigue. Pt reports previously having drastic ups and downs, lowest period meaning she would need to stay home from work, or working a half day. Pt reports she hasn't had a big up/down in a few months. Pt reports journaling throughout process.       INSTRUMENTAL ACTIVITIES OF DAILY LIVING (IADL):   Meal Planning/Prep: independent  Home/Financial Management: independent  Communication/Computer Use: independent  Community Mobility: will drive to appts, cannot tolerate driving long periods or riding for long periods.   Care of Others:- n/a  Sleep: Pt reports sleeping fine, was diagnosed with sleep apnea but doesn't think she has sleep apnea. Pt reports being able to fall asleep easily, able to stay asleep. Pt reports sleeping deeper with CPAP. Pt reports about 8 hours per night, will continue to wake up with fatigue.     Functional Assessment of Chronic Illness Therapy - Fatigue (FACIT-F): 25/52     Assessment & Plan   CLINICAL IMPRESSIONS  Medical Diagnosis: Post-COVID chronic fatigue (G93.32, U09.9)    Treatment Diagnosis: Post-COVID chronic fatigue (G93.32, U09.9), alteration in instrumental activities of daily living    Impression/Assessment: Pt is a 59 year old female presenting to Occupational Therapy due to chronic fatigue, alteration in IADLs, sensitivity to sound.  The following significant findings have been identified: Impaired activity tolerance, Pain, and Impaired visual perception.  These identified  deficits interfere with their ability to perform self care tasks, work tasks, and driving  as compared to previous level of function.     Clinical Decision Making (Complexity):  Assessment of Occupational Performance: 3-5 Performance Deficits  Occupational Performance Limitations: driving and community mobility, work, leisure activities, and social participation  Clinical Decision Making (Complexity): Moderate complexity    PLAN OF CARE  Treatment Interventions:  Interventions: Self-Care/Home Management, Therapeutic Activity, Therapeutic Exercise    Long Term Goals   OT Goal 1  Goal Identifier: facit  Goal Description: Patient will identify at least 3 methods to manage her fatigue/energy to demonstrate at least a 5 point improvement in her perceived level of fatigue as measured by the FACIT, in order to increase safety and independence with ADL/IADL tasks.  Target Date: 09/18/24  OT Goal 2  Goal Identifier: fatigue  Goal Description: Pt to verbalize understanding of 3-5 fatigue management techniques in order to increase safety and independence with ADL/IADL tasks.  Target Date: 09/18/24  OT Goal 3  Goal Identifier: SSP  Goal Description: Pt to demonstrate use and understanding of the Safe and Sound Protocol in order to self regulate auditory stimuli in order to increase manage of IADL tasks.  Target Date: 09/18/24  OT Goal 4  Goal Identifier: IADLs  Goal Description: Pt will demonstrate adequate performance with [employer] to demonstrate appropriate reaction time and processing speed for IADL s.  Target Date: 09/18/24      Frequency of Treatment: one time per week  Duration of Treatment: up to 90 days     Recommended Referrals to Other Professionals:  none  Education Assessment: Learner/Method: Patient;Listening     Risks and benefits of evaluation/treatment have been explained.   Patient/Family/caregiver agrees with Plan of Care.     Evaluation Time:    OT Eval, Moderate Complexity Minutes (89405): 30      Signing  Clinician: Bob Cruz, OT

## 2024-06-26 ENCOUNTER — THERAPY VISIT (OUTPATIENT)
Dept: OCCUPATIONAL THERAPY | Facility: CLINIC | Age: 59
End: 2024-06-26
Attending: INTERNAL MEDICINE
Payer: COMMERCIAL

## 2024-06-26 DIAGNOSIS — G93.32 POST-COVID CHRONIC FATIGUE: Primary | ICD-10-CM

## 2024-06-26 DIAGNOSIS — U09.9 POST-COVID CHRONIC FATIGUE: Primary | ICD-10-CM

## 2024-06-26 PROCEDURE — 97533 SENSORY INTEGRATION: CPT | Mod: GO

## 2024-06-26 PROCEDURE — 97535 SELF CARE MNGMENT TRAINING: CPT | Mod: GO

## 2024-06-28 ENCOUNTER — MYC REFILL (OUTPATIENT)
Dept: PHYSICAL MEDICINE AND REHAB | Facility: CLINIC | Age: 59
End: 2024-06-28
Payer: COMMERCIAL

## 2024-06-28 DIAGNOSIS — M79.10 POST-COVID CHRONIC MUSCLE PAIN: ICD-10-CM

## 2024-06-28 DIAGNOSIS — G89.29 POST-COVID CHRONIC MUSCLE PAIN: ICD-10-CM

## 2024-06-28 DIAGNOSIS — U09.9 POST-COVID CHRONIC MUSCLE PAIN: ICD-10-CM

## 2024-07-03 ENCOUNTER — THERAPY VISIT (OUTPATIENT)
Dept: OCCUPATIONAL THERAPY | Facility: CLINIC | Age: 59
End: 2024-07-03
Attending: INTERNAL MEDICINE
Payer: COMMERCIAL

## 2024-07-03 DIAGNOSIS — G93.32 POST-COVID CHRONIC FATIGUE: Primary | ICD-10-CM

## 2024-07-03 DIAGNOSIS — U09.9 POST-COVID CHRONIC FATIGUE: Primary | ICD-10-CM

## 2024-07-03 PROCEDURE — 97535 SELF CARE MNGMENT TRAINING: CPT | Mod: GO

## 2024-07-08 ENCOUNTER — THERAPY VISIT (OUTPATIENT)
Dept: PHYSICAL THERAPY | Facility: CLINIC | Age: 59
End: 2024-07-08
Payer: COMMERCIAL

## 2024-07-08 DIAGNOSIS — U09.9 POST-COVID CHRONIC FATIGUE: Primary | ICD-10-CM

## 2024-07-08 DIAGNOSIS — G93.32 POST-COVID CHRONIC FATIGUE: Primary | ICD-10-CM

## 2024-07-08 PROCEDURE — 97112 NEUROMUSCULAR REEDUCATION: CPT | Mod: GP | Performed by: PHYSICAL THERAPIST

## 2024-07-08 PROCEDURE — 97110 THERAPEUTIC EXERCISES: CPT | Mod: GP | Performed by: PHYSICAL THERAPIST

## 2024-07-19 ENCOUNTER — THERAPY VISIT (OUTPATIENT)
Dept: OCCUPATIONAL THERAPY | Facility: CLINIC | Age: 59
End: 2024-07-19
Attending: INTERNAL MEDICINE
Payer: COMMERCIAL

## 2024-07-19 DIAGNOSIS — R41.89 COGNITIVE CHANGES: ICD-10-CM

## 2024-07-19 DIAGNOSIS — G93.32 POST-COVID CHRONIC FATIGUE: Primary | ICD-10-CM

## 2024-07-19 DIAGNOSIS — U09.9 POST-COVID CHRONIC FATIGUE: Primary | ICD-10-CM

## 2024-07-19 PROCEDURE — 97530 THERAPEUTIC ACTIVITIES: CPT | Mod: GO

## 2024-07-23 ASSESSMENT — ANXIETY QUESTIONNAIRES
1. FEELING NERVOUS, ANXIOUS, OR ON EDGE: SEVERAL DAYS
GAD7 TOTAL SCORE: 5
6. BECOMING EASILY ANNOYED OR IRRITABLE: SEVERAL DAYS
7. FEELING AFRAID AS IF SOMETHING AWFUL MIGHT HAPPEN: NOT AT ALL
GAD7 TOTAL SCORE: 5
8. IF YOU CHECKED OFF ANY PROBLEMS, HOW DIFFICULT HAVE THESE MADE IT FOR YOU TO DO YOUR WORK, TAKE CARE OF THINGS AT HOME, OR GET ALONG WITH OTHER PEOPLE?: SOMEWHAT DIFFICULT
4. TROUBLE RELAXING: SEVERAL DAYS
5. BEING SO RESTLESS THAT IT IS HARD TO SIT STILL: SEVERAL DAYS
2. NOT BEING ABLE TO STOP OR CONTROL WORRYING: NOT AT ALL
IF YOU CHECKED OFF ANY PROBLEMS ON THIS QUESTIONNAIRE, HOW DIFFICULT HAVE THESE PROBLEMS MADE IT FOR YOU TO DO YOUR WORK, TAKE CARE OF THINGS AT HOME, OR GET ALONG WITH OTHER PEOPLE: SOMEWHAT DIFFICULT
3. WORRYING TOO MUCH ABOUT DIFFERENT THINGS: SEVERAL DAYS
7. FEELING AFRAID AS IF SOMETHING AWFUL MIGHT HAPPEN: NOT AT ALL

## 2024-07-25 ENCOUNTER — VIRTUAL VISIT (OUTPATIENT)
Dept: PALLIATIVE MEDICINE | Facility: CLINIC | Age: 59
End: 2024-07-25
Payer: COMMERCIAL

## 2024-07-25 ENCOUNTER — LAB REQUISITION (OUTPATIENT)
Dept: LAB | Facility: CLINIC | Age: 59
End: 2024-07-25

## 2024-07-25 DIAGNOSIS — U09.9 POST-COVID CHRONIC FATIGUE: ICD-10-CM

## 2024-07-25 DIAGNOSIS — G89.29 POST-COVID CHRONIC MUSCLE PAIN: Primary | ICD-10-CM

## 2024-07-25 DIAGNOSIS — G93.32 POST-COVID CHRONIC FATIGUE: ICD-10-CM

## 2024-07-25 DIAGNOSIS — M75.21 BICEPS TENDONITIS, RIGHT: ICD-10-CM

## 2024-07-25 DIAGNOSIS — M79.18 MYALGIA, MULTIPLE SITES: ICD-10-CM

## 2024-07-25 DIAGNOSIS — M79.10 POST-COVID CHRONIC MUSCLE PAIN: Primary | ICD-10-CM

## 2024-07-25 DIAGNOSIS — U09.9 POST-COVID CHRONIC MUSCLE PAIN: Primary | ICD-10-CM

## 2024-07-25 PROCEDURE — 96158 HLTH BHV IVNTJ INDIV 1ST 30: CPT | Mod: 95 | Performed by: PSYCHOLOGIST

## 2024-07-25 NOTE — PROGRESS NOTES
Jayne Leiva is a 59 year old female who is being evaluated via a billable video visit.      Patient is currently in the [facility]? yes    Patient would like the video invitation sent by: Text to cell phone: [phone]}    Video Start Time: 8:00 AM  Video Stop Time: 8:26 AM    Additional provider notes:     Pain Diagnoses per pain provider:   Post-COVID chronic muscle pain     Post-COVID chronic fatigue     Myalgia, multiple sites     Biceps tendonitis, right            DATA: During today's visit you reported the following: Your chronic pain is overall feeling well-managed. Your mood is mildly worse - learned mother has just a few weeks according to hospice. Your activity level is increased and sustained in general at a higher level than in the past. Your stress level is overall manageable - situational increase due to mother and former father-in-law's health worsening, work stress. Your sleep is a little less restful - discussed this could be more situational, getting used to CPAP. You reported engaging in self-care for your pain 2-3 times daily.    You identified that you would like to focus on the following or had questions regarding the following issues or concerns, and we discussed the following:   - believe current regimen is providing overall relief  - report surgeon says you have tennis elbow and injected again 3 days ago roughly   - ex's father is not doing well either - a lot going on between his illness and mother's health   - discussed using brain dump before bed  - discussed that provider's role within [facility] is changing, effective 10/9/2024 and how this impacts work together     ASSESSMENT: Overall Jayne reports she has been continuing on a positive trajector with her pain management. She has some situational stressors in the last few weeks, and she seems to have excellent awareness that this could trigger worse sleep, lower mood and possibly increase pain.     PLAN:    Your next appointment is scheduled for n/a - may schedule as needed before 10/9.   Assignment/Objectives /interventions for next session:   - continue to engage in self-care  - continue to engage in physical activity as tolerated    We believe regular attendance is key to your success in our program!    Any time you are unable to keep your appointment we ask that you call us at [phone] at least 24 hours in advance to cancel.This will allow us to offer the appointment time to another patient.   Multiple missed appointments may lead to dismissal from the clinic.    Telemedicine Visit: The patient's condition can be safely assessed and treated via synchronous audio and visual telemedicine encounter.      Reason for Telemedicine Visit: Services only offered telehealth    Originating Site (Patient Location): Patient's home    Distant Site (Provider Location): Provider Remote Setting- Home Office    Consent:  The patient/guardian has verbally consented to: the potential risks and benefits of telemedicine (video visit) versus in person care; bill my insurance or make self-payment for services provided; and responsibility for payment of non-covered services.     Mode of Communication:  Video Conference via StudyApps    As the provider I attest to compliance with applicable laws and regulations related to telemedicine.      Oneyda Faye PsyD LP  Licensed Psychologist  Outpatient Clinic Therapist  [facility]

## 2024-07-28 ENCOUNTER — APPOINTMENT (OUTPATIENT)
Dept: LAB | Facility: CLINIC | Age: 59
End: 2024-07-28
Payer: COMMERCIAL

## 2024-07-28 LAB
CORTIS SERPL-MCNC: 7.3 UG/DL
ESTRADIOL SERPL-MCNC: 24 PG/ML
FSH SERPL IRP2-ACNC: 41.1 MIU/ML
LH SERPL-ACNC: 28.5 MIU/ML
Lab: 4465
Lab: NORMAL
PERFORMING LABORATORY: NORMAL
SHBG SERPL-SCNC: 52 NMOL/L (ref 30–135)
T3 SERPL-MCNC: 101 NG/DL (ref 85–202)
T4 FREE SERPL-MCNC: 1.28 NG/DL (ref 0.9–1.7)
TEST NAME: NORMAL
TSH SERPL DL<=0.005 MIU/L-ACNC: 0.62 UIU/ML (ref 0.3–4.2)

## 2024-07-28 PROCEDURE — 84439 ASSAY OF FREE THYROXINE: CPT | Performed by: INTERNAL MEDICINE

## 2024-07-28 PROCEDURE — 82670 ASSAY OF TOTAL ESTRADIOL: CPT | Performed by: INTERNAL MEDICINE

## 2024-07-28 PROCEDURE — 84270 ASSAY OF SEX HORMONE GLOBUL: CPT | Performed by: INTERNAL MEDICINE

## 2024-07-28 PROCEDURE — 83002 ASSAY OF GONADOTROPIN (LH): CPT | Performed by: INTERNAL MEDICINE

## 2024-07-28 PROCEDURE — 82533 TOTAL CORTISOL: CPT | Performed by: INTERNAL MEDICINE

## 2024-07-28 PROCEDURE — 84443 ASSAY THYROID STIM HORMONE: CPT | Performed by: INTERNAL MEDICINE

## 2024-07-28 PROCEDURE — 83001 ASSAY OF GONADOTROPIN (FSH): CPT | Performed by: INTERNAL MEDICINE

## 2024-07-28 PROCEDURE — 86376 MICROSOMAL ANTIBODY EACH: CPT | Performed by: INTERNAL MEDICINE

## 2024-07-28 PROCEDURE — 82024 ASSAY OF ACTH: CPT | Performed by: INTERNAL MEDICINE

## 2024-07-28 PROCEDURE — 84403 ASSAY OF TOTAL TESTOSTERONE: CPT | Performed by: INTERNAL MEDICINE

## 2024-07-28 PROCEDURE — 84480 ASSAY TRIIODOTHYRONINE (T3): CPT | Performed by: INTERNAL MEDICINE

## 2024-07-29 LAB
ACTH PLAS-MCNC: <10 PG/ML
Lab: 4465
PERFORMING LABORATORY: NORMAL
SPECIMEN STATUS: NORMAL
TEST NAME: NORMAL
THYROPEROXIDASE AB SERPL-ACNC: <10 IU/ML

## 2024-07-30 LAB — LABCORP INTERFACED MISCELLANEOUS TEST RESULT: NORMAL

## 2024-07-31 LAB
MISCELLANEOUS TEST 1 (ARUP): NORMAL
TESTOST SERPL-MCNC: 26 NG/DL (ref 8–60)

## 2024-08-02 ENCOUNTER — TRANSFERRED RECORDS (OUTPATIENT)
Dept: FAMILY MEDICINE | Facility: CLINIC | Age: 59
End: 2024-08-02

## 2024-08-03 ENCOUNTER — MYC MEDICAL ADVICE (OUTPATIENT)
Dept: PHYSICAL MEDICINE AND REHAB | Facility: CLINIC | Age: 59
End: 2024-08-03
Payer: COMMERCIAL

## 2024-08-04 LAB — LABCORP INTERFACED MISCELLANEOUS TEST RESULT: NORMAL

## 2024-08-05 ENCOUNTER — THERAPY VISIT (OUTPATIENT)
Dept: OCCUPATIONAL THERAPY | Facility: CLINIC | Age: 59
End: 2024-08-05
Attending: INTERNAL MEDICINE
Payer: COMMERCIAL

## 2024-08-05 DIAGNOSIS — G93.32 POST-COVID CHRONIC FATIGUE: Primary | ICD-10-CM

## 2024-08-05 DIAGNOSIS — U09.9 POST-COVID CHRONIC FATIGUE: Primary | ICD-10-CM

## 2024-08-05 PROCEDURE — 97530 THERAPEUTIC ACTIVITIES: CPT | Mod: GO

## 2024-08-06 ENCOUNTER — THERAPY VISIT (OUTPATIENT)
Dept: PHYSICAL THERAPY | Facility: CLINIC | Age: 59
End: 2024-08-06
Payer: COMMERCIAL

## 2024-08-06 DIAGNOSIS — U09.9 POST-COVID CHRONIC FATIGUE: Primary | ICD-10-CM

## 2024-08-06 DIAGNOSIS — G93.32 POST-COVID CHRONIC FATIGUE: Primary | ICD-10-CM

## 2024-08-06 PROCEDURE — 97110 THERAPEUTIC EXERCISES: CPT | Mod: GP | Performed by: PHYSICAL THERAPIST

## 2024-08-06 PROCEDURE — 97112 NEUROMUSCULAR REEDUCATION: CPT | Mod: GP | Performed by: PHYSICAL THERAPIST

## 2024-08-08 ENCOUNTER — VIRTUAL VISIT (OUTPATIENT)
Dept: PALLIATIVE MEDICINE | Facility: CLINIC | Age: 59
End: 2024-08-08
Attending: NURSE PRACTITIONER
Payer: COMMERCIAL

## 2024-08-08 DIAGNOSIS — U09.9 POST-COVID CHRONIC FATIGUE: ICD-10-CM

## 2024-08-08 DIAGNOSIS — M79.18 MYALGIA, MULTIPLE SITES: Primary | ICD-10-CM

## 2024-08-08 DIAGNOSIS — G93.32 POST-COVID CHRONIC FATIGUE: ICD-10-CM

## 2024-08-08 LAB — MISCELLANEOUS TEST 1 (ARUP): NORMAL

## 2024-08-08 PROCEDURE — 99214 OFFICE O/P EST MOD 30 MIN: CPT | Mod: 95 | Performed by: NURSE PRACTITIONER

## 2024-08-08 RX ORDER — PREGABALIN 150 MG/1
150 CAPSULE ORAL 2 TIMES DAILY
Qty: 180 CAPSULE | Refills: 1 | Status: SHIPPED | OUTPATIENT
Start: 2024-08-08

## 2024-08-08 ASSESSMENT — PAIN SCALES - PAIN ENJOYMENT GENERAL ACTIVITY SCALE (PEG)
AVG_PAIN_PASTWEEK: 3
PEG_TOTALSCORE: 4.33
PEG_TOTALSCORE: 4.33
INTERFERED_ENJOYMENT_LIFE: 5
INTERFERED_GENERAL_ACTIVITY: 5
INTERFERED_ENJOYMENT_LIFE: 5
AVG_PAIN_PASTWEEK: 3
INTERFERED_GENERAL_ACTIVITY: 5

## 2024-08-08 ASSESSMENT — PAIN SCALES - GENERAL: PAINLEVEL: MODERATE PAIN (5)

## 2024-08-08 NOTE — PATIENT INSTRUCTIONS
Increase Lyrica up to 150 mg twice a day.   Continue to work with PT/OT/Psychology.  Follow up with me in 3-6 months to reassess symptoms and response to treatment.   _____________________________________________________  Scheduling/Clinic telephone number for ALL locations:  [phone]    After Hours On-Call Service for Emergencies:  [phone]  - Calls are returned by our nurse care coordinators Monday - Friday between 8 AM and 4:00 PM. We usually return messages within 2-3 business days depending on the issue or request. I am not in the clinic on Fridays.   - For medication refills, call the clinic or send a [employer] message 7 days in advance.  Please include the name of the requested medication and your preferred pharmacy. Please allow 3-4 days to be processed.   We are not able to refill medications over the weekend or after clinic hours.   - If you are unable to keep your appointment, call us at least 24 hours in advance to cancel to allow us to offer the appointment time to another patient. Multiple missed appointments will lead to dismissal from the clinic.

## 2024-08-08 NOTE — PROGRESS NOTES
Video-Visit Details    Type of service:  Video Visit   Originating Location (pt. Location): Home    Distant Location (provider location):  On-site  Platform used for Video Visit: Rakan  Joined the call at 8/8/2024, 8:00:51 am.  Left the call at 8/8/2024, 8:32:18 am.  You were on the call for 31 minutes 27 seconds   Is Pt currently in [place]? Yes    NOTE:  If Pt is not in [place], Appointment needs to be canceled and rescheduled.  ____________________________________________________________    [facility]   Date of Visit: 8/8/2024  Last visit: 5/8/2024  Original Consult: 11/1/23    Jayne Leiva is a 59 year old female who has a past medical history of Acid reflux disease, KELLE (generalized anxiety disorder), Morbid obesity (H), Other sleep apnea (3/6/2023), and Pure hypercholesterolemia. Jayne is being seen today for ongoing management of chronic pain.    History:  Post covid myalgia:  After first episode of COVID in February of 2022, she had fatigue that never went away. Suqsequent COVID infection in December of 2022, persistent muscle pain and brain fog began. Treating with Dr. Gerardo [facility]. Has been treated with therapy (PT and OT) to build endurance, LDN, arginine and vitamin C supplements. Did not tolerate topiramate or gabapentin.   Chronic shoulder and right arm pain:  PT for her arm and wrist at [facility]. Kinesio tape has been helpful for reducing her pain in her arm. Has a history of right RCR.     Recommendations from last visit:  Having good response to pregabalin 50 mg 3 times a day.  Advised increasing up to 100 mg twice a day, instructions entered on AVS.  If she is tolerating this dose, she will call me to get new prescription for 100 mg capsules twice a day.  Alternately if she does not tolerate an increase, will convert to 75 mg twice a day.  Continue with pain PT and pain psychology.  She was interested in reducing some of her medications as she is feeling  "better, advised changing or eliminating 1 medication at a time to accurately evaluate response.  Will plan to follow-up next in 3 months, sooner if needed    ____________________________________________________________  Interval History   - Good and bad days. Alternating walking with other activities like mowing, gardening. Energy is slowly improving. She has noticed that since stopping phentermine, she has felt like she cannot focus or concentrate as well.   - Titrated Lyrica up to dose of 100 twice a day, no side effects. Has been helpful. Stopped arginine and d-ribose, there was no difference in her pain with stopping these medications.   -She has completed 9 Pain PT visits, continues to make progress. Noted to have mental fatigue. Reached out to Dr. Gerardo with question of if Vyvance would be helpful as her daughter was prescribed this for Chronic Fatigue Syndrome.  She and her daughter have had similar symptoms post-covid and her daughter is doing much better with this treatment. Planning to discuss with Dr. Sanders.   - She does feel like working with Gerri Morgan and Britany Faye have been helpful for symptom management and control. She is also using an remberto recommended by Dr. Gerardo called \"Safe and Sound,\" to help reduce fatigue and feels of overstimulation. Started OT as she was having visual incongruence, this treatment is helping. It has been challenging that her coverage of treatments by insurance has been poor and she has accumulated medical debit.   - Walking increases pain and mental fogginess, slowly building on the treadmill. Up to 9 minutes a day, slowed due to recent illness. Goal to walk for pleasure again.   - Right bicep tendonitis and radial tunnel syndrome was better after steroid injections in May. Then it wore off after 3 months, so she was referred to surgeon who opined that it was tennis elbow. He suggested another injection for tennis elbow, this was not helpful (neither was the " previous steroid injection for tennis elbow.) She is now confused on what to do, feels confused by course of treatment at [facility].  - She is working from home full time. Can have some ability to pace her work and take breaks with the support of her manager.   -  , adult children. Her mother lives in [place], in hospice care, but has remained fairly stable despite being told that her mother is likely to pass away soon.     Current pain medications:  LDN 4.5 mg per day  Lyrica 100 mg BID  Tylenol as needed    Pain description:  Location: wide spread, location can vary  Quality: cramping, sharp, cutting, dull, aching, throbbing   Duration: PM   Severity/Intensity: Moderate Pain (5)   Aggravating factors include: standing, walking, exercise  Relieving factors include: lying down, medication  Pain Intensity and Interference in the past week  Date PEG   11/27/23 6.33   1/26/24 7.67   3/19/24 8   5/1/2024 5.67   8/8/2024 4.33         PAIN MANAGEMENT TREATMENT HISTORY  1. MEDICATIONS:  Opioids: Codeine, Oxycodone  NSAIDs: Ibuprofen, Naproxen  Muscle relaxants: Cyclobenzaprine  Pain Antidepressants/ Anxiolytics: Bupropion, Citalopram   Neuroleptics: Topiramate (GI and CNS symptoms), gabapentin SE (lightheaded, LE edema), Lyrica has been helpful  Topical: Other gels, creams, patches or ointments   Adjuvant medications: Acetaminophen, vitamin C, arginine. LDN helpful for fatigue and brain fog, less so for the pain.  2. PHYSICAL THERAPY:   Pain PT started 2024 with Gerri Morgan, 6 visits completed  PT at [facility] for right arm and shoulder pain [id number]  Completed 7 OT visits with Nicole Guzman and 7 PT visits with Jerri Hubbard for Post Covid treatment  3. PAIN PSYCHOLOGY:   Currently working with Oneyda Faye, PhD  Had done extensive therapy after divorce  4. SURGERY:  Spine fusion in 1999, Dr. Morrissey at the [facility]  5. INJECTIONS:   2/2023 Right elbow CSI at [facility]  4/2024 right bicep tendon and radial tunnel steroid  injection at [facility], helpful  6. COMPLEMENTARY THERAPY:  Chiropractic: Has not tried  Not interested in trying  Acupuncture/acupressure: Has not tried  TENS unit: Has not tried  Walks with her dog  for exercise      Social History   She reports that she quit smoking about 2 years ago. Her smoking use included cigarettes. She started smoking about 28 years ago. She has a 19.5 pack-year smoking history. She has been exposed to tobacco smoke. She has never used smokeless tobacco. She reports current alcohol use. She reports that she does not use drugs.    Social History     Social History Narrative     in April, 2023, he and her  have been together since 2015. They dated in high school as well.      She was previously  once.     3 adult kids(2 daughters and 1 son)    No grandchildren.    Walks her dog for exercise.     Works full time as a  at a [employer]      Medications and Allergies reviewed.    Objective   LMP  (LMP Unknown)   Constitutional: Well developed, well nourished, appears stated age. No acute distress.  Gait is normal and steady  HEENT: Head atraumatic, normocephalic. Eyes without conjunctival injection or jaundice. Neck supple.   Skin: No obvious rash, lesions, or petechiae of exposed skin.   Extremities: Peripheral pulses intact. No clubbing, cyanosis, or edema. Moves all extremities.  Psychiatric/mental status: Alert, without lethargy or stupor. Speech fluent. Appropriate affect. Mood normal. Able to follow commands without difficulty.   Musculoskeletal exam: Normal bulk and tone. Unremarkable spinal curvature.      Assessment & Plan   Post-COVID chronic muscle pain  Post-COVID chronic fatigue  Myalgia, multiple sites  She has made considerable, albeit slow progress with pain management. Continue to work with PT/OT/Psychology.Fatigue continues to be very prevalent.     I think it may be reasonable to consider a trial of a stimulant to see if this could further  improve her symptoms of fatigue and brain fog. She did notice an improvement in her symptoms when taking phentermine for weight loss. She certainly has participated whole heartedly in all treatments and therapies both with me and Dr. Gerardo with suboptimal relief. Encouraged her to discuss with Dr. Sanders as stimulants are not something that I use for pain management.     Increase Lyrica up to 150 mg twice a day.     Bicep tendinitis, right  Continue cares with [facility].       Melita Sullivan, CNP-BC, PMGT-BC, AP-PMN  [facility]

## 2024-08-09 NOTE — PROGRESS NOTES
Assessment & Plan   Problem List Items Addressed This Visit          Digestive    Morbid obesity (H)       Other    Post-COVID chronic fatigue - Primary     Other Visit Diagnoses       Other chronic pain        Borderline diabetes        Pain of right forearm               1. Post-COVID chronic fatigue  Ongoing fatigue. I discussed with pharmacist, Cristin, she said that guanfacine has been shown to be helpful in a small study. I recommended that she discuss this with her post covid specialist. We also discussed using an selective serotonin reuptake inhibitor, she said she had many reasons why the ones she has already taken didn't work for her. Consider referral to psychiatry for other medication options.    2. Other chronic pain  Continue with pain clinic.    3. Morbid obesity (H)      4. Borderline diabetes      5. Pain of right forearm  Alejandro has all of the records of her arm workup. She seems frustrated with the fact that she still has pain. Hasn't followed up with them. I recommend that she see them in followup first, then if still not better, let me know and I can do a referral to see ortho for second opinion.              FUTURE APPOINTMENTS:       - Follow-up visit as needed. We manage her chronic medical care.    No follow-ups on file.    Ekaterina Sanders MD  [facility]    Subjective     Nursing Notes:   Sheyla Miranda [facility]  8/12/2024  2:22 PM  Signed  Chief Complaint   Patient presents with    Fatigue     Discuss chronic fatigue from post covid, she has been struggling with this for the last few years, her daughter is taking vyvanse for this as well     Arm Pain     Ongoing right arm pain, she has now had multiple injections, would like a second opinion     Pre-visit Screening:  Immunizations:  up to date  Colonoscopy:  is up to date  Mammogram: is up to date  Asthma Action Test/Plan:  NA  PHQ9:  NA  GAD7:  NA  Questioned patient about current smoking habits Pt. quit smoking some time  ago.  Ok to leave detailed message on voice mail for today's visit only Yes, phone # [phone]       Jayne Leiva is a 59 year old female who presents to clinic today for the following health issues   HPI     Post covid. Has been more than a year.   Chronic arm pain--has been seeing tco for the arm pain, wants to see another ortho for the chronic pain. Wants a second opinion.  They told her this was tennis elbow, has been doing physical therapy. They did injections. Has had an MRI, which showed the nerve being compressed in the elbow. Sent her to see another surgeon. This helped for 3 months but then it started wearing off and was told to see a surgeon. The surgeon told her that it's regular tennis elbow.    Has fatigue, daughter is on vyvanse and this has helped her focus.    Sees pain clinic for chronic pain. Overall from post covid.  Previously on escitalopram and fluoxetine. She said these were long ago when she was having panic attacks but this was when she getting a divorce.   She said she doesn't have anxiety and is upset about this being a diagnosis for her. Feels down some days.  Tries to not let this affect her mood. Has been on fluoxetine through this because this didn't help the fatigue and the fog.     Used to see MNMARY for weight loss, so stopped seeing him because he wasn't covered anymore by her insurance. Previously on phentermine but ins didn't cover it. Also was going to prescribe wegovy but then stopped going.  She is on metformin for prediabetes.    Also has cpap, has been to the sleep clinic.    They want to see her back in 3-6 months.    The pain is at a manageable level finally.        Review of Systems   Constitutional, HEENT, cardiovascular, pulmonary, gi and gu systems are negative, except as otherwise noted.      Objective    /74 (BP Location: Right arm, Patient Position: Sitting, Cuff Size: Adult Large)   Pulse 90   Temp 98  F (36.7  C) (Temporal)   Wt 111.6 kg (246 lb)    LMP  (LMP Unknown)   SpO2 97%   BMI 42.20 kg/m    Body mass index is 42.2 kg/m .  Physical Exam   GENERAL: alert and no distress  MS: no gross musculoskeletal defects noted, no edema  NEURO: Normal strength and tone, mentation intact and speech normal  PSYCH: mentation appears normal, affect normal/bright    No results found for any visits on 08/12/24.

## 2024-08-12 ENCOUNTER — OFFICE VISIT (OUTPATIENT)
Dept: FAMILY MEDICINE | Facility: CLINIC | Age: 59
End: 2024-08-12

## 2024-08-12 VITALS
BODY MASS INDEX: 42.2 KG/M2 | OXYGEN SATURATION: 97 % | HEART RATE: 90 BPM | TEMPERATURE: 98 F | DIASTOLIC BLOOD PRESSURE: 74 MMHG | SYSTOLIC BLOOD PRESSURE: 128 MMHG | WEIGHT: 246 LBS

## 2024-08-12 DIAGNOSIS — M79.631 PAIN OF RIGHT FOREARM: ICD-10-CM

## 2024-08-12 DIAGNOSIS — E66.01 MORBID OBESITY (H): ICD-10-CM

## 2024-08-12 DIAGNOSIS — U09.9 POST-COVID CHRONIC FATIGUE: Primary | ICD-10-CM

## 2024-08-12 DIAGNOSIS — G89.29 OTHER CHRONIC PAIN: ICD-10-CM

## 2024-08-12 DIAGNOSIS — G93.32 POST-COVID CHRONIC FATIGUE: Primary | ICD-10-CM

## 2024-08-12 DIAGNOSIS — R73.03 BORDERLINE DIABETES: ICD-10-CM

## 2024-08-12 PROBLEM — Z86.16 HISTORY OF COVID-19: Status: RESOLVED | Noted: 2023-03-06 | Resolved: 2024-08-12

## 2024-08-12 PROCEDURE — G2211 COMPLEX E/M VISIT ADD ON: HCPCS | Performed by: FAMILY MEDICINE

## 2024-08-12 PROCEDURE — 99214 OFFICE O/P EST MOD 30 MIN: CPT | Performed by: FAMILY MEDICINE

## 2024-08-12 NOTE — NURSING NOTE
Chief Complaint   Patient presents with    Fatigue     Discuss chronic fatigue from post covid, she has been struggling with this for the last few years, her daughter is taking vyvanse for this as well     Arm Pain     Ongoing right arm pain, she has now had multiple injections, would like a second opinion     Pre-visit Screening:  Immunizations:  up to date  Colonoscopy:  is up to date  Mammogram: is up to date  Asthma Action Test/Plan:  NA  PHQ9:  NA  GAD7:  NA  Questioned patient about current smoking habits Pt. quit smoking some time ago.  Ok to leave detailed message on voice mail for today's visit only Yes, phone # [phone]

## 2024-08-13 ASSESSMENT — ANXIETY QUESTIONNAIRES
1. FEELING NERVOUS, ANXIOUS, OR ON EDGE: NOT AT ALL
3. WORRYING TOO MUCH ABOUT DIFFERENT THINGS: NOT AT ALL
GAD7 TOTAL SCORE: 1
IF YOU CHECKED OFF ANY PROBLEMS ON THIS QUESTIONNAIRE, HOW DIFFICULT HAVE THESE PROBLEMS MADE IT FOR YOU TO DO YOUR WORK, TAKE CARE OF THINGS AT HOME, OR GET ALONG WITH OTHER PEOPLE: NOT DIFFICULT AT ALL
6. BECOMING EASILY ANNOYED OR IRRITABLE: SEVERAL DAYS
7. FEELING AFRAID AS IF SOMETHING AWFUL MIGHT HAPPEN: NOT AT ALL
4. TROUBLE RELAXING: NOT AT ALL
5. BEING SO RESTLESS THAT IT IS HARD TO SIT STILL: NOT AT ALL
2. NOT BEING ABLE TO STOP OR CONTROL WORRYING: NOT AT ALL

## 2024-08-13 ASSESSMENT — PATIENT HEALTH QUESTIONNAIRE - PHQ9: SUM OF ALL RESPONSES TO PHQ QUESTIONS 1-9: 6

## 2024-08-14 ENCOUNTER — TELEPHONE (OUTPATIENT)
Dept: PHYSICAL MEDICINE AND REHAB | Facility: CLINIC | Age: 59
End: 2024-08-14

## 2024-08-14 NOTE — TELEPHONE ENCOUNTER
Left Voicemail (1st Attempt) and Sent Mychart (1st Attempt) for the patient to call back and schedule the following:    Appointment type: Resched todays (8/14/24) appt  Provider: Dr. Gerardo  Return date: next avail  Specialty phone number: [phone]  Additional appointment(s) needed:   Additonal Notes:

## 2024-08-18 SDOH — SOCIAL STABILITY: SOCIAL NETWORK: I HAVE TROUBLE DOING ALL OF MY USUAL WORK (INCLUDE WORK AT HOME): SOMETIMES

## 2024-08-18 SDOH — SOCIAL STABILITY: SOCIAL NETWORK

## 2024-08-18 SDOH — SOCIAL STABILITY: SOCIAL NETWORK: PROMIS ABILITY TO PARTICIPATE IN SOCIAL ROLES & ACTIVITIES T-SCORE: 43

## 2024-08-18 SDOH — SOCIAL STABILITY: SOCIAL NETWORK: I HAVE TROUBLE DOING ALL OF THE ACTIVITIES WITH FRIENDS THAT I WANT TO DO: SOMETIMES

## 2024-08-18 SDOH — SOCIAL STABILITY: SOCIAL NETWORK: I HAVE TROUBLE DOING ALL OF MY REGULAR LEISURE ACTIVITIES WITH OTHERS: SOMETIMES

## 2024-08-18 SDOH — SOCIAL STABILITY: SOCIAL NETWORK: I HAVE TROUBLE DOING ALL OF THE FAMILY ACTIVITIES THAT I WANT TO DO: USUALLY

## 2024-08-18 ASSESSMENT — ANXIETY QUESTIONNAIRES
3. WORRYING TOO MUCH ABOUT DIFFERENT THINGS: NOT AT ALL
IF YOU CHECKED OFF ANY PROBLEMS ON THIS QUESTIONNAIRE, HOW DIFFICULT HAVE THESE PROBLEMS MADE IT FOR YOU TO DO YOUR WORK, TAKE CARE OF THINGS AT HOME, OR GET ALONG WITH OTHER PEOPLE: SOMEWHAT DIFFICULT
2. NOT BEING ABLE TO STOP OR CONTROL WORRYING: NOT AT ALL
8. IF YOU CHECKED OFF ANY PROBLEMS, HOW DIFFICULT HAVE THESE MADE IT FOR YOU TO DO YOUR WORK, TAKE CARE OF THINGS AT HOME, OR GET ALONG WITH OTHER PEOPLE?: SOMEWHAT DIFFICULT
GAD7 TOTAL SCORE: 1
5. BEING SO RESTLESS THAT IT IS HARD TO SIT STILL: NOT AT ALL
1. FEELING NERVOUS, ANXIOUS, OR ON EDGE: NOT AT ALL
GAD7 TOTAL SCORE: 1
7. FEELING AFRAID AS IF SOMETHING AWFUL MIGHT HAPPEN: NOT AT ALL
4. TROUBLE RELAXING: NOT AT ALL
7. FEELING AFRAID AS IF SOMETHING AWFUL MIGHT HAPPEN: NOT AT ALL
GAD7 TOTAL SCORE: 1
6. BECOMING EASILY ANNOYED OR IRRITABLE: SEVERAL DAYS

## 2024-08-18 ASSESSMENT — PATIENT HEALTH QUESTIONNAIRE - PHQ9
SUM OF ALL RESPONSES TO PHQ QUESTIONS 1-9: 6
SUM OF ALL RESPONSES TO PHQ QUESTIONS 1-9: 6
10. IF YOU CHECKED OFF ANY PROBLEMS, HOW DIFFICULT HAVE THESE PROBLEMS MADE IT FOR YOU TO DO YOUR WORK, TAKE CARE OF THINGS AT HOME, OR GET ALONG WITH OTHER PEOPLE: VERY DIFFICULT

## 2024-08-21 ENCOUNTER — VIRTUAL VISIT (OUTPATIENT)
Dept: PHYSICAL MEDICINE AND REHAB | Facility: CLINIC | Age: 59
End: 2024-08-21
Payer: COMMERCIAL

## 2024-08-21 ENCOUNTER — TELEPHONE (OUTPATIENT)
Dept: INTERNAL MEDICINE | Facility: CLINIC | Age: 59
End: 2024-08-21

## 2024-08-21 DIAGNOSIS — U09.9 POST-COVID CHRONIC FATIGUE: Primary | ICD-10-CM

## 2024-08-21 DIAGNOSIS — G93.32 POST-COVID CHRONIC FATIGUE: Primary | ICD-10-CM

## 2024-08-21 PROCEDURE — 99214 OFFICE O/P EST MOD 30 MIN: CPT | Mod: 95 | Performed by: INTERNAL MEDICINE

## 2024-08-21 RX ORDER — GUANFACINE 1 MG/1
2 TABLET ORAL AT BEDTIME
Qty: 60 TABLET | Refills: 4 | Status: SHIPPED | OUTPATIENT
Start: 2024-08-21

## 2024-08-21 NOTE — LETTER
8/21/2024       RE: Jayne Leiva  [address] [facility] [address]     Dear Colleague,    Thank you for referring your patient, Jayne Leiva, to the [facility] [place] [facility]. Please see a copy of my visit note below.    Jayne is a 59 year old who is being evaluated via a billable video visit.    How would you like to obtain your AVS? MyChart  If the video visit is dropped, the invitation should be resent by: Text to cell phone: [phone]  Will anyone else be joining your video visit? No      Assessment & Plan    Post-COVID chronic fatigue  Reviewed options for management of post-COVID fatigue with patient.  Discussed data guanfacine and acetylcysteine available for management of post-COVID fatigue.  Recommend starting with 1 mg dose at bedtime.  Patient was instructed that increase could happen in 1 week to 2 mg at night.  Side effects were discussed, patient was advised on possibility of orthostatic hypotension and lower blood pressure in general with guanfacine.  Recommend follow-up in 2 to 3 months to review progress.  Patient was instructed to continue with implementation of lifestyle modifications aimed at fatigue management and energy conservation.  Patient is in agreement with the plan.  - guanFACINE (TENEX) 1 MG tablet; Take 2 tablets (2 mg) by mouth at bedtime. Start with 1 mg at bedtime, increase to 2 mg at bedtime after 1 week  - acetylcysteine (N-ACETYL CYSTEINE) 600 MG CAPS capsule; Take 1 capsule (600 mg) by mouth daily.      I spent a total of 30 minutes on the day of the visit.   Time spent by me doing chart review, history and exam, documentation and further activities per the note          Return in about 3 months (around 11/21/2024).    Subjective  Jayne is a 59 year old, presenting for the following health issues:  No chief complaint on file.  No chief complaint on  file.    HPI     Patient is following up on post-COVID issues.  Patient continues to have issues with fatigue. She is sleeping 8-9 hours of sleep but does not feel refreshed. She is wondering if she is a candidate for Vyvanse.       Current concerns: Health Concerns        8/18/2024     9:57 AM   PHQ Assesment Total Score(s)   PHQ-9 Score 6           8/18/2024     9:57 AM   KELLE-7 Results   KELLE 7 TOTAL SCORE 1 (minimal anxiety)   KELLE-7 Total Score 1         8/18/2024     9:57 AM   PTSD Screen Score   Have you ever experienced this kind of event? Yes   PTSD Screen (Score of 3 or more suggests positive screen) 4         8/18/2024     9:58 AM   PROMIS-29   PROMIS Physical Function T-Score 39 (moderate dysfunction)   PROMIS Anxiety T-Score 40 (within normal limits)   PROMIS Depression T-Score 52 (within normal limits)   PROMIS Fatigue T-Score 65 (moderate)   PROMIS Sleep Disturbance T-Score 48 (within normal limits)   PROMIS Ability to Participate in Social Roles & Activities T-Score 43 (mild dysfunction)   PROMIS Pain Interference T-Score 61 (moderate)   PROMIS Pain Intensity 5       Past Medical History:   Diagnosis Date     Acid reflux disease      KELLE (generalized anxiety disorder)      Morbid obesity (H)      Other sleep apnea 3/6/2023     Pure hypercholesterolemia        Past Surgical History:   Procedure Laterality Date     ARTHROSCOPY SHOULDER Right 2015     ARTHROSCOPY SHOULDER, OPEN ROTATOR CUFF REPAIR, COMBINED Left 03/29/2017    Procedure:  Left shoulder arthroscopy and arthroscopic subacromial decompression (acromioplasty, bursectomy and coracoacromial ligament resection). Arthroscopic distal clavicle resection. Mini-open rotator cuff tear repair. Surgeon:  Kevin George MD  Location: [facility]     BUNIONECTOMY Right 2008     CV CORONARY ANGIOGRAM N/A 4/14/2022    Procedure: Coronary Angiogram;  Surgeon: Olvin Roy MD;  [facility]  [facility]     [facility] BILATERAL SALPINGO-OOPHORECTOMY, COMBINED N/A 2021    Procedure: Robotic assisted total laparoscopic hysterectomy, bilateral salpingectomy, bilateral oophorectomy, cystoscopy;  Surgeon: Jonathan Peters MD;  Location: [facility]     DISKECTOMY, LUMBAR, SINGLE SP      L5-S1     DISKECTOMY, LUMBAR, SINGLE SP      L5-S1     FUSION LUMBAR ANTERIOR ONE LEVEL      L5-S1     OSTEOTOMY FOOT Right 2017    Procedure: OSTEOTOMY FOOT;  1)  WEIL OSTEOTOMY RIGHT SECOND METATARSAL, 2) PLANTAR CAPSULE DEBRIDEMENT/SYNOVECTOMY, RIGHT SECOND METATARSAL PHALANGEAL JOINT, 3) HAMMERTOE REPAIR RIGHT SECOND TOE;  Surgeon: Olvin Betancur DPM;  Location: [facility]     [place] SPINE SURGERY PROCEDURE UNLISTED       RECONSTRUCT FOREFOOT WITH METATARSOPHALANGEAL (MTP) FUSION Right 2017    Procedure: RECONSTRUCT FOREFOOT WITH METATARSOPHALANGEAL (MTP) FUSION;;  Surgeon: Olvin Betancur DPM;  Location:  [facility]     REPAIR HAMMER TOE Right 2017    Procedure: REPAIR HAMMER TOE;;  Surgeon: Olvin Betancur DPM;  Location: [facility]     ROTATOR CUFF REPAIR RT/LT Left        Family History   Problem Relation Age of Onset     Rheumatoid Arthritis Mother      Coronary Artery Disease Mother         s/p MI and PCIs in 50s     Dementia Mother      Diabetes Type 2  Mother      Rheumatoid Arthritis Sister      Myocardial Infarction Maternal Grandmother         in her 60s     Myocardial Infarction Maternal Grandfather         later in life       Social History     Tobacco Use     Smoking status: Former     Current packs/day: 0.00     Average packs/day: 0.8 packs/day for 26.0 years (19.5 ttl pk-yrs)     Types: Cigarettes     Start date: 1996     Quit date: 2022     Years since quittin.3     Passive exposure: Past     Smokeless tobacco: Never   Vaping Use     Vaping status: Never Used   Substance Use Topics     Alcohol use: Yes     Comment: rare     Drug use: No         Current Outpatient Medications:       atorvastatin (LIPITOR) 40 MG tablet, Take 0.5 tablets (20 mg) by mouth every evening, Disp: 90 tablet, Rfl: 1     metFORMIN (GLUCOPHAGE XR) 500 MG 24 hr tablet, Take 4 tablets by mouth daily at 2 pm, Disp: , Rfl:      multivitamin w/minerals (THERA-VIT-M) tablet, Take 1 tablet by mouth daily, Disp: , Rfl:      Naltrexone HCl, Pain, 1.5 MG CAPS, Take 4.5 mg by mouth daily, Disp: 90 capsule, Rfl: 3     omeprazole (PRILOSEC) 20 MG DR capsule, Take 20 mg by mouth daily, Disp: , Rfl:      pregabalin (LYRICA) 150 MG capsule, Take 1 capsule (150 mg) by mouth 2 times daily, Disp: 180 capsule, Rfl: 1     vitamin C (ASCORBIC ACID) 500 MG tablet, Take 1 tablet (500 mg) by mouth daily, Disp: 30 tablet, Rfl: 4     vitamin D2 (ERGOCALCIFEROL) 02030 units (1250 mcg) capsule, Take 50,000 Units by mouth once a week, Disp: , Rfl:      pregabalin (LYRICA) 100 MG capsule, Take 1 capsule (100 mg) by mouth 2 times daily, Disp: 60 capsule, Rfl: 3          Objective   Vitals - Patient Reported  Pain Score: Moderate Pain (4)        Physical Exam   GENERAL: alert and no distress  EYES: Eyes grossly normal to inspection.  No discharge or erythema, or obvious scleral/conjunctival abnormalities.  RESP: No audible wheeze, cough, or visible cyanosis.    SKIN: Visible skin clear. No significant rash, abnormal pigmentation or lesions.  NEURO: Cranial nerves grossly intact.  Mentation and speech appropriate for age.  PSYCH: Appropriate affect, tone, and pace of words          Video-Visit Details    Type of service:  Video Visit   Originating Location (pt. Location): Home    Distant Location (provider location):  Off-site  Platform used for Video Visit: Rakan  Signed Electronically by: Amy Gerardo MD    Answers submitted by the patient for this visit:  Patient Health Questionnaire (Submitted on 8/18/2024)  If you checked off any problems, how difficult have these problems made it for you to do your work, take care of things at home, or  get along with other people?: Very difficult  PHQ9 TOTAL SCORE: 6  Patient Health Questionnaire (G7) (Submitted on 8/18/2024)  KELLE 7 TOTAL SCORE: 1      Again, thank you for allowing me to participate in the care of your patient.      Sincerely,    Amy Gerardo MD

## 2024-08-21 NOTE — NURSING NOTE
Current patient location: [address] [address]    Is the patient currently in the state of [place]? YES    Visit mode:VIDEO    If the visit is dropped, the patient can be reconnected by: TELEPHONE VISIT: Phone number:   Telephone Information:   Mobile [phone]       Will anyone else be joining the visit? NO  (If patient encounters technical issues they should call [phone]    How would you like to obtain your AVS? MyChart    Are changes needed to the allergy or medication list? Pt stated no med changes    Are refills needed on medications prescribed by this physician? NO    Rooming Documentation:  Questionnaire(s) completed      Reason for visit: Video Visit (Follow-up )    Summer ANDRES

## 2024-08-21 NOTE — PROGRESS NOTES
Jayne is a 59 year old who is being evaluated via a billable video visit.    How would you like to obtain your AVS? MyChart  If the video visit is dropped, the invitation should be resent by: Text to cell phone: [phone]  Will anyone else be joining your video visit? No      Assessment & Plan     Post-COVID chronic fatigue  Reviewed options for management of post-COVID fatigue with patient.  Discussed data guanfacine and acetylcysteine available for management of post-COVID fatigue.  Recommend starting with 1 mg dose at bedtime.  Patient was instructed that increase could happen in 1 week to 2 mg at night.  Side effects were discussed, patient was advised on possibility of orthostatic hypotension and lower blood pressure in general with guanfacine.  Recommend follow-up in 2 to 3 months to review progress.  Patient was instructed to continue with implementation of lifestyle modifications aimed at fatigue management and energy conservation.  Patient is in agreement with the plan.  - guanFACINE (TENEX) 1 MG tablet; Take 2 tablets (2 mg) by mouth at bedtime. Start with 1 mg at bedtime, increase to 2 mg at bedtime after 1 week  - acetylcysteine (N-ACETYL CYSTEINE) 600 MG CAPS capsule; Take 1 capsule (600 mg) by mouth daily.      I spent a total of 30 minutes on the day of the visit.   Time spent by me doing chart review, history and exam, documentation and further activities per the note          Return in about 3 months (around 11/21/2024).    Subjective   Jayne is a 59 year old, presenting for the following health issues:  No chief complaint on file.  No chief complaint on file.    HPI     Patient is following up on post-COVID issues.  Patient continues to have issues with fatigue. She is sleeping 8-9 hours of sleep but does not feel refreshed. She is wondering if she is a candidate for Vyvanse.       Current concerns: Health Concerns        8/18/2024     9:57 AM   PHQ Assesment Total Score(s)   PHQ-9 Score 6            8/18/2024     9:57 AM   KELLE-7 Results   KELLE 7 TOTAL SCORE 1 (minimal anxiety)   KELLE-7 Total Score 1         8/18/2024     9:57 AM   PTSD Screen Score   Have you ever experienced this kind of event? Yes   PTSD Screen (Score of 3 or more suggests positive screen) 4         8/18/2024     9:58 AM   PROMIS-29   PROMIS Physical Function T-Score 39 (moderate dysfunction)   PROMIS Anxiety T-Score 40 (within normal limits)   PROMIS Depression T-Score 52 (within normal limits)   PROMIS Fatigue T-Score 65 (moderate)   PROMIS Sleep Disturbance T-Score 48 (within normal limits)   PROMIS Ability to Participate in Social Roles & Activities T-Score 43 (mild dysfunction)   PROMIS Pain Interference T-Score 61 (moderate)   PROMIS Pain Intensity 5       Past Medical History:   Diagnosis Date    Acid reflux disease     KELLE (generalized anxiety disorder)     Morbid obesity (H)     Other sleep apnea 3/6/2023    Pure hypercholesterolemia        Past Surgical History:   Procedure Laterality Date    ARTHROSCOPY SHOULDER Right 2015    ARTHROSCOPY SHOULDER, OPEN ROTATOR CUFF REPAIR, COMBINED Left 03/29/2017    Procedure:  Left shoulder arthroscopy and arthroscopic subacromial decompression (acromioplasty, bursectomy and coracoacromial ligament resection). Arthroscopic distal clavicle resection. Mini-open rotator cuff tear repair. Surgeon:  Kevin George MD  Location: [facility] Right 2008    CV CORONARY ANGIOGRAM N/A 4/14/2022    Procedure: Coronary Angiogram;  Surgeon: Olvin Roy MD;  Location:  HEART CARDIAC CATH LAB    DAVINC HYSTERECTOMY TOTAL, BILATERAL SALPINGO-OOPHORECTOMY, COMBINED N/A 2/9/2021    Procedure: Robotic assisted total laparoscopic hysterectomy, bilateral salpingectomy, bilateral oophorectomy, cystoscopy;  Surgeon: Jonathan Peters MD;  Location:  OR    DISKECTOMY, LUMBAR, SINGLE SP  1996    L5-S1    DISKECTOMY, LUMBAR, SINGLE SP  1997    L5-S1    FUSION  LUMBAR ANTERIOR ONE LEVEL      L5-S1    OSTEOTOMY FOOT Right 2017    Procedure: OSTEOTOMY FOOT;  1)  WEIL OSTEOTOMY RIGHT SECOND METATARSAL, 2) PLANTAR CAPSULE DEBRIDEMENT/SYNOVECTOMY, RIGHT SECOND METATARSAL PHALANGEAL JOINT, 3) HAMMERTOE REPAIR RIGHT SECOND TOE;  Surgeon: Olvin Betancur DPM;  Location: [facility]    [place] SPINE SURGERY PROCEDURE UNLISTED      RECONSTRUCT FOREFOOT WITH METATARSOPHALANGEAL (MTP) FUSION Right 2017    Procedure: RECONSTRUCT FOREFOOT WITH METATARSOPHALANGEAL (MTP) FUSION;;  Surgeon: Olvin Betancur DPM;  Location: [facility]    REPAIR HAMMER TOE Right 2017    Procedure: REPAIR HAMMER TOE;;  Surgeon: Olvin Betancur DPM;  Location: [facility]    ROTATOR CUFF REPAIR RT/LT Left 2017       Family History   Problem Relation Age of Onset    Rheumatoid Arthritis Mother     Coronary Artery Disease Mother         s/p MI and PCIs in 50s    Dementia Mother     Diabetes Type 2  Mother     Rheumatoid Arthritis Sister     Myocardial Infarction Maternal Grandmother         in her 60s    Myocardial Infarction Maternal Grandfather         later in life       Social History     Tobacco Use    Smoking status: Former     Current packs/day: 0.00     Average packs/day: 0.8 packs/day for 26.0 years (19.5 ttl pk-yrs)     Types: Cigarettes     Start date: 1996     Quit date: 2022     Years since quittin.3     Passive exposure: Past    Smokeless tobacco: Never   Vaping Use    Vaping status: Never Used   Substance Use Topics    Alcohol use: Yes     Comment: rare    Drug use: No         Current Outpatient Medications:     atorvastatin (LIPITOR) 40 MG tablet, Take 0.5 tablets (20 mg) by mouth every evening, Disp: 90 tablet, Rfl: 1    metFORMIN (GLUCOPHAGE XR) 500 MG 24 hr tablet, Take 4 tablets by mouth daily at 2 pm, Disp: , Rfl:     multivitamin w/minerals (THERA-VIT-M) tablet, Take 1 tablet by mouth daily, Disp: , Rfl:     Naltrexone HCl, Pain, 1.5 MG CAPS, Take 4.5 mg by mouth  daily, Disp: 90 capsule, Rfl: 3    omeprazole (PRILOSEC) 20 MG DR capsule, Take 20 mg by mouth daily, Disp: , Rfl:     pregabalin (LYRICA) 150 MG capsule, Take 1 capsule (150 mg) by mouth 2 times daily, Disp: 180 capsule, Rfl: 1    vitamin C (ASCORBIC ACID) 500 MG tablet, Take 1 tablet (500 mg) by mouth daily, Disp: 30 tablet, Rfl: 4    vitamin D2 (ERGOCALCIFEROL) 67979 units (1250 mcg) capsule, Take 50,000 Units by mouth once a week, Disp: , Rfl:     pregabalin (LYRICA) 100 MG capsule, Take 1 capsule (100 mg) by mouth 2 times daily, Disp: 60 capsule, Rfl: 3          Objective    Vitals - Patient Reported  Pain Score: Moderate Pain (4)        Physical Exam   GENERAL: alert and no distress  EYES: Eyes grossly normal to inspection.  No discharge or erythema, or obvious scleral/conjunctival abnormalities.  RESP: No audible wheeze, cough, or visible cyanosis.    SKIN: Visible skin clear. No significant rash, abnormal pigmentation or lesions.  NEURO: Cranial nerves grossly intact.  Mentation and speech appropriate for age.  PSYCH: Appropriate affect, tone, and pace of words          Video-Visit Details    Type of service:  Video Visit   Originating Location (pt. Location): Home    Distant Location (provider location):  Off-site  Platform used for Video Visit: [facility]  Signed Electronically by: Amy Gerardo MD    Answers submitted by the patient for this visit:  Patient Health Questionnaire (Submitted on 8/18/2024)  If you checked off any problems, how difficult have these problems made it for you to do your work, take care of things at home, or get along with other people?: Very difficult  PHQ9 TOTAL SCORE: 6  Patient Health Questionnaire (G7) (Submitted on 8/18/2024)  KELLE 7 TOTAL SCORE: 1

## 2024-08-21 NOTE — TELEPHONE ENCOUNTER
[facility] Prior Authorization Team   Phone: [phone]        PRIOR AUTHORIZATION DENIED    Medication:  600 MG PO CAPS  Insurance Company: [employer] [place] - Phone [phone] Fax [phone]  Denial Date: 8/21/2024  Denial Reason(s):         Appeal Information: Drug exclusions cannot be appealed.  This medication will not be covered by the prescription plan for any reason. The drug is not on formulary and there are no loopholes to gaining approval.    Patient Notified: No. The [facility] PA Team does not notify of the denial outcomes as the patient often will ask what their provider will prescribe in place of the denied medication, or additional information in regards to other therapies they can take in place of the denied medication.  This is not something we can advise on in our department.

## 2024-08-23 ENCOUNTER — MYC MEDICAL ADVICE (OUTPATIENT)
Dept: PHYSICAL MEDICINE AND REHAB | Facility: CLINIC | Age: 59
End: 2024-08-23
Payer: COMMERCIAL

## 2024-08-23 NOTE — TELEPHONE ENCOUNTER
Jodir left a message on patient's voice mail requesting a return call regarding medication.Fatimah Sanchez RN on 8/23/2024 at 3:09 PM

## 2024-08-28 ENCOUNTER — TRANSFERRED RECORDS (OUTPATIENT)
Dept: FAMILY MEDICINE | Facility: CLINIC | Age: 59
End: 2024-08-28

## 2024-08-28 ENCOUNTER — TRANSFERRED RECORDS (OUTPATIENT)
Dept: HEALTH INFORMATION MANAGEMENT | Facility: CLINIC | Age: 59
End: 2024-08-28

## 2024-08-30 ENCOUNTER — TRANSFERRED RECORDS (OUTPATIENT)
Dept: HEALTH INFORMATION MANAGEMENT | Facility: CLINIC | Age: 59
End: 2024-08-30

## 2024-09-03 ENCOUNTER — THERAPY VISIT (OUTPATIENT)
Dept: OCCUPATIONAL THERAPY | Facility: CLINIC | Age: 59
End: 2024-09-03
Attending: INTERNAL MEDICINE
Payer: COMMERCIAL

## 2024-09-03 ENCOUNTER — TRANSFERRED RECORDS (OUTPATIENT)
Dept: FAMILY MEDICINE | Facility: CLINIC | Age: 59
End: 2024-09-03

## 2024-09-03 DIAGNOSIS — G93.32 POST-COVID CHRONIC FATIGUE: Primary | ICD-10-CM

## 2024-09-03 DIAGNOSIS — U09.9 POST-COVID CHRONIC FATIGUE: Primary | ICD-10-CM

## 2024-09-03 PROCEDURE — 97530 THERAPEUTIC ACTIVITIES: CPT | Mod: GO

## 2024-09-03 NOTE — PROGRESS NOTES
PLAN  Continue therapy per updated plan of care. See progress noted below.    Beginning/End Dates of Progress Note Reporting Period:  06/20/24  to 09/03/2024    Referring Provider:  Amy Gerardo        09/03/24 0500   Appointment Info   Treating Provider Nicole Guzman, OTR/L   Total/Authorized Visits [facility]   Visits Used Visit 6, 6 of 10   Medical Diagnosis Post-COVID chronic fatigue (G93.32, U09.9)   OT Tx Diagnosis Post-COVID chronic fatigue (G93.32, U09.9), alteration in instrumental activities of daily living   Other pertinent information 60 combined visits, 0 used. Telehealth - yes.   Progress Note/Certification   Start Of Care Date 06/20/24   Onset of Illness/Injury or Date of Surgery 06/12/24   Therapy Frequency 1x/every other week   Predicted Duration 90 days   Progress Note Due Date 12/02/24   Goals   OT Goals 1;2;3;4   OT Goal 1   Goal Identifier facit   Goal Description Patient will identify at least 3 methods to manage her fatigue/energy to demonstrate at least a 5 point improvement in her perceived level of fatigue as measured by the FACIT, in order to increase safety and independence with ADL/IADL tasks.   Goal Progress Goal progressing. Pt educated in fatigue management strategies including stovetop burner analogy, planning, prioritizing, pacing, and posture with application to ADLs/IADLs. Additionally, educated in factors contributing to cognitive fatigue and compensatory strategies to promote IND with daily activities. Will plan to reassess and further progress prn.   Target Date 12/02/24   OT Goal 2   Goal Identifier fatigue   Goal Description Pt to verbalize understanding of 3-5 fatigue management techniques in order to increase safety and independence with ADL/IADL tasks.   Goal Progress Goal met. Educated in both physical and cognitive fatigue management strategies (See goal area above) with pt demonstrating appropriate awareness and application of strategies to daily routine. Pt to  "continue applying fatigue management strategies, as needed, to promote IND with daily activities.   Target Date 09/18/24   Date Met 09/03/24   OT Goal 3   Goal Identifier SSP   Goal Description Pt to demonstrate use and understanding of the Safe and Sound Protocol in order to self regulate auditory stimuli in order to increase manage of IADL tasks.   Goal Progress Goal progressing. Pt educated and trained in SSP for self-regulation of sensory sensitivities and emotional regulation. Pt completed first round of Core listening program and demonstrated lasting improvements from SSP weeks later (see objective measures below). Re-issued SSP Core program to further progress regulation of auditory and visual sensitivities to promote engagement in daily activities. Will plan to reassess and address prn in future session.   Target Date 12/02/24   OT Goal 4   Goal Identifier IADLs   Goal Description Pt will demonstrate adequate performance with Dynavision to demonstrate appropriate reaction time and processing speed for IADL s.   Goal Progress Goal progressing. Initiated training in both peripersonal and extrapersonal visual tasks to progress visual skills for engagement in home- and community-based daily activities. Visual HEP includes tracking, saccades, and convergence tasks. Additionally, initiated extrapersonal scanning in session with use of Dynavision as therapeutic tool with pt demonstrating improvements over time but still scoring BNLs (see below). Will continue to progress in future sessions.   Target Date 12/02/24   Subjective Report   Subjective Report Jayne reports she had to drive up to [place] which was the longest she has driven in some time and \"it sent me into a flare for about 3 weeks.\" She also [place].\" Reports she felt very overwhelmed by the crowds and sounds. Reports she forgot to order earplugs before this but remembered to order them when she got " home. She reports eye strain with her visual HEP but otherwise tolerating them well.   Objective Measures   Objective Measures Objective Measure 1;Objective Measure 2;Objective Measure 3   Objective Measure 1   Objective Measure BBCSS   Details As of 9/3/24 - Using the scale 1 = low impairment and 4 = high impairment, the pt scored the following on the BBCSS: auditory hypersensitivity = 2.4 (was 2.7), auditory hyposensitivity to voices = 1.6 (was 1.8), visual hypersensitivity = 1.6 (was 2.3), tactile hypersensitivity = 1.8 (was 1.9), affiliative touch aversion = 1.3 (was 2.0), selective eating = 1.7 (was 1.8), ingestive problems = 1.0 (same as previous), and digestive problems = 2.0 (was 2.3). Measures compared to initial assessment on 6/26/24.   Objective Measure 2   Objective Measure Oculomotor Skills   Details As of 7/19/24 - Pursuits: smooth tracking but occasionally loses focus and reports fatigue, otherwise WFLs. Saccades: increased blinking frequency and reports eye strain/fatigue, otherwise WFLs. NPC: approx 27-28 cm on fifth trial (BNLs) and reports eye strain.   Objective Measure 3   Objective Measure Dynavision   Details As of 9/3/24 - Completed standing on foam block to integrate vestibular and visual systems. Mode A, trial 1: 46 hits (average reaction time 1.3 sec top quadrants, 1.4 sec bottom R, 1.2 sec bottom L). trial 2: 50 hits (average reaction time 1.1 sec top L, 1.2 sec top R, 1.3 sec bottom R, 1.2 sec bottom L; WNLs is 52+ hits). Reports difficulty with looking from bottom lights back up to top lights.   Treatment Interventions (OT)   Interventions Self Care/Home Management;Sensory Integration;Therapeutic Activity   Therapeutic Activity   Therapeutic Activity Minutes (76363) 40   Ther Act 1 Reassessment, Visual scanning/skills   Ther Act 1 - Details To assess progress with self-regulation and fatigue management, administered subjective review and BBCSS objective measure - see results above. With  pt demonstrating lasting improvements in visual and auditory sensitivities following SSP completion, pt is a good candidate for repetition of the program. Educated on listening protocol for SSP Core and issued Core Classical Flow playlist for completion as part of HEP. Facilitated skilled review of Rajendra 4M visual scanning HEP with pt demonstrating 100% accuracy and notes increased eye strain with completion (with word cancellation being the most challenging). Upgraded to Rajendra 3M visual scanning with education in using visual block as needed for completion; issued for HEP. To promote visual scanning/skills for vision-based ADLs/IADLs, facilitated teach back method of visual exercises including: visual scanning clockwise then counterclockwise x2 repetitions each direction first monocular then binocular then Blayne string for NPC with pt demonstrating understanding of setup of Blayne string and able to complete 1) middle to distal bead and return, 2) middle to proximal bead and return, 3) proximal to distal bead, and 4) saccades in horizontal plane for 60 seconds per exercise. Min VCs for form and sequencing of exercises; due to continued increase in eye strain, no upgrade to exercises today. However, did educate in strategies for upgrading and when to progress exercises based on level of tolerance for exercises. To further progress extrapersonal visual scanning/awareness skills and integration of vestibular and visual systems, facilitated Dynavision Mode A with use of foam block as balance challenge - see results above. SBA provided throughout for balance. Pt overall demonstrates good tolerance for Dynavision activity with only min difficulty noted with balance. Able to self-correct.   Skilled Intervention Skilled training in visual scanning/skills for IND with vision-based daily activities, such as reading and driving   Patient Response/Progress Jayne reports min eye strain with all exercises this date. Reports  greatest difficulty with Dynavision is looking down then looking up for next light (min increase in dizziness). Goals 3-4 progressing   Education   Learner/Method Patient;Listening;Demonstration;Reading;No Barriers to Learning   Education Comments see tx details above   Plan   Home program ECWS burners + basics. Cognitive fatigue module. AE: Light filtering glasses, Loop earplugs. SSP Core + vagus nerve stimulation techniques. Vision ed: reduce visual clutter with block, large font/text. Vision HEP (tracking stick, Blayne string, Rajendra 4M).   Plan for next session f/u mom in hospice care. F/u SSP core (second round). f/u vision HEP - review and progress (increase to 90 sec prn all Blayne string, f/u Rajendra 3M and gauge tolerance for Rajendra 2M, more DV - increase balance challenge to rocker as able and trial SC). functional tasks: marble in pie pan, marble trap, tennis ball, solitaire, spot it game.   Comments   Comments Session started late to due OTR running behind schedule. Able to adjust end time of session to accommodate.   Total Session Time   Timed Code Treatment Minutes 40   Total Treatment Time (sum of timed and untimed services) 40

## 2024-09-09 ENCOUNTER — THERAPY VISIT (OUTPATIENT)
Dept: PHYSICAL THERAPY | Facility: CLINIC | Age: 59
End: 2024-09-09
Payer: COMMERCIAL

## 2024-09-09 DIAGNOSIS — U09.9 POST-COVID CHRONIC FATIGUE: Primary | ICD-10-CM

## 2024-09-09 DIAGNOSIS — R53.83 FATIGUE, UNSPECIFIED TYPE: ICD-10-CM

## 2024-09-09 DIAGNOSIS — G93.32 POST-COVID CHRONIC FATIGUE: Primary | ICD-10-CM

## 2024-09-09 PROCEDURE — 97112 NEUROMUSCULAR REEDUCATION: CPT | Mod: GP | Performed by: PHYSICAL THERAPIST

## 2024-09-09 PROCEDURE — 97110 THERAPEUTIC EXERCISES: CPT | Mod: GP | Performed by: PHYSICAL THERAPIST

## 2024-09-17 ENCOUNTER — THERAPY VISIT (OUTPATIENT)
Dept: OCCUPATIONAL THERAPY | Facility: CLINIC | Age: 59
End: 2024-09-17
Attending: INTERNAL MEDICINE
Payer: COMMERCIAL

## 2024-09-17 DIAGNOSIS — G93.32 POST-COVID CHRONIC FATIGUE: Primary | ICD-10-CM

## 2024-09-17 DIAGNOSIS — U09.9 POST-COVID CHRONIC FATIGUE: Primary | ICD-10-CM

## 2024-09-17 PROCEDURE — 97530 THERAPEUTIC ACTIVITIES: CPT | Mod: GO

## 2024-10-01 ENCOUNTER — THERAPY VISIT (OUTPATIENT)
Dept: OCCUPATIONAL THERAPY | Facility: CLINIC | Age: 59
End: 2024-10-01
Attending: INTERNAL MEDICINE
Payer: COMMERCIAL

## 2024-10-01 DIAGNOSIS — G93.32 POST-COVID CHRONIC FATIGUE: Primary | ICD-10-CM

## 2024-10-01 DIAGNOSIS — U09.9 POST-COVID CHRONIC FATIGUE: Primary | ICD-10-CM

## 2024-10-01 PROCEDURE — 97530 THERAPEUTIC ACTIVITIES: CPT | Mod: GO

## 2024-10-09 ENCOUNTER — THERAPY VISIT (OUTPATIENT)
Dept: PHYSICAL THERAPY | Facility: CLINIC | Age: 59
End: 2024-10-09
Payer: COMMERCIAL

## 2024-10-09 DIAGNOSIS — G93.32 POST-COVID CHRONIC FATIGUE: Primary | ICD-10-CM

## 2024-10-09 DIAGNOSIS — U09.9 POST-COVID CHRONIC FATIGUE: Primary | ICD-10-CM

## 2024-10-09 PROCEDURE — 97110 THERAPEUTIC EXERCISES: CPT | Mod: GP | Performed by: PHYSICAL THERAPIST

## 2024-10-09 PROCEDURE — 97112 NEUROMUSCULAR REEDUCATION: CPT | Mod: GP | Performed by: PHYSICAL THERAPIST

## 2024-10-09 NOTE — PROGRESS NOTES
PLAN  Continue therapy per current plan of care.    Beginning/End Dates of Progress Note Reporting Period:  07/09/2024 to 10/09/2024    Referring Provider:  Melita Sullivan      10/09/24 0500   Appointment Info   Signing clinician's name / credentials Gerri Morgan PT, DPT   Total/Authorized Visits eval and treat   Medical Diagnosis Post Covid Chronic muscle pain myalgia   PT Tx Diagnosis chronic pain, fatigue, decreased activity tolerance   Other pertinent information pt mentally fatigues with alot of talking   Progress Note/Certification   Onset of illness/injury or Date of Surgery 02/01/22   Therapy Frequency 1/month   Predicted Duration 3-4 months   Progress Note Due Date 12/03/24   Progress Note Completed Date 10/09/24   GOALS   PT Goals 2;3   PT Goal 1   Goal Identifier walking   Goal Description Pt will be able to walk for 30 minutes with minimal increase in pain   Rationale to maximize safety and independence with performance of ADLs and functional tasks;to maximize safety and independence within the home;to maximize safety and independence within the community;to maximize safety and independence with transportation;to maximize safety and independence with self cares   Goal Progress pt has been walking 30 min on her treadmill daily   Target Date 04/29/24   Date Met 06/03/24   PT Goal 2   Goal Identifier HEP   Goal Description Patient will demo IND with HEP and progression to improve strength and mobility   Rationale to maximize safety and independence with performance of ADLs and functional tasks;to maximize safety and independence within the home;to maximize safety and independence within the community;to maximize safety and independence with transportation;to maximize safety and independence with self cares   Target Date 12/03/24   PT Goal 3   Goal Identifier Pain   Goal Description Patient will be IND with pain management strategies to be able to self manage flare ups in pain to be   Rationale to  "maximize safety and independence with performance of ADLs and functional tasks;to maximize safety and independence within the home;to maximize safety and independence within the community;to maximize safety and independence with transportation;to maximize safety and independence with self cares   Target Date 12/03/24   Subjective Report   Subjective Report Pt reporting she started a new med that was thought to help with fatigue, however is not sure it has really helped. Has been reviewing the side effects and may be having them.  Not sure it is helping the fatigue, will follow up middle of Nov with MD about this.  Pt reporting pain and faitgue is worse lately.  Has alot of personal things going on.   Objective Measure 1   Objective Measure pt able to lan yard without stopping   Details about 1.5 hours on feet   Objective Measure 2   Objective Measure fatigue   Details friday-had to leave work due to fatigue- and then on sat was very faitgued and needed to nap   Therapeutic Procedure/Exercise   Therapeutic Procedures: strength, endurance, ROM, flexibility minutes (79269) 15   Ther Proc 3 NU step lvl 7   Ther Proc 3 - Details seat 10, arms 10, X10 min- tired, but no pain   Ther Proc 4 Leg press-seat 5   Ther Proc 4 - Details 4 plates X10 X2, 5 plates X  X15   Skilled Intervention pt has very large HEP- reviewed and advised to tone back to daily routine of walking to build habit and gentle aerobic activity   Patient Response/Progress pt felt a \"lift\" of the fog when she did the Nu Step and leg press today- demo smal burst of activity can lift mood and clear fog- but does not need to do alot   Neuromuscular Re-education   Neuromuscular re-ed of mvmt, balance, coord, kinesthetic sense, posture, proprioception minutes (04830) 45   Neuro Re-ed 1 sleep hygiene   Neuro Re-ed 1 - Details pt reporting she has been talking to MD about med to help sleep and with fatigue, main pain is in right elbow   Neuro Re-ed 2 Mental Fatigue " "  Neuro Re-ed 2 - Details pt really struggling with fatigue- mental- emptional- educated on how emotions link to pain- pt had appointment wit Pain Psych, however is transitioning to new provider as has alot going on in personal lift and could be contributing to mental fatigue   Neuro Re-ed 3 aerobic actvity   Neuro Re-ed 3 - Details [place] not been able [place]- walking [place](but does not feel safe walking due to balance)- goes through cycle with \"flare ups, balance is struggling more- has a treadmill-  for aerobic activity pt looking to get a Nu-Step or something seated   Neuro Re-ed 5 diaphragmatic breathing   Neuro Re-ed 5 - Details reviewed- completing regulalry   Neuro Re-ed 8 self care-   Neuro Re-ed 8 - Details reviewed routine- pt tends to overdo when certain exercise feels good- encouraged am routine- start with walk- or breathing, take frequent breaks throughout the day of gentle movement to help with mental god/clarity   Neuro Re-ed 9 Journal   Neuro Re-ed 9 - Details pt presents with her journal- has done it intermittently since June however more regulalry- reviewed with her- identified areas that are valuable for her and other that might be more busywork- encouraged to document things that go well- how she was feeling that day in additiong to what she has been documenting-  goal to be able to look  back at journal when there is a flare up to see if there is a trend   Skilled Intervention PNE- focus on stress- emotions- self care and journal   Patient Response/Progress pt engaged in education- overwhelmed today in general   Education   Learner/Method No Barriers to Learning   Education Comments no concerns   Plan   Home program on her phone- general strength- and calming/breathing strategies   Updates to plan of care 1x/month for 3-4 months   Plan for next session gym- program - update HEP   Total Session Time   Timed Code Treatment Minutes 60   Total Treatment Time (sum of timed and " untimed services) 60

## 2024-10-11 NOTE — PROGRESS NOTES
"Assessment & Plan   Problem List Items Addressed This Visit       Chronic upper extremity pain, unspecified laterality    Morbid obesity (H)    Post-COVID chronic fatigue    Borderline diabetes - Primary    Relevant Orders    HEMOGLOBIN A1C (BFP) (Completed)    VENOUS COLLECTION (Completed)        1. Borderline diabetes  Hgba1c is in range, decrease her dose of metformin ER to 2 per day, recheck in 6 months. She doesn't need refills today.  - HEMOGLOBIN A1C (BFP)  - VENOUS COLLECTION    2. Post-COVID chronic fatigue  Continue working with long covid specialist, and she has an apt with therapist.    3. Chronic upper extremity pain, unspecified laterality  Followup with ortho.    4. Morbid obesity (H)  Continue working on healthy diet and regular exercise.          BMI  Estimated body mass index is 42.72 kg/m  as calculated from the following:    Height as of 6/12/24: 1.626 m (5' 4.02\").    Weight as of this encounter: 112.9 kg (249 lb).         FUTURE APPOINTMENTS:       - Follow-up visit in 6 mo.    No follow-ups on file.    Ekaterina Sanders MD  [facility]    Subjective     Nursing Notes:   Sheyla Miranda CMA  10/14/2024  7:42 AM  Signed  Chief Complaint   Patient presents with    Recheck Medication     Fasting today, recheck A1C, she does not need refills of metformin yet     Pre-visit Screening:  Immunizations:  up to date  Colonoscopy:  is up to date  Mammogram: is up to date  Asthma Action Test/Plan:  NA  PHQ9:  NA  GAD7:  NA  Questioned patient about current smoking habits Pt. quit smoking some time ago.  Ok to leave detailed message on voice mail for today's visit only Yes, phone # [phone]       Jayne Leiva is a 59 year old female who presents to clinic today for the following health issues   HPI     Here to followup on her borderline diabetes. She is currently on 4 metformin ER per day. She thinks this was initially prescribed by weight loss clinic but she decided not to go " there any longer because it wasn't [place] [facility] [place] She still has the medication and doesn't need a refill.  Is also going to see a therapist, has an apt.  Chronic right arm pain--is seeing Dr. Langley at [facility] for this. Considering surgery. Has had an MRI, but still has a lot of the pain.  Is seeing specialist for long covid, started guanfacine, this isn't helping so she will probably stop it.        Review of Systems   Constitutional, HEENT, cardiovascular, pulmonary, gi and gu systems are negative, except as otherwise noted.      Objective    /78 (BP Location: Right arm, Patient Position: Sitting, Cuff Size: Adult Large)   Pulse 84   Temp 98.1  F (36.7  C) (Temporal)   Wt 112.9 kg (249 lb)   LMP  (LMP Unknown)   SpO2 99%   BMI 42.72 kg/m    Body mass index is 42.72 kg/m .  Physical Exam   GENERAL: alert and no distress  MS: no gross musculoskeletal defects noted, no edema  PSYCH: mentation appears normal, affect normal/bright    Results for orders placed or performed in visit on 10/14/24   HEMOGLOBIN A1C (BFP)     Status: None   Result Value Ref Range    Hemoglobin A1C 5.5 4 - 5.6 %

## 2024-10-14 ENCOUNTER — OFFICE VISIT (OUTPATIENT)
Dept: FAMILY MEDICINE | Facility: CLINIC | Age: 59
End: 2024-10-14

## 2024-10-14 VITALS
DIASTOLIC BLOOD PRESSURE: 78 MMHG | OXYGEN SATURATION: 99 % | TEMPERATURE: 98.1 F | SYSTOLIC BLOOD PRESSURE: 118 MMHG | BODY MASS INDEX: 42.72 KG/M2 | WEIGHT: 249 LBS | HEART RATE: 84 BPM

## 2024-10-14 DIAGNOSIS — G89.29 CHRONIC UPPER EXTREMITY PAIN, UNSPECIFIED LATERALITY: ICD-10-CM

## 2024-10-14 DIAGNOSIS — G93.32 POST-COVID CHRONIC FATIGUE: ICD-10-CM

## 2024-10-14 DIAGNOSIS — M79.603 CHRONIC UPPER EXTREMITY PAIN, UNSPECIFIED LATERALITY: ICD-10-CM

## 2024-10-14 DIAGNOSIS — U09.9 POST-COVID CHRONIC FATIGUE: ICD-10-CM

## 2024-10-14 DIAGNOSIS — R73.03 BORDERLINE DIABETES: Primary | ICD-10-CM

## 2024-10-14 DIAGNOSIS — E66.01 MORBID OBESITY (H): ICD-10-CM

## 2024-10-14 LAB — HEMOGLOBIN A1C: 5.5 % (ref 4–5.6)

## 2024-10-14 PROCEDURE — G2211 COMPLEX E/M VISIT ADD ON: HCPCS | Performed by: FAMILY MEDICINE

## 2024-10-14 PROCEDURE — 36415 COLL VENOUS BLD VENIPUNCTURE: CPT | Performed by: FAMILY MEDICINE

## 2024-10-14 PROCEDURE — 99213 OFFICE O/P EST LOW 20 MIN: CPT | Performed by: FAMILY MEDICINE

## 2024-10-14 PROCEDURE — 83036 HEMOGLOBIN GLYCOSYLATED A1C: CPT | Performed by: FAMILY MEDICINE

## 2024-10-14 RX ORDER — METFORMIN HYDROCHLORIDE 500 MG/1
1000 TABLET, EXTENDED RELEASE ORAL
COMMUNITY
Start: 2024-10-14

## 2024-10-14 NOTE — NURSING NOTE
Chief Complaint   Patient presents with    Recheck Medication     Fasting today, recheck A1C, she does not need refills of metformin yet     Pre-visit Screening:  Immunizations:  up to date  Colonoscopy:  is up to date  Mammogram: is up to date  Asthma Action Test/Plan:  NA  PHQ9:  NA  GAD7:  NA  Questioned patient about current smoking habits Pt. quit smoking some time ago.  Ok to leave detailed message on voice mail for today's visit only Yes, phone # [phone]

## 2024-10-15 ENCOUNTER — THERAPY VISIT (OUTPATIENT)
Dept: OCCUPATIONAL THERAPY | Facility: CLINIC | Age: 59
End: 2024-10-15
Attending: INTERNAL MEDICINE
Payer: COMMERCIAL

## 2024-10-15 DIAGNOSIS — G93.32 POST-COVID CHRONIC FATIGUE: Primary | ICD-10-CM

## 2024-10-15 DIAGNOSIS — U09.9 POST-COVID CHRONIC FATIGUE: Primary | ICD-10-CM

## 2024-10-15 PROCEDURE — 97530 THERAPEUTIC ACTIVITIES: CPT | Mod: GO

## 2024-10-22 ENCOUNTER — OFFICE VISIT (OUTPATIENT)
Dept: FAMILY MEDICINE | Facility: CLINIC | Age: 59
End: 2024-10-22

## 2024-10-22 VITALS
WEIGHT: 252 LBS | DIASTOLIC BLOOD PRESSURE: 78 MMHG | HEIGHT: 64 IN | TEMPERATURE: 98.1 F | SYSTOLIC BLOOD PRESSURE: 122 MMHG | OXYGEN SATURATION: 98 % | BODY MASS INDEX: 43.02 KG/M2 | HEART RATE: 73 BPM

## 2024-10-22 DIAGNOSIS — F41.1 GAD (GENERALIZED ANXIETY DISORDER): ICD-10-CM

## 2024-10-22 DIAGNOSIS — G93.32 POST-COVID CHRONIC FATIGUE: ICD-10-CM

## 2024-10-22 DIAGNOSIS — E66.01 MORBID OBESITY (H): ICD-10-CM

## 2024-10-22 DIAGNOSIS — I25.2 OLD MYOCARDIAL INFARCTION: ICD-10-CM

## 2024-10-22 DIAGNOSIS — M79.631 PAIN OF RIGHT FOREARM: ICD-10-CM

## 2024-10-22 DIAGNOSIS — R73.03 BORDERLINE DIABETES: ICD-10-CM

## 2024-10-22 DIAGNOSIS — U09.9 POST-COVID CHRONIC FATIGUE: ICD-10-CM

## 2024-10-22 DIAGNOSIS — E78.00 PURE HYPERCHOLESTEROLEMIA: ICD-10-CM

## 2024-10-22 DIAGNOSIS — G47.39 OTHER SLEEP APNEA: ICD-10-CM

## 2024-10-22 DIAGNOSIS — Z01.818 PREOPERATIVE EXAMINATION: Primary | ICD-10-CM

## 2024-10-22 DIAGNOSIS — K21.00 GASTROESOPHAGEAL REFLUX DISEASE WITH ESOPHAGITIS, UNSPECIFIED WHETHER HEMORRHAGE: ICD-10-CM

## 2024-10-22 DIAGNOSIS — R41.89 BRAIN FOG: ICD-10-CM

## 2024-10-22 PROBLEM — R53.83 OTHER FATIGUE: Status: RESOLVED | Noted: 2023-03-06 | Resolved: 2024-10-22

## 2024-10-22 LAB
% GRANULOCYTES: 46 %
HCT VFR BLD AUTO: 40.4 % (ref 35–47)
HEMOGLOBIN: 13.2 G/DL (ref 11.7–15.7)
LYMPHOCYTES NFR BLD AUTO: 45.8 %
MCH RBC QN AUTO: 31.4 PG (ref 26–33)
MCHC RBC AUTO-ENTMCNC: 32.7 G/DL (ref 31–36)
MCV RBC AUTO: 96 FL (ref 78–100)
MONOCYTES NFR BLD AUTO: 8.2 %
PLATELET COUNT - QUEST: 292 10^9/L (ref 150–375)
RBC # BLD AUTO: 4.21 10*12/L (ref 3.8–5.2)
WBC # BLD AUTO: 7.4 10*9/L (ref 4–11)

## 2024-10-22 PROCEDURE — 36415 COLL VENOUS BLD VENIPUNCTURE: CPT | Performed by: FAMILY MEDICINE

## 2024-10-22 PROCEDURE — 93000 ELECTROCARDIOGRAM COMPLETE: CPT | Performed by: FAMILY MEDICINE

## 2024-10-22 PROCEDURE — 80048 BASIC METABOLIC PNL TOTAL CA: CPT | Performed by: FAMILY MEDICINE

## 2024-10-22 PROCEDURE — 85025 COMPLETE CBC W/AUTO DIFF WBC: CPT | Performed by: FAMILY MEDICINE

## 2024-10-22 PROCEDURE — 99214 OFFICE O/P EST MOD 30 MIN: CPT | Performed by: FAMILY MEDICINE

## 2024-10-22 NOTE — PROGRESS NOTES
Preoperative Evaluation  [facility]  [address] [place]  [address]  [facility] [address]  Phone: [phone]  Fax: [phone]  Primary Provider: Ekaterina Sanders MD  Pre-op Performing Provider: Ekaterina Sanders MD  Oct 22, 2024               10/22/2024   Surgical Information   What procedure is being done? Right arm/ elbow repair   Facility or Hospital where procedure/surgery will be performed: [facility] [place]   Who is doing the procedure / surgery? Dr. Arroyo   Date of surgery / procedure: 10/29/24   Time of surgery / procedure: PM   Where do you plan to recover after surgery? at home with family      Fax number for surgical facility: [phone]    Assessment & Plan     The proposed surgical procedure is considered LOW risk.    Problem List Items Addressed This Visit       Pure hypercholesterolemia    Old myocardial infarction    Morbid obesity (H)    Relevant Orders    BASIC METABOLIC PANEL ([facility]) (Completed)    KELLE (generalized anxiety disorder)    Acid reflux disease    Other sleep apnea    Brain fog    Post-COVID chronic fatigue    Borderline diabetes    Relevant Orders    BASIC METABOLIC PANEL ([facility]) (Completed)     Other Visit Diagnoses       Preoperative examination    -  Primary    Relevant Orders    VENOUS COLLECTION (Completed)    HEMOGRAM PLATELET DIFF ([facility]) (Completed)    EKG 12-lead complete w/read - Clinics (Completed)    BASIC METABOLIC PANEL ([facility]) (Completed)    Pain of right forearm                  1. Preoperative examination (Primary)    - VENOUS COLLECTION  - HEMOGRAM PLATELET DIFF ([facility])  - EKG 12-lead complete w/read - Clinics  - BASIC METABOLIC PANEL ([facility])    2. Pain of right forearm      3. Old myocardial infarction  Discussed with Dr. Joseph, cardiology, who reviewed the studies and the notes. No concerns with proceeding with surgery. Had an abnormal study with subsequent normal angiogram. The patient is assymptomatic. EKG today with non specific changes.    4. KELLE  (generalized anxiety disorder)  Controlled.    5. Post-COVID chronic fatigue  Followed by specialist.    6. Brain fog  She was on guanfacine but found this isn't really helping her, she reports having stopped it as of 10.23.2024    7. Borderline diabetes  Controlled.  - BASIC METABOLIC PANEL ([facility])    8. Pure hypercholesterolemia      9. Morbid obesity (H)    - BASIC METABOLIC PANEL ([facility])    10. Gastroesophageal reflux disease with esophagitis, unspecified whether hemorrhage      11. Other sleep apnea        - No identified additional risk factors other than previously addressed    Antiplatelet or Anticoagulation Medication Instructions   - Patient is on no antiplatelet or anticoagulation medications.    Additional Medication Instructions  Take all scheduled medications on the day of surgery EXCEPT for modifications listed below:  She was instructed to hold naltrexone for 1 week prior to surgery, she has stopped the guanfacine herself on 10.23 due to ineffectivity,   Do not take metformin the night before or the morning of surgery,   Lyrica: hold the morning of surgery.    Recommendation  Approval given to proceed with proposed procedure, without further diagnostic evaluation.    Julio Godoy is a 59 year old, presenting for the following:  Pre-Op Exam        HPI related to upcoming procedure: here for preop right forearm pain. This is chronic. Has had MRI, injections. Having pain and inflammation end July last year.    Mostly the sx is pain.    She said she had a slight MI< but this was due to the lexiscan.          10/22/2024   Pre-Op Questionnaire   Have you ever had a heart attack or stroke? (!) YES April 2022   Have you ever had surgery on your heart or blood vessels, such as a stent placement, a coronary artery bypass, or surgery on an artery in your head, neck, heart, or legs? No   Do you have chest pain with activity? No   Do you have a history of heart failure? No   Do you currently have a cold,  bronchitis or symptoms of other infection? No   Do you have a cough, shortness of breath, or wheezing? No   Do you or anyone in your family have previous history of blood clots? No   Do you or does anyone in your family have a serious bleeding problem such as prolonged bleeding following surgeries or cuts? No   Have you ever had problems with anemia or been told to take iron pills? No   Have you had any abnormal blood loss such as black, tarry or bloody stools, or abnormal vaginal bleeding? No   Have you ever had a blood transfusion? No   Have you ever had a transfusion reaction? No   Are you willing to have a blood transfusion if it is medically needed before, during, or after your surgery? Yes   Have you or any of your relatives ever had problems with anesthesia? (!) YES mom with trouble waking up   Do you have sleep apnea, excessive snoring or daytime drowsiness? (!) YES   Do you have a CPAP machine? Yes   Do you have any artifical heart valves or other implanted medical devices like a pacemaker, defibrillator, or continuous glucose monitor? No   Do you have artificial joints? No   Are you allergic to latex? No      Health Care Directive  Patient does not have a Health Care Directive or Living Will: Discussed advance care planning with patient; information given to patient to review.    Preoperative Review of    reviewed - is now taking lyrica, stopped gabapentin prior to change to lyrica. Briefly took phentermine for weight loss, but stopped this. April 2024.      Status of Chronic Conditions:  DIABETES - Patient has a longstanding history of DiabetesType Type II . Patient is being treated with oral agents and denies significant side effects. Control has been good.   HYPERLIPIDEMIA - Patient has a long history of significant Hyperlipidemia requiring medication for treatment with recent good control. Patient reports no problems or side effects with the medication.     Patient Active Problem List     Diagnosis Date Noted    Borderline diabetes 10/14/2024     Priority: Medium    Post-COVID chronic fatigue 06/30/2023     Priority: Medium    Other sleep apnea 03/06/2023     Priority: Medium    Brain fog 03/06/2023     Priority: Medium    Cognitive changes 03/06/2023     Priority: Medium    Morbid obesity (H) 12/04/2022     Priority: Medium    KELLE (generalized anxiety disorder) 12/04/2022     Priority: Medium    Acid reflux disease 12/04/2022     Priority: Medium    Old myocardial infarction 04/13/2022     Priority: Medium    Pure hypercholesterolemia      Priority: Medium    Chronic upper extremity pain, unspecified laterality 05/22/2017     Priority: Medium    ACP (advance care planning) 03/06/2023     Priority: Low      Past Medical History:   Diagnosis Date    Acid reflux disease     KELLE (generalized anxiety disorder)     Morbid obesity (H)     Other sleep apnea 3/6/2023    Pure hypercholesterolemia      Past Surgical History:   Procedure Laterality Date    ARTHROSCOPY SHOULDER Right 2015    ARTHROSCOPY SHOULDER, OPEN ROTATOR CUFF REPAIR, COMBINED Left 03/29/2017    Procedure:  Left shoulder arthroscopy and arthroscopic subacromial decompression (acromioplasty, bursectomy and coracoacromial ligament resection). Arthroscopic distal clavicle resection. Mini-open rotator cuff tear repair. Surgeon:  Kevin George MD  [facility]: [facility]    [facility]    CV CORONARY ANGIOGRAM N/A 4/14/2022    Procedure: Coronary Angiogram;  Surgeon: Olvin Roy MD;  Location:  [facility]    DAVINCI HYSTERECTOMY TOTAL, BILATERAL SALPINGO-OOPHORECTOMY, COMBINED N/A 2/9/2021    Procedure: Robotic assisted total laparoscopic hysterectomy, bilateral salpingectomy, bilateral oophorectomy, cystoscopy;  Surgeon: Jonathan Peters MD;  Location:  OR    DISKECTOMY, LUMBAR, SINGLE SP  1996    L5-S1    DISKECTOMY, LUMBAR, SINGLE SP  1997    L5-S1    FUSION LUMBAR ANTERIOR ONE LEVEL   1999    L5-S1    OSTEOTOMY FOOT Right 06/06/2017    Procedure: OSTEOTOMY FOOT;  1)  WEIL OSTEOTOMY RIGHT SECOND METATARSAL, 2) PLANTAR CAPSULE DEBRIDEMENT/SYNOVECTOMY, RIGHT SECOND METATARSAL PHALANGEAL JOINT, 3) HAMMERTOE REPAIR RIGHT SECOND TOE;  Surgeon: Olvin Betancur DPM;  Location: [facility]    CO SPINE SURGERY PROCEDURE UNLISTED      RECONSTRUCT FOREFOOT WITH METATARSOPHALANGEAL (MTP) FUSION Right 06/06/2017    Procedure: RECONSTRUCT FOREFOOT WITH METATARSOPHALANGEAL (MTP) FUSION;;  Surgeon: Olvin Betancur DPM;  Location: [facility]    REPAIR HAMMER TOE Right 06/06/2017    Procedure: REPAIR HAMMER TOE;;  Surgeon: Olvin Betancur DPM;  Location: [facility]    ROTATOR CUFF REPAIR RT/LT Left 2017     Current Outpatient Medications   Medication Sig Dispense Refill    acetylcysteine (N-ACETYL CYSTEINE) 600 MG CAPS capsule Take 1 capsule (600 mg) by mouth daily. 90 capsule 3    atorvastatin (LIPITOR) 40 MG tablet Take 0.5 tablets (20 mg) by mouth every evening 90 tablet 1    guanFACINE (TENEX) 1 MG tablet Take 2 tablets (2 mg) by mouth at bedtime. Start with 1 mg at bedtime, increase to 2 mg at bedtime after 1 week 60 tablet 4    metFORMIN (GLUCOPHAGE XR) 500 MG 24 hr tablet Take 2 tablets (1,000 mg) by mouth daily at 2 pm.      multivitamin w/minerals (THERA-VIT-M) tablet Take 1 tablet by mouth daily      omeprazole (PRILOSEC) 20 MG DR capsule Take 20 mg by mouth daily      pregabalin (LYRICA) 150 MG capsule Take 1 capsule (150 mg) by mouth 2 times daily 180 capsule 1    vitamin C (ASCORBIC ACID) 500 MG tablet Take 1 tablet (500 mg) by mouth daily 30 tablet 4    vitamin D2 (ERGOCALCIFEROL) 33976 units (1250 mcg) capsule Take 50,000 Units by mouth once a week      Naltrexone HCl, Pain, 1.5 MG CAPS Take 4.5 mg by mouth daily 90 capsule 3       Allergies   Allergen Reactions    Lexapro [Escitalopram] Rash     Purple marks on arms    Nickel Rash    Penicillins Hives        Social History     Tobacco Use    Smoking  "status: Former     Current packs/day: 0.00     Average packs/day: 0.8 packs/day for 26.0 years (19.5 ttl pk-yrs)     Types: Cigarettes     Start date: 1996     Quit date: 2022     Years since quittin.5     Passive exposure: Past    Smokeless tobacco: Never   Substance Use Topics    Alcohol use: Yes     Comment: rare     Family History   Problem Relation Age of Onset    Rheumatoid Arthritis Mother     Coronary Artery Disease Mother         s/p MI and PCIs in 50s    Dementia Mother     Diabetes Type 2  Mother     Rheumatoid Arthritis Sister     Myocardial Infarction Maternal Grandmother         in her 60s    Myocardial Infarction Maternal Grandfather         later in life     History   Drug Use No             Review of Systems  Constitutional, HEENT, cardiovascular, pulmonary, gi and gu systems are negative, except as otherwise noted.    Objective    /78 (BP Location: Right arm, Patient Position: Sitting, Cuff Size: Adult Large)   Pulse 73   Temp 98.1  F (36.7  C) (Temporal)   Ht 1.626 m (5' 4\")   Wt 114.3 kg (252 lb)   LMP  (LMP Unknown)   SpO2 98%   BMI 43.26 kg/m     Estimated body mass index is 43.26 kg/m  as calculated from the following:    Height as of this encounter: 1.626 m (5' 4\").    Weight as of this encounter: 114.3 kg (252 lb).  Physical Exam  GENERAL: alert and no distress  EYES: Eyes grossly normal to inspection, PERRL and conjunctivae and sclerae normal  HENT: ear canals and TM's normal, nose and mouth without ulcers or lesions  NECK: no adenopathy, no asymmetry, masses, or scars  RESP: lungs clear to auscultation - no rales, rhonchi or wheezes  CV: regular rate and rhythm, normal S1 S2, no S3 or S4, no murmur, click or rub, no peripheral edema  ABDOMEN: soft, nontender, no hepatosplenomegaly, no masses and bowel sounds normal  MS: no gross musculoskeletal defects noted, no edema  SKIN: no suspicious lesions or rashes  NEURO: Normal strength and tone, mentation intact and " speech normal  PSYCH: mentation appears normal, affect normal/bright    Recent Labs   Lab Test 10/14/24  0000 03/15/24  0000   NA  --  141.3   POTASSIUM  --  4.33   CR  --  0.93   A1C 5.5  --         Diagnostics  Recent Results (from the past 720 hours)   HEMOGLOBIN A1C ([facility])    Collection Time: 10/14/24 12:00 AM   Result Value Ref Range    Hemoglobin A1C 5.5 4 - 5.6 %   HEMOGRAM PLATELET DIFF ([facility])    Collection Time: 10/22/24 12:00 AM   Result Value Ref Range    WBC 7.4 4.0 - 11 10*9/L    RBC Count 4.21 3.8 - 5.2 10*12/L    Hemoglobin 13.2 11.7 - 15.7 g/dL    Hematocrit 40.4 35.0 - 47.0 %    MCV 96.0 78 - 100 fL    MCH 31.4 26 - 33 pg    MCHC 32.7 31 - 36 g/dL    Platelet Count 292 150 - 375 10^9/L    % Granulocytes 46.0 %    % Lymphocytes 45.8 %    % Monocytes 8.2 %   BASIC METABOLIC PANEL ([facility])    Collection Time: 10/22/24  3:20 PM   Result Value Ref Range    Glucose 99 65 - 99 mg/dL    Urea Nitrogen 14 7 - 25 mg/dL    Creatinine 0.83 0.50 - 1.03 mg/dL    EGFR ([facility]) 82 > OR = 60 mL/min/1.73m2    BUN/Creatinine Ratio SEE NOTE: 6 - 22 (calc)    Sodium 141 135 - 146 mmol/L    Potassium 4.2 3.5 - 5.3 mmol/L    Chloride 104 98 - 110 mmol/L    Carbon Dioxide 27 20 - 32 mmol/L    Calcium 9.6 8.6 - 10.4 mg/dL      EKG: unchanged from previous tracings, sinus bradycardia, non specific changes, reviewed with cardiology    Revised Cardiac Risk Index (RCRI)  The patient has the following serious cardiovascular risks for perioperative complications:   - No serious cardiac risks = 0 points     RCRI Interpretation: 0 points: Class I (very low risk - 0.4% complication rate)         Signed Electronically by: Ekaterina Sanders MD  A copy of this evaluation report is provided to the requesting physician.

## 2024-10-22 NOTE — LETTER
10/22/2024      Jayne L Abbie  [address] [facility] [address]        Preoperative Evaluation  [facility]  [address]  [address]  [facility] [address]  Phone: [phone]  Fax: [phone]  Primary Provider: Ekaterina Sanders MD  Pre-op Performing Provider: Ekaterina Sanders MD  Oct 22, 2024               10/22/2024   Surgical Information   What procedure is being done? Right arm/ elbow repair   Facility or Hospital where procedure/surgery will be performed: [facility] [place]   Who is doing the procedure / surgery? Dr. Arroyo   Date of surgery / procedure: 10/29/24   Time of surgery / procedure: PM   Where do you plan to recover after surgery? at home with family      Fax number for surgical facility: [phone]    Assessment & Plan    The proposed surgical procedure is considered LOW risk.    Problem List Items Addressed This Visit       Pure hypercholesterolemia    Old myocardial infarction    Morbid obesity (H)    Relevant Orders    BASIC METABOLIC PANEL ([facility]) (Completed)    KELLE (generalized anxiety disorder)    Acid reflux disease    Other sleep apnea    Brain fog    Post-COVID chronic fatigue    Borderline diabetes    Relevant Orders    BASIC METABOLIC PANEL ([facility]) (Completed)     Other Visit Diagnoses       Preoperative examination    -  Primary    Relevant Orders    VENOUS COLLECTION (Completed)    HEMOGRAM PLATELET DIFF ([facility]) (Completed)    EKG 12-lead complete w/read - Clinics (Completed)    BASIC METABOLIC PANEL ([facility]) (Completed)    Pain of right forearm                  1. Preoperative examination (Primary)    - VENOUS COLLECTION  - HEMOGRAM PLATELET DIFF ([facility])  - EKG 12-lead complete w/read - Clinics  - BASIC METABOLIC PANEL ([facility])    2. Pain of right forearm      3. Old myocardial infarction  Discussed with Dr. Joseph, cardiology, who reviewed the studies and the notes. No concerns with proceeding with surgery. Had an abnormal study with subsequent normal  angiogram. The patient is assymptomatic. EKG today with non specific changes.    4. KELLE (generalized anxiety disorder)  Controlled.    5. Post-COVID chronic fatigue  Followed by specialist.    6. Brain fog  She was on guanfacine but found this isn't really helping her, she reports having stopped it as of 10.23.2024    7. Borderline diabetes  Controlled.  - BASIC METABOLIC PANEL ([facility])    8. Pure hypercholesterolemia      9. Morbid obesity (H)    - BASIC METABOLIC PANEL ([facility])    10. Gastroesophageal reflux disease with esophagitis, unspecified whether hemorrhage      11. Other sleep apnea        - No identified additional risk factors other than previously addressed    Antiplatelet or Anticoagulation Medication Instructions   - Patient is on no antiplatelet or anticoagulation medications.    Additional Medication Instructions  Take all scheduled medications on the day of surgery EXCEPT for modifications listed below:  She was instructed to hold naltrexone for 1 week prior to surgery, she has stopped the guanfacine herself on 10.23 due to ineffectivity,   Do not take metformin the night before or the morning of surgery,   Lyrica: hold the morning of surgery.    Recommendation  Approval given to proceed with proposed procedure, without further diagnostic evaluation.    Julio Godoy is a 59 year old, presenting for the following:  Pre-Op Exam        HPI related to upcoming procedure: here for preop right forearm pain. This is chronic. Has had MRI, injections. Having pain and inflammation end July last year.    Mostly the sx is pain.    She said she had a slight MI< but this was due to the lexiscan.          10/22/2024   Pre-Op Questionnaire   Have you ever had a heart attack or stroke? (!) YES April 2022   Have you ever had surgery on your heart or blood vessels, such as a stent placement, a coronary artery bypass, or surgery on an artery in your head, neck, heart, or legs? No   Do you have chest pain with  activity? No   Do you have a history of heart failure? No   Do you currently have a cold, bronchitis or symptoms of other infection? No   Do you have a cough, shortness of breath, or wheezing? No   Do you or anyone in your family have previous history of blood clots? No   Do you or does anyone in your family have a serious bleeding problem such as prolonged bleeding following surgeries or cuts? No   Have you ever had problems with anemia or been told to take iron pills? No   Have you had any abnormal blood loss such as black, tarry or bloody stools, or abnormal vaginal bleeding? No   Have you ever had a blood transfusion? No   Have you ever had a transfusion reaction? No   Are you willing to have a blood transfusion if it is medically needed before, during, or after your surgery? Yes   Have you or any of your relatives ever had problems with anesthesia? (!) YES mom with trouble waking up   Do you have sleep apnea, excessive snoring or daytime drowsiness? (!) YES   Do you have a CPAP machine? Yes   Do you have any artifical heart valves or other implanted medical devices like a pacemaker, defibrillator, or continuous glucose monitor? No   Do you have artificial joints? No   Are you allergic to latex? No      Health Care Directive  Patient does not have a Health Care Directive or Living Will: Discussed advance care planning with patient; information given to patient to review.    Preoperative Review of    reviewed - is now taking lyrica, stopped gabapentin prior to change to lyrica. Briefly took phentermine for weight loss, but stopped this. April 2024.      Status of Chronic Conditions:  DIABETES - Patient has a longstanding history of DiabetesType Type II . Patient is being treated with oral agents and denies significant side effects. Control has been good.   HYPERLIPIDEMIA - Patient has a long history of significant Hyperlipidemia requiring medication for treatment with recent good control. Patient reports no  problems or side effects with the medication.     Patient Active Problem List    Diagnosis Date Noted     Borderline diabetes 10/14/2024     Priority: Medium     Post-COVID chronic fatigue 06/30/2023     Priority: Medium     Other sleep apnea 03/06/2023     Priority: Medium     Brain fog 03/06/2023     Priority: Medium     Cognitive changes 03/06/2023     Priority: Medium     Morbid obesity (H) 12/04/2022     Priority: Medium     KELLE (generalized anxiety disorder) 12/04/2022     Priority: Medium     Acid reflux disease 12/04/2022     Priority: Medium     Old myocardial infarction 04/13/2022     Priority: Medium     Pure hypercholesterolemia      Priority: Medium     Chronic upper extremity pain, unspecified laterality 05/22/2017     Priority: Medium     ACP (advance care planning) 03/06/2023     Priority: Low      Past Medical History:   Diagnosis Date     Acid reflux disease      KELLE (generalized anxiety disorder)      Morbid obesity (H)      Other sleep apnea 3/6/2023     Pure hypercholesterolemia      Past Surgical History:   Procedure Laterality Date     ARTHROSCOPY SHOULDER Right 2015     ARTHROSCOPY SHOULDER, OPEN ROTATOR CUFF REPAIR, COMBINED Left 03/29/2017    Procedure:  Left shoulder arthroscopy and arthroscopic subacromial decompression (acromioplasty, bursectomy and coracoacromial ligament resection). Arthroscopic distal clavicle resection. Mini-open rotator cuff tear repair. Surgeon:  Kevin George MD  [facility]     [facility]     CV CORONARY ANGIOGRAM N/A 4/14/2022    Procedure: Coronary Angiogram;  Surgeon: Olvin Roy MD;  Location:  [facility]     DAVINCI HYSTERECTOMY TOTAL, BILATERAL SALPINGO-OOPHORECTOMY, COMBINED N/A 2/9/2021    Procedure: Robotic assisted total laparoscopic hysterectomy, bilateral salpingectomy, bilateral oophorectomy, cystoscopy;  Surgeon: Jonathan Peters MD;  Location:  OR     DISKECTOMY, LUMBAR,  SINGLE SP  1996    L5-S1     DISKECTOMY, LUMBAR, SINGLE SP  1997    L5-S1     FUSION LUMBAR ANTERIOR ONE LEVEL  1999    L5-S1     OSTEOTOMY FOOT Right 06/06/2017    Procedure: OSTEOTOMY FOOT;  1)  WEIL OSTEOTOMY RIGHT SECOND METATARSAL, 2) PLANTAR CAPSULE DEBRIDEMENT/SYNOVECTOMY, RIGHT SECOND METATARSAL PHALANGEAL JOINT, 3) HAMMERTOE REPAIR RIGHT SECOND TOE;  Surgeon: Olvin Betancur DPM;  Location: [facility]     GA SPINE SURGERY PROCEDURE UNLISTED       RECONSTRUCT FOREFOOT WITH METATARSOPHALANGEAL (MTP) FUSION Right 06/06/2017    Procedure: RECONSTRUCT FOREFOOT WITH METATARSOPHALANGEAL (MTP) FUSION;;  Surgeon: Olvin Betancur DPM;  Location: [facility]     REPAIR HAMMER TOE Right 06/06/2017    Procedure: REPAIR HAMMER TOE;;  Surgeon: Olvin Betancur DPM;  Location: [facility]     ROTATOR CUFF REPAIR RT/LT Left 2017     Current Outpatient Medications   Medication Sig Dispense Refill     acetylcysteine (N-ACETYL CYSTEINE) 600 MG CAPS capsule Take 1 capsule (600 mg) by mouth daily. 90 capsule 3     atorvastatin (LIPITOR) 40 MG tablet Take 0.5 tablets (20 mg) by mouth every evening 90 tablet 1     guanFACINE (TENEX) 1 MG tablet Take 2 tablets (2 mg) by mouth at bedtime. Start with 1 mg at bedtime, increase to 2 mg at bedtime after 1 week 60 tablet 4     metFORMIN (GLUCOPHAGE XR) 500 MG 24 hr tablet Take 2 tablets (1,000 mg) by mouth daily at 2 pm.       multivitamin w/minerals (THERA-VIT-M) tablet Take 1 tablet by mouth daily       omeprazole (PRILOSEC) 20 MG DR capsule Take 20 mg by mouth daily       pregabalin (LYRICA) 150 MG capsule Take 1 capsule (150 mg) by mouth 2 times daily 180 capsule 1     vitamin C (ASCORBIC ACID) 500 MG tablet Take 1 tablet (500 mg) by mouth daily 30 tablet 4     vitamin D2 (ERGOCALCIFEROL) 23497 units (1250 mcg) capsule Take 50,000 Units by mouth once a week       Naltrexone HCl, Pain, 1.5 MG CAPS Take 4.5 mg by mouth daily 90 capsule 3       Allergies   Allergen Reactions     Lexapro  "[Escitalopram] Rash     Purple marks on arms     Nickel Rash     Penicillins Hives        Social History     Tobacco Use     Smoking status: Former     Current packs/day: 0.00     Average packs/day: 0.8 packs/day for 26.0 years (19.5 ttl pk-yrs)     Types: Cigarettes     Start date: 1996     Quit date: 2022     Years since quittin.5     Passive exposure: Past     Smokeless tobacco: Never   Substance Use Topics     Alcohol use: Yes     Comment: rare     Family History   Problem Relation Age of Onset     Rheumatoid Arthritis Mother      Coronary Artery Disease Mother         s/p MI and PCIs in 50s     Dementia Mother      Diabetes Type 2  Mother      Rheumatoid Arthritis Sister      Myocardial Infarction Maternal Grandmother         in her 60s     Myocardial Infarction Maternal Grandfather         later in life     History   Drug Use No             Review of Systems  Constitutional, HEENT, cardiovascular, pulmonary, gi and gu systems are negative, except as otherwise noted.    Objective    /78 (BP Location: Right arm, Patient Position: Sitting, Cuff Size: Adult Large)   Pulse 73   Temp 98.1  F (36.7  C) (Temporal)   Ht 1.626 m (5' 4\")   Wt 114.3 kg (252 lb)   LMP  (LMP Unknown)   SpO2 98%   BMI 43.26 kg/m     Estimated body mass index is 43.26 kg/m  as calculated from the following:    Height as of this encounter: 1.626 m (5' 4\").    Weight as of this encounter: 114.3 kg (252 lb).  Physical Exam  GENERAL: alert and no distress  EYES: Eyes grossly normal to inspection, PERRL and conjunctivae and sclerae normal  HENT: ear canals and TM's normal, nose and mouth without ulcers or lesions  NECK: no adenopathy, no asymmetry, masses, or scars  RESP: lungs clear to auscultation - no rales, rhonchi or wheezes  CV: regular rate and rhythm, normal S1 S2, no S3 or S4, no murmur, click or rub, no peripheral edema  ABDOMEN: soft, nontender, no hepatosplenomegaly, no masses and bowel sounds normal  MS: no " gross musculoskeletal defects noted, no edema  SKIN: no suspicious lesions or rashes  NEURO: Normal strength and tone, mentation intact and speech normal  PSYCH: mentation appears normal, affect normal/bright    Recent Labs   Lab Test 10/14/24  0000 03/15/24  0000   NA  --  141.3   POTASSIUM  --  4.33   CR  --  0.93   A1C 5.5  --         Diagnostics  Recent Results (from the past 720 hours)   HEMOGLOBIN A1C ([facility])    Collection Time: 10/14/24 12:00 AM   Result Value Ref Range    Hemoglobin A1C 5.5 4 - 5.6 %   HEMOGRAM PLATELET DIFF ([facility])    Collection Time: 10/22/24 12:00 AM   Result Value Ref Range    WBC 7.4 4.0 - 11 10*9/L    RBC Count 4.21 3.8 - 5.2 10*12/L    Hemoglobin 13.2 11.7 - 15.7 g/dL    Hematocrit 40.4 35.0 - 47.0 %    MCV 96.0 78 - 100 fL    MCH 31.4 26 - 33 pg    MCHC 32.7 31 - 36 g/dL    Platelet Count 292 150 - 375 10^9/L    % Granulocytes 46.0 %    % Lymphocytes 45.8 %    % Monocytes 8.2 %   BASIC METABOLIC PANEL ([facility])    Collection Time: 10/22/24  3:20 PM   Result Value Ref Range    Glucose 99 65 - 99 mg/dL    Urea Nitrogen 14 7 - 25 mg/dL    Creatinine 0.83 0.50 - 1.03 mg/dL    EGFR ([facility]) 82 > OR = 60 mL/min/1.73m2    BUN/Creatinine Ratio SEE NOTE: 6 - 22 (calc)    Sodium 141 135 - 146 mmol/L    Potassium 4.2 3.5 - 5.3 mmol/L    Chloride 104 98 - 110 mmol/L    Carbon Dioxide 27 20 - 32 mmol/L    Calcium 9.6 8.6 - 10.4 mg/dL      EKG: unchanged from previous tracings, sinus bradycardia, non specific changes, reviewed with cardiology    Revised Cardiac Risk Index (RCRI)  The patient has the following serious cardiovascular risks for perioperative complications:   - No serious cardiac risks = 0 points     RCRI Interpretation: 0 points: Class I (very low risk - 0.4% complication rate)         Signed Electronically by: Ekaterina Sanders MD  A copy of this evaluation report is provided to the requesting physician.             Sincerely,        Ekaterina Sanders MD

## 2024-10-23 LAB
BUN SERPL-MCNC: 14 MG/DL (ref 7–25)
BUN/CREATININE RATIO: NORMAL (CALC) (ref 6–22)
CALCIUM SERPL-MCNC: 9.6 MG/DL (ref 8.6–10.4)
CHLORIDE SERPLBLD-SCNC: 104 MMOL/L (ref 98–110)
CO2 SERPL-SCNC: 27 MMOL/L (ref 20–32)
CREAT SERPL-MCNC: 0.83 MG/DL (ref 0.5–1.03)
EGFR (QUEST): 82 ML/MIN/1.73M2
GLUCOSE - QUEST: 99 MG/DL (ref 65–99)
POTASSIUM SERPL-SCNC: 4.2 MMOL/L (ref 3.5–5.3)
SODIUM SERPL-SCNC: 141 MMOL/L (ref 135–146)

## 2024-10-25 ENCOUNTER — TRANSFERRED RECORDS (OUTPATIENT)
Dept: FAMILY MEDICINE | Facility: CLINIC | Age: 59
End: 2024-10-25

## 2024-11-07 ENCOUNTER — TRANSFERRED RECORDS (OUTPATIENT)
Dept: FAMILY MEDICINE | Facility: CLINIC | Age: 59
End: 2024-11-07

## 2024-11-12 ENCOUNTER — THERAPY VISIT (OUTPATIENT)
Dept: PHYSICAL THERAPY | Facility: CLINIC | Age: 59
End: 2024-11-12
Payer: COMMERCIAL

## 2024-11-12 DIAGNOSIS — G93.32 POST-COVID CHRONIC FATIGUE: Primary | ICD-10-CM

## 2024-11-12 DIAGNOSIS — U09.9 POST-COVID CHRONIC FATIGUE: Primary | ICD-10-CM

## 2024-11-12 PROCEDURE — 97112 NEUROMUSCULAR REEDUCATION: CPT | Mod: GP | Performed by: PHYSICAL THERAPIST

## 2024-11-12 PROCEDURE — 97110 THERAPEUTIC EXERCISES: CPT | Mod: GP | Performed by: PHYSICAL THERAPIST

## 2024-11-15 ENCOUNTER — THERAPY VISIT (OUTPATIENT)
Dept: OCCUPATIONAL THERAPY | Facility: CLINIC | Age: 59
End: 2024-11-15
Attending: INTERNAL MEDICINE
Payer: COMMERCIAL

## 2024-11-15 DIAGNOSIS — G93.32 POST-COVID CHRONIC FATIGUE: Primary | ICD-10-CM

## 2024-11-15 DIAGNOSIS — U09.9 POST-COVID CHRONIC FATIGUE: Primary | ICD-10-CM

## 2024-11-15 PROCEDURE — 97535 SELF CARE MNGMENT TRAINING: CPT | Mod: GO

## 2024-11-15 PROCEDURE — 97530 THERAPEUTIC ACTIVITIES: CPT | Mod: GO

## 2024-11-15 NOTE — PROGRESS NOTES
"    DISCHARGE  Reason for Discharge: Patient has met/partially met all goals. No further expectation of progress at this time.    Equipment Issued: visual HEP (tracking stick, Blayne string)    Discharge Plan: Patient to continue home program. She is welcome to return to OP OT in the future, as needed, with new referral from her provider.    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 Telehealth - yes.   Progress Note/Certification   Start Of Care Date 06/20/24   Onset of Illness/Injury or Date of Surgery 06/12/24   Therapy Frequency 1x/every other week   Predicted Duration 90 days   Progress Note Due Date 12/02/24   Goals   OT Goals 1;2;3;4   OT Goal 1   Goal Identifier facit   Goal Description Patient will identify at least 3 methods to manage her fatigue/energy to demonstrate at least a 5 point improvement in her perceived level of fatigue as measured by the FACIT, in order to increase safety and independence with ADL/IADL tasks.   Goal Progress Goal partially met. Pt educated in fatigue management strategies including stovetop burner analogy, planning, prioritizing, pacing, and posture with application to ADLs/IADLs. Additionally, educated in factors contributing to cognitive fatigue and compensatory strategies to promote IND with daily activities. Educated in application of strategies to work-based activities including options for reducing work load or work hours, as needed, to further promote fatigue management. Demonstrates 4/4 recall of \"4 P's of energy conservation\" and demonstrates good application of strategies to daily activities. " With reassessment, she scored 25/52 on FACIT (same as previous, see flowsheet for details). Pt to continue with application of strategies to promote safety and IND with ADLs/IADLs and work-based activities.   Target Date 12/02/24   OT Goal 3   Goal Identifier SSP   Goal Description Pt to demonstrate use and understanding of the Safe and Sound Protocol in order to self regulate auditory stimuli in order to increase manage of IADL tasks.   Goal Progress Goal met. Pt educated and trained in SSP for self-regulation of sensory sensitivities and emotional regulation. Pt completed first round of Core listening program and demonstrated lasting improvements from SSP weeks later (see objective measures below). Re-issued SSP Core program to further progress regulation of auditory and visual sensitivities to promote engagement in daily activities. Pt completed successfully and educated in additional vagus stimulation and calming options for self-regulation. Issued SSP Balance program to further promote positive ANS changes for improved engagement in daily activities.   Target Date 12/02/24   Date Met 11/15/24   OT Goal 4   Goal Identifier IADLs   Goal Description Pt will demonstrate adequate performance with Dynavision to demonstrate appropriate reaction time and processing speed for IADL s.   Goal Progress Goal progressing but not fully met. Initiated training in both peripersonal and extrapersonal visual tasks to progress visual skills for engagement in home- and community-based daily activities. Visual HEP includes tracking, saccades, and convergence tasks, as well as functional activities (cards, tennis ball, marbles, Spot It!, visual apps) to promote visual skills. Additionally, initiated extrapersonal scanning in session with use of Dynavision as therapeutic tool with pt demonstrating improvements over time but still scoring BNLs (see below). However, demonstrates WFLs on BiVABA scan course on second trial. Continues to  have increased symptoms and difficulty tolerating. Educated in continuation of HEP and expressed caution with daily activities, including prolonged visual tasks and tasks like driving.   Target Date 12/02/24   Subjective Report   Subjective Report Jayne reports that she had her elbow tendon repair surgery, so she hasn't been able to do much of anything for her HEP as a result. However, she did purchase three Highlights search and find books and has been working on them intermittently using her L hand to write/draw. Reports she has her new glasses, which she is still getting accustomed to but found they made her Blayne string exercises easier. She notes that being off of work has been beneficial as she hasn't been feeling the same level of fatigue during her days, however she does continue to experience fatigue despite not working. She reports she may have to consider reduced work load in the future for improved quality of life. Overall, she reports understanding of OT education/HEP and she has no further questions/concerns. She endorses readiness for discharge today.   Objective Measures   Objective Measures Objective Measure 1;Objective Measure 2;Objective Measure 3;Objective Measure 4;Objective Measure 5   Objective Measure 1   Objective Measure BBCSS   Details As of 10/1/24 - Using the scale 1 = low impairment and 4 = high impairment, the pt scored the following on the BBCSS: auditory hypersensitivity = 2.6 (was 2.4, 2.7), auditory hyposensitivity to voices = 1.6 (was 1.6, 1.8), visual hypersensitivity = 1.9 (was 1.6, 2.3), tactile hypersensitivity = 1.7 (was 1.8, 1.9), affiliative touch aversion = 1.3 (was 1.3, 2.0), selective eating = 2.0 (was 1.7, 1.8), ingestive problems = 1.0 (same as both previous), and digestive problems = 2.5 (was 2.0, 2.3). Measures compared to 9/3/24 and initial assessment on 6/26/24. Of note, 10/1/24 numbers likely influenced by significant stress/family concerns in patient's life  "right now.   Objective Measure 2   Objective Measure Oculomotor Skills   Details As of 7/19/24 - Pursuits: smooth tracking but occasionally loses focus and reports fatigue, otherwise WFLs. Saccades: increased blinking frequency and reports eye strain/fatigue, otherwise WFLs. NPC: approx 27-28 cm on fifth trial (BNLs) and reports eye strain.   Objective Measure 3   Objective Measure Dynavision   Details As of 9/3/24 - Completed standing on foam block to integrate vestibular and visual systems. Mode A, trial 1: 46 hits (average reaction time 1.3 sec top quadrants, 1.4 sec bottom R, 1.2 sec bottom L). trial 2: 50 hits (average reaction time 1.1 sec top L, 1.2 sec top R, 1.3 sec bottom R, 1.2 sec bottom L; WNLs is 52+ hits). Reports difficulty with looking from bottom lights back up to top lights.   Objective Measure 4   Objective Measure BiVABA Scan Course   Details As of 10/1/24 - On 60 foot scan course with targets high/medium/low, patient gets 8/10 left and 10/10 right in 43.1 sec 1st trial and 10/10 L, 10/10 R in 36.0 sec for second trial following education on visual scanning techniques (lighthouse and rainbow scanning patterns). Reports dizziness with both trials but trial 1 being worse than trial 2. WNL is consistent scores of >19/20, completing course in 35-50 seconds.   Objective Measure 5   Objective Measure FACIT   Details As of 11/15/24 - Scored 25/52 (BNLs, same as before, see flowsheet for details).   Treatment Interventions (OT)   Interventions Self Care/Home Management;Sensory Integration;Therapeutic Activity   Self Care/Home Management   Self-Care/Home Mgmt/ADL, Compensatory, Meal Prep Minutes (71936) 15 Minutes   Self Care 1 Energy conservation   Self Care 1 - Details To reassess progress with fatigue management, administered FACIT - see above and flowsheet for details. Facilitated teach back method of \"4 P's of Energy Conservation\" with pt able to recall 4/4 strategies. Additionally, provided " "re-education on factors contributing to fatigue (physical, cognitive, social, emotional, sensory stimuli, diet/hydration, sleep, medications, illness, etc) and educated in maximizing those she can influence and minimizing those she doesn't have control over. Additionally, educated in application of strategies to work-based activities including options for reducing work load or work hours, as needed, to further promote fatigue management.   Skilled Intervention Skilled education in energy conservation strategies to promote IND with ADLs/IADLs   Patient Response/Progress Jayne endorses good understanding of energy conservation principles and has no further questions/concerns at this time. Goal 1 partially met   Therapeutic Activity   Therapeutic Activity Minutes (89556) 25   Ther Act 1 Visual scanning/skills, HEP, discharge summary   Ther Act 1 - Details Facilitated skilled review of Highlights search and find visual HEP with pt completing with 100% accuracy, reports increased difficulty with puzzle with greater visual clutter in the image. To further progress peripersonal visual skills for IND with vision-based ADLs/IADLs, facilitated skilled review of visual HEP, including: use of stick for 1) tracking clockwise circles x2 repetitions, 2) tracking counterclockwise circles x2 repetitions, and 3) convergence task with \"E\" on stick and bringing closer to eyes while keeping in focus, as well as four variations of Blayne string exercises (see previous notes) x90 sec per exercise. Additionally, provided brief review of functional visual activities for completion as part of HEP, including: marbles, cards, tennis ball, Spot It! game, and Highlights search and find activities. To further progress peripersonal visual skills for reading and other vision-based ADLs/IADLs, trained in visual apps on tablet, including: Flow Free x2 puzzles, visual attention therapy (34 targets in 40 sec with 97% accuracy), Find the Difference x2 " "puzzles, and Unblock Me x4 puzzles (2 easy, 2 medium). Issued handout with custom edits for carryover to home. Educated in follow up and referral process for return to OP OT in the future, as needed.   Skilled Intervention Skilled training in visual scanning/skills for IND with vision-based daily activities, such as reading and driving   Patient Response/Progress Jayne endorses understanding of HEP and discharge recommendations, as well as readiness for discharge. Goals partially met.   Education   Learner/Method Patient;Listening;Demonstration;Reading;No Barriers to Learning   Education Comments see tx details above   Plan   Home program ECWS burners + basics. Cognitive fatigue module. AE: Light filtering glasses, Loop earplugs. SSP Core + vagus nerve stimulation techniques. Vision ed: reduce visual clutter with block, large font/text. Vision HEP (tracking stick and \"E\", Blayne string, Rajendra 4M/3M/2M/reading, Highlights search and find, functional tasks - marbles, cards, tennis ball, Spot It!, visual apps). Extrapersonal: DV, SC   Updates to plan of care discharged   Total Session Time   Timed Code Treatment Minutes 40   Total Treatment Time (sum of timed and untimed services) 40     Thank you for the referral of this patient.  If you have any questions regarding the information in this report, please feel free to contact me per the information provided below.      Nicole Guzman MA, OTR/L  Occupational Therapist  [address] [address]  Clinic Fax:  [phone]  Clinic Phone: [phone]  "

## 2024-11-18 SDOH — SOCIAL STABILITY: SOCIAL NETWORK: I HAVE TROUBLE DOING ALL OF THE FAMILY ACTIVITIES THAT I WANT TO DO: SOMETIMES

## 2024-11-18 SDOH — SOCIAL STABILITY: SOCIAL NETWORK: PROMIS ABILITY TO PARTICIPATE IN SOCIAL ROLES & ACTIVITIES T-SCORE: 45

## 2024-11-18 SDOH — SOCIAL STABILITY: SOCIAL NETWORK: I HAVE TROUBLE DOING ALL OF MY USUAL WORK (INCLUDE WORK AT HOME): SOMETIMES

## 2024-11-18 SDOH — SOCIAL STABILITY: SOCIAL NETWORK: I HAVE TROUBLE DOING ALL OF MY REGULAR LEISURE ACTIVITIES WITH OTHERS: SOMETIMES

## 2024-11-18 SDOH — SOCIAL STABILITY: SOCIAL NETWORK

## 2024-11-18 SDOH — SOCIAL STABILITY: SOCIAL NETWORK: I HAVE TROUBLE DOING ALL OF THE ACTIVITIES WITH FRIENDS THAT I WANT TO DO: SOMETIMES

## 2024-11-18 ASSESSMENT — ANXIETY QUESTIONNAIRES
GAD7 TOTAL SCORE: 2
IF YOU CHECKED OFF ANY PROBLEMS ON THIS QUESTIONNAIRE, HOW DIFFICULT HAVE THESE PROBLEMS MADE IT FOR YOU TO DO YOUR WORK, TAKE CARE OF THINGS AT HOME, OR GET ALONG WITH OTHER PEOPLE: SOMEWHAT DIFFICULT
5. BEING SO RESTLESS THAT IT IS HARD TO SIT STILL: SEVERAL DAYS
1. FEELING NERVOUS, ANXIOUS, OR ON EDGE: NOT AT ALL
7. FEELING AFRAID AS IF SOMETHING AWFUL MIGHT HAPPEN: NOT AT ALL
3. WORRYING TOO MUCH ABOUT DIFFERENT THINGS: NOT AT ALL
4. TROUBLE RELAXING: NOT AT ALL
GAD7 TOTAL SCORE: 2
GAD7 TOTAL SCORE: 2
6. BECOMING EASILY ANNOYED OR IRRITABLE: SEVERAL DAYS
2. NOT BEING ABLE TO STOP OR CONTROL WORRYING: NOT AT ALL
7. FEELING AFRAID AS IF SOMETHING AWFUL MIGHT HAPPEN: NOT AT ALL
8. IF YOU CHECKED OFF ANY PROBLEMS, HOW DIFFICULT HAVE THESE MADE IT FOR YOU TO DO YOUR WORK, TAKE CARE OF THINGS AT HOME, OR GET ALONG WITH OTHER PEOPLE?: SOMEWHAT DIFFICULT

## 2024-11-18 ASSESSMENT — PATIENT HEALTH QUESTIONNAIRE - PHQ9
SUM OF ALL RESPONSES TO PHQ QUESTIONS 1-9: 7
10. IF YOU CHECKED OFF ANY PROBLEMS, HOW DIFFICULT HAVE THESE PROBLEMS MADE IT FOR YOU TO DO YOUR WORK, TAKE CARE OF THINGS AT HOME, OR GET ALONG WITH OTHER PEOPLE: SOMEWHAT DIFFICULT
SUM OF ALL RESPONSES TO PHQ QUESTIONS 1-9: 7

## 2024-11-20 ENCOUNTER — VIRTUAL VISIT (OUTPATIENT)
Dept: PHYSICAL MEDICINE AND REHAB | Facility: CLINIC | Age: 59
End: 2024-11-20
Attending: INTERNAL MEDICINE
Payer: COMMERCIAL

## 2024-11-20 VITALS — BODY MASS INDEX: 39.32 KG/M2 | WEIGHT: 236 LBS | HEIGHT: 65 IN

## 2024-11-20 DIAGNOSIS — G93.32 POST-COVID CHRONIC FATIGUE: Primary | ICD-10-CM

## 2024-11-20 DIAGNOSIS — U09.9 POST-COVID CHRONIC FATIGUE: Primary | ICD-10-CM

## 2024-11-20 ASSESSMENT — PAIN SCALES - GENERAL: PAINLEVEL_OUTOF10: MILD PAIN (3)

## 2024-11-20 NOTE — NURSING NOTE
Current patient location: [address] [facility] [address]    Is the patient currently in the state of [place]? YES    Visit mode:VIDEO    If the visit is dropped, the patient can be reconnected by:VIDEO VISIT: Text to cell phone:   Telephone Information:   Mobile [phone]       Will anyone else be joining the visit? NO  (If patient encounters technical issues they should call [phone]    Are changes needed to the allergy or medication list? Pt stated no changes to allergies and Pt stated no med changes    Are refills needed on medications prescribed by this physician? NO    Rooming Documentation:  Questionnaire(s) completed    Reason for visit: Consult    Fiordaliza LINCOLNF

## 2024-11-20 NOTE — LETTER
"11/20/2024       RE: Jayne Leiva  [address] [facility] [address]     Dear Colleague,    Thank you for referring your patient, Jayne Leiva, to the [facility] [place] at [facility]. Please see a copy of my visit note below.    Jayne is a 59 year old who is being evaluated via a billable video visit.    How would you like to obtain your AVS? MyChart  If the video visit is dropped, the invitation should be resent by: Text to cell phone: [phone]  Will anyone else be joining your video visit? No      Assessment & Plan    Post-COVID chronic fatigue  Discussed current understanding of COVID-19 and associated long-term symptoms.  Reviewed options for management with patient.  Recommend restarting low-dose naltrexone as she had some benefits from the medication in the past.  Reviewed possible restart of Lyrica.  Patient was encouraged to monitor symptoms for a month after restarting naltrexone to make a decision on restart of Lyrica.  She was encouraged to continue with implementation of lifestyle modifications aimed at energy conservation and fatigue management.      I spent a total of 40 minutes on the day of the visit.   Time spent by me today doing chart review, history and exam, documentation and further activities per the note          BMI  Estimated body mass index is 39.88 kg/m  as calculated from the following:    Height as of this encounter: 1.638 m (5' 4.5\").    Weight as of this encounter: 107 kg (236 lb).         Return in about 13 weeks (around 2/19/2025).    Subjective  Jayne is a 59 year old, presenting for the following health issues:  Consult  Consult    HPI     Patient reports that she stopped naltrexone and guanfacine for the surgery. She recently had surgery and needed pain management. She states that she finally got to the point where she does not need opiate medications for " pain management. She states that she is not planning to restart guanfacine due to minimal improvement of symptoms with the medication.   She is currently off work for recovery from surgery. She is working with PT and OT. She is concerned about returning to work She reports that her fatigue level is not as debilitating when she is off work.  Patient has not restarted Lyrica yet. She lost 16 lbs being off Lyrica. She is hesitant to restart it. She is concerned about additional weight gain with the medication.   Patient is currently not using her treadmill. She is trying to walk more with her . She is not able to use her arm due to recent surgery. She is getting up frequently during the day. She is having difficulties with driving long distances due to significant fatigue.   :[id number]}      11/18/2024    10:01 AM   PHQ Assesment Total Score(s)   PHQ-9 Score 7        Patient-reported           11/18/2024    10:02 AM   KELLE-7 Results   KELLE 7 TOTAL SCORE 2 (minimal anxiety)   KELLE-7 Total Score 2        Patient-reported         11/18/2024    10:02 AM   PTSD Screen Score   Have you ever experienced this kind of event? Yes    PTSD Screen (Score of 3 or more suggests positive screen) 4        Patient-reported         11/18/2024    10:05 AM   PROMIS-29   PROMIS Physical Function T-Score 42 (mild dysfunction)   PROMIS Anxiety T-Score 40 (within normal limits)   PROMIS Depression T-Score 52 (within normal limits)   PROMIS Fatigue T-Score 57 (mild)   PROMIS Sleep Disturbance T-Score 58 (mild)   PROMIS Ability to Participate in Social Roles & Activities T-Score 45 (within normal limits)   PROMIS Pain Interference T-Score 64 (moderate)   PROMIS Pain Intensity 5       Past Medical History:   Diagnosis Date     Acid reflux disease      KELLE (generalized anxiety disorder)      Morbid obesity (H)      Other sleep apnea 3/6/2023     Pure hypercholesterolemia        Past Surgical History:   Procedure Laterality Date     ARTHROSCOPY  SHOULDER Right 2015     ARTHROSCOPY SHOULDER, OPEN ROTATOR CUFF REPAIR, COMBINED Left 03/29/2017    Procedure:  Left shoulder arthroscopy and arthroscopic subacromial decompression (acromioplasty, bursectomy and coracoacromial ligament resection). Arthroscopic distal clavicle resection. Mini-open rotator cuff tear repair. Surgeon:  Kevin George MD  Location: [facility]     BUNIONECTOMY Right 2008     CV CORONARY ANGIOGRAM N/A 4/14/2022    Procedure: Coronary Angiogram;  Surgeon: Olvin Roy MD;  Location:  [facility]ATH LAB     DAVINCI HYSTERECTOMY TOTAL, BILATERAL SALPINGO-OOPHORECTOMY, COMBINED N/A 2/9/2021    Procedure: Robotic assisted total laparoscopic hysterectomy, bilateral salpingectomy, bilateral oophorectomy, cystoscopy;  Surgeon: Jonathan Peters MD;  Location:  OR     DISKECTOMY, LUMBAR, SINGLE SP  1996    L5-S1     DISKECTOMY, LUMBAR, SINGLE SP  1997    L5-S1     FUSION LUMBAR ANTERIOR ONE LEVEL  1999    L5-S1     OSTEOTOMY FOOT Right 06/06/2017    Procedure: OSTEOTOMY FOOT;  1)  WEIL OSTEOTOMY RIGHT SECOND METATARSAL, 2) PLANTAR CAPSULE DEBRIDEMENT/SYNOVECTOMY, RIGHT SECOND METATARSAL PHALANGEAL JOINT, 3) HAMMERTOE REPAIR RIGHT SECOND TOE;  Surgeon: Olvin Betancur DPM;  Location: [facility]     AR SPINE SURGERY PROCEDURE UNLISTED       RECONSTRUCT FOREFOOT WITH METATARSOPHALANGEAL (MTP) FUSION Right 06/06/2017    Procedure: RECONSTRUCT FOREFOOT WITH METATARSOPHALANGEAL (MTP) FUSION;;  Surgeon: Olvin Betancur DPM;  Location: [facility]     REPAIR HAMMER TOE Right 06/06/2017    Procedure: REPAIR HAMMER TOE;;  Surgeon: Olvin Betancur DPM;  Location: [facility]     ROTATOR CUFF REPAIR RT/LT Left 2017       Family History   Problem Relation Age of Onset     Rheumatoid Arthritis Mother      Coronary Artery Disease Mother         s/p MI and PCIs in 50s     Dementia Mother      Diabetes Type 2  Mother      Rheumatoid Arthritis Sister      Myocardial Infarction  Maternal Grandmother         in her 60s     Myocardial Infarction Maternal Grandfather         later in life       Social History     Tobacco Use     Smoking status: Former     Current packs/day: 0.00     Average packs/day: 0.8 packs/day for 26.0 years (19.5 ttl pk-yrs)     Types: Cigarettes     Start date: 1996     Quit date: 2022     Years since quittin.6     Passive exposure: Past     Smokeless tobacco: Never   Vaping Use     Vaping status: Never Used   Substance Use Topics     Alcohol use: Yes     Comment: rare     Drug use: No         Current Outpatient Medications:      acetylcysteine (N-ACETYL CYSTEINE) 600 MG CAPS capsule, Take 1 capsule (600 mg) by mouth daily., Disp: 90 capsule, Rfl: 3     atorvastatin (LIPITOR) 40 MG tablet, Take 0.5 tablets (20 mg) by mouth every evening, Disp: 90 tablet, Rfl: 1     metFORMIN (GLUCOPHAGE XR) 500 MG 24 hr tablet, Take 2 tablets (1,000 mg) by mouth daily at 2 pm., Disp: , Rfl:      multivitamin w/minerals (THERA-VIT-M) tablet, Take 1 tablet by mouth daily, Disp: , Rfl:      Naltrexone HCl, Pain, 1.5 MG CAPS, Take 4.5 mg by mouth daily, Disp: 90 capsule, Rfl: 3     omeprazole (PRILOSEC) 20 MG DR capsule, Take 20 mg by mouth daily, Disp: , Rfl:      pregabalin (LYRICA) 150 MG capsule, Take 1 capsule (150 mg) by mouth 2 times daily, Disp: 180 capsule, Rfl: 1     vitamin C (ASCORBIC ACID) 500 MG tablet, Take 1 tablet (500 mg) by mouth daily, Disp: 30 tablet, Rfl: 4     vitamin D2 (ERGOCALCIFEROL) 34473 units (1250 mcg) capsule, Take 50,000 Units by mouth once a week, Disp: , Rfl:           Objective   Vitals - Patient Reported  Pain Score: Mild Pain (3)  Pain Loc:  (All over generalized)        Physical Exam   GENERAL: alert and no distress  EYES: Eyes grossly normal to inspection.  No discharge or erythema, or obvious scleral/conjunctival abnormalities.  RESP: No audible wheeze, cough, or visible cyanosis.    SKIN: Visible skin clear. No significant rash,  abnormal pigmentation or lesions.  NEURO: Cranial nerves grossly intact.  Mentation and speech appropriate for age.  PSYCH: Appropriate affect, tone, and pace of words          Video-Visit Details    Type of service:  Video Visit   Originating Location (pt. Location): Home    Distant Location (provider location):  Off-site  Platform used for Video Visit: [facility]  Signed Electronically by: Amy Gerardo MD        Again, thank you for allowing me to participate in the care of your patient.      Sincerely,    Amy Gerardo MD

## 2024-11-20 NOTE — PROGRESS NOTES
"Jayne is a 59 year old who is being evaluated via a billable video visit.    How would you like to obtain your AVS? MyChart  If the video visit is dropped, the invitation should be resent by: Text to cell phone: [phone]  Will anyone else be joining your video visit? No      Assessment & Plan     Post-COVID chronic fatigue  Discussed current understanding of COVID-19 and associated long-term symptoms.  Reviewed options for management with patient.  Recommend restarting low-dose naltrexone as she had some benefits from the medication in the past.  Reviewed possible restart of Lyrica.  Patient was encouraged to monitor symptoms for a month after restarting naltrexone to make a decision on restart of Lyrica.  She was encouraged to continue with implementation of lifestyle modifications aimed at energy conservation and fatigue management.      I spent a total of 40 minutes on the day of the visit.   Time spent by me today doing chart review, history and exam, documentation and further activities per the note          BMI  Estimated body mass index is 39.88 kg/m  as calculated from the following:    Height as of this encounter: 1.638 m (5' 4.5\").    Weight as of this encounter: 107 kg (236 lb).         Return in about 13 weeks (around 2/19/2025).    Subjective   Jayne is a 59 year old, presenting for the following health issues:  Consult  Consult    HPI     Patient reports that she stopped naltrexone and guanfacine for the surgery. She recently had surgery and needed pain management. She states that she finally got to the point where she does not need opiate medications for pain management. She states that she is not planning to restart guanfacine due to minimal improvement of symptoms with the medication.   She is currently off work for recovery from surgery. She is working with PT and OT. She is concerned about returning to work She reports that her fatigue level is not as debilitating when she is off " work.  Patient has not restarted Lyrica yet. She lost 16 lbs being off Lyrica. She is hesitant to restart it. She is concerned about additional weight gain with the medication.   Patient is currently not using her treadmill. She is trying to walk more with her . She is not able to use her arm due to recent surgery. She is getting up frequently during the day. She is having difficulties with driving long distances due to significant fatigue.   :[id number]}      11/18/2024    10:01 AM   PHQ Assesment Total Score(s)   PHQ-9 Score 7        Patient-reported           11/18/2024    10:02 AM   KELLE-7 Results   KELLE 7 TOTAL SCORE 2 (minimal anxiety)   KELLE-7 Total Score 2        Patient-reported         11/18/2024    10:02 AM   PTSD Screen Score   Have you ever experienced this kind of event? Yes    PTSD Screen (Score of 3 or more suggests positive screen) 4        Patient-reported         11/18/2024    10:05 AM   PROMIS-29   PROMIS Physical Function T-Score 42 (mild dysfunction)   PROMIS Anxiety T-Score 40 (within normal limits)   PROMIS Depression T-Score 52 (within normal limits)   PROMIS Fatigue T-Score 57 (mild)   PROMIS Sleep Disturbance T-Score 58 (mild)   PROMIS Ability to Participate in Social Roles & Activities T-Score 45 (within normal limits)   PROMIS Pain Interference T-Score 64 (moderate)   PROMIS Pain Intensity 5       Past Medical History:   Diagnosis Date    Acid reflux disease     KELLE (generalized anxiety disorder)     Morbid obesity (H)     Other sleep apnea 3/6/2023    Pure hypercholesterolemia        Past Surgical History:   Procedure Laterality Date    ARTHROSCOPY SHOULDER Right 2015    ARTHROSCOPY SHOULDER, OPEN ROTATOR CUFF REPAIR, COMBINED Left 03/29/2017    Procedure:  Left shoulder arthroscopy and arthroscopic subacromial decompression (acromioplasty, bursectomy and coracoacromial ligament resection). Arthroscopic distal clavicle resection. Mini-open rotator cuff tear repair. Surgeon:  Kevin George  MD [place]  Location: [facility]    BUNIONECTOMY Right 2008    CV CORONARY ANGIOGRAM N/A 4/14/2022    Procedure: Coronary Angiogram;  Surgeon: Olvin Roy MD;  Location:  [facility]    DAVINCI HYSTERECTOMY TOTAL, BILATERAL SALPINGO-OOPHORECTOMY, COMBINED N/A 2/9/2021    Procedure: Robotic assisted total laparoscopic hysterectomy, bilateral salpingectomy, bilateral oophorectomy, cystoscopy;  Surgeon: Jonathan Peters MD;  Location:  OR    DISKECTOMY, LUMBAR, SINGLE SP  1996    L5-S1    DISKECTOMY, LUMBAR, SINGLE SP  1997    L5-S1    FUSION LUMBAR ANTERIOR ONE LEVEL  1999    L5-S1    OSTEOTOMY FOOT Right 06/06/2017    Procedure: OSTEOTOMY FOOT;  1)  WEIL OSTEOTOMY RIGHT SECOND METATARSAL, 2) PLANTAR CAPSULE DEBRIDEMENT/SYNOVECTOMY, RIGHT SECOND METATARSAL PHALANGEAL JOINT, 3) HAMMERTOE REPAIR RIGHT SECOND TOE;  Surgeon: Olvin Betancur DPM;  Location: [facility]    [place] SPINE SURGERY PROCEDURE UNLISTED      RECONSTRUCT FOREFOOT WITH METATARSOPHALANGEAL (MTP) FUSION Right 06/06/2017    Procedure: RECONSTRUCT FOREFOOT WITH METATARSOPHALANGEAL (MTP) FUSION;;  Surgeon: Olvin Betancur DPM;  Location: [facility]    REPAIR HAMMER TOE Right 06/06/2017    Procedure: REPAIR HAMMER TOE;;  Surgeon: Olvin Betancur DPM;  Location: [facility]    ROTATOR CUFF REPAIR RT/LT Left 2017       Family History   Problem Relation Age of Onset    Rheumatoid Arthritis Mother     Coronary Artery Disease Mother         s/p MI and PCIs in 50s    Dementia Mother     Diabetes Type 2  Mother     Rheumatoid Arthritis Sister     Myocardial Infarction Maternal Grandmother         in her 60s    Myocardial Infarction Maternal Grandfather         later in life       Social History     Tobacco Use    Smoking status: Former     Current packs/day: 0.00     Average packs/day: 0.8 packs/day for 26.0 years (19.5 ttl pk-yrs)     Types: Cigarettes     Start date: 4/12/1996     Quit date: 4/12/2022     Years since  quittin.6     Passive exposure: Past    Smokeless tobacco: Never   Vaping Use    Vaping status: Never Used   Substance Use Topics    Alcohol use: Yes     Comment: rare    Drug use: No         Current Outpatient Medications:     acetylcysteine (N-ACETYL CYSTEINE) 600 MG CAPS capsule, Take 1 capsule (600 mg) by mouth daily., Disp: 90 capsule, Rfl: 3    atorvastatin (LIPITOR) 40 MG tablet, Take 0.5 tablets (20 mg) by mouth every evening, Disp: 90 tablet, Rfl: 1    metFORMIN (GLUCOPHAGE XR) 500 MG 24 hr tablet, Take 2 tablets (1,000 mg) by mouth daily at 2 pm., Disp: , Rfl:     multivitamin w/minerals (THERA-VIT-M) tablet, Take 1 tablet by mouth daily, Disp: , Rfl:     Naltrexone HCl, Pain, 1.5 MG CAPS, Take 4.5 mg by mouth daily, Disp: 90 capsule, Rfl: 3    omeprazole (PRILOSEC) 20 MG DR capsule, Take 20 mg by mouth daily, Disp: , Rfl:     pregabalin (LYRICA) 150 MG capsule, Take 1 capsule (150 mg) by mouth 2 times daily, Disp: 180 capsule, Rfl: 1    vitamin C (ASCORBIC ACID) 500 MG tablet, Take 1 tablet (500 mg) by mouth daily, Disp: 30 tablet, Rfl: 4    vitamin D2 (ERGOCALCIFEROL) 04672 units (1250 mcg) capsule, Take 50,000 Units by mouth once a week, Disp: , Rfl:           Objective    Vitals - Patient Reported  Pain Score: Mild Pain (3)  Pain Loc:  (All over generalized)        Physical Exam   GENERAL: alert and no distress  EYES: Eyes grossly normal to inspection.  No discharge or erythema, or obvious scleral/conjunctival abnormalities.  RESP: No audible wheeze, cough, or visible cyanosis.    SKIN: Visible skin clear. No significant rash, abnormal pigmentation or lesions.  NEURO: Cranial nerves grossly intact.  Mentation and speech appropriate for age.  PSYCH: Appropriate affect, tone, and pace of words          Video-Visit Details    Type of service:  Video Visit   Originating Location (pt. Location): Home    Distant Location (provider location):  Off-site  Platform used for Video Visit: Rakan Iraheta  Electronically by: Amy Gerardo MD

## 2024-12-10 ENCOUNTER — THERAPY VISIT (OUTPATIENT)
Dept: PHYSICAL THERAPY | Facility: CLINIC | Age: 59
End: 2024-12-10
Payer: COMMERCIAL

## 2024-12-10 DIAGNOSIS — U09.9 POST-COVID CHRONIC FATIGUE: Primary | ICD-10-CM

## 2024-12-10 DIAGNOSIS — G93.32 POST-COVID CHRONIC FATIGUE: Primary | ICD-10-CM

## 2024-12-10 PROCEDURE — 97110 THERAPEUTIC EXERCISES: CPT | Mod: GP | Performed by: PHYSICAL THERAPIST

## 2024-12-10 PROCEDURE — 97112 NEUROMUSCULAR REEDUCATION: CPT | Mod: GP | Performed by: PHYSICAL THERAPIST

## 2024-12-13 ASSESSMENT — PAIN SCALES - PAIN ENJOYMENT GENERAL ACTIVITY SCALE (PEG)
AVG_PAIN_PASTWEEK: 5
PEG_TOTALSCORE: 5
INTERFERED_ENJOYMENT_LIFE: 5
INTERFERED_GENERAL_ACTIVITY: 5

## 2024-12-16 ENCOUNTER — OFFICE VISIT (OUTPATIENT)
Dept: PALLIATIVE MEDICINE | Facility: CLINIC | Age: 59
End: 2024-12-16
Attending: NURSE PRACTITIONER
Payer: COMMERCIAL

## 2024-12-16 VITALS — SYSTOLIC BLOOD PRESSURE: 125 MMHG | OXYGEN SATURATION: 97 % | HEART RATE: 75 BPM | DIASTOLIC BLOOD PRESSURE: 76 MMHG

## 2024-12-16 DIAGNOSIS — M79.10 POST-COVID CHRONIC MUSCLE PAIN: ICD-10-CM

## 2024-12-16 DIAGNOSIS — G93.32 POST-COVID CHRONIC FATIGUE: Primary | ICD-10-CM

## 2024-12-16 DIAGNOSIS — G89.29 POST-COVID CHRONIC MUSCLE PAIN: ICD-10-CM

## 2024-12-16 DIAGNOSIS — U09.9 POST-COVID CHRONIC FATIGUE: Primary | ICD-10-CM

## 2024-12-16 DIAGNOSIS — M79.18 MYALGIA, MULTIPLE SITES: ICD-10-CM

## 2024-12-16 DIAGNOSIS — U09.9 POST-COVID CHRONIC MUSCLE PAIN: ICD-10-CM

## 2024-12-16 PROCEDURE — 99214 OFFICE O/P EST MOD 30 MIN: CPT | Performed by: NURSE PRACTITIONER

## 2024-12-16 RX ORDER — TOPIRAMATE 25 MG/1
25 TABLET, FILM COATED ORAL AT BEDTIME
Qty: 90 TABLET | Refills: 0 | Status: SHIPPED | OUTPATIENT
Start: 2024-12-16

## 2024-12-16 ASSESSMENT — PAIN SCALES - GENERAL: PAINLEVEL_OUTOF10: MODERATE PAIN (5)

## 2024-12-16 NOTE — PROGRESS NOTES
[facility] Pain Management   Date of Visit: 12/16/2024  Last visit: 8/8/2024  Original Consult: 11/1/23    Jayne Leiva is a 59 year old female who has a past medical history of Acid reflux disease, KELLE (generalized anxiety disorder), Morbid obesity (H), Other sleep apnea (3/6/2023), and Pure hypercholesterolemia. Jayne is being seen today for ongoing management of chronic pain.    History:  Post covid myalgia:  After first episode of COVID in February of 2022, she had fatigue that never went away. Suqsequent COVID infection in December of 2022, persistent muscle pain and brain fog began. Treating with Dr. Gerardo in the [facility]. Has been treated with therapy (PT and OT) to build endurance, LDN, arginine and vitamin C supplements. Did not tolerate topiramate or gabapentin.   Chronic shoulder and right arm pain:  PT for her arm and wrist at [facility]. Kinesio tape has been helpful for reducing her pain in her arm. Has a history of right RCR.     Recommendations from last visit:  She has made considerable, albeit slow progress with pain management. Continue to work with PT/OT/Psychology.Fatigue continues to be very prevalent.   ____________________________________________________________  Interval History   - Outpatient surgery at [facility] on 10/29/24 for tendonitis/radial tunnel, stopped medications before and then lost 16 lbs since being off Lyrica. She is hesitant to restart it. She is concerned about additional weight gain with the medication.   - Continues to have significant mental and physical fatigue.   - Just started back to work today for the first time since surgery. Will be working 2 hours a week, then gradually increasing every week.   - Had been off of low dose naltrexone, now restarting as this had provided some improvement in symptoms.   - Would like to try topiramate again (had GI/ symptoms at 100 mg at bedtime) at a lower dose. If this is not helpful, she may consider  returning to [facility].   - Working with Gerri Morgan and Britany Faye has been helpful for symptom management.  - Walking for pleasure again, but needs to be mindful of the distance as this can flare up her symptoms.   - She is working from home full time. Can have some ability to pace her work and take breaks with the support of her manager.   -  , adult children. Her mother lives in [place], in hospice care, but has remained fairly stable despite being told that her mother is likely to pass away soon. Daughter moved back to [place] from [place] recently to help care for grandparents.     Current pain medications:  LDN 4.5 mg per day  Tylenol as needed    Pain description:  Location: wide spread, location can vary  Quality: cramping, sharp, cutting, dull, aching, throbbing   Duration: PM   Severity/Intensity: Moderate Pain (5)   Aggravating factors include: standing, walking, exercise  Relieving factors include: lying down, medication  Pain Intensity and Interference in the past week  Date PEG   11/27/23 6.33   1/26/24 7.67   3/19/24 8   5/1/2024 5.67   8/8/2024 4.33   12/16/2024 5       PAIN MANAGEMENT TREATMENT HISTORY  1. MEDICATIONS:  Opioids: Codeine, Oxycodone  NSAIDs: Ibuprofen, Naproxen  Muscle relaxants: Cyclobenzaprine  Pain Antidepressants/ Anxiolytics: Bupropion, Citalopram   Neuroleptics: Topiramate (GI and CNS symptoms at 100 mg), gabapentin SE (lightheaded, LE edema), Lyrica has been helpful  Topical: Other gels, creams, patches or ointments   Adjuvant medications: Acetaminophen, vitamin C, arginine. LDN helpful for fatigue and brain fog, less so for the pain.  2. PHYSICAL THERAPY:   Pain PT with Gerri Morgan   PT at [facility] for right arm and shoulder pain [id number]  Completed 7 OT visits with Nicole Guzman and 7 PT visits with Jerri Hubbard for Post Covid treatment  3. PAIN PSYCHOLOGY:   Currently working with Oneyda Faye, PhD  Had done extensive therapy after divorce  4. SURGERY:  Spine fusion in  [place], Dr. Morrissey at the [facility]  5. INJECTIONS:   2/2023 Right elbow CSI at [facility]  4/2024 right bicep tendon and radial tunnel steroid injection at [facility], helpful  6. COMPLEMENTARY THERAPY:  Chiropractic: Has not tried  Not interested in trying  Acupuncture/acupressure: Has not tried  TENS unit: Has not tried  Walks with her dog  for exercise      Social History   She reports that she quit smoking about 2 years ago. Her smoking use included cigarettes. She started smoking about 28 years ago. She has a 19.5 pack-year smoking history. She has been exposed to tobacco smoke. She has never used smokeless tobacco. She reports current alcohol use. She reports that she does not use drugs.    Social History     Social History Narrative     in April, 2023, he and her  have been together since 2015. They dated in high school as well.      She was previously  once.     3 adult kids(2 daughters and 1 son)    No grandchildren.    Walks her dog for exercise.     Works full time as a  at a [employer] center      Medications and Allergies reviewed.    Objective   /76 (BP Location: Right arm, Patient Position: Chair, Cuff Size: Adult Regular)   Pulse 75   LMP  (LMP Unknown)   SpO2 97%   Constitutional: Well developed, well nourished, appears stated age. No acute distress.  Gait is normal and steady  HEENT: Head atraumatic, normocephalic. Eyes without conjunctival injection or jaundice. Neck supple.   Skin: No obvious rash, lesions, or petechiae of exposed skin.   Extremities: Peripheral pulses intact. No clubbing, cyanosis, or edema. Moves all extremities.  Psychiatric/mental status: Alert, without lethargy or stupor. Speech fluent. Appropriate affect. Mood normal. Able to follow commands without difficulty.  Word finding difficulties at times.   Musculoskeletal exam: Normal bulk and tone. Unremarkable spinal curvature.      Assessment & Plan   Post-COVID chronic muscle pain  Post-COVID chronic  fatigue  Myalgia, multiple sites  She had made considerable, albeit slow progress with pain management. Continue to work with PT/OT/Psychology.New order for Pain PT placed.  Likely feeling worse with being off of medications and recovery from surgery. Will restart Topiramate at 25 mg at bedtime. If tolerating, she could try to increase to 50 mg after 2 weeks. She will monitor for side effects. Can call for refill with 50 mg strength if needed.   Advised that I would be leaving [facility] at the end of the year and discussed transitioning to a new pain provider at this location. Order placed.    - Pain PT Eval and Treat Critical access hospital Referral  - topiramate (TOPAMAX) 25 MG tablet  Dispense: 90 tablet; Refill: 0     Melita Sullivan, CNP-BC, PMGT-BC, AP-PMN  [facility]

## 2024-12-16 NOTE — PATIENT INSTRUCTIONS
New order for PT with Gerri.  Can try Topiramate again, start with just 25 mg at bedtime and monitor symptoms. (Had gut issues at 100 mg, possibly tolerated 75 mg at bedtime.)  Veronica Adam will be in [place] on Mondays and Wednesdays. You can call or go on [employer] to schedule with her.   ----------------------------------------------------------------  [facility] Number:  [phone]   Call with any questions about your care and for scheduling assistance.   Calls are returned Monday through Friday between 8 AM and 4:30 PM. We usually get back to you within 2 business days depending on the issue/request.

## 2024-12-30 ENCOUNTER — TELEPHONE (OUTPATIENT)
Dept: PHYSICAL MEDICINE AND REHAB | Facility: CLINIC | Age: 59
End: 2024-12-30
Payer: COMMERCIAL

## 2024-12-30 NOTE — TELEPHONE ENCOUNTER
Left Voicemail (1st Attempt) and Sent Mychart (1st Attempt) for the patient to call back and schedule the following:    Appointment type: Post-covid return video  Provider: Akin  Return date: around 2/19/2025   Specialty phone number: [phone]  Additional appointment(s) needed: NA  Additonal Notes: 13 week follow up    Provider seeing patients now through [facility]    Arleen Campos on 12/30/2024 at 12:27 PM

## 2025-01-08 ENCOUNTER — MYC REFILL (OUTPATIENT)
Dept: PHYSICAL MEDICINE AND REHAB | Facility: CLINIC | Age: 60
End: 2025-01-08
Payer: COMMERCIAL

## 2025-01-08 DIAGNOSIS — U09.9 POST-COVID CHRONIC MUSCLE PAIN: ICD-10-CM

## 2025-01-08 DIAGNOSIS — G89.29 POST-COVID CHRONIC MUSCLE PAIN: ICD-10-CM

## 2025-01-08 DIAGNOSIS — M79.10 POST-COVID CHRONIC MUSCLE PAIN: ICD-10-CM

## 2025-01-28 ENCOUNTER — TRANSFERRED RECORDS (OUTPATIENT)
Dept: FAMILY MEDICINE | Facility: CLINIC | Age: 60
End: 2025-01-28

## 2025-01-28 ENCOUNTER — TRANSFERRED RECORDS (OUTPATIENT)
Dept: HEALTH INFORMATION MANAGEMENT | Facility: CLINIC | Age: 60
End: 2025-01-28

## 2025-02-05 ENCOUNTER — THERAPY VISIT (OUTPATIENT)
Dept: PHYSICAL THERAPY | Facility: CLINIC | Age: 60
End: 2025-02-05
Attending: NURSE PRACTITIONER
Payer: COMMERCIAL

## 2025-02-05 DIAGNOSIS — U09.9 POST-COVID CHRONIC MUSCLE PAIN: ICD-10-CM

## 2025-02-05 DIAGNOSIS — G89.29 POST-COVID CHRONIC MUSCLE PAIN: ICD-10-CM

## 2025-02-05 DIAGNOSIS — G93.32 POST-COVID CHRONIC FATIGUE: ICD-10-CM

## 2025-02-05 DIAGNOSIS — M79.18 MYALGIA, MULTIPLE SITES: ICD-10-CM

## 2025-02-05 DIAGNOSIS — U09.9 POST-COVID CHRONIC FATIGUE: ICD-10-CM

## 2025-02-05 DIAGNOSIS — M79.10 POST-COVID CHRONIC MUSCLE PAIN: ICD-10-CM

## 2025-02-05 PROCEDURE — 97110 THERAPEUTIC EXERCISES: CPT | Mod: GP | Performed by: PHYSICAL THERAPIST

## 2025-02-05 PROCEDURE — 97112 NEUROMUSCULAR REEDUCATION: CPT | Mod: GP | Performed by: PHYSICAL THERAPIST

## 2025-02-05 NOTE — PROGRESS NOTES
02/05/25 0500   Appointment Info   Signing clinician's name / credentials Gerri Morgan PT, DPT   Total/Authorized Visits eval and treat   Medical Diagnosis Post Covid Chronic muscle pain myalgia   PT Tx Diagnosis chronic pain, fatigue, decreased activity tolerance   Other pertinent information pt mentally fatigues with alot of talking   Progress Note/Certification   Onset of illness/injury or Date of Surgery 02/01/22   Therapy Frequency 1/month   Predicted Duration 3-4 months   Progress Note Due Date 05/05/25   Progress Note Completed Date 02/05/25   PT Goal 1   Goal Identifier walking   Goal Description Pt will be able to walk for 30 minutes with minimal increase in pain   Rationale to maximize safety and independence with performance of ADLs and functional tasks;to maximize safety and independence within the home;to maximize safety and independence within the community;to maximize safety and independence with transportation;to maximize safety and independence with self cares   Goal Progress pt has been walking 30 min on her treadmill daily   Target Date 04/29/24   Date Met 06/03/24   PT Goal 2   Goal Identifier HEP   Goal Description Patient will demo IND with HEP and progression to improve strength and mobility   Rationale to maximize safety and independence with performance of ADLs and functional tasks;to maximize safety and independence within the home;to maximize safety and independence within the community;to maximize safety and independence with transportation;to maximize safety and independence with self cares   Target Date 05/05/25   PT Goal 3   Goal Identifier Pain   Goal Description Patient will be IND with pain management strategies to be able to self manage flare ups in pain to be   Rationale to maximize safety and independence with performance of ADLs and functional tasks;to maximize safety and independence within the home;to maximize safety and independence within the community;to maximize safety  and independence with transportation;to maximize safety and independence with self cares   Target Date 05/05/25   Subjective Report   Subjective Report Pt reporting her right elbow is stll sore.  Was just released from all resitrctions about 2 weeks ago. Is still having some discomfort.  Will wait out about a month before seeing if she needs a referral to OT or other provider.  Has returned to work Dec 16th,  2 hours a day for 2 weeks,  then ramped up to 4 hours a day for 2 weeks,   then 6 hours a day for 2 weeks,  then just last week started 8 hours a week.  Felt a difference from 4- 6 hours.  Is working from home.   Objective Measure 2   Objective Measure fatigue   Details has increased since returning back to work   Therapeutic Procedure/Exercise   Therapeutic Procedures: strength, endurance, ROM, flexibility minutes (99631) 30   PTRx Ther Proc 1 Cervical Retraction   PTRx Ther Proc 1 - Details X10, pt has YTB she has been using   PTRx Ther Proc 2 Calf Raise - Single Leg   PTRx Ther Proc 2 - Details X20 each challenging   PTRx Ther Proc 3 Standing Gastroc Stretch   PTRx Ther Proc 3 - Details X1 min   PTRx Ther Proc 5 Hip Extension In Neutral With Theraband   PTRx Ther Proc 5 - Details YTB X15 X2   PTRx Ther Proc 6 Sidestep   PTRx Ther Proc 6 - Details X15 with theraband   PTRx Ther Proc 7 Stepping Reactions   PTRx Ther Proc 7 - Details NEW- X3 each direction   PTRx Ther Proc 8 Shoulder Theraband Rows   PTRx Ther Proc 8 - Details doing this at home- YTB   PTRx Ther Proc 9 Sit to Stand   PTRx Ther Proc 9 - Details NEW- transition from squat exercises cues for form pushing through the heels   Skilled Intervention cues for form and control   Patient Response/Progress pt tolerated well   Neuromuscular Re-education   Neuromuscular re-ed of mvmt, balance, coord, kinesthetic sense, posture, proprioception minutes (30479) 30   Neuro Re-ed 1 sleep hygiene   Neuro Re-ed 1 - Details is back able to use her cpap every night,   does not wake up feeling rested, is falling asleep ok,  is not waking up much over night-  generally falls back to sleep really   Neuro Re-ed 2 Mental Fatigue   Neuro Re-ed 2 - Details now with back to work full time at 6/10-  when not working down to 3-4- is doing her strategies for mind she was taiught in therapy for post-covid   Neuro Re-ed 3 aerobic actvity   Neuro Re-ed 3 - Details pt reporting she was able to do 30 min at 3 mph- at 3% incline however now back to work full time struggling, is still doing the 30 min, but less incline and slower-  feeling more tired,  is doing this every day,  except for weekends   Neuro Re-ed 5 diaphragmatic breathing   Neuro Re-ed 5 - Details reviewed benefits is doing regulalry   Neuro Re-ed 8 self care-   Neuro Re-ed 8 - Details pt continues to use throughout the day   PTRx Neuro Re-ed 1 Single Leg Stance - Balance Right Foot   PTRx Neuro Re-ed 1 - Details X30 sec right and left- easy progressed to on pillow   PTRx Neuro Re-ed 2 Tandem Walking and Balance   PTRx Neuro Re-ed 2 - Details X5 laps   Skilled Intervention PNE- self care- balance   Patient Response/Progress pt very fatigued from increased hours at work- balance is progressing well overall   PTRx Neuro Re-ed 3 Single leg balance on a Pillow - Right Foot   PTRx Neuro Re-ed 3 - Details NEW- to do on the both sides   Education   Learner/Method No Barriers to Learning   Education Comments no concerns   Plan   Home program on her phone- general strength- and calming/breathing strategies   Updates to plan of care 1x/month for 3-4 months   Comments   Comments Patient has been making gradual progress with post-COVID fatugue, and general wellness/fitness.  She was out of work for right elbow surgery, however has now returned back to full time with increasing fatigue.  Pt to follow up with COVID provider on work hours and tolerance and to establish care with new pain provider.  Pt will continue to benefit from ongoing Pain PT  monthly for ongoing PNE and general strength/conditioning   Total Session Time   Timed Code Treatment Minutes 60   Total Treatment Time (sum of timed and untimed services) 60         PLAN  Continue therapy per current plan of care.    Beginning/End Dates of Progress Note Reporting Period:  10/9/24 to 02/05/2025    Referring Provider:  Melita Sullivan

## 2025-02-12 ASSESSMENT — PAIN SCALES - PAIN ENJOYMENT GENERAL ACTIVITY SCALE (PEG)
AVG_PAIN_PASTWEEK: 5
INTERFERED_ENJOYMENT_LIFE: 4
PEG_TOTALSCORE: 4.33
INTERFERED_GENERAL_ACTIVITY: 4

## 2025-02-16 NOTE — PROGRESS NOTES
February 17, 2025      COMPREHENSIVE PAIN CLINIC FOLLOW UP EVALUATION    I had the pleasure of meeting Ms. Jayne Leiva on 2/17/2025 in the [facility].  The patient is a 59 year old female with past medical history of obesity, HTN, HLD, post-covid chronic fatigue, DMII, anxiety who presents for evaluation of chronic pain.  UDS and opioid agreement are not on file.      Updates since last appointment on 12/16/2024 with ELVIS Cooper CNP..  She has had covid 3 times and the last illness was over xmas.  She continues to follow at the [facility] with Dr. Gerardo.      History:  Post covid myalgia:  Had COVID xmas 2024.  First episode of COVID in February of 2022, she had fatigue that never went away. Suqsequent COVID infection in December of 2022, persistent muscle pain and brain fog began. Treating with Dr. Gerardo in the [facility]. Has been treated with therapy (PT and OT) to build endurance, LDN, arginine and vitamin C supplements. Did not tolerate topiramate or gabapentin.   Chronic shoulder and right arm pain:  PT for her arm and wrist at [facility]. Kinesio tape has been helpful for reducing her pain in her arm. Has a history of right RCR.      She continues to work with Pain PT.      Interventions/Injections:   2/2023 Right elbow CSI at [facility]  4/2024 right bicep tendon and radial tunnel steroid injection at [facility], helpful      Progress Notes Reviewed:  1/23/2025 [facility] - R) distal bicep debridement and repair of tendinopathy  12/16/2024 ELVIS Cooper, CNP  11/05/2024 [facility] - 1 week post op R) distal bicep debridement and repair of tendinopathy by Dr. Michael Langley      Any hospitalizations/ER/UC visits since last appointment:  no  Any falls/accidents since last appointment:  no      Primary Pain :  Location: wide spread, location can vary.  Spine fusion in 1999, Dr. Morrissey at the [facility]       Characteristics:  Any changes in pain characteristics since last appointment?   no    Quality: cramping, sharp, cutting, dull, aching, throbbing, stabbing, burning  Duration: PM   Aggravating factors include: standing, walking, exercise  Relieving factors include: lying down, medication      2025 current pain on 0/10 VAS:  4     Worst pain:  7    Best pain:   4    PE.33  Severity/Intensity: Moderate Pain (5)      PE      Current Pain Related Medications:  Any medications changes since last appointment: no    Topamax 50mg q hs - Abdominal is mild  LDN 4.5 mg per day  Tylenol as needed      NOTE:  Topamax cause GI and CNS symptoms at 100mg, gabapentin caused lightheaded and LE edema, lyrica was effective however it caused significant wt gain.    Anticoagulation:  none  Implanted Devices:  none      Therapies discuss on initial consult:   Physical therapy, Pain Psychology, TENs unit, Grounding Mat, Frequency Specific Micro Current, Anti-inflammatory Lifestyle    Social:  She is  and lives in a house in [place], [place].  She is independent in ADL's. She has 3 adult children.   She does not smoke or use alcohol.  No history of chemical dependency.    Employment:  She works full time.      Exercise:  Walks with her dog  for exercise.  Walks on the treadmill 3 0minutes daily.      Hobbies:      Mental Health:    Currently working with Oneyda Faye, PhD  Had done extensive therapy after divorce.        Past Medical History:  Medical history reviewed.  Past Medical History:   Diagnosis Date    Acid reflux disease     KELLE (generalized anxiety disorder)     Morbid obesity (H)     Other sleep apnea 3/6/2023    Pure hypercholesterolemia       Patient Active Problem List   Diagnosis    Chronic upper extremity pain, unspecified laterality    Pure hypercholesterolemia    Old myocardial infarction    Morbid obesity (H)    KELLE (generalized anxiety disorder)    Acid reflux disease    ACP (advance care planning)    Other sleep apnea    Brain fog    Cognitive changes    Post-COVID chronic  fatigue    Borderline diabetes         Past Surgical History:  Pertinent surgical history reviewed.  Past Surgical History:   Procedure Laterality Date    ARTHROSCOPY SHOULDER Right 2015    ARTHROSCOPY SHOULDER, OPEN ROTATOR CUFF REPAIR, COMBINED Left 03/29/2017    Procedure:  Left shoulder arthroscopy and arthroscopic subacromial decompression (acromioplasty, bursectomy and coracoacromial ligament resection). Arthroscopic distal clavicle resection. Mini-open rotator cuff tear repair. Surgeon:  Keivn George MD  Location: [facility]    BUNIONECTOMY Right 2008    CV CORONARY ANGIOGRAM N/A 4/14/2022    Procedure: Coronary Angiogram;  Surgeon: Olvin Roy MD;  Location:  [facility] CATH LAB    DAVINCI HYSTERECTOMY TOTAL, BILATERAL SALPINGO-OOPHORECTOMY, COMBINED N/A 2/9/2021    Procedure: Robotic assisted total laparoscopic hysterectomy, bilateral salpingectomy, bilateral oophorectomy, cystoscopy;  Surgeon: Jonathan Peters MD;  Location:  OR    DISKECTOMY, LUMBAR, SINGLE SP  1996    L5-S1    DISKECTOMY, LUMBAR, SINGLE SP  1997    L5-S1    FUSION LUMBAR ANTERIOR ONE LEVEL  1999    L5-S1    OSTEOTOMY FOOT Right 06/06/2017    Procedure: OSTEOTOMY FOOT;  1)  WEIL OSTEOTOMY RIGHT SECOND METATARSAL, 2) PLANTAR CAPSULE DEBRIDEMENT/SYNOVECTOMY, RIGHT SECOND METATARSAL PHALANGEAL JOINT, 3) HAMMERTOE REPAIR RIGHT SECOND TOE;  Surgeon: Olvin Betancur DPM;  Location: [facility]    [place] SPINE SURGERY PROCEDURE UNLISTED      RECONSTRUCT FOREFOOT WITH METATARSOPHALANGEAL (MTP) FUSION Right 06/06/2017    Procedure: RECONSTRUCT FOREFOOT WITH METATARSOPHALANGEAL (MTP) FUSION;;  Surgeon: Olvin Betancur DPM;  Location: [facility]    REPAIR HAMMER TOE Right 06/06/2017    Procedure: REPAIR HAMMER TOE;;  Surgeon: Olvin Betancur DPM;  Location: [facility]    ROTATOR CUFF REPAIR RT/LT Left 2017          Medications: Pertinent medications reviewed.  Current Outpatient Medications   Medication Sig  Dispense Refill    atorvastatin (LIPITOR) 40 MG tablet Take 0.5 tablets (20 mg) by mouth every evening 90 tablet 1    metFORMIN (GLUCOPHAGE XR) 500 MG 24 hr tablet Take 2 tablets (1,000 mg) by mouth daily at 2 pm.      multivitamin w/minerals (THERA-VIT-M) tablet Take 1 tablet by mouth daily      Naltrexone HCl, Pain, 1.5 MG CAPS Take 4.5 mg by mouth daily. 90 capsule 0    omeprazole (PRILOSEC) 20 MG DR capsule Take 20 mg by mouth daily      topiramate (TOPAMAX) 25 MG tablet Take 1 tablet (25 mg) by mouth at bedtime. 90 tablet 0    vitamin C (ASCORBIC ACID) 500 MG tablet Take 1 tablet (500 mg) by mouth daily 30 tablet 4    vitamin D2 (ERGOCALCIFEROL) 34541 units (1250 mcg) capsule Take 50,000 Units by mouth once a week         [place] Prescription Monitoring Program reviewed 2/16/2025.  No concern for abuse or misuse of controlled medications based on this report.  No controlled medications are being prescribed.    10/29/2024 Norco 5/325mg 25 tabs for 3 days  8/9/2024 Pregabalin 150mg 170 tabs for 90 days  8/8/2024 Pregabalin 150mg 170 tabs for 90 days        Allergies: Pertinent allergies reviewed.     Allergies   Allergen Reactions    Lexapro [Escitalopram] Rash     Purple marks on arms    Nickel Rash    Penicillins Hives       Family History:   family history includes Coronary Artery Disease in her mother; Dementia in her mother; Diabetes Type 2  in her mother; Myocardial Infarction in her maternal grandfather and maternal grandmother; Rheumatoid Arthritis in her mother and sister.    Social History:   She is  and lives in a house in [place].  She has 3 adult children.  She works full time.  She is independent in ADl's.  No history of chemical dependency.  She  reports that she quit smoking about 2 years ago. Her smoking use included cigarettes. She started smoking about 28 years ago. She has a 19.5 pack-year smoking history. She has been exposed to tobacco smoke. She has never used smokeless tobacco. She  reports current alcohol use. She reports that she does not use drugs.  Social History     Social History Narrative     in April, 2023, he and her  have been together since 2015. They dated in high school as well.      She was previously  once.     3 adult kids(2 daughters and 1 son)    No grandchildren.    Walks her dog for exercise.     Works full time as a  at a call center             Physical Exam:  /73   Pulse 88   LMP  (LMP Unknown)   SpO2 98%       Constitutional: She is oriented to person, place, and time.  She is overweight She is not in acute distress.   HENT:     Head: Normocephalic and atraumatic.     Eyes: Pupils are equal, round, and reactive to light. EOM are normal. No scleral icterus.   Pulmonary/Chest:  NWOB. No respiratory distress.   Neurological: She is alert and oriented to person, place, and time. Coordination grossly normal.  Skin: Skin is warm and dry. She is not diaphoretic.   Psychiatric: She has a normal mood and affect. Her behavior is normal. Judgment and thought content normal.  Patient answers questions appropriately.  MSK: Gait is normal.              Imaging:  MR SHOULDER LEFT WITHOUT CONTRAST 6/10/2019 8:41 AM      HISTORY: Shoulder pain, prior rotator cuff repair, re-tear suspected,  negative x-ray. Status post left shoulder DEC/DCR. Rotator cuff repair  2017 with chronic pain and weakness, no trauma history. Chronic pain  in left shoulder.  Status post rotator cuff surgery. Surgery in March 2017.     COMPARISON: 3/8/2017     TECHNIQUE:  Multiplanar, multisequence without contrast.     FINDINGS:  Osseous Acromion Outlet: Postsurgical changes including resection at  the AC joint and acromioplasty. No os acromiale.     Rotator Cuff: Supraspinatus tendon -  Postsurgical changes. No  recurrent tear or significant tendinosis identified.  Infraspinatus  tendon -  mild tendinosis.  Subscapularis tendon -  no tear or  significant tendinosis.   Intact teres minor tendon.  No asymmetric  muscle atrophy.      Labral Structures: No superior labral tear (SLAP lesion) identified.  No labral cyst identified. No anterior or posterior labral tear  identified.     Biceps Tendon: No tear, dislocation, or significant tendinosis.     Osseous and Cartilaginous Structures:  No bone contusion or fracture.  No glenohumeral osteoarthritis or apparent chondromalacia identified.     Joint Space: No significant overall joint effusion.     Additional Findings: No deltoid muscle edema. Medium amount of fluid  within the subacromial-subdeltoid bursa.                                                                      IMPRESSION:  1. Postsurgical changes. No recurrent supraspinatus tendon tear  identified.  2. Mild infraspinatus tendinosis.  3. Subacromial bursal fluid.      EMG:  na      Diagnosis:  (M79.10) Myalgia  (primary encounter diagnosis)  Comment:   Plan: ketamine HCl POWD, topiramate (TOPAMAX) 50 MG         tablet            (G93.32,  U09.9) Post-COVID chronic fatigue  Comment:   Plan:     (R41.89) Brain fog  Comment:   Plan:     (G89.29) Chronic intractable pain  Comment:   Plan: topiramate (TOPAMAX) 50 MG tablet                Plan on initial consult on 2/16/2025:  A multimodal plan was developed today to treat your pain.  Multimodal analgesia is a strategy that reduces reliance on opioids through the use of non-opioid analgesics and therapies that have different mechanisms of action.      Diagnostics:   Reviewed shoulder imaging.        Medications:        The following OTC pain medications may be helpful, use as directed: Voltaren Gel 1%, CBD products, Arnica products, Capsaicin products, Australian Dream Cream, Epson It, Lidocaine Patch, Solanpas, Biofreeze, Aspercream, Tiger Balm and Brian Emu cream.  Apply heat or cold PRN.      Therapies:    PHYSICAL THERAPY -  Continue to do exercises learned at PT on a daily basis.  Discussed the importance of core  strengthening, ROM, stretching exercises with the patient and how each of these entities is important in decreasing pain.  Explained to the patient that the purpose of physical therapy is to teach the patient a home exercise program.  These exercises need to be performed every day in order to decrease pain and prevent future occurrences of pain.          Interventions: none    Follow up:   Start compounding cream through [facility] ketamine 10% Lidocaine 5% apply 2-3 pumps (1-1.5gms) to affected area four times daily.    Refill Topamax 50mg daily 2 RF    Continue LDN 4.5mg    Return to clinic in 3 months.    AVS My Chart.        ELVIS Huitron, RYAN  [facility]        BILLING TIME DOCUMENTATION:   The total TIME spent on this patient on the date of the encounter/appointment was 30 minutes.

## 2025-02-17 ENCOUNTER — OFFICE VISIT (OUTPATIENT)
Dept: PALLIATIVE MEDICINE | Facility: CLINIC | Age: 60
End: 2025-02-17
Attending: NURSE PRACTITIONER
Payer: COMMERCIAL

## 2025-02-17 VITALS — DIASTOLIC BLOOD PRESSURE: 73 MMHG | SYSTOLIC BLOOD PRESSURE: 127 MMHG | HEART RATE: 88 BPM | OXYGEN SATURATION: 98 %

## 2025-02-17 DIAGNOSIS — R41.89 BRAIN FOG: ICD-10-CM

## 2025-02-17 DIAGNOSIS — G93.32 POST-COVID CHRONIC FATIGUE: ICD-10-CM

## 2025-02-17 DIAGNOSIS — M79.10 MYALGIA: Primary | ICD-10-CM

## 2025-02-17 DIAGNOSIS — G89.29 CHRONIC INTRACTABLE PAIN: ICD-10-CM

## 2025-02-17 DIAGNOSIS — U09.9 POST-COVID CHRONIC FATIGUE: ICD-10-CM

## 2025-02-17 PROCEDURE — G2211 COMPLEX E/M VISIT ADD ON: HCPCS | Performed by: NURSE PRACTITIONER

## 2025-02-17 PROCEDURE — 99203 OFFICE O/P NEW LOW 30 MIN: CPT | Performed by: NURSE PRACTITIONER

## 2025-02-17 RX ORDER — KETAMINE HCL 100 %
2-3 POWDER (GRAM) MISCELLANEOUS 4 TIMES DAILY
Qty: 120 G | Refills: 0 | Status: SHIPPED | OUTPATIENT
Start: 2025-02-17

## 2025-02-17 RX ORDER — TOPIRAMATE 50 MG/1
50 TABLET, FILM COATED ORAL DAILY
Qty: 90 TABLET | Refills: 0 | Status: SHIPPED | OUTPATIENT
Start: 2025-02-17 | End: 2025-05-18

## 2025-02-17 ASSESSMENT — PAIN SCALES - GENERAL: PAINLEVEL_OUTOF10: MODERATE PAIN (4)

## 2025-02-17 NOTE — PATIENT INSTRUCTIONS
Follow up:   Start compounding cream through [facility].  Refill Topamax 50mg daily 2 RF.    Return to clinic in 3 months.        ELVIS Huitron, RYAN  [facility] [place]          Clinic Number:  [phone]   Call with any questions about your care and for scheduling assistance.   Calls are returned Monday through Friday between 8 AM and 4:30 PM. We usually get back to you within 2 business days depending on the issue/request.    If we are prescribing your medications:  For opioid medication refills, call the clinic or send a [employer] message 7 days in advance.  Please include:  Name of requested medication  Name of the pharmacy.  For non-opioid medications, call your pharmacy directly to request a refill. Please allow 3-4 days to be processed.   Per [place] State Law:  All controlled substance prescriptions must be filled within 30 days of being written.    For those controlled substances allowing refills, pickup must occur within 30 days of last fill.      We believe regular attendance is key to your success in our program!    Any time you are unable to keep your appointment we ask that you call us at least 24 hours in advance to cancel.This will allow us to offer the appointment time to another patient.   Multiple missed appointments may lead to dismissal from the clinic.

## 2025-02-24 ENCOUNTER — TELEPHONE (OUTPATIENT)
Dept: INTERNAL MEDICINE | Facility: CLINIC | Age: 60
End: 2025-02-24
Payer: COMMERCIAL

## 2025-02-24 NOTE — TELEPHONE ENCOUNTER
Patient confirmed scheduled appointment:  Date: 4/4  Time: 8am  Visit type: Post-COVID  Provider: Dr. Gerardo  Location: [facility] [place]  Additional notes: per check out - Post Covid VV -  Return in about 1 month (around 3/21/2025). Dr. Gerardo

## 2025-02-27 ENCOUNTER — MYC MEDICAL ADVICE (OUTPATIENT)
Dept: PALLIATIVE MEDICINE | Facility: CLINIC | Age: 60
End: 2025-02-27
Payer: COMMERCIAL

## 2025-02-27 DIAGNOSIS — M79.10 MYALGIA: Primary | ICD-10-CM

## 2025-03-03 NOTE — TELEPHONE ENCOUNTER
Will leave encounter open for patient response/chart review by nursing.     ---------------------------------------------    Per 12/16/24 OV REI Sullivan  - Would like to try topiramate again (had GI/ symptoms at 100 mg at bedtime) at a lower dose. If this is not helpful, she may consider returning to Lyrica.     08/08/24:    Instructions    Increase Lyrica up to 150 mg twice a day.           ----------------Mychart Below from pt----------------  Hello,     At my last appointment with Melita she had asked me to reach out to her here if I could not tolerate the Topiramate and she would renew the rx for Lyrica.  I had to cancel the 50mg Topiramate fill you put through last week due to increasing stomach pain by the end of the 2nd week at the increased dosage, on Sunday....so never picked it up from the pharmacy.     I was going to just see how it went, but spent a near sleepless night with the sharp muscle pains and aches I had prior to starting med management.        So, first just wondering if I am OK to start the Lyrica again, and if so I'll need a refill as I only have about 10 days worth left from when I stopped in October for surgery.   Second, just wanted to make sure no interaction I'd have to worry about taking it with the Modafinil Dr. Gerardo prescribed last week.      I'll hold off on starting again until I hear back.     Thank you!  Jayne    --------------Mychart below response to pt----------------  Maycol Godoy,    Thanks for reaching out. I can have Veronica Willoughby review once I have a few more details. What strength Lyrica do you have on hand -150mg or 100mg? Last dose I can see that you were on and tolerated was 150mg twice a day- Is that correct? What is your preferred pharmacy?    Sharyn CONTRERAS, RN Care Coordinator  [facility]  Pain Management    When responding to this message, please allow 24-48 business hours for a reply.  If you need sooner assistance please call: [facility] at [phone] between the hours of 8:00AM and 4:30PM Monday through Friday.    Note that MyChart is not intended for emergencies, detailed provider communication, or in lieu of an office visit. Medication is not typically adjusted over MyChart communication.

## 2025-03-04 RX ORDER — PREGABALIN 150 MG/1
150 CAPSULE ORAL 2 TIMES DAILY
Qty: 60 CAPSULE | Refills: 1 | Status: SHIPPED | OUTPATIENT
Start: 2025-03-04

## 2025-03-04 NOTE — TELEPHONE ENCOUNTER
Routing to provider to review prepped prescription [id number]. See pt request to discontinue topiramate and restart lyrica     Below from pt  Dominic Coles,      I was taking 150mg 2x per day and Pharmacy is [facility] and [address] in [place].     Thank you for getting back to me!     Jayne

## 2025-03-17 ENCOUNTER — THERAPY VISIT (OUTPATIENT)
Dept: PHYSICAL THERAPY | Facility: CLINIC | Age: 60
End: 2025-03-17
Payer: COMMERCIAL

## 2025-03-17 DIAGNOSIS — G93.32 POST-COVID CHRONIC FATIGUE: Primary | ICD-10-CM

## 2025-03-17 DIAGNOSIS — G89.29 POST-COVID CHRONIC MUSCLE PAIN: ICD-10-CM

## 2025-03-17 DIAGNOSIS — M79.10 POST-COVID CHRONIC MUSCLE PAIN: ICD-10-CM

## 2025-03-17 DIAGNOSIS — U09.9 POST-COVID CHRONIC FATIGUE: Primary | ICD-10-CM

## 2025-03-17 DIAGNOSIS — U09.9 POST-COVID CHRONIC MUSCLE PAIN: ICD-10-CM

## 2025-03-17 PROCEDURE — 97112 NEUROMUSCULAR REEDUCATION: CPT | Mod: GP | Performed by: PHYSICAL THERAPIST

## 2025-03-17 PROCEDURE — 97110 THERAPEUTIC EXERCISES: CPT | Mod: GP | Performed by: PHYSICAL THERAPIST

## 2025-04-04 ENCOUNTER — VIRTUAL VISIT (OUTPATIENT)
Dept: INTERNAL MEDICINE | Facility: CLINIC | Age: 60
End: 2025-04-04
Payer: COMMERCIAL

## 2025-04-04 DIAGNOSIS — R00.2 POST-COVID CHRONIC PALPITATIONS: ICD-10-CM

## 2025-04-04 DIAGNOSIS — U09.9 POST-COVID CHRONIC PALPITATIONS: ICD-10-CM

## 2025-04-04 DIAGNOSIS — U09.9 POST-COVID CHRONIC FATIGUE: Primary | ICD-10-CM

## 2025-04-04 DIAGNOSIS — G93.32 POST-COVID CHRONIC FATIGUE: Primary | ICD-10-CM

## 2025-04-04 PROCEDURE — 98007 SYNCH AUDIO-VIDEO EST HI 40: CPT | Performed by: INTERNAL MEDICINE

## 2025-04-04 PROCEDURE — 1126F AMNT PAIN NOTED NONE PRSNT: CPT | Performed by: INTERNAL MEDICINE

## 2025-04-04 RX ORDER — NALTREXONE HCL 4.5 MG
4.5 CAPSULE ORAL DAILY
Qty: 90 CAPSULE | Refills: 3 | Status: SHIPPED | OUTPATIENT
Start: 2025-04-04

## 2025-04-04 NOTE — PROGRESS NOTES
Jayne is a 59 year old who is being evaluated via a billable video visit.    How would you like to obtain your AVS? MyChart  If the video visit is dropped, the invitation should be resent by: Text to cell phone: [phone]  Will anyone else be joining your video visit? No      Assessment & Plan     Post-COVID chronic fatigue  Discussed management of chronic fatigue. Recommend to continue with low-dose naltrexone as patient has been able to tolerate it well as found beneficial. Reviewed clinical trials, encouraged patient to consider participating.   - Naltrexone HCl, Pain, (NALTREX) 4.5 MG CAPS; Take 4.5 mg by mouth daily.    Post-COVID chronic palpitations  Discussed use of vagus nerve stimulator devices with patient, advised on limited data with use in chronic fatigue and autonomic dysfunction. However, given limited options for management as well as encouraging preliminary data, will proceed with a trial. Prescription was enetered for the patient.   - Miscellaneous DME Supply Order (Use only if a more specific DME order does not already exist)      I spent a total of 40 minutes on the day of the visit.   Time spent by me today doing chart review, history and exam, documentation and further activities per the note      Return in about 3 months (around 7/4/2025).    Subjective   Jayne is a 59 year old, presenting for the following health issues:  No chief complaint on file.  No chief complaint on file.    HPI      Patient is following up on post-COVID symptoms. She reports that she had lower extremity edema and palpitations. She stopped Lyrica as it was likely a side effect of Lyrica. She reprts that the swelling and palpitations have improved. She also has tried modafinil on several occasions.         Current concerns: Health Concerns        2/20/2025    12:46 PM   PHQ Assesment Total Score(s)   PHQ-2 Score 0        Patient-reported           11/18/2024    10:02 AM   KELLE-7 Results   KELLE 7 TOTAL SCORE 2  (minimal anxiety)   KELLE-7 Total Score 2        Patient-reported         11/18/2024    10:02 AM   PTSD Screen Score   Have you ever experienced this kind of event? Yes   PTSD Screen (Score of 3 or more suggests positive screen) 4        Patient-reported         11/18/2024    10:05 AM   PROMIS-29   PROMIS Physical Function T-Score 42 (mild dysfunction)   PROMIS Anxiety T-Score 40 (within normal limits)   PROMIS Depression T-Score 52 (within normal limits)   PROMIS Fatigue T-Score 57 (mild)   PROMIS Sleep Disturbance T-Score 58 (mild)   PROMIS Ability to Participate in Social Roles & Activities T-Score 45 (within normal limits)   PROMIS Pain Interference T-Score 64 (moderate)   PROMIS Pain Intensity 5       Past Medical History:   Diagnosis Date    Acid reflux disease     KELLE (generalized anxiety disorder)     Morbid obesity (H)     Other sleep apnea 3/6/2023    Pure hypercholesterolemia        Past Surgical History:   Procedure Laterality Date    ARTHROSCOPY SHOULDER Right 2015    ARTHROSCOPY SHOULDER, OPEN ROTATOR CUFF REPAIR, COMBINED Left 03/29/2017    Procedure:  Left shoulder arthroscopy and arthroscopic subacromial decompression (acromioplasty, bursectomy and coracoacromial ligament resection). Arthroscopic distal clavicle resection. Mini-open rotator cuff tear repair. Surgeon:  Kevin George MD  Location: [facility]    BUNIONECTOMY Right 2008    CV CORONARY ANGIOGRAM N/A 4/14/2022    Procedure: Coronary Angiogram;  Surgeon: Olvin Roy MD;  Location:  [facility]    DAVINCI HYSTERECTOMY TOTAL, BILATERAL SALPINGO-OOPHORECTOMY, COMBINED N/A 2/9/2021    Procedure: Robotic assisted total laparoscopic hysterectomy, bilateral salpingectomy, bilateral oophorectomy, cystoscopy;  Surgeon: Jonathan Peters MD;  Location:  OR    DISKECTOMY, LUMBAR, SINGLE SP  1996    L5-S1    DISKECTOMY, LUMBAR, SINGLE SP  1997    L5-S1    FUSION LUMBAR ANTERIOR ONE LEVEL  1999    L5-S1     OSTEOTOMY FOOT Right 2017    Procedure: OSTEOTOMY FOOT;  1)  WEIL OSTEOTOMY RIGHT SECOND METATARSAL, 2) PLANTAR CAPSULE DEBRIDEMENT/SYNOVECTOMY, RIGHT SECOND METATARSAL PHALANGEAL JOINT, 3) HAMMERTOE REPAIR RIGHT SECOND TOE;  Surgeon: Olvin Betancur DPM;  Location:  [facility]    MO SPINE SURGERY PROCEDURE UNLISTED      RECONSTRUCT FOREFOOT WITH METATARSOPHALANGEAL (MTP) FUSION Right 2017    Procedure: RECONSTRUCT FOREFOOT WITH METATARSOPHALANGEAL (MTP) FUSION;;  Surgeon: Olvin Betancur DPM;  Location:  [facility]    REPAIR HAMMER TOE Right 2017    Procedure: REPAIR HAMMER TOE;;  Surgeon: Olvin Betancur DPM;  Location:  [facility]    ROTATOR CUFF REPAIR RT/LT Left        Family History   Problem Relation Age of Onset    Rheumatoid Arthritis Mother     Coronary Artery Disease Mother         s/p MI and PCIs in 50s    Dementia Mother     Diabetes Type 2  Mother     Rheumatoid Arthritis Sister     Myocardial Infarction Maternal Grandmother         in her 60s    Myocardial Infarction Maternal Grandfather         later in life       Social History     Tobacco Use    Smoking status: Former     Current packs/day: 0.00     Average packs/day: 0.8 packs/day for 26.0 years (19.5 ttl pk-yrs)     Types: Cigarettes     Start date: 1996     Quit date: 2022     Years since quittin.9     Passive exposure: Past    Smokeless tobacco: Never   Vaping Use    Vaping status: Never Used   Substance Use Topics    Alcohol use: Yes     Comment: rare    Drug use: No         Current Outpatient Medications:     atorvastatin (LIPITOR) 40 MG tablet, Take 0.5 tablets (20 mg) by mouth every evening, Disp: 90 tablet, Rfl: 1    ketamine HCl POWD, 2-3 Pump 4 times daily., Disp: 120 g, Rfl: 0    metFORMIN (GLUCOPHAGE XR) 500 MG 24 hr tablet, Take 2 tablets (1,000 mg) by mouth daily at 2 pm., Disp: , Rfl:     modafinil (PROVIGIL) 100 MG tablet, Take 1 tablet (100 mg) by mouth daily., Disp: 30 tablet, Rfl: 3    multivitamin  w/minerals (THERA-VIT-M) tablet, Take 1 tablet by mouth daily, Disp: , Rfl:     Naltrexone HCl, Pain, 1.5 MG CAPS, Take 4.5 mg by mouth daily., Disp: 90 capsule, Rfl: 0    omeprazole (PRILOSEC) 20 MG DR capsule, Take 20 mg by mouth daily, Disp: , Rfl:     pregabalin (LYRICA) 150 MG capsule, Take 1 capsule (150 mg) by mouth 2 times daily., Disp: 60 capsule, Rfl: 1    topiramate (TOPAMAX) 25 MG tablet, Take 1 tablet (25 mg) by mouth at bedtime., Disp: 90 tablet, Rfl: 0    topiramate (TOPAMAX) 50 MG tablet, Take 1 tablet (50 mg) by mouth daily., Disp: 90 tablet, Rfl: 0    vitamin C (ASCORBIC ACID) 500 MG tablet, Take 1 tablet (500 mg) by mouth daily, Disp: 30 tablet, Rfl: 4    vitamin D2 (ERGOCALCIFEROL) 37888 units (1250 mcg) capsule, Take 50,000 Units by mouth once a week, Disp: , Rfl:           Objective           Vitals:  No vitals were obtained today due to virtual visit.    Physical Exam   GENERAL: alert and no distress  EYES: Eyes grossly normal to inspection.  No discharge or erythema, or obvious scleral/conjunctival abnormalities.  RESP: No audible wheeze, cough, or visible cyanosis.    SKIN: Visible skin clear. No significant rash, abnormal pigmentation or lesions.  NEURO: Cranial nerves grossly intact.  Mentation and speech appropriate for age.  PSYCH: Appropriate affect, tone, and pace of words          Video-Visit Details    Type of service:  Video Visit   Originating Location (pt. Location): Home    Distant Location (provider location):  On-site  Platform used for Video Visit: Rakan  Signed Electronically by: Amy Gerardo MD

## 2025-04-04 NOTE — PATIENT INSTRUCTIONS
Thank you for visiting the Primary Care Center today at the [facility]! The following is some information about our clinic:     Primary Care Center Frequently-Asked Questions    (1) How do I schedule appointments at the [facility]?     Primary Care--to schedule or make changes to an existing appointment, please call our primary care line at [phone].    Labs--to schedule a lab appointment at the [facility] you can use Espressi or call [phone]. If you have a [place] location that is closer to home, you can reach out to that location for scheduling options.     Imaging--if you need to schedule a CT, X-ray, MRI, ultrasound, or other imaging study you can call [phone] to schedule at the [facility] or any other [facility] imaging location.     Referrals--if a referral to another specialty was ordered you can expect a phone call from their scheduling team. If you have not heard from them in a week, please call us or send us a Espressi message to check the status or get a scheduling number. Please note that this only applies to internal [facility] referrals. If the referral is external you would need to contact their office for scheduling.     (2) I have a question about my visit, who do I contact?     You can call us at the primary care line at [phone] to ask questions about your visit. You can also send a secure message through Espressi, which is reviewed by clinic staff. Please note that Espressi messages have a twenty-four to forty-eight business hour turnaround time and should not be used for urgent concerns.    (3) How will I get the results of my tests?    If you are signed up for [employer] all tests will be released to you within twenty-four hours of resulting. Please allow three to five days for your doctor to review your results and place a note interpreting the results. If you do not have [email or web address] you will receive your  results through mail seven to ten business days following the return of the tests. Please note that if there should be any urgent or concerning results that your doctor or their registered nurse will reach out to you the same day as the tests come back. If you have follow up questions about your results or would like to discuss the results in detail please schedule a follow up with your provider either in person or virtually.     (4) How do I get refills of my prescriptions?     You should always first contact your pharmacy for refills of your medications. If submitting a refill request on iHandle, please be sure to submit the request only once--repeat requests can cause delays in refill. If you are requesting a NEW medication or a medication related to new symptoms you will need to schedule an appointment with a provider prior to approval. Please note: Routine medication refills have up to one to three business day turnaround whereas controlled substances refills have up to five to seven business day turnaround.    (5) I have new symptoms, what do I do?     If you are having an immediate medical emergency, you should dial 911 for assistance.   For anything urgent that needs to be seen within a few hours to one day you should visit a local urgent care for assistance.  For non-urgent symptoms that need to be seen within a few days to a week you can schedule with an available provider in primary care by going to [facility] or calling [phone].   If you are not sure how serious your symptoms are or you would like to receive medical advice you can always call [phone] to speak with a triage nurse.

## 2025-04-10 ENCOUNTER — TELEPHONE (OUTPATIENT)
Dept: INTERNAL MEDICINE | Facility: CLINIC | Age: 60
End: 2025-04-10
Payer: COMMERCIAL

## 2025-04-10 NOTE — TELEPHONE ENCOUNTER
Patient confirmed scheduled appointment:  Date: 7/11  Time: 10:15am  Visit type: POST COVID VIDEO   Provider: Dr. Gerardo  [facility]  Additional notes: per check out- Return in about 3 months (around 7/4/2025).   Dr. Akin oquendo

## 2025-04-23 ENCOUNTER — TELEPHONE (OUTPATIENT)
Dept: FAMILY MEDICINE | Facility: CLINIC | Age: 60
End: 2025-04-23

## 2025-04-23 ENCOUNTER — THERAPY VISIT (OUTPATIENT)
Dept: PHYSICAL THERAPY | Facility: CLINIC | Age: 60
End: 2025-04-23
Payer: COMMERCIAL

## 2025-04-23 DIAGNOSIS — U09.9 POST-COVID CHRONIC MUSCLE PAIN: ICD-10-CM

## 2025-04-23 DIAGNOSIS — G89.29 POST-COVID CHRONIC MUSCLE PAIN: ICD-10-CM

## 2025-04-23 DIAGNOSIS — M79.18 MYALGIA, MULTIPLE SITES: ICD-10-CM

## 2025-04-23 DIAGNOSIS — M25.512 CHRONIC LEFT SHOULDER PAIN: ICD-10-CM

## 2025-04-23 DIAGNOSIS — M79.10 POST-COVID CHRONIC MUSCLE PAIN: ICD-10-CM

## 2025-04-23 DIAGNOSIS — G93.32 POST-COVID CHRONIC FATIGUE: Primary | ICD-10-CM

## 2025-04-23 DIAGNOSIS — R53.83 FATIGUE, UNSPECIFIED TYPE: ICD-10-CM

## 2025-04-23 DIAGNOSIS — U09.9 POST-COVID CHRONIC FATIGUE: Primary | ICD-10-CM

## 2025-04-23 DIAGNOSIS — G89.29 CHRONIC LEFT SHOULDER PAIN: ICD-10-CM

## 2025-04-23 PROCEDURE — 97112 NEUROMUSCULAR REEDUCATION: CPT | Mod: GP | Performed by: PHYSICAL THERAPIST

## 2025-04-23 PROCEDURE — 97110 THERAPEUTIC EXERCISES: CPT | Mod: GP | Performed by: PHYSICAL THERAPIST

## 2025-04-23 NOTE — TELEPHONE ENCOUNTER
----- Message from Vidhya Morgan sent at 4/23/2025  3:15 PM CDT -----  Regarding: Elbow referral  Dominic Sanders,    Could you switch, or enter a new order for OT for her right elbow pain.  Our Occupational Therapists are really good for elbow down to hand therapy. :-)      Thank you,  Gerri Morgan, PT, DPT

## 2025-05-07 ASSESSMENT — SLEEP AND FATIGUE QUESTIONNAIRES
HOW LIKELY ARE YOU TO NOD OFF OR FALL ASLEEP WHILE SITTING INACTIVE IN A PUBLIC PLACE: WOULD NEVER DOZE
HOW LIKELY ARE YOU TO NOD OFF OR FALL ASLEEP WHILE SITTING AND TALKING TO SOMEONE: WOULD NEVER DOZE
HOW LIKELY ARE YOU TO NOD OFF OR FALL ASLEEP IN A CAR, WHILE STOPPED FOR A FEW MINUTES IN TRAFFIC: WOULD NEVER DOZE
HOW LIKELY ARE YOU TO NOD OFF OR FALL ASLEEP WHILE WATCHING TV: SLIGHT CHANCE OF DOZING
HOW LIKELY ARE YOU TO NOD OFF OR FALL ASLEEP WHILE SITTING QUIETLY AFTER LUNCH WITHOUT ALCOHOL: WOULD NEVER DOZE
HOW LIKELY ARE YOU TO NOD OFF OR FALL ASLEEP WHEN YOU ARE A PASSENGER IN A CAR FOR AN HOUR WITHOUT A BREAK: SLIGHT CHANCE OF DOZING
HOW LIKELY ARE YOU TO NOD OFF OR FALL ASLEEP WHILE LYING DOWN TO REST IN THE AFTERNOON WHEN CIRCUMSTANCES PERMIT: SLIGHT CHANCE OF DOZING
HOW LIKELY ARE YOU TO NOD OFF OR FALL ASLEEP WHILE SITTING AND READING: WOULD NEVER DOZE

## 2025-05-12 ENCOUNTER — OFFICE VISIT (OUTPATIENT)
Dept: SLEEP MEDICINE | Facility: CLINIC | Age: 60
End: 2025-05-12
Payer: COMMERCIAL

## 2025-05-12 VITALS
OXYGEN SATURATION: 97 % | DIASTOLIC BLOOD PRESSURE: 75 MMHG | HEART RATE: 74 BPM | WEIGHT: 256 LBS | HEIGHT: 65 IN | BODY MASS INDEX: 42.65 KG/M2 | SYSTOLIC BLOOD PRESSURE: 138 MMHG

## 2025-05-12 DIAGNOSIS — G47.33 OSA ON CPAP: Primary | ICD-10-CM

## 2025-05-12 DIAGNOSIS — U09.9 POST-COVID CHRONIC FATIGUE: ICD-10-CM

## 2025-05-12 DIAGNOSIS — G93.32 POST-COVID CHRONIC FATIGUE: ICD-10-CM

## 2025-05-12 DIAGNOSIS — E66.813 OBESITY, CLASS III, BMI 40-49.9 (MORBID OBESITY) (H): ICD-10-CM

## 2025-05-12 PROCEDURE — 3078F DIAST BP <80 MM HG: CPT | Performed by: INTERNAL MEDICINE

## 2025-05-12 PROCEDURE — 1125F AMNT PAIN NOTED PAIN PRSNT: CPT | Performed by: INTERNAL MEDICINE

## 2025-05-12 PROCEDURE — 99214 OFFICE O/P EST MOD 30 MIN: CPT | Performed by: INTERNAL MEDICINE

## 2025-05-12 PROCEDURE — 3075F SYST BP GE 130 - 139MM HG: CPT | Performed by: INTERNAL MEDICINE

## 2025-05-12 NOTE — PROGRESS NOTES
"No chief complaint on file.      Initial /75   Pulse 74   Ht 1.638 m (5' 4.5\")   Wt 116.1 kg (256 lb)   LMP  (LMP Unknown)   SpO2 97%   BMI 43.26 kg/m   Estimated body mass index is 43.26 kg/m  as calculated from the following:    Height as of this encounter: 1.638 m (5' 4.5\").    Weight as of this encounter: 116.1 kg (256 lb).    Medication Reconciliation: complete  IS 6  ESS 3  DME: JEYSON Lewis MA          [facility]  Sleep clinic follow-up visit note                Date: 5/12/2025    Chief complaint: Follow-up of ZHANE, review CPAP compliance measures    Jayne Leiva presents for follow-up of previously diagnosed obstructive sleep apnea,managed with CPAP.      Previous sleep study report:   Study Date: 11/21/2022  The combined apnea/hypopnea index was 8.8 events per hour (central apnea/hypopnea index was 0.2 events per hour). The REM AHI was 35.6 events per hour. The supine AHI was 31.0 events per hour. The RERA index was 2.0 events per hour.  The RDI was 10.8 events per hour.  Sleep Associated Hypoxemia -  Baseline oxygen saturation was 93.7%. Lowest oxygen saturation was 86.0%. Time spent less than or equal to 88% was 2.3 minutes. Time spent less than or equal to 89% was 5.9 minutes.      She received a Resmed Airsense 11 Pressures were set at 5-15 cm H2O.   Patient s ramp is 5 cm H2O for Auto and FLEX/EPR is EPR, 2.  Patient received a Resmed Mask name: [id number]  Nasal mask size Standard, heated tubing and heated humidifier.  She presents to sleep clinic today for follow-up of ZHANE and to review the CPAP compliance measures.     Do you use a CPAP Machine at home: (Patient-Rptd) Yes  Overall, on a scale of 0-10 how would you rate your CPAP (0 poor, 10 great): 6    What type of mask do you use: (Patient-Rptd) Nasal Pillow  Is your mask comfortable: (Patient-Rptd) No  If not, why: (Patient-Rptd) I have to wear a chinstrap to keep my mouth closed and between that and th3 cpap frame " it's just very uncomfortable.   I wake up fairly often trying to adjust things.  Is your mask leaking: (Patient-Rptd) Yes- mouth opening in spite of using chin strap  If yes, where do you feel it: (Patient-Rptd) I dont, but my remberto says it is.  How many night per week does the mask leak (0-7): (Patient-Rptd) 2 or 3    Do you notice snoring with mask on: (Patient-Rptd) No  Do you notice gasping arousals with mask on: (Patient-Rptd) No  Are you having significant oral or nasal dryness: (Patient-Rptd) No  Is the pressure setting comfortable: (Patient-Rptd) Yes  If not, why:      What is your typical bedtime: (Patient-Rptd) 9:00pm  How long does it take you to go to sleep on PAP therapy: (Patient-Rptd) 10 minutes?  What time do you typically get out of bed for the day: (Patient-Rptd) 5:15 to 5:45am  How many hours on average per night are you using PAP therapy: (Patient-Rptd) 6 to 7.5 ( Occasionally she takes the mask off during the middle of the night due to reasons documented above).  How many hours are you sleeping per night: (Patient-Rptd) 8+  Patient reports that CPAP helps her sleep deeper.  Do you feel well rested in the morning: (Patient-Rptd) No Since long COVID 2023    ResMed    Auto-PAP 8.0 - 15.0 cmH2O 30 day usage data:   From April 12, 2025 through May 11, 2025  87% of days with >= 4 hours of use. 2/30 days with no use.   Average use 6 hours and 19 minutes per day.   95%ile Leak 11.7 L/min.   CPAP 95% pressure 11.7 cm H2O.   AHI 0.3 events per hour.        EPWORTH SLEEPINESS SCALE         5/7/2025    11:32 AM    [place] Sleepiness Scale ( KHANH Salter  [id number]<br>ESS - [place]/English - Final version - 21 Nov 07 - [facility] [place].)   Sitting and reading Would never doze   Watching TV Slight chance of dozing   Sitting, inactive in a public place (e.g. a theatre or a meeting) Would never doze   As a passenger in a car for an hour without a break Slight chance of dozing   Lying down to rest in the  afternoon when circumstances permit Slight chance of dozing   Sitting and talking to someone Would never doze   Sitting quietly after a lunch without alcohol Would never doze   In a car, while stopped for a few minutes in traffic Would never doze   [place] Score (MC) 3   [place] Score (Sleep) 3        Patient-reported       INSOMNIA SEVERITY INDEX (JEFFREY)          5/7/2025    11:27 AM   Insomnia Severity Index (JEFFREY)   Difficulty falling asleep 0   Difficulty staying asleep 2   Problems waking up too early 0   How SATISFIED/DISSATISFIED are you with your CURRENT sleep pattern? 2   How NOTICEABLE to others do you think your sleep problem is in terms of impairing the quality of your life? 0   How WORRIED/DISTRESSED are you about your current sleep problem? 1   To what extent do you consider your sleep problem to INTERFERE with your daily functioning (e.g. daytime fatigue, mood, ability to function at work/daily chores, concentration, memory, mood, etc.) CURRENTLY? 1   JEFFREY Total Score 6        Patient-reported       Guidelines for Scoring/Interpretation:  Total score categories:  0-7 = No clinically significant insomnia   8-14 = Subthreshold insomnia   15-21 = Clinical insomnia (moderate severity)  22-28 = Clinical insomnia (severe)  Used via courtesy of www.[employer].va.gov with permission from Nahid Nagy PhD., [facility]      Past medical/surgical history, family history, social history, medications and allergies were reviewed.        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     Priority: Medium    Other sleep apnea 03/06/2023     Priority: Medium    Brain fog 03/06/2023     Priority: Medium    Cognitive changes 03/06/2023     Priority: Medium    Morbid obesity (H) 12/04/2022     Priority: Medium    KELLE (generalized anxiety disorder) 12/04/2022     Priority: Medium    Acid reflux disease  "12/04/2022     Priority: Medium    Old myocardial infarction 04/13/2022     Priority: Medium    Pure hypercholesterolemia      Priority: Medium    Chronic upper extremity pain, unspecified laterality 05/22/2017     Priority: Medium    ACP (advance care planning) 03/06/2023     Priority: Low        /75   Pulse 74   Ht 1.638 m (5' 4.5\")   Wt 116.1 kg (256 lb)   LMP  (LMP Unknown)   SpO2 97%   BMI 43.26 kg/m      Assessment/plan:  ZHANE: Pt reports adequate compliance with CPAP and reports positive benefits with CPAP treatment. Based on compliance measures, ZHNAE is adequately controlled with CPAP at the current settings. DME orders were generated for renewal of CPAP supplies. Patient was instructed to follow-up with [facility]. She could benefit from a different mask option like DreamWear Full Facemask. Recommended to continue using the CPAP regularly during sleep, to use the device for the entirety of the sleep duration to derive maximum benefit and getting the supplies for the CPAP replaced regularly. Patient instructed to remember to bring PAP with her if hospitalized and if anticipating procedure that requires sedation/surgery to be sure to discuss with the provider/surgeon that she has sleep apnea and uses PAP therapy.     Post COVID chronic fatigue: She is currently on naltrexone. She was prescribed modafinil in February 2025 by Dr. Gerardo. She stopped taking modafinil due to palpitations. The lower extremity edema and palpitations that she was experiencing was likely a side effect of Lyrica,  so she stopped the Lyrica and subsequently the palpitations stopped. She is planning on re-starting treatment with the modafinil. She will continue follow-up with the post-COVID clinic.     BMI 43.26 kg/m : We discussed weight management with diet and exercise.      Follow-up with sleep clinic in 1 year or sooner if any concerns.    Patient was strongly advised to avoid driving, operating any heavy machinery " "or other hazardous situations while drowsy or sleepy.  Patient was counseled on the importance of driving while alert, to pull over if drowsy, or nap before getting into the vehicle if sleepy.     The above note was dictated using voice recognition software. Although reviewed after completion, some word and grammatical error may remain . Please contact the author for any clarifications.     \" Total time spent was 30 minutes for this appointment on this date of service which include time spent before, during and after the visit for chart review, patient care, counseling and coordination of care including documentation.\"      Benjamín Hendrix MD  [facility]  [address] , [place] [address]            "

## 2025-05-16 PROBLEM — M25.521 RIGHT ELBOW PAIN: Status: ACTIVE | Noted: 2017-05-22

## 2025-05-21 ENCOUNTER — THERAPY VISIT (OUTPATIENT)
Dept: OCCUPATIONAL THERAPY | Facility: CLINIC | Age: 60
End: 2025-05-21
Payer: COMMERCIAL

## 2025-05-21 DIAGNOSIS — M25.521 RIGHT ELBOW PAIN: Primary | ICD-10-CM

## 2025-05-21 PROCEDURE — 97760 ORTHOTIC MGMT&TRAING 1ST ENC: CPT | Mod: GO | Performed by: SPECIALIST/TECHNOLOGIST

## 2025-05-21 PROCEDURE — 97535 SELF CARE MNGMENT TRAINING: CPT | Mod: GO | Performed by: SPECIALIST/TECHNOLOGIST

## 2025-06-01 NOTE — PROGRESS NOTES
6/2/2025        COMPREHENSIVE PAIN CLINIC FOLLOW UP EVALUATION    I had the pleasure of meeting Ms. Jayne Leiva on 2/17/2025 in the [facility].  The patient is a 59 year old female with past medical history of obesity, HTN, HLD, post-covid chronic fatigue, DMII, anxiety who presents for evaluation of chronic pain.  UDS and opioid agreement are not on file.      Updates since last appointment on February 17, 2025.  She presents alone.    Start compounding cream through [facility] ketamine 10% Lidocaine 5% apply 2-3 pumps (1-1.5gms) to affected area four times daily.  Takes the edge off.    She stopped topamax due to abdominal pain.  She stopped lyrica due to palpitations and weight gain.    Continue LDN 4.5mg    Discussed Grounding, Frequency Specific Microcurrent, Acupuncture and medical cannabis.  [email or web address], [phone].    Her mom passed away about a month ago.      Interventions/Injections:   2/2023 Right elbow CSI at [facility]  4/2024 right bicep tendon and radial tunnel steroid injection at [facility], helpful      Progress Notes Reviewed:  5/21/22025 OT R) elbow pain  5/14/2025 OR R0 elbow pain  5/12/2025 Sleep Medicine  4/4/2025 Dr. Amy Gerardo, - post covid chronic fatigue, continue LDN, vagus nerve stimulator      Any hospitalizations/ER/UC visits since last appointment:  no  Any falls/accidents since last appointment:  no      Primary Pain :  Location: wide spread, location can vary.  Spine fusion in 1999, Dr. Morrissey at the [facility]       Characteristics:  Any changes in pain characteristics since last appointment?  no    Quality: cramping, sharp, cutting, dull, aching, throbbing, stabbing, burning  Duration: PM   Aggravating factors include: standing, walking, exercise  Relieving factors include: lying down, medication    6/2/2025 current pain on 0/10 VAS:    4   Worst pain:   7     Best pain:  4  2/17/2025 current pain on 0/10 VAS:  4     Worst pain:  7    Best  pain:   4    PE.33  Severity/Intensity: Moderate Pain (5)      PE              Current Pain Related Medications:  Any medications changes since last appointment: no    Lyrica was topped due to ankle edema and weight gain  Topamax 50mg q hs - Abdominal pain worsened and she stopped topamax.  LDN 4.5 mg per day - takes the edge off  Compounding cream - takes the edge off  Tylenol as needed      NOTE:  Topamax cause GI and CNS symptoms at 100mg, gabapentin caused lightheaded and LE edema, lyrica was effective however it caused significant wt gain.  Anticoagulation:  none  Implanted Devices:  none      Therapies discuss on initial consult:   Physical therapy, Pain Psychology, TENs unit, Grounding Mat, Frequency Specific Micro Current, Anti-inflammatory Lifestyle    Social:  She is  and lives in a house in [place], [place].  She is independent in ADL's. She has 3 adult children.   She does not smoke or use alcohol.  No history of chemical dependency.    Employment:  She works full time.      Exercise:  Walks with her dog for exercise.  Walks on the treadmill 30 minutes daily.    Hobbies:      Mental Health:    Currently working with Oneyda Faye, PhD  Had done extensive therapy after divorce.                 Plan on initial consult on 2025:  A multimodal plan was developed today to treat your pain.  Multimodal analgesia is a strategy that reduces reliance on opioids through the use of non-opioid analgesics and therapies that have different mechanisms of action.      Diagnostics:   Reviewed shoulder imaging.        Medications:        The following OTC pain medications may be helpful, use as directed: Voltaren Gel 1%, CBD products, Arnica products, Capsaicin products, Australian Dream Cream, Epson It, Lidocaine Patch, Solanpas, Biofreeze, Aspercream, Tiger Balm and Brian Emu cream.  Apply heat or cold PRN.      Therapies:    PHYSICAL THERAPY -  Continue to do exercises learned at PT on a daily  basis.  Discussed the importance of core strengthening, ROM, stretching exercises with the patient and how each of these entities is important in decreasing pain.  Explained to the patient that the purpose of physical therapy is to teach the patient a home exercise program.  These exercises need to be performed every day in order to decrease pain and prevent future occurrences of pain.          Interventions: none    Follow up:   Start compounding cream through [facility] ketamine 10% Lidocaine 5% apply 2-3 pumps (1-1.5gms) to affected area four times daily.    Refill Topamax 50mg daily 2 RF    Continue LDN 4.5mg    Return to clinic in 3 months.      Updates since last appointment on 12/16/2024 with ELVIS Cooper CNP..  She has had covid 3 times and the last illness was over xmas.  She continues to follow at the [facility] with Dr. Gerardo.      History:  Post covid myalgia:  Had COVID xmas 2024.  First episode of COVID in February of 2022, she had fatigue that never went away. Suqsequent COVID infection in December of 2022, persistent muscle pain and brain fog began. Treating with Dr. Gerardo in the [facility]. Has been treated with therapy (PT and OT) to build endurance, LDN, arginine and vitamin C supplements. Did not tolerate topiramate or gabapentin.   Chronic shoulder and right arm pain:  PT for her arm and wrist at [facility]. Kinesio tape has been helpful for reducing her pain in her arm. Has a history of right RCR.      She continues to work with Pain PT.      Interventions/Injections:   2/2023 Right elbow CSI at [facility]  4/2024 right bicep tendon and radial tunnel steroid injection at [facility], helpful      Progress Notes Reviewed:  1/23/2025 [facility] - R) distal bicep debridement and repair of tendinopathy  12/16/2024 ELVIS Cooper, CNP  11/05/2024 [facility] - 1 week post op R) distal bicep debridement and repair of tendinopathy by Dr. Michael Langley      Any  hospitalizations/ER/UC visits since last appointment:  no  Any falls/accidents since last appointment:  no      Primary Pain :  Location: wide spread, location can vary.  Spine fusion in , Dr. Morrissey at the [facility]       Characteristics:  Any changes in pain characteristics since last appointment?  no    Quality: cramping, sharp, cutting, dull, aching, throbbing, stabbing, burning  Duration: PM   Aggravating factors include: standing, walking, exercise  Relieving factors include: lying down, medication      2025 current pain on 0/10 VAS:  4     Worst pain:  7    Best pain:   4    PE.33  Severity/Intensity: Moderate Pain (5)      PE      Current Pain Related Medications:  Any medications changes since last appointment: no    Topamax 50mg q hs - Abdominal is mild  LDN 4.5 mg per day  Tylenol as needed      NOTE:  Topamax cause GI and CNS symptoms at 100mg, gabapentin caused lightheaded and LE edema, lyrica was effective however it caused significant wt gain.    Anticoagulation:  none  Implanted Devices:  none      Therapies discuss on initial consult:   Physical therapy, Pain Psychology, TENs unit, Grounding Mat, Frequency Specific Micro Current, Anti-inflammatory Lifestyle    Social:  She is  and lives in a house in [place], [place].  She is independent in ADL's. She has 3 adult children.   She does not smoke or use alcohol.  No history of chemical dependency.    Employment:  She works full time.      Exercise:  Walks with her dog  for exercise.  Walks on the treadmill 3 0minutes daily.      Hobbies:      Mental Health:    Currently working with Oneyda Faye, PhD  Had done extensive therapy after divorce.        Past Medical History:  Medical history reviewed.  Past Medical History:   Diagnosis Date    Acid reflux disease     KELLE (generalized anxiety disorder)     Morbid obesity (H)     Other sleep apnea 3/6/2023    Pure hypercholesterolemia       Patient Active Problem List   Diagnosis     Right elbow pain    Pure hypercholesterolemia    Old myocardial infarction    Morbid obesity (H)    KELLE (generalized anxiety disorder)    Acid reflux disease    ACP (advance care planning)    Other sleep apnea    Brain fog    Cognitive changes    Post-COVID chronic fatigue    Borderline diabetes         Past Surgical History:  Pertinent surgical history reviewed.  Past Surgical History:   Procedure Laterality Date    ARTHROSCOPY SHOULDER Right 2015    ARTHROSCOPY SHOULDER, OPEN ROTATOR CUFF REPAIR, COMBINED Left 03/29/2017    Procedure:  Left shoulder arthroscopy and arthroscopic subacromial decompression (acromioplasty, bursectomy and coracoacromial ligament resection). Arthroscopic distal clavicle resection. Mini-open rotator cuff tear repair. Surgeon:  Kevin George MD  Location: [facility]    BUNIONECTOMY Right 2008    CV CORONARY ANGIOGRAM N/A 4/14/2022    Procedure: Coronary Angiogram;  Surgeon: Olvin Roy MD;  Location:  HEART CARDIAC CATH LAB    DAVINCI HYSTERECTOMY TOTAL, BILATERAL SALPINGO-OOPHORECTOMY, COMBINED N/A 2/9/2021    Procedure: Robotic assisted total laparoscopic hysterectomy, bilateral salpingectomy, bilateral oophorectomy, cystoscopy;  Surgeon: Jonathan Peters MD;  Location:  OR    DISKECTOMY, LUMBAR, SINGLE SP  1996    L5-S1    DISKECTOMY, LUMBAR, SINGLE SP  1997    L5-S1    FUSION LUMBAR ANTERIOR ONE LEVEL  1999    L5-S1    OSTEOTOMY FOOT Right 06/06/2017    Procedure: OSTEOTOMY FOOT;  1)  WEIL OSTEOTOMY RIGHT SECOND METATARSAL, 2) PLANTAR CAPSULE DEBRIDEMENT/SYNOVECTOMY, RIGHT SECOND METATARSAL PHALANGEAL JOINT, 3) HAMMERTOE REPAIR RIGHT SECOND TOE;  Surgeon: Olvin Betancur DPM;  Location: [facility]    [place] SPINE SURGERY PROCEDURE UNLISTED      RECONSTRUCT FOREFOOT WITH METATARSOPHALANGEAL (MTP) FUSION Right 06/06/2017    Procedure: RECONSTRUCT FOREFOOT WITH METATARSOPHALANGEAL (MTP) FUSION;;  Surgeon: Olvin Betancur DPM;  Location: [facility]     REPAIR HAMMER TOE Right 06/06/2017    Procedure: REPAIR HAMMER TOE;;  Surgeon: Olvin Betancur DPM;  Location: [facility]    ROTATOR CUFF REPAIR RT/LT Left 2017          Medications: Pertinent medications reviewed.  Current Outpatient Medications   Medication Sig Dispense Refill    atorvastatin (LIPITOR) 40 MG tablet Take 0.5 tablets (20 mg) by mouth every evening. 45 tablet 1    Coenzyme Q10 (CO Q 10 PO) Take by mouth.      D-Ribose (RIBOSE, D,) POWD       ketamine HCl POWD 2-3 Pump 4 times daily. 120 g 0    MAGNESIUM PO Take by mouth.      metFORMIN (GLUCOPHAGE XR) 500 MG 24 hr tablet Take 2 tablets (1,000 mg) by mouth daily at 2 pm.      modafinil (PROVIGIL) 100 MG tablet Take 1 tablet (100 mg) by mouth daily. 30 tablet 3    multivitamin w/minerals (THERA-VIT-M) tablet Take 1 tablet by mouth daily      Naltrexone HCl, Pain, (NALTREX) 4.5 MG CAPS Take 4.5 mg by mouth daily. 90 capsule 3    omeprazole (PRILOSEC) 20 MG DR capsule Take 20 mg by mouth daily      vitamin C (ASCORBIC ACID) 500 MG tablet Take 1 tablet (500 mg) by mouth daily 30 tablet 4    vitamin D2 (ERGOCALCIFEROL) 51798 units (1250 mcg) capsule Take 50,000 Units by mouth once a week         [place] Prescription Monitoring Program reviewed 2/16/2025.  No concern for abuse or misuse of controlled medications based on this report.  No controlled medications are being prescribed.    10/29/2024 Norco 5/325mg 25 tabs for 3 days  8/9/2024 Pregabalin 150mg 170 tabs for 90 days  8/8/2024 Pregabalin 150mg 170 tabs for 90 days        Allergies: Pertinent allergies reviewed.     Allergies   Allergen Reactions    Lyrica [Pregabalin] Swelling     Ankle swelling, palpitations    Lexapro [Escitalopram] Rash     Purple marks on arms    Nickel Rash    Penicillins Hives       Family History:   family history includes Coronary Artery Disease in her mother; Dementia in her mother; Diabetes Type 2  in her mother; Myocardial Infarction in her maternal grandfather and maternal  grandmother; Rheumatoid Arthritis in her mother and sister.    Social History:   She is  and lives in a house in [place].  She has 3 adult children.  She works full time.  She is independent in ADl's.  No history of chemical dependency.  She  reports that she quit smoking about 3 years ago. Her smoking use included cigarettes. She started smoking about 29 years ago. She has a 19.5 pack-year smoking history. She has been exposed to tobacco smoke. She has never used smokeless tobacco. She reports that she does not currently use alcohol. She reports that she does not use drugs.  Social History     Social History Narrative     in April, 2023, he and her  have been together since 2015. They dated in high school as well.      She was previously  once.     3 adult kids(2 daughters and 1 son)    No grandchildren.    Walks her dog for exercise.     Works full time as a  at a [employer] center             Physical Exam:  /80 (BP Location: Right arm)   Pulse 83   LMP  (LMP Unknown)   SpO2 98%     Constitutional: She is oriented to person, place, and time.  She is overweight She is not in acute distress.   HENT:     Head: Normocephalic and atraumatic.     Eyes: Pupils are equal, round, and reactive to light. EOM are normal. No scleral icterus.   Pulmonary/Chest:  NWOB. No respiratory distress.   Neurological: She is alert and oriented to person, place, and time. Coordination grossly normal.  Skin: Skin is warm and dry. She is not diaphoretic.   Psychiatric: She has a normal mood and affect. Her behavior is normal. Judgment and thought content normal.  Patient answers questions appropriately.  MSK: Gait is normal.              Imaging:  MR SHOULDER LEFT WITHOUT CONTRAST 6/10/2019 8:41 AM      HISTORY: Shoulder pain, prior rotator cuff repair, re-tear suspected,  negative x-ray. Status post left shoulder DEC/DCR. Rotator cuff repair  2017 with chronic pain and weakness, no  trauma history. Chronic pain  in left shoulder.  Status post rotator cuff surgery. Surgery in March 2017.     COMPARISON: 3/8/2017     TECHNIQUE:  Multiplanar, multisequence without contrast.     FINDINGS:  Osseous Acromion Outlet: Postsurgical changes including resection at  the AC joint and acromioplasty. No os acromiale.     Rotator Cuff: Supraspinatus tendon -  Postsurgical changes. No  recurrent tear or significant tendinosis identified.  Infraspinatus  tendon -  mild tendinosis.  Subscapularis tendon -  no tear or  significant tendinosis.  Intact teres minor tendon.  No asymmetric  muscle atrophy.      Labral Structures: No superior labral tear (SLAP lesion) identified.  No labral cyst identified. No anterior or posterior labral tear  identified.     Biceps Tendon: No tear, dislocation, or significant tendinosis.     Osseous and Cartilaginous Structures:  No bone contusion or fracture.  No glenohumeral osteoarthritis or apparent chondromalacia identified.     Joint Space: No significant overall joint effusion.     Additional Findings: No deltoid muscle edema. Medium amount of fluid  within the subacromial-subdeltoid bursa.                                                                      IMPRESSION:  1. Postsurgical changes. No recurrent supraspinatus tendon tear  identified.  2. Mild infraspinatus tendinosis.  3. Subacromial bursal fluid.      EMG:  na      Diagnosis:  (M79.10) Myalgia  (primary encounter diagnosis)  Comment:   Plan: ketamine HCl POWD,           (G93.32,  U09.9) Post-COVID chronic fatigue  Comment: continue LDN  Plan:     (R41.89) Brain fog  Comment:   Plan:     (G89.29) Chronic intractable pain  Comment:   Plan:             Plan on 6/2/2025:  Discussed grounding, frequency specific microcurrent, acupuncture, medical cannabis.    AVS My Chart.    Follow-up as needed.        Veronica Adam, APRN, FNP  [facility]  [facility]        BILLING TIME DOCUMENTATION:   The total TIME spent on this patient on the date of the encounter/appointment was 22 minutes.

## 2025-06-02 ENCOUNTER — OFFICE VISIT (OUTPATIENT)
Dept: PALLIATIVE MEDICINE | Facility: CLINIC | Age: 60
End: 2025-06-02
Attending: NURSE PRACTITIONER
Payer: COMMERCIAL

## 2025-06-02 VITALS — HEART RATE: 83 BPM | SYSTOLIC BLOOD PRESSURE: 129 MMHG | OXYGEN SATURATION: 98 % | DIASTOLIC BLOOD PRESSURE: 80 MMHG

## 2025-06-02 DIAGNOSIS — M79.10 MYALGIA: Primary | ICD-10-CM

## 2025-06-02 DIAGNOSIS — R53.82 CHRONIC FATIGUE: ICD-10-CM

## 2025-06-02 PROCEDURE — 99213 OFFICE O/P EST LOW 20 MIN: CPT | Performed by: NURSE PRACTITIONER

## 2025-06-02 PROCEDURE — 3079F DIAST BP 80-89 MM HG: CPT | Performed by: NURSE PRACTITIONER

## 2025-06-02 PROCEDURE — 1125F AMNT PAIN NOTED PAIN PRSNT: CPT | Performed by: NURSE PRACTITIONER

## 2025-06-02 PROCEDURE — 3074F SYST BP LT 130 MM HG: CPT | Performed by: NURSE PRACTITIONER

## 2025-06-02 ASSESSMENT — PAIN SCALES - GENERAL: PAINLEVEL_OUTOF10: MODERATE PAIN (6)

## 2025-06-02 NOTE — PATIENT INSTRUCTIONS
Plan on 6/2/2025:  Discussed grounding, frequency specific microcurrent, acupuncture, medical cannabis.    AVS My Chart.    Follow-up as needed.        ELVIS Huitron, RYAN  [facility]              Clinic Number:  [phone]  Call with any questions about your care and for scheduling assistance.   Calls are returned Monday through Friday between 8 AM and 4:30 PM. We usually get back to you within 2 business days depending on the issue/request.    If we are prescribing your medications:  For opioid medication refills, call the clinic or send a [facility] message 7 days in advance.  Please include:  Name of requested medication  Name of the pharmacy.  For non-opioid medications, call your pharmacy directly to request a refill. Please allow 3-4 days to be processed.   Per [place] State Law:  All controlled substance prescriptions must be filled within 30 days of being written.    For those controlled substances allowing refills, pickup must occur within 30 days of last fill.      We believe regular attendance is key to your success in our program!    Any time you are unable to keep your appointment we ask that you call us at least 24 hours in advance to cancel.This will allow us to offer the appointment time to another patient.   Multiple missed appointments may lead to dismissal from the clinic.

## 2025-06-03 ENCOUNTER — THERAPY VISIT (OUTPATIENT)
Dept: PHYSICAL THERAPY | Facility: CLINIC | Age: 60
End: 2025-06-03
Payer: COMMERCIAL

## 2025-06-03 DIAGNOSIS — M79.18 MYALGIA, MULTIPLE SITES: ICD-10-CM

## 2025-06-03 DIAGNOSIS — M79.10 POST-COVID CHRONIC MUSCLE PAIN: ICD-10-CM

## 2025-06-03 DIAGNOSIS — G89.29 POST-COVID CHRONIC MUSCLE PAIN: ICD-10-CM

## 2025-06-03 DIAGNOSIS — U09.9 POST-COVID CHRONIC MUSCLE PAIN: ICD-10-CM

## 2025-06-03 DIAGNOSIS — U09.9 POST-COVID CHRONIC FATIGUE: Primary | ICD-10-CM

## 2025-06-03 DIAGNOSIS — G93.32 POST-COVID CHRONIC FATIGUE: Primary | ICD-10-CM

## 2025-06-03 DIAGNOSIS — R53.83 FATIGUE, UNSPECIFIED TYPE: ICD-10-CM

## 2025-06-03 PROCEDURE — 97110 THERAPEUTIC EXERCISES: CPT | Mod: GP | Performed by: PHYSICAL THERAPIST

## 2025-06-03 PROCEDURE — 97112 NEUROMUSCULAR REEDUCATION: CPT | Mod: GP | Performed by: PHYSICAL THERAPIST

## 2025-06-04 ENCOUNTER — THERAPY VISIT (OUTPATIENT)
Dept: OCCUPATIONAL THERAPY | Facility: CLINIC | Age: 60
End: 2025-06-04
Payer: COMMERCIAL

## 2025-06-04 DIAGNOSIS — M25.521 RIGHT ELBOW PAIN: Primary | ICD-10-CM

## 2025-06-04 PROCEDURE — 97140 MANUAL THERAPY 1/> REGIONS: CPT | Mod: GO | Performed by: OCCUPATIONAL THERAPIST

## 2025-06-04 PROCEDURE — 97112 NEUROMUSCULAR REEDUCATION: CPT | Mod: GO | Performed by: OCCUPATIONAL THERAPIST

## 2025-06-11 ENCOUNTER — THERAPY VISIT (OUTPATIENT)
Dept: OCCUPATIONAL THERAPY | Facility: CLINIC | Age: 60
End: 2025-06-11
Payer: COMMERCIAL

## 2025-06-11 DIAGNOSIS — M25.521 RIGHT ELBOW PAIN: Primary | ICD-10-CM

## 2025-06-11 PROCEDURE — 97140 MANUAL THERAPY 1/> REGIONS: CPT | Mod: GO | Performed by: OCCUPATIONAL THERAPIST

## 2025-06-11 PROCEDURE — 97112 NEUROMUSCULAR REEDUCATION: CPT | Mod: GO | Performed by: OCCUPATIONAL THERAPIST

## 2025-06-16 ENCOUNTER — THERAPY VISIT (OUTPATIENT)
Dept: OCCUPATIONAL THERAPY | Facility: CLINIC | Age: 60
End: 2025-06-16
Payer: COMMERCIAL

## 2025-06-16 DIAGNOSIS — M25.521 RIGHT ELBOW PAIN: Primary | ICD-10-CM

## 2025-06-16 PROCEDURE — 97112 NEUROMUSCULAR REEDUCATION: CPT | Mod: GO | Performed by: OCCUPATIONAL THERAPIST

## 2025-06-16 PROCEDURE — 97110 THERAPEUTIC EXERCISES: CPT | Mod: GO | Performed by: OCCUPATIONAL THERAPIST

## 2025-06-16 PROCEDURE — 97140 MANUAL THERAPY 1/> REGIONS: CPT | Mod: GO | Performed by: OCCUPATIONAL THERAPIST

## 2025-07-02 ENCOUNTER — THERAPY VISIT (OUTPATIENT)
Dept: PHYSICAL THERAPY | Facility: CLINIC | Age: 60
End: 2025-07-02
Payer: COMMERCIAL

## 2025-07-02 DIAGNOSIS — U09.9 POST-COVID CHRONIC FATIGUE: Primary | ICD-10-CM

## 2025-07-02 DIAGNOSIS — G93.32 POST-COVID CHRONIC FATIGUE: Primary | ICD-10-CM

## 2025-07-02 DIAGNOSIS — G89.29 POST-COVID CHRONIC MUSCLE PAIN: ICD-10-CM

## 2025-07-02 DIAGNOSIS — U09.9 POST-COVID CHRONIC MUSCLE PAIN: ICD-10-CM

## 2025-07-02 DIAGNOSIS — M79.10 POST-COVID CHRONIC MUSCLE PAIN: ICD-10-CM

## 2025-07-02 DIAGNOSIS — M79.18 MYALGIA, MULTIPLE SITES: ICD-10-CM

## 2025-07-02 PROCEDURE — 97110 THERAPEUTIC EXERCISES: CPT | Mod: GP | Performed by: PHYSICAL THERAPIST

## 2025-07-02 PROCEDURE — 97112 NEUROMUSCULAR REEDUCATION: CPT | Mod: GP | Performed by: PHYSICAL THERAPIST

## 2025-07-03 NOTE — PROGRESS NOTES
07/02/25 0500   Appointment Info   Signing clinician's name / credentials Gerri Morgan PT, DPT   Total/Authorized Visits eval and treat   Medical Diagnosis Post Covid Chronic muscle pain myalgia   PT Tx Diagnosis chronic pain, fatigue, decreased activity tolerance   Other pertinent information pt mentally fatigues with alot of talking   Progress Note/Certification   Onset of illness/injury or Date of Surgery 02/01/22   Therapy Frequency 1/month   Predicted Duration 3-4 months   Progress Note Due Date 08/05/25   PT Goal 1   Goal Identifier walking   Goal Description Pt will be able to walk for 30 minutes with minimal increase in pain   Rationale to maximize safety and independence with performance of ADLs and functional tasks;to maximize safety and independence within the home;to maximize safety and independence within the community;to maximize safety and independence with transportation;to maximize safety and independence with self cares   Goal Progress pt has been walking 30 min on her treadmill daily   Target Date 04/29/24   Date Met 06/03/24   PT Goal 2   Goal Identifier HEP   Goal Description Patient will demo IND with HEP and progression to improve strength and mobility   Rationale to maximize safety and independence with performance of ADLs and functional tasks;to maximize safety and independence within the home;to maximize safety and independence within the community;to maximize safety and independence with transportation;to maximize safety and independence with self cares   Target Date 08/05/25   PT Goal 3   Goal Identifier Pain   Goal Description Patient will be IND with pain management strategies to be able to self manage flare ups in pain to be   Rationale to maximize safety and independence with performance of ADLs and functional tasks;to maximize safety and independence within the home;to maximize safety and independence within the community;to maximize safety and independence with transportation;to  maximize safety and independence with self cares   Target Date 08/05/25   Subjective Report   Subjective Report Pt reporting she did start the taking gummies,   took them at night, made her heart race, so stopped taking them.  Strulggling with heart palpatations. Wonders if metformin has increased her heart palpatations. Reports these are the most bothersome.  Will address this with MD.   Therapeutic Procedure/Exercise   Therapeutic Procedures: strength, endurance, ROM, flexibility minutes (05894) 30   Ther Proc 1 Nu Step- lvl 5   Ther Proc 1 - Details seat 10, left arm 10 8 min   Ther Proc 2 upright bike seat 4- level 5   Ther Proc 2 - Details DNT   Ther Proc 3 Leg press   Ther Proc 3 - Details seat at 4, X10 3 plates, X10 4 plates, X10 5 plates   PTRx Ther Proc 2 Calf Raise - Single Leg   PTRx Ther Proc 2 - Details bilateral X20- challenging   PTRx Ther Proc 3 Standing Gastroc Stretch   PTRx Ther Proc 3 - Details X1 min each side X3   PTRx Ther Proc 4 leg press seat 4   PTRx Ther Proc 4 - Details 3 plates too easy,  4plates goot warm up X10, 5plates, X12, 6 plates X10   PTRx Ther Proc 5 Hip Extension In Neutral With Theraband   PTRx Ther Proc 5 - Details HEP   PTRx Ther Proc 6 Sidestep   PTRx Ther Proc 6 - Details X5 laps, RTB good challenge   PTRx Ther Proc 7 Stepping Reactions   PTRx Ther Proc 7 - Details HEP   PTRx Ther Proc 8 NEW- hip ext   PTRx Ther Proc 8 - Details X10 each side RTB   PTRx Ther Proc 9 NEW- hip ab standing   PTRx Ther Proc 9 - Details X10 each side RTB   Skilled Intervention cues for form and control   Patient Response/Progress tolerating well- overall general conditioning to decrease faitgue   Neuromuscular Re-education   Neuromuscular re-ed of mvmt, balance, coord, kinesthetic sense, posture, proprioception minutes (79099) 20   Neuro Re-ed 1 sleep hygiene   Neuro Re-ed 1 - Details pt reporting she is sleeping, however does not feel rested,   Neuro Re-ed 2 Mental Fatigue   Neuro Re-ed 2 -  "Details pt reporting this is improving soom, however talking alot is still fatiguing, varies greatly,  tries to decrease \"noise\" as much as possible   Neuro Re-ed 3 aerobic actvity   Neuro Re-ed 3 - Details reviewed benefits to calm nervous system pt has not been doing as much   Neuro Re-ed 4 PNE- pacing- work- rest breaks versus exercise   Neuro Re-ed 4 - Details pt is now working 8 hours,  now has 30 min break- just has lunch and take dog  out   Neuro Re-ed 5 diaphragmatic breathing   Neuro Re-ed 5 - Details reviewed benefits- is doing this throughout the day, sitting up- clears the mind overall   Neuro Re-ed 6 Posture   Neuro Re-ed 6 - Details reviewed with body mechanis   Neuro Re-ed 7 mood, emotions, anxiety,   Neuro Re-ed 7 - Details overall pt is doing well   Neuro Re-ed 8 turn in [place] right and left   Neuro Re-ed 8 - Details HEP   Neuro Re-ed 9 walking head rotation   Neuro Re-ed 9 - Details HEP   PTRx Neuro Re-ed 1 Single Leg Stance - Balance Right Foot   PTRx Neuro Re-ed 1 - Details HEP   PTRx Neuro Re-ed 2 Tandem Walking and Balance   PTRx Neuro Re-ed 2 - Details X3 laps   PTRx Neuro Re-ed 3 Single leg balance on a Pillow - Right Foot   PTRx Neuro Re-ed 3 - Details HEP   Skilled Intervention PNE- balance-pacing and rest   Patient Response/Progress over all tired today- tolerating well   Education   Learner/Method No Barriers to Learning   Education Comments no concerns   Plan   Home program on her phone- general strength- and calming/breathing strategies   Updates to plan of care 1x/month for 3-4 months   Plan for next session review all seperate programs and update   Total Session Time   Timed Code Treatment Minutes 50   Total Treatment Time (sum of timed and untimed services) 50       PLAN  Continue therapy per current plan of care.    Beginning/End Dates of Progress Note Reporting Period:   3/17/2025to 07/02/2025    Referring Provider:  Melita Sullivan transitioned to Veronica Adam  "

## 2025-07-15 ENCOUNTER — THERAPY VISIT (OUTPATIENT)
Dept: OCCUPATIONAL THERAPY | Facility: CLINIC | Age: 60
End: 2025-07-15
Payer: COMMERCIAL

## 2025-07-15 DIAGNOSIS — M25.521 RIGHT ELBOW PAIN: Primary | ICD-10-CM

## 2025-07-15 PROCEDURE — 97112 NEUROMUSCULAR REEDUCATION: CPT | Mod: GO | Performed by: OCCUPATIONAL THERAPIST

## 2025-07-15 PROCEDURE — 97140 MANUAL THERAPY 1/> REGIONS: CPT | Mod: GO | Performed by: OCCUPATIONAL THERAPIST

## 2025-07-15 PROCEDURE — 97110 THERAPEUTIC EXERCISES: CPT | Mod: GO | Performed by: OCCUPATIONAL THERAPIST

## 2025-07-18 NOTE — PROGRESS NOTES
07/15/25 0500   Appointment Info   Treating Provider Kristine Fuentes, OTR, CHT   Total/Authorized Visits 6 (POC)   Visits Used 6   Medical Diagnosis R Elbow Pain   OT Tx Diagnosis R Elbow Pain   Progress Note/Certification   Onset of Illness/Injury or Date of Surgery 04/23/25  (MD order date)   Therapy Frequency 1x/week   Predicted Duration 6 weeks   Progress Note Due Date 06/25/25   Progress Note Completed Date 05/14/25   OT Goal 1   Goal Identifier Household Chores   Goal Description Pt will be able to open a new jar without pain in order to maximize independence with ADLs   Target Date 08/28/25   Subjective Report   Subjective Report I had time off last week.  It was doing better unti I went back to work last week.  It is getting sore again.   Treatment Interventions (OT)   Interventions Self Care/Home Management;Orthotics   Self Care/Home Management   Self Care Self Care 2;Self Care 3   Self Care 1 Edu   Self Care 1 - Details Lifting, functional mechanics avoiding palm down, outside of body, using trunk for support   Self Care 2 Edu   Self Care 2 - Details Pacing and activity cessation using pain as guide   Self Care 3 Edu   Self Care 3 - Details Splinting options of wrist cock up for daytime, Wrist in more extension at night due to nighttime pain   Neuromuscular Re-education   Neuromuscular Re-ed Minutes (37537) 10   Neuro Re-ed 1 Passive distal radial nerve glides   Neuro Re-ed 1 - Details until a release was felt   PTRx Neuro Re-ed 1 Nerve Gliding Proximal Radial   PTRx Neuro Re-ed 1 - Details HEP   Skilled Intervention Graded release to decrease pain   Patient Response/Progress Pt tolerated well   Therapeutic Procedure/Exercise   Therapeutic Procedure: strength, endurance, ROM, flexibillity minutes (68507) 8   PTRx Ther Proc 1 Forearm Passive Range of Motion Extensor Stretch   PTRx Ther Proc 1 - Details HEP   PTRx Ther Proc 2 Thoracic Elongation Latissimus Dorsi Stretch   PTRx Ther Proc 2 - Details Do  standing with hands on counter5 reps 3-4x/day hold 20-30 seconds   Skilled Intervention Educated on and provided cueing for proper follow through with HEP.   Patient Response/Progress Good   Therapeutic Activity   PTRx Ther Act 1 Ball Massage to Extensors   PTRx Ther Act 1 - Details HEP   Manual Therapy   Manual Therapy Minutes (14270) 10   Manual Therapy 1 MFR to distal triceps   Manual Therapy 1 - Details moderate pressure   Skilled Intervention Reduced tightness in joint and musculature to decrease pain and improve function   Patient Response/Progress Pt tolerated well   Education   Learner/Method Patient;No Barriers to Learning   Total Session Time   Timed Code Treatment Minutes 28   Total Treatment Time (sum of timed and untimed services) 28         PLAN  Continue therapy per current plan of care.    Beginning/End Dates of Progress Note Reporting Period:  05/14/25 to 07/15/2025    Referring Provider:  Ekaterina Sanders

## 2025-07-21 ENCOUNTER — PATIENT OUTREACH (OUTPATIENT)
Dept: CARE COORDINATION | Facility: CLINIC | Age: 60
End: 2025-07-21
Payer: COMMERCIAL

## 2025-07-21 DIAGNOSIS — U09.9 POST-COVID CHRONIC PALPITATIONS: Primary | ICD-10-CM

## 2025-07-21 DIAGNOSIS — R00.2 POST-COVID CHRONIC PALPITATIONS: Primary | ICD-10-CM

## 2025-07-29 ENCOUNTER — THERAPY VISIT (OUTPATIENT)
Dept: OCCUPATIONAL THERAPY | Facility: CLINIC | Age: 60
End: 2025-07-29
Payer: COMMERCIAL

## 2025-07-29 DIAGNOSIS — M25.521 RIGHT ELBOW PAIN: Primary | ICD-10-CM

## 2025-07-29 PROCEDURE — 97112 NEUROMUSCULAR REEDUCATION: CPT | Mod: GO | Performed by: OCCUPATIONAL THERAPIST

## 2025-07-29 PROCEDURE — 97140 MANUAL THERAPY 1/> REGIONS: CPT | Mod: GO | Performed by: OCCUPATIONAL THERAPIST

## 2025-08-05 ENCOUNTER — THERAPY VISIT (OUTPATIENT)
Dept: OCCUPATIONAL THERAPY | Facility: CLINIC | Age: 60
End: 2025-08-05
Payer: COMMERCIAL

## 2025-08-05 ENCOUNTER — THERAPY VISIT (OUTPATIENT)
Dept: PHYSICAL THERAPY | Facility: CLINIC | Age: 60
End: 2025-08-05
Payer: COMMERCIAL

## 2025-08-05 DIAGNOSIS — U09.9 POST-COVID CHRONIC MUSCLE PAIN: ICD-10-CM

## 2025-08-05 DIAGNOSIS — M79.10 POST-COVID CHRONIC MUSCLE PAIN: ICD-10-CM

## 2025-08-05 DIAGNOSIS — G93.32 POST-COVID CHRONIC FATIGUE: Primary | ICD-10-CM

## 2025-08-05 DIAGNOSIS — U09.9 POST-COVID CHRONIC FATIGUE: Primary | ICD-10-CM

## 2025-08-05 DIAGNOSIS — M25.521 RIGHT ELBOW PAIN: Primary | ICD-10-CM

## 2025-08-05 DIAGNOSIS — G89.29 POST-COVID CHRONIC MUSCLE PAIN: ICD-10-CM

## 2025-08-05 DIAGNOSIS — M79.18 MYALGIA, MULTIPLE SITES: ICD-10-CM

## 2025-08-05 PROCEDURE — 97110 THERAPEUTIC EXERCISES: CPT | Mod: GP | Performed by: PHYSICAL THERAPIST

## 2025-08-05 PROCEDURE — 97112 NEUROMUSCULAR REEDUCATION: CPT | Mod: GP | Performed by: PHYSICAL THERAPIST

## 2025-08-05 PROCEDURE — 97140 MANUAL THERAPY 1/> REGIONS: CPT | Mod: GO | Performed by: OCCUPATIONAL THERAPIST

## 2025-08-05 PROCEDURE — 97112 NEUROMUSCULAR REEDUCATION: CPT | Mod: GO | Performed by: OCCUPATIONAL THERAPIST

## 2025-08-18 ENCOUNTER — THERAPY VISIT (OUTPATIENT)
Dept: OCCUPATIONAL THERAPY | Facility: CLINIC | Age: 60
End: 2025-08-18
Payer: COMMERCIAL

## 2025-08-18 DIAGNOSIS — M25.521 RIGHT ELBOW PAIN: Primary | ICD-10-CM

## 2025-08-18 PROCEDURE — 97112 NEUROMUSCULAR REEDUCATION: CPT | Mod: GO | Performed by: OCCUPATIONAL THERAPIST

## 2025-08-18 PROCEDURE — 97140 MANUAL THERAPY 1/> REGIONS: CPT | Mod: GO | Performed by: OCCUPATIONAL THERAPIST

## 2025-09-03 ENCOUNTER — THERAPY VISIT (OUTPATIENT)
Dept: PHYSICAL THERAPY | Facility: CLINIC | Age: 60
End: 2025-09-03
Payer: COMMERCIAL

## 2025-09-03 ENCOUNTER — THERAPY VISIT (OUTPATIENT)
Dept: OCCUPATIONAL THERAPY | Facility: CLINIC | Age: 60
End: 2025-09-03
Payer: COMMERCIAL

## 2025-09-03 DIAGNOSIS — M79.18 MYALGIA, MULTIPLE SITES: ICD-10-CM

## 2025-09-03 DIAGNOSIS — G89.29 POST-COVID CHRONIC MUSCLE PAIN: ICD-10-CM

## 2025-09-03 DIAGNOSIS — M25.521 RIGHT ELBOW PAIN: Primary | ICD-10-CM

## 2025-09-03 DIAGNOSIS — M79.10 POST-COVID CHRONIC MUSCLE PAIN: ICD-10-CM

## 2025-09-03 DIAGNOSIS — U09.9 POST-COVID CHRONIC MUSCLE PAIN: ICD-10-CM

## 2025-09-03 DIAGNOSIS — U09.9 POST-COVID CHRONIC FATIGUE: Primary | ICD-10-CM

## 2025-09-03 DIAGNOSIS — G93.32 POST-COVID CHRONIC FATIGUE: Primary | ICD-10-CM

## 2025-09-03 PROCEDURE — 97112 NEUROMUSCULAR REEDUCATION: CPT | Mod: GP | Performed by: PHYSICAL THERAPIST

## 2025-09-03 PROCEDURE — 97110 THERAPEUTIC EXERCISES: CPT | Mod: GP | Performed by: PHYSICAL THERAPIST

## 2025-09-03 PROCEDURE — 97112 NEUROMUSCULAR REEDUCATION: CPT | Mod: GO | Performed by: OCCUPATIONAL THERAPIST

## 2025-09-03 PROCEDURE — 97140 MANUAL THERAPY 1/> REGIONS: CPT | Mod: GO | Performed by: OCCUPATIONAL THERAPIST

## (undated) DEVICE — BNDG ELASTIC 4"X5YDS UNSTERILE 6611-40

## (undated) DEVICE — NDL INSUFFLATION 13GA 150MM C2202

## (undated) DEVICE — DRAPE MINI C-ARM 4003

## (undated) DEVICE — SUCTION IRR STRYKERFLOW II W/TIP 250-070-520

## (undated) DEVICE — SU PROLENE 4-0 PC-3 18" 8634G

## (undated) DEVICE — PROTECTOR ARM ONE-STEP TRENDELENBURG 40418

## (undated) DEVICE — ADH SKIN CLOSURE PREMIERPRO EXOFIN 1.0ML 3470

## (undated) DEVICE — PAD CHUX UNDERPAD 30X36" P3036C

## (undated) DEVICE — KIT PATIENT POSITIONING PIGAZZI LATEX FREE 40580

## (undated) DEVICE — DAVINCI XI OBTURATOR BLADELESS 8MM 470359

## (undated) DEVICE — Device

## (undated) DEVICE — CAST PADDING 4" UNSTERILE 9044

## (undated) DEVICE — IMM LIMB ELEVATOR DC40-0203

## (undated) DEVICE — GLOVE PROTEXIS W/NEU-THERA 8.0  2D73TE80

## (undated) DEVICE — SOL NACL 0.9% IRRIG 1000ML BOTTLE 07138-09

## (undated) DEVICE — GLOVE PROTEXIS BLUE W/NEU-THERA 7.0  2D73EB70

## (undated) DEVICE — DAVINCI XI DRAPE ARM 470015

## (undated) DEVICE — BNDG ROLLER GAUZE CONFORM 4"X4YD 41-54

## (undated) DEVICE — DEFIB PRO-PADZ LVP LQD GEL ADULT 8900-2105-01

## (undated) DEVICE — LINEN ORTHO PACK 5446

## (undated) DEVICE — NDL 25GA 1.5" 305127

## (undated) DEVICE — BLADE KNIFE SURG 15 371115

## (undated) DEVICE — DRSG ABDOMINAL 07 1/2X8" 7197D

## (undated) DEVICE — SUCTION CANISTER MEDIVAC LINER 3000ML W/LID 65651-530

## (undated) DEVICE — SYR 10ML FINGER CONTROL W/O NDL 309695

## (undated) DEVICE — CATH TRAY FOLEY SURESTEP 16FR DRAIN BAG STATOCK A899916

## (undated) DEVICE — SU VICRYL 4-0 PS-2 18" UND J496H

## (undated) DEVICE — SUCTION MANIFOLD NEPTUNE 2 SYS 4 PORT 0702-020-000

## (undated) DEVICE — RETR ELEV / UTERINE MANIPULATOR V-CARE XLG CUP 60-6085-203A

## (undated) DEVICE — DRSG ADAPTIC 3X8" 6113

## (undated) DEVICE — TUBING CONMED AIRSEAL SMOKE EVAC INSUFFLATION ASM-EVAC

## (undated) DEVICE — PREP CHLORAPREP 26ML TINTED ORANGE  260815

## (undated) DEVICE — PIN GUARD 10-1-001 101001PBX

## (undated) DEVICE — GLOVE PROTEXIS POWDER FREE SMT 7.0  2D72PT70X

## (undated) DEVICE — SU WND CLOSURE VLOC 180 ABS 2-0 9" GS-22 VLOCL2145

## (undated) DEVICE — PACK DAVINCI GYN SMA15GDFS1

## (undated) DEVICE — LINEN TOWEL PACK X5 5464

## (undated) DEVICE — CATH JACKY 5FR 3.5 CURVE 40-5023

## (undated) DEVICE — ESU GROUND PAD ADULT W/CORD E7507

## (undated) DEVICE — DRAPE SHEET REV FOLD 3/4 9349

## (undated) DEVICE — BNDG ROLLER GAUZE CONFORM 3"X4YD 41-53

## (undated) DEVICE — SU VICRYL 3-0 PS-1 18" UND J683

## (undated) DEVICE — SUCTION CURETTE 3MM ENDOMETRIAL MX140

## (undated) DEVICE — BLADE SAW OSCILLATING STRYK MED 9.0X25X0.38MM 2296-003-111

## (undated) DEVICE — INTRO GLIDESHEATH SLENDER 6FR 10X45CM 60-1060

## (undated) DEVICE — GLOVE PROTEXIS BLUE W/NEU-THERA 8.0  2D73EB80

## (undated) DEVICE — MANIFOLD KIT ANGIO AUTOMATED 014613

## (undated) DEVICE — DRAPE STERI TOWEL LG 1010

## (undated) DEVICE — TUBING IV EXTENSION SET ANESTHESIA 34" MLL 2C6227

## (undated) DEVICE — DAVINCI XI DRAPE COLUMN 470341

## (undated) DEVICE — GLOVE PROTEXIS MICRO 8.0  2D73PM80

## (undated) DEVICE — SOL NACL 0.9% INJ 1000ML BAG 2B1324X

## (undated) DEVICE — DAVINCI XI SEAL UNIVERSAL 5-8MM 470361

## (undated) DEVICE — KIT HAND CONTROL ANGIOTOUCH ACIST 65CM AT-P65

## (undated) DEVICE — LUBRICANT INST ELECTROLUBE EL101

## (undated) DEVICE — ENDO TROCAR CONMED AIRSEAL BLADELESS 05X120MM IAS5-120LP

## (undated) DEVICE — SLEEVE TR BAND RADIAL COMPRESSION DEVICE 24CM TRB24-REG

## (undated) DEVICE — DAVINCI HOT SHEARS TIP COVER  400180

## (undated) DEVICE — PACK EXTREMITY SOP15EXFSD

## (undated) DEVICE — DRSG GAUZE 4X4" 3033

## (undated) RX ORDER — PROPOFOL 10 MG/ML
INJECTION, EMULSION INTRAVENOUS
Status: DISPENSED
Start: 2017-06-06

## (undated) RX ORDER — DEXAMETHASONE SODIUM PHOSPHATE 4 MG/ML
INJECTION, SOLUTION INTRA-ARTICULAR; INTRALESIONAL; INTRAMUSCULAR; INTRAVENOUS; SOFT TISSUE
Status: DISPENSED
Start: 2017-06-06

## (undated) RX ORDER — SCOLOPAMINE TRANSDERMAL SYSTEM 1 MG/1
PATCH, EXTENDED RELEASE TRANSDERMAL
Status: DISPENSED
Start: 2021-02-09

## (undated) RX ORDER — PHENAZOPYRIDINE HYDROCHLORIDE 200 MG/1
TABLET, FILM COATED ORAL
Status: DISPENSED
Start: 2021-02-09

## (undated) RX ORDER — FENTANYL CITRATE 50 UG/ML
INJECTION, SOLUTION INTRAMUSCULAR; INTRAVENOUS
Status: DISPENSED
Start: 2017-06-06

## (undated) RX ORDER — EPHEDRINE SULFATE 50 MG/ML
INJECTION, SOLUTION INTRAMUSCULAR; INTRAVENOUS; SUBCUTANEOUS
Status: DISPENSED
Start: 2021-02-09

## (undated) RX ORDER — CLINDAMYCIN PHOSPHATE 900 MG/50ML
INJECTION, SOLUTION INTRAVENOUS
Status: DISPENSED
Start: 2021-02-09

## (undated) RX ORDER — ONDANSETRON 2 MG/ML
INJECTION INTRAMUSCULAR; INTRAVENOUS
Status: DISPENSED
Start: 2017-06-06

## (undated) RX ORDER — OXYCODONE HYDROCHLORIDE 5 MG/1
TABLET ORAL
Status: DISPENSED
Start: 2017-06-06

## (undated) RX ORDER — FENTANYL CITRATE 50 UG/ML
INJECTION, SOLUTION INTRAMUSCULAR; INTRAVENOUS
Status: DISPENSED
Start: 2021-02-09

## (undated) RX ORDER — BUPIVACAINE HYDROCHLORIDE 5 MG/ML
INJECTION, SOLUTION EPIDURAL; INTRACAUDAL
Status: DISPENSED
Start: 2021-02-09

## (undated) RX ORDER — KETOROLAC TROMETHAMINE 30 MG/ML
INJECTION, SOLUTION INTRAMUSCULAR; INTRAVENOUS
Status: DISPENSED
Start: 2021-02-09

## (undated) RX ORDER — CLINDAMYCIN PHOSPHATE 900 MG/50ML
INJECTION, SOLUTION INTRAVENOUS
Status: DISPENSED
Start: 2017-06-06

## (undated) RX ORDER — PROPOFOL 10 MG/ML
INJECTION, EMULSION INTRAVENOUS
Status: DISPENSED
Start: 2021-02-09

## (undated) RX ORDER — ACETAMINOPHEN 325 MG/1
TABLET ORAL
Status: DISPENSED
Start: 2021-02-09